# Patient Record
Sex: MALE | Race: WHITE | NOT HISPANIC OR LATINO | Employment: OTHER | URBAN - METROPOLITAN AREA
[De-identification: names, ages, dates, MRNs, and addresses within clinical notes are randomized per-mention and may not be internally consistent; named-entity substitution may affect disease eponyms.]

---

## 2019-09-05 ENCOUNTER — APPOINTMENT (OUTPATIENT)
Dept: GENERAL RADIOLOGY | Facility: HOSPITAL | Age: 84
End: 2019-09-05

## 2019-09-05 ENCOUNTER — APPOINTMENT (OUTPATIENT)
Dept: CT IMAGING | Facility: HOSPITAL | Age: 84
End: 2019-09-05

## 2019-09-05 ENCOUNTER — HOSPITAL ENCOUNTER (INPATIENT)
Facility: HOSPITAL | Age: 84
LOS: 5 days | Discharge: HOME HEALTH | End: 2019-09-10
Attending: EMERGENCY MEDICINE | Admitting: INTERNAL MEDICINE

## 2019-09-05 ENCOUNTER — APPOINTMENT (OUTPATIENT)
Dept: CARDIOLOGY | Facility: HOSPITAL | Age: 84
End: 2019-09-05

## 2019-09-05 DIAGNOSIS — E87.20 LACTIC ACIDOSIS: ICD-10-CM

## 2019-09-05 DIAGNOSIS — A41.9 SEPSIS, DUE TO UNSPECIFIED ORGANISM: ICD-10-CM

## 2019-09-05 DIAGNOSIS — S22.39XA CLOSED FRACTURE OF ONE RIB, UNSPECIFIED LATERALITY, INITIAL ENCOUNTER: ICD-10-CM

## 2019-09-05 DIAGNOSIS — J18.9 PNEUMONIA DUE TO INFECTIOUS ORGANISM, UNSPECIFIED LATERALITY, UNSPECIFIED PART OF LUNG: ICD-10-CM

## 2019-09-05 DIAGNOSIS — S22.050A TRAUMATIC COMPRESSION FRACTURE OF T6 THORACIC VERTEBRA, CLOSED, INITIAL ENCOUNTER (HCC): ICD-10-CM
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
## 2019-09-05 DIAGNOSIS — I71.20 THORACIC AORTIC ANEURYSM WITHOUT RUPTURE (HCC): ICD-10-CM

## 2019-09-05 DIAGNOSIS — M62.82 NON-TRAUMATIC RHABDOMYOLYSIS: Primary | ICD-10-CM

## 2019-09-05 PROBLEM — N28.9 RENAL INSUFFICIENCY: Status: ACTIVE | Noted: 2019-09-05

## 2019-09-05 PROBLEM — E87.6 HYPOKALEMIA: Status: ACTIVE | Noted: 2019-09-05

## 2019-09-05 PROBLEM — R79.89 LACTATE BLOOD INCREASE: Status: ACTIVE | Noted: 2019-09-05

## 2019-09-05 PROBLEM — I71.40 ABDOMINAL AORTIC ANEURYSM (AAA) WITHOUT RUPTURE (HCC): Status: ACTIVE | Noted: 2019-09-05

## 2019-09-05 PROBLEM — E86.0 DEHYDRATION: Status: ACTIVE | Noted: 2019-09-05

## 2019-09-05 LAB
ALBUMIN SERPL-MCNC: 4.1 G/DL (ref 3.5–5.2)
ALBUMIN/GLOB SERPL: 1.1 G/DL
ALP SERPL-CCNC: 68 U/L (ref 39–117)
ALT SERPL W P-5'-P-CCNC: 40 U/L (ref 1–41)
ANION GAP SERPL CALCULATED.3IONS-SCNC: 16 MMOL/L (ref 5–15)
AST SERPL-CCNC: 208 U/L (ref 1–40)
BACTERIA UR QL AUTO: ABNORMAL /HPF
BASOPHILS # BLD MANUAL: 0 10*3/MM3 (ref 0–0.2)
BASOPHILS NFR BLD AUTO: 0 % (ref 0–1.5)
BILIRUB SERPL-MCNC: 0.6 MG/DL (ref 0.2–1.2)
BILIRUB UR QL STRIP: NEGATIVE
BUN BLD-MCNC: 18 MG/DL (ref 8–23)
BUN/CREAT SERPL: 10.5 (ref 7–25)
CALCIUM SPEC-SCNC: 8.9 MG/DL (ref 8.2–9.6)
CHLORIDE SERPL-SCNC: 103 MMOL/L (ref 98–107)
CK SERPL-CCNC: ABNORMAL U/L (ref 20–200)
CLARITY UR: ABNORMAL
CO2 SERPL-SCNC: 21 MMOL/L (ref 22–29)
COARSE GRAN CASTS URNS QL MICRO: ABNORMAL /LPF
COLOR UR: ABNORMAL
CREAT BLD-MCNC: 1.72 MG/DL (ref 0.76–1.27)
D-LACTATE SERPL-SCNC: 1.3 MMOL/L (ref 0.5–2)
D-LACTATE SERPL-SCNC: 2.8 MMOL/L (ref 0.5–2)
DEPRECATED RDW RBC AUTO: 56 FL (ref 37–54)
EOSINOPHIL # BLD MANUAL: 0 10*3/MM3 (ref 0–0.4)
EOSINOPHIL NFR BLD MANUAL: 0 % (ref 0.3–6.2)
ERYTHROCYTE [DISTWIDTH] IN BLOOD BY AUTOMATED COUNT: 17.8 % (ref 12.3–15.4)
GFR SERPL CREATININE-BSD FRML MDRD: 38 ML/MIN/1.73
GLOBULIN UR ELPH-MCNC: 3.8 GM/DL
GLUCOSE BLD-MCNC: 87 MG/DL (ref 65–99)
GLUCOSE UR STRIP-MCNC: NEGATIVE MG/DL
HCT VFR BLD AUTO: 39.6 % (ref 37.5–51)
HGB BLD-MCNC: 12.2 G/DL (ref 13–17.7)
HGB UR QL STRIP.AUTO: ABNORMAL
HOLD SPECIMEN: NORMAL
HYALINE CASTS UR QL AUTO: ABNORMAL /LPF
KETONES UR QL STRIP: ABNORMAL
LEUKOCYTE ESTERASE UR QL STRIP.AUTO: ABNORMAL
LYMPHOCYTES # BLD MANUAL: 1.09 10*3/MM3 (ref 0.7–3.1)
LYMPHOCYTES NFR BLD MANUAL: 4 % (ref 19.6–45.3)
LYMPHOCYTES NFR BLD MANUAL: 5 % (ref 5–12)
MAGNESIUM SERPL-MCNC: 2 MG/DL (ref 1.6–2.4)
MCH RBC QN AUTO: 26.8 PG (ref 26.6–33)
MCHC RBC AUTO-ENTMCNC: 30.8 G/DL (ref 31.5–35.7)
MCV RBC AUTO: 86.8 FL (ref 79–97)
MONOCYTES # BLD AUTO: 1.36 10*3/MM3 (ref 0.1–0.9)
MUCOUS THREADS URNS QL MICRO: ABNORMAL /HPF
NEUTROPHILS # BLD AUTO: 24.82 10*3/MM3 (ref 1.7–7)
NEUTROPHILS NFR BLD MANUAL: 80 % (ref 42.7–76)
NEUTS BAND NFR BLD MANUAL: 11 % (ref 0–5)
NITRITE UR QL STRIP: NEGATIVE
NT-PROBNP SERPL-MCNC: 5517 PG/ML (ref 5–1800)
PH UR STRIP.AUTO: 5.5 [PH] (ref 5–8)
PLAT MORPH BLD: NORMAL
PLATELET # BLD AUTO: 234 10*3/MM3 (ref 140–450)
PMV BLD AUTO: 9.8 FL (ref 6–12)
POTASSIUM BLD-SCNC: 3.1 MMOL/L (ref 3.5–5.2)
PROT SERPL-MCNC: 7.9 G/DL (ref 6–8.5)
PROT UR QL STRIP: ABNORMAL
RBC # BLD AUTO: 4.56 10*6/MM3 (ref 4.14–5.8)
RBC # UR: ABNORMAL /HPF
RBC MORPH BLD: NORMAL
REF LAB TEST METHOD: ABNORMAL
SODIUM BLD-SCNC: 140 MMOL/L (ref 136–145)
SP GR UR STRIP: 1.02 (ref 1–1.03)
SQUAMOUS #/AREA URNS HPF: ABNORMAL /HPF
TROPONIN T SERPL-MCNC: 0.03 NG/ML (ref 0–0.03)
UROBILINOGEN UR QL STRIP: ABNORMAL
WBC MORPH BLD: NORMAL
WBC NRBC COR # BLD: 27.28 10*3/MM3 (ref 3.4–10.8)
WBC UR QL AUTO: ABNORMAL /HPF
WHOLE BLOOD HOLD SPECIMEN: NORMAL
WHOLE BLOOD HOLD SPECIMEN: NORMAL

## 2019-09-05 PROCEDURE — 84484 ASSAY OF TROPONIN QUANT: CPT | Performed by: EMERGENCY MEDICINE

## 2019-09-05 PROCEDURE — 25010000002 CEFTRIAXONE PER 250 MG: Performed by: NURSE PRACTITIONER

## 2019-09-05 PROCEDURE — 93005 ELECTROCARDIOGRAM TRACING: CPT | Performed by: EMERGENCY MEDICINE

## 2019-09-05 PROCEDURE — 81001 URINALYSIS AUTO W/SCOPE: CPT | Performed by: EMERGENCY MEDICINE

## 2019-09-05 PROCEDURE — 25010000002 AZITHROMYCIN PER 500 MG: Performed by: NURSE PRACTITIONER

## 2019-09-05 PROCEDURE — 83880 ASSAY OF NATRIURETIC PEPTIDE: CPT | Performed by: NURSE PRACTITIONER

## 2019-09-05 PROCEDURE — 80053 COMPREHEN METABOLIC PANEL: CPT | Performed by: EMERGENCY MEDICINE

## 2019-09-05 PROCEDURE — A9270 NON-COVERED ITEM OR SERVICE: HCPCS | Performed by: INTERNAL MEDICINE

## 2019-09-05 PROCEDURE — 85025 COMPLETE CBC W/AUTO DIFF WBC: CPT | Performed by: EMERGENCY MEDICINE

## 2019-09-05 PROCEDURE — 71250 CT THORAX DX C-: CPT

## 2019-09-05 PROCEDURE — 85007 BL SMEAR W/DIFF WBC COUNT: CPT | Performed by: EMERGENCY MEDICINE

## 2019-09-05 PROCEDURE — 70450 CT HEAD/BRAIN W/O DYE: CPT

## 2019-09-05 PROCEDURE — 93010 ELECTROCARDIOGRAM REPORT: CPT | Performed by: INTERNAL MEDICINE

## 2019-09-05 PROCEDURE — 99285 EMERGENCY DEPT VISIT HI MDM: CPT

## 2019-09-05 PROCEDURE — 63710000001 POTASSIUM CHLORIDE 10 MEQ CAPSULE CONTROLLED-RELEASE: Performed by: INTERNAL MEDICINE

## 2019-09-05 PROCEDURE — 83605 ASSAY OF LACTIC ACID: CPT | Performed by: EMERGENCY MEDICINE

## 2019-09-05 PROCEDURE — 93971 EXTREMITY STUDY: CPT | Performed by: INTERNAL MEDICINE

## 2019-09-05 PROCEDURE — 93005 ELECTROCARDIOGRAM TRACING: CPT | Performed by: INTERNAL MEDICINE

## 2019-09-05 PROCEDURE — 99223 1ST HOSP IP/OBS HIGH 75: CPT | Performed by: INTERNAL MEDICINE

## 2019-09-05 PROCEDURE — 93971 EXTREMITY STUDY: CPT

## 2019-09-05 PROCEDURE — 71045 X-RAY EXAM CHEST 1 VIEW: CPT

## 2019-09-05 PROCEDURE — 83735 ASSAY OF MAGNESIUM: CPT | Performed by: EMERGENCY MEDICINE

## 2019-09-05 PROCEDURE — 63710000001 ACETAMINOPHEN 325 MG TABLET: Performed by: INTERNAL MEDICINE

## 2019-09-05 PROCEDURE — 63710000001 DOXYCYCLINE 100 MG CAPSULE: Performed by: INTERNAL MEDICINE

## 2019-09-05 PROCEDURE — 87040 BLOOD CULTURE FOR BACTERIA: CPT | Performed by: EMERGENCY MEDICINE

## 2019-09-05 PROCEDURE — 82550 ASSAY OF CK (CPK): CPT | Performed by: NURSE PRACTITIONER

## 2019-09-05 RX ORDER — MAGNESIUM SULFATE HEPTAHYDRATE 40 MG/ML
4 INJECTION, SOLUTION INTRAVENOUS AS NEEDED
Status: DISCONTINUED | OUTPATIENT
Start: 2019-09-05 | End: 2019-09-10 | Stop reason: HOSPADM

## 2019-09-05 RX ORDER — ACETAMINOPHEN 160 MG/5ML
650 SOLUTION ORAL EVERY 4 HOURS PRN
Status: DISCONTINUED | OUTPATIENT
Start: 2019-09-05 | End: 2019-09-10 | Stop reason: HOSPADM

## 2019-09-05 RX ORDER — HYDROCODONE BITARTRATE AND ACETAMINOPHEN 5; 325 MG/1; MG/1
1 TABLET ORAL EVERY 4 HOURS PRN
Status: DISCONTINUED | OUTPATIENT
Start: 2019-09-05 | End: 2019-09-10 | Stop reason: HOSPADM

## 2019-09-05 RX ORDER — SODIUM CHLORIDE 0.9 % (FLUSH) 0.9 %
10 SYRINGE (ML) INJECTION AS NEEDED
Status: DISCONTINUED | OUTPATIENT
Start: 2019-09-05 | End: 2019-09-10 | Stop reason: HOSPADM

## 2019-09-05 RX ORDER — SODIUM CHLORIDE, SODIUM LACTATE, POTASSIUM CHLORIDE, CALCIUM CHLORIDE 600; 310; 30; 20 MG/100ML; MG/100ML; MG/100ML; MG/100ML
100 INJECTION, SOLUTION INTRAVENOUS CONTINUOUS
Status: ACTIVE | OUTPATIENT
Start: 2019-09-05 | End: 2019-09-07

## 2019-09-05 RX ORDER — AMLODIPINE BESYLATE 5 MG/1
5 TABLET ORAL DAILY
Status: ON HOLD | COMMUNITY
End: 2019-09-18 | Stop reason: SDUPTHER

## 2019-09-05 RX ORDER — POTASSIUM CHLORIDE 7.45 MG/ML
10 INJECTION INTRAVENOUS
Status: DISCONTINUED | OUTPATIENT
Start: 2019-09-05 | End: 2019-09-10 | Stop reason: HOSPADM

## 2019-09-05 RX ORDER — POTASSIUM CHLORIDE 750 MG/1
40 CAPSULE, EXTENDED RELEASE ORAL AS NEEDED
Status: DISCONTINUED | OUTPATIENT
Start: 2019-09-05 | End: 2019-09-10 | Stop reason: HOSPADM

## 2019-09-05 RX ORDER — MAGNESIUM SULFATE HEPTAHYDRATE 40 MG/ML
2 INJECTION, SOLUTION INTRAVENOUS AS NEEDED
Status: DISCONTINUED | OUTPATIENT
Start: 2019-09-05 | End: 2019-09-10 | Stop reason: HOSPADM

## 2019-09-05 RX ORDER — SENNA AND DOCUSATE SODIUM 50; 8.6 MG/1; MG/1
2 TABLET, FILM COATED ORAL NIGHTLY
Status: DISCONTINUED | OUTPATIENT
Start: 2019-09-05 | End: 2019-09-10 | Stop reason: HOSPADM

## 2019-09-05 RX ORDER — DOXYCYCLINE 100 MG/1
100 CAPSULE ORAL EVERY 12 HOURS SCHEDULED
Status: DISCONTINUED | OUTPATIENT
Start: 2019-09-05 | End: 2019-09-10 | Stop reason: HOSPADM

## 2019-09-05 RX ORDER — POTASSIUM CHLORIDE 1.5 G/1.77G
40 POWDER, FOR SOLUTION ORAL AS NEEDED
Status: DISCONTINUED | OUTPATIENT
Start: 2019-09-05 | End: 2019-09-10 | Stop reason: HOSPADM

## 2019-09-05 RX ORDER — ONDANSETRON 4 MG/1
4 TABLET, FILM COATED ORAL EVERY 6 HOURS PRN
Status: DISCONTINUED | OUTPATIENT
Start: 2019-09-05 | End: 2019-09-10 | Stop reason: HOSPADM

## 2019-09-05 RX ORDER — SODIUM CHLORIDE 0.9 % (FLUSH) 0.9 %
10 SYRINGE (ML) INJECTION EVERY 12 HOURS SCHEDULED
Status: DISCONTINUED | OUTPATIENT
Start: 2019-09-05 | End: 2019-09-10 | Stop reason: HOSPADM

## 2019-09-05 RX ORDER — HYDRALAZINE HYDROCHLORIDE 50 MG/1
50 TABLET, FILM COATED ORAL 3 TIMES DAILY
COMMUNITY
End: 2019-09-10 | Stop reason: HOSPADM

## 2019-09-05 RX ORDER — ROSUVASTATIN CALCIUM 5 MG/1
5 TABLET, COATED ORAL DAILY
COMMUNITY
End: 2019-09-10 | Stop reason: HOSPADM

## 2019-09-05 RX ORDER — CHOLECALCIFEROL (VITAMIN D3) 125 MCG
5 CAPSULE ORAL NIGHTLY PRN
Status: DISCONTINUED | OUTPATIENT
Start: 2019-09-05 | End: 2019-09-10 | Stop reason: HOSPADM

## 2019-09-05 RX ORDER — ACETAMINOPHEN 650 MG/1
650 SUPPOSITORY RECTAL EVERY 4 HOURS PRN
Status: DISCONTINUED | OUTPATIENT
Start: 2019-09-05 | End: 2019-09-10 | Stop reason: HOSPADM

## 2019-09-05 RX ORDER — ACETAMINOPHEN 325 MG/1
650 TABLET ORAL EVERY 4 HOURS PRN
Status: DISCONTINUED | OUTPATIENT
Start: 2019-09-05 | End: 2019-09-10 | Stop reason: HOSPADM

## 2019-09-05 RX ORDER — ONDANSETRON 2 MG/ML
4 INJECTION INTRAMUSCULAR; INTRAVENOUS EVERY 6 HOURS PRN
Status: DISCONTINUED | OUTPATIENT
Start: 2019-09-05 | End: 2019-09-10 | Stop reason: HOSPADM

## 2019-09-05 RX ADMIN — POTASSIUM CHLORIDE 40 MEQ: 750 CAPSULE, EXTENDED RELEASE ORAL at 16:18

## 2019-09-05 RX ADMIN — ACETAMINOPHEN 650 MG: 325 TABLET ORAL at 14:55

## 2019-09-05 RX ADMIN — SODIUM CHLORIDE, POTASSIUM CHLORIDE, SODIUM LACTATE AND CALCIUM CHLORIDE 150 ML/HR: 600; 310; 30; 20 INJECTION, SOLUTION INTRAVENOUS at 11:55

## 2019-09-05 RX ADMIN — DOXYCYCLINE 100 MG: 100 CAPSULE ORAL at 11:55

## 2019-09-05 RX ADMIN — CEFTRIAXONE 2 G: 2 INJECTION, POWDER, FOR SOLUTION INTRAMUSCULAR; INTRAVENOUS at 10:04

## 2019-09-05 RX ADMIN — ACETAMINOPHEN 650 MG: 325 TABLET ORAL at 20:53

## 2019-09-05 RX ADMIN — SODIUM CHLORIDE, PRESERVATIVE FREE 10 ML: 5 INJECTION INTRAVENOUS at 20:55

## 2019-09-05 RX ADMIN — SODIUM CHLORIDE, POTASSIUM CHLORIDE, SODIUM LACTATE AND CALCIUM CHLORIDE 150 ML/HR: 600; 310; 30; 20 INJECTION, SOLUTION INTRAVENOUS at 19:00

## 2019-09-05 RX ADMIN — POTASSIUM CHLORIDE 40 MEQ: 750 CAPSULE, EXTENDED RELEASE ORAL at 11:57

## 2019-09-05 RX ADMIN — POTASSIUM CHLORIDE 40 MEQ: 750 CAPSULE, EXTENDED RELEASE ORAL at 20:53

## 2019-09-05 RX ADMIN — AZITHROMYCIN MONOHYDRATE 500 MG: 500 INJECTION, POWDER, LYOPHILIZED, FOR SOLUTION INTRAVENOUS at 10:42

## 2019-09-05 RX ADMIN — DOXYCYCLINE 100 MG: 100 CAPSULE ORAL at 20:53

## 2019-09-05 RX ADMIN — SODIUM CHLORIDE 1000 ML: 9 INJECTION, SOLUTION INTRAVENOUS at 10:04

## 2019-09-05 NOTE — ED PROVIDER NOTES
Subjective   Reports she got up to use the bathroom around 2:00 this morning he felt his legs get weak and he fell onto the ground.  He does not think he hit his head and was not knocked out.  He denies any pain from the fall.  He tells me he laid on the floor at about 7:00 periodically dozing off as he did not want to wake anyone up in the house to come get him.  He does have a small skin tear to his left elbow but denies any joint pain.  Family to bedside tells me he lives with them and typically is very mobile gets up and moves around can hold children etc. however now he can barely stand because he reports she is very weak.  He is apparently in A. fib right now but he denies any anticoagulation he also denies any sort of heart disease or previous stroke.  He tells me he did go to  several years ago for some heart problems where he was passing out but was not a good candidate for any sort of procedure.  As of right now he tells me he feels okay his family tells me he would most likely not tell me if he was feeling bad as that is his personality.        Fall   Mechanism of injury: fall    Injury location:  Head/neck  Time since incident:  6 hours  Arrived directly from scene: no    Suspicion of alcohol use: no    Suspicion of drug use: no    Prior to arrival data:     Patient ambulatory at scene: no      Loss of consciousness: no      Amnesic to event: no    Associated symptoms: no abdominal pain, no back pain, no chest pain, no headaches, no loss of consciousness, no nausea, no neck pain and no vomiting        Review of Systems   Constitutional: Negative for chills, diaphoresis and fever.   HENT: Negative for congestion and sore throat.    Respiratory: Negative for cough, choking, chest tightness, shortness of breath and wheezing.    Cardiovascular: Negative for chest pain and leg swelling.   Gastrointestinal: Negative for abdominal distention, abdominal pain, anal bleeding, blood in stool, constipation,  diarrhea, nausea and vomiting.   Genitourinary: Negative for difficulty urinating, dysuria, flank pain, frequency, hematuria and urgency.   Musculoskeletal: Negative for back pain and neck pain.   Neurological: Negative for loss of consciousness and headaches.   All other systems reviewed and are negative.      Past Medical History:   Diagnosis Date   • Cancer (CMS/HCC)     colon   • Hyperlipidemia    • Hypertension        No Known Allergies    Past Surgical History:   Procedure Laterality Date   • COLON SURGERY     • VASCULAR SURGERY         History reviewed. No pertinent family history.    Social History     Socioeconomic History   • Marital status:      Spouse name: Not on file   • Number of children: Not on file   • Years of education: Not on file   • Highest education level: Not on file   Tobacco Use   • Smoking status: Never Smoker   Substance and Sexual Activity   • Alcohol use: Yes     Frequency: Never     Comment: social   • Drug use: No   • Sexual activity: Defer           Objective   Physical Exam   Constitutional: He is oriented to person, place, and time. He appears distressed.   Frail. Ill appearing.   HENT:   Head: Normocephalic and atraumatic.   Right Ear: External ear normal.   Left Ear: External ear normal.   Nose: Nose normal.   Mouth/Throat: Oropharynx is clear and moist.   Small area of bruising on the top of his head.  Denies any pain there is no break in the skin.  There is no cervical spinal tenderness no pain with range of motion.   Eyes: Conjunctivae and EOM are normal. Pupils are equal, round, and reactive to light.   Neck: Normal range of motion. Neck supple.   Cardiovascular: Normal rate, regular rhythm, normal heart sounds and intact distal pulses.   Pulmonary/Chest: Effort normal and breath sounds normal.   Abdominal: Soft. Bowel sounds are normal.   Musculoskeletal: Normal range of motion.   She does have a small superficial less than 1 cm skin tear to his left elbow which is  not actively bleeding.  He does have good range of motion of his left elbow with no pain.    Has no spinal tenderness.  He has no pelvic pain there is no facial grimace with deep palpation and squeezing of his pelvis he does have limited range of motion in his legs rated to him telling me that his legs feel pretty weak and they are mild to moderately swollen which the family tells me is normal for him he usually wears compression garments.   Neurological: He is alert and oriented to person, place, and time.   Skin: Skin is warm and dry.   Psychiatric: He has a normal mood and affect. His behavior is normal. Judgment normal.       Critical Care  Performed by: Carlito Stone APRN  Authorized by: Natanael Rodriguez DO     Critical care provider statement:     Critical care time (minutes):  35    Critical care was necessary to treat or prevent imminent or life-threatening deterioration of the following conditions:  Metabolic crisis and dehydration    Critical care was time spent personally by me on the following activities:  Ordering and performing treatments and interventions, ordering and review of laboratory studies, ordering and review of radiographic studies, pulse oximetry, re-evaluation of patient's condition, review of old charts, obtaining history from patient or surrogate, examination of patient, evaluation of patient's response to treatment, discussions with consultants and development of treatment plan with patient or surrogate               ED Course  ED Course as of Sep 05 1637   Thu Sep 05, 2019   0954 Hospitalist paged.  Patient will receive critical care related to serial evaluations rhabdo pneumonia Case discussed with Dr. Rodriguez.  [MEHNAZ]   1011 Ua pending  [MEHNAZ]      ED Course User Index  [JM] Carlito Stone APRN        CT Chest Without Contrast   Preliminary Result   1. Mild emphysema/COPD with superimposed nonspecific diffuse groundglass   changes most pronounced involving the lingula and bilateral lower lobes    with mild interlobular septal thickening. This is favored to relate to   an infectious/inflammatory etiology superimposed on chronic interstitial   lung disease. Clinical correlation is required.   2. Fusiform aortic aneurysm measuring up to 6.1 cm involving the   ascending aorta with additional enlarged aortic arch and descending   thoracic aorta as described above.   3. Heavy calcified and noncalcified atherosclerotic disease of the   coronary arteries.   4. Age-indeterminate T6 compression deformity. Correlate with point   tenderness for further evaluation.   5. Remote appearing right lower lateral rib fracture is identified.       D:  09/05/2019   E:  09/05/2019          CT Head Without Contrast   Preliminary Result   1. No acute intracranial process appreciated.   2. Chronic senescent changes with diffuse cerebral atrophy most   pronounced involving the bilateral frontal lobes with associated chronic   microvascular ischemic change identified.   3. Favored remote left basal ganglia lacunar infarct.       D:  09/05/2019   E:  09/05/2019          XR Chest 1 View   Preliminary Result   1.  Tortuous and fusiform prominence of the thoracic aorta is suggested   measuring up to 6.4 cm which may be accentuated by the portable   technique of the film. Consider cross-sectional imaging for further   evaluation if underlying acute aortic pathology is of clinical concern.       2.  Coarsened interstitial lung changes noted diffusely without   superimposed evidence of pneumonia.       D:  09/05/2019   E:  09/05/2019                Final diagnoses:   Non-traumatic rhabdomyolysis   Pneumonia due to infectious organism, unspecified laterality, unspecified part of lung   Thoracic aortic aneurysm without rupture (CMS/HCC)   Fall, initial encounter   Traumatic compression fracture of T6 thoracic vertebra, closed, initial encounter (CMS/HCC)   Closed fracture of one rib, unspecified laterality, initial encounter   Lactic acidosis                MDM             Carlito Stone APRN  09/05/19 1011       Carlito Stone APRN  09/05/19 7128

## 2019-09-05 NOTE — PLAN OF CARE
Problem: Patient Care Overview  Goal: Plan of Care Review  Outcome: Ongoing (interventions implemented as appropriate)   09/05/19 9230   Coping/Psychosocial   Plan of Care Reviewed With patient   Plan of Care Review   Progress no change   OTHER   Outcome Summary Consulted to assess patient left elbow for a skin tear sustained from a fall prior to admission. Assessment performed. At this time skin tear is less then 1.0cm, with pink/white exposed dermal layer. Xeroform and foam dressing is already in place. Nothing is needed from C nurse. See order for care needs. Wound dressing and care daily. Will sign off. Thanks

## 2019-09-05 NOTE — H&P
"    [facility] Medicine Services  HISTORY AND PHYSICAL    Patient Name: Ash Diez  : 10/4/1928  MRN: [id number]  Primary Care Physician: Maria Isabel Espinosa MD  Date of admission: 2019      Subjective   Subjective     Chief Complaint: fall    HPI:  Ash Diez is a 89 y.o. male brought in after a fall at home. Per his daughter, the patient \"didn't look well yesterday.\" When asked about this the patient denies feeling poorly though says he didn't eat well. He awoke at 0200 this am to use restroom and was \"so weak in legs and arms that he fell.\" He was unable to get up and rather than call for help he laid in the floor until 0700 this morning. His only other complaint is that he has had congestion/drainage for the past couple of days. Denies f/c, cough.    Review of Systems   Gen- No fevers, chills  CV- No chest pain, palpitations  GI- No N/V/D, abd pain    All other systems reviewed and negative except any additional pertinent positives and negatives discussed in HPI.     All other systems reviewed and are negative.     Personal History     Past Medical History:   Diagnosis Date   • Cancer (CMS/HCC)     colon   • Hyperlipidemia    • Hypertension        Past Surgical History:   Procedure Laterality Date   • COLON SURGERY     • VASCULAR SURGERY         Family History: Otherwise pertinent FHx was reviewed and unremarkable.     Social History:  reports that he has never smoked. He does not have any smokeless tobacco history on file. He reports that he drinks alcohol. He reports that he does not use drugs.  Social History     Social History Narrative   • Not on file       Medications:    Available home medication information reviewed.    (Not in a hospital admission)    No Known Allergies    Objective   Objective     Vital Signs:   Temp:  [100.1 °F (37.8 °C)] 100.1 °F (37.8 °C)  Heart Rate:  [85] 85  Resp:  [18] 18  BP: (106-125)/(70-72) 106/72        Physical Exam   Constitutional: Awake, " alert  Eyes: PERRLA, sclerae anicteric, no conjunctival injection  HENT: NCAT, dry MM, contusion on posterior head  Neck: Supple, no thyromegaly, no lymphadenopathy, trachea midline  Respiratory: Clear to auscultation bilaterally, nonlabored respirations   Cardiovascular: RRR, no murmurs, rubs, or gallops, palpable pedal pulses bilaterally  Gastrointestinal: Positive bowel sounds, soft, nontender, nondistended  Musculoskeletal: Right and left leg edematous, but right leg with erythema and warmth. LUE as below, without TTP.  Psychiatric: Appropriate affect, cooperative  Neurologic: Oriented x 3, strength symmetric in all extremities, Cranial Nerves grossly intact to confrontation, speech clear  Skin: LUE with large ecchymosis    Results Reviewed:  I have personally reviewed current lab and radiology data.    Results from last 7 days   Lab Units 09/05/19  0751   WBC 10*3/mm3 27.28*   HEMOGLOBIN g/dL 12.2*   HEMATOCRIT % 39.6   PLATELETS 10*3/mm3 234     Results from last 7 days   Lab Units 09/05/19  0755 09/05/19  0751   SODIUM mmol/L  --  140   POTASSIUM mmol/L  --  3.1*   CHLORIDE mmol/L  --  103   CO2 mmol/L  --  21.0*   BUN mg/dL  --  18   CREATININE mg/dL  --  1.72*   GLUCOSE mg/dL  --  87   CALCIUM mg/dL  --  8.9   ALT (SGPT) U/L  --  40   AST (SGOT) U/L  --  208*   TROPONIN T ng/mL  --  0.030   PROBNP pg/mL  --  5,517.0*   LACTATE mmol/L 2.8*  --      Estimated Creatinine Clearance: 34.1 mL/min (A) (by C-G formula based on SCr of 1.72 mg/dL (H)).  Brief Urine Lab Results     None        CT chest personally reviewed with mild GGO bilaterally. Agree with interpretation.  Imaging Results (last 24 hours)     Procedure Component Value Units Date/Time    CT Chest Without Contrast [[id number]] Collected:  09/05/19 0910     Updated:  09/05/19 0944    Narrative:       EXAMINATION: CT CHEST WO CONTRAST-      INDICATION: Fall, WBC 27.      TECHNIQUE: Unenhanced CT imaging of the chest was performed. Additional  coronal  sagittal reformatted imaging was obtained for review.     The radiation dose reduction device was turned on for each scan per the  ALARA (As Low as Reasonably Achievable) protocol.     COMPARISON: None.     FINDINGS: Dedicated CT imaging of the lungs demonstrate nonspecific  subpleural reticulation with bibasilar groundglass airspace opacities  and micronodularity noted. Mild COPD/emphysema identified. Mild  bronchial wall thickening noted.     The ascending thoracic aorta demonstrates fusiform enlargement measuring  up to 6.1 cm. The sinus of Valsalva measures approximately 4.4 cm. The  sinotubular junction measures 4.1 cm. The fusiform enlargement of the  aorta extends throughout the aortic arch measuring 4.1 cm. The  descending thoracic aorta is tortuous and measures up to 4.3 cm. After  sclerotic calcifications of the thoracic aorta are identified. Lack of  intravenous contrast limits evaluation of underlying acute aortic  pathology and endoluminal evaluation. Although no inflammation  surrounding the aorta or displaced aortic wall calcification are  identified on the exam. The heart is not particularly enlarged. Aortic  annular calcifications are identified. Heavy coronary arterial  calcification are identified within noncalcified lack noted within the  coronary artery distribution. Trace pericardial fluid is identified. The  thoracic esophagus demonstrates mild small volume of air and fluid noted  throughout its lumen. No mediastinal mass or adenopathy identified. The  visualized upper abdomen does not demonstrate acute abnormality.  Granulomatous disease of the spleen, liver, and mediastinum are  identified. Thoracolumbar degenerative changes are identified. An  age-indeterminate T6 compression fracture is identified. No retropulsion  appreciated. The ribs appear intact.       Impression:       1. Mild emphysema/COPD with superimposed nonspecific diffuse groundglass  changes most pronounced involving the  lingula and bilateral lower lobes  with mild interlobular septal thickening. This is favored to relate to  an infectious/inflammatory etiology superimposed on chronic interstitial  lung disease. Clinical correlation is required.  2. Fusiform aortic aneurysm measuring up to 6.1 cm involving the  ascending aorta with additional enlarged aortic arch and descending  thoracic aorta as described above.  3. Heavy calcified and noncalcified atherosclerotic disease of the  coronary arteries.  4. Age-indeterminate T6 compression deformity. Correlate with point  tenderness for further evaluation.  5. Remote appearing right lower lateral rib fracture is identified.     D:  09/05/2019  E:  09/05/2019       CT Head Without Contrast [[id number]] Collected:  09/05/19 0906     Updated:  09/05/19 0923    Narrative:       EXAMINATION: CT HEAD WO CONTRAST-      INDICATION: Fall.     TECHNIQUE: Unenhanced CT imaging of the brain was performed.     The radiation dose reduction device was turned on for each scan per the  ALARA (As Low as Reasonably Achievable) protocol.     COMPARISON: None.     FINDINGS: Unenhanced CT imaging of the brain was performed without  intravenous contrast which demonstrates no evidence of midline shift. No  hydrocephalus. Diffuse cerebral atrophy most pronounced involving the  frontal lobes are identified with slight asymmetry involving the right  frontal lobe. No evidence for extraaxial fluid collection or  intracranial hemorrhage identified. Bilateral basal ganglia  calcifications are noted. The gray-white matter junction is largely  maintained without CT evidence of transcortical infarct, however CT is  insensitive for the presence of underlying ischemia. MRI would be more  sensitive if clinically relevant. Confluent hypoattenuating white matter  changes are identified likely relating to chronic microvascular ischemic  change.  Hypoattenuated region involving the left caudate head  identified. Favored to be  representative of a remote lacunar infarct.       Impression:       1. No acute intracranial process appreciated.  2. Chronic senescent changes with diffuse cerebral atrophy most  pronounced involving the bilateral frontal lobes with associated chronic  microvascular ischemic change identified.  3. Favored remote left basal ganglia lacunar infarct.     D:  09/05/2019  E:  09/05/2019       XR Chest 1 View [[id number]] Collected:  09/05/19 0822     Updated:  09/05/19 0837    Narrative:       EXAMINATION: XR CHEST 1 VW-09/05/2019:      INDICATION: Weak/Dizzy/AMS, triage protocol.      COMPARISON: NONE.     FINDINGS: Single portable chest radiograph was submitted for review. The  heart is not enlarged. The thoracic aorta is tortuous and appears  enlarged measuring up to 6.4 cm, although is slightly accentuated by the  portable technique of the film. Coarsened interstitial lung changes are  identified. Remote appearing right lateral lower rib fractures noted. No  convincing evidence of superimposed airspace disease. No pleural  effusion or pneumothorax. The visualized upper abdomen is unrevealing.  No acute osseous abnormality appreciated.           Impression:       1.  Tortuous and fusiform prominence of the thoracic aorta is suggested  measuring up to 6.4 cm which may be accentuated by the portable  technique of the film. Consider cross-sectional imaging for further  evaluation if underlying acute aortic pathology is of clinical concern.     2.  Coarsened interstitial lung changes noted diffusely without  superimposed evidence of pneumonia.     D:  09/05/2019  E:  09/05/2019                   Assessment/Plan   Assessment / Plan     Active Hospital Problems    Diagnosis POA   • **Sepsis (CMS/HCC) [A41.9] Yes   • Non-traumatic rhabdomyolysis [M62.82] Yes   • Pneumonia [J18.9] Yes   • Renal insufficiency [N28.9] Yes   • Dehydration [E86.0] Yes   • Hypokalemia [E87.6] Yes   • Lactate blood increase [R79.89] Yes   •  "Abdominal aortic aneurysm (AAA) without rupture (CMS/[facility]) [I71.4] Yes   • Metabolic acidosis [E87.2] Yes     89 y/o male presenting after a fall at home with sepsis due to pna and RLE cellulitis also with rhabdomyolysis.    A/P:  --Cultures pending including sputum culture. Continue IV rocephin and doxy for his PNA and RLE cellulitis.  --Continue IVF and repeat CK in am. Hold statin, antihypertensives.   --I suspect he has some underlying mild CKD though I am unclear as to what his baseline renal function is. IV fluids as above and recheck bmp in am.  --Reflex lactate.  --RLE duplex to r/o DVT.  --Had admission at [facility] for valvular heart disease and syncope related to that. During that visit he was seen by cardiology and CT surgery (for his AAA.) At that time intervention was recommended against. He can follow up with them as needed upon d/c.  --Has \"remote\" stroke mentioned on CT head. Patient denies any associated symptoms with this. Prior to d/c can discuss addition of asa to his statin for ppx.  --Replace K+.  --PT/OT. CM.    DVT prophylaxis: Mechanical on LLE    CODE STATUS:    Code Status and Medical Interventions:   Ordered at: 09/05/19 1045     Limited Support to NOT Include:    Intubation     Code Status:    No CPR     Medical Interventions (Level of Support Prior to Arrest):    Limited       Admission Status:  I believe this patient meets INPATIENT status due to need for IV fluids, repeat labs, IV abx.  I feel patient’s risk for adverse outcomes and need for care warrant INPATIENT evaluation and I predict the patient’s care encounter to likely last beyond 2 midnights.    Electronically signed by Tammy Cobb II, DO, 09/05/19, 10:51 AM.      "

## 2019-09-05 NOTE — PROGRESS NOTES
Discharge Planning Assessment  [facility]     Patient Name: Ash Diez  MRN: [id number]  Today's Date: 9/5/2019    Admit Date: 9/5/2019    Discharge Needs Assessment     Row Name 09/05/19 1608       Living Environment    Lives With  grandchild(arthur);other relative(s)    Name(s) of Who Lives With Patient  Gregoria Lerma's  and 2 kids.     Current Living Arrangements  home/apartment/condo [place] [facility] [place] [facility]     Primary Care Provided by  self    Provides Primary Care For  no one, unable/limited ability to care for self    Family Caregiver if Needed  grandchild(arthur), adult    Quality of Family Relationships  helpful;involved;supportive    Able to Return to Prior Arrangements  yes       Resource/Environmental Concerns    Resource/Environmental Concerns  none    Transportation Concerns  car, none       Transition Planning    Patient/Family Anticipates Transition to  home with family    Patient/Family Anticipated Services at Transition      Transportation Anticipated  family or friend will provide       Discharge Needs Assessment    Concerns to be Addressed  other (see comments) Will need a new PCP     Equipment Currently Used at Home  other (see comments) [facility]    Anticipated Changes Related to Illness  none    Equipment Needed After Discharge  none        Discharge Plan     Row Name 09/05/19 1618       Plan    Plan  Home with [facility]    Patient/Family in Agreement with Plan  yes    Plan Comments  Met with pt and his [facility] at [facility]. He is independent of ADL's. He just moved to live with his [facility] 2 weeks ago from [place]. He doesn't have medication coverage but meds at his [facility] have been affordable. He is agreeable to a new PCP and Meds to Beds. He plans to return home with his [facility] at d/c with no needs. CM will call for a new PCP closer to d/c.     Final Discharge Disposition Code  01 - home or self-care         Destination      No service coordination in this encounter.      Durable Medical Equipment      No service coordination in this encounter.      Dialysis/Infusion      No service coordination in this encounter.      Home Medical Care      No service coordination in this encounter.      Therapy      No service coordination in this encounter.      Community Resources      No service coordination in this encounter.          Demographic Summary     Row Name 09/05/19 1555       General Information    Referral Source  admission list;nursing    Reason for Consult  other (see comments) Assessment     Used During This Interaction  no    General Information Comments  PCP is Maria Isabel Espinosa in [place]. Agreeable to new PCP here.        Contact Information    Permission Granted to Share Info With  ;family/designee Jax    Contact Information Comments  Gregoria [phone]        Functional Status     Row Name 09/05/19 1606       Functional Status    Usual Activity Tolerance  good    Current Activity Tolerance  good       Functional Status, IADL    Medications  assistive person    Meal Preparation  assistive person    Housekeeping  assistive person    Laundry  assistive person    Shopping  assistive person    IADL Comments  Pt is active. Pt is able to cook and clean but his jaclyn does it.        Employment/    Employment/ Comments  Has Medicare [employer]B and [employer]. States he has no medication coverage but copays have been affordable. Used [facility] in the past.         Psychosocial    No documentation.       Abuse/Neglect    No documentation.       Legal    No documentation.       Substance Abuse    No documentation.       Patient Forms    No documentation.           Rupa Tian RN

## 2019-09-05 NOTE — PLAN OF CARE
Problem: Patient Care Overview  Goal: Plan of Care Review  Outcome: Ongoing (interventions implemented as appropriate)   09/05/19 1701   Coping/Psychosocial   Plan of Care Reviewed With patient;family   Plan of Care Review   Progress no change   OTHER   Outcome Summary VSS, Max Temp 99, Afib on monitor, remains on RA. Left skin tear noted to left elbow s/p fall. LR infusing at 150 ml/hr per order. Tylenol given for headache. Awaiting duplex RLE. RLE reddened/hot. K replaced per protocol. LBM today. Denies any other needs/complaints. Will continue to monitor.

## 2019-09-06 LAB
ANION GAP SERPL CALCULATED.3IONS-SCNC: 11 MMOL/L (ref 5–15)
BH CV ECHO MEAS - BSA(HAYCOCK): 2.2 M^2
BH CV ECHO MEAS - BSA: 2.2 M^2
BH CV ECHO MEAS - BZI_BMI: 28.3 KILOGRAMS/M^2
BH CV ECHO MEAS - BZI_METRIC_HEIGHT: 182.9 CM
BH CV ECHO MEAS - BZI_METRIC_WEIGHT: 94.8 KG
BH CV LOWER VASCULAR LEFT COMMON FEMORAL AUGMENT: NORMAL
BH CV LOWER VASCULAR LEFT COMMON FEMORAL COMPRESS: NORMAL
BH CV LOWER VASCULAR LEFT COMMON FEMORAL PHASIC: NORMAL
BH CV LOWER VASCULAR LEFT COMMON FEMORAL SPONT: NORMAL
BH CV LOWER VASCULAR RIGHT COMMON FEMORAL AUGMENT: NORMAL
BH CV LOWER VASCULAR RIGHT COMMON FEMORAL COMPRESS: NORMAL
BH CV LOWER VASCULAR RIGHT COMMON FEMORAL PHASIC: NORMAL
BH CV LOWER VASCULAR RIGHT COMMON FEMORAL SPONT: NORMAL
BH CV LOWER VASCULAR RIGHT DISTAL FEMORAL COMPRESS: NORMAL
BH CV LOWER VASCULAR RIGHT GASTRONEMIUS COMPRESS: NORMAL
BH CV LOWER VASCULAR RIGHT GREATER SAPH AK COMPRESS: NORMAL
BH CV LOWER VASCULAR RIGHT GREATER SAPH BK COMPRESS: NORMAL
BH CV LOWER VASCULAR RIGHT LESSER SAPH COMPRESS: NORMAL
BH CV LOWER VASCULAR RIGHT MID FEMORAL AUGMENT: NORMAL
BH CV LOWER VASCULAR RIGHT MID FEMORAL COMPRESS: NORMAL
BH CV LOWER VASCULAR RIGHT MID FEMORAL PHASIC: NORMAL
BH CV LOWER VASCULAR RIGHT MID FEMORAL SPONT: NORMAL
BH CV LOWER VASCULAR RIGHT PERONEAL COMPRESS: NORMAL
BH CV LOWER VASCULAR RIGHT POPLITEAL AUGMENT: NORMAL
BH CV LOWER VASCULAR RIGHT POPLITEAL COMPRESS: NORMAL
BH CV LOWER VASCULAR RIGHT POPLITEAL PHASIC: NORMAL
BH CV LOWER VASCULAR RIGHT POPLITEAL SPONT: NORMAL
BH CV LOWER VASCULAR RIGHT POSTERIOR TIBIAL COMPRESS: NORMAL
BH CV LOWER VASCULAR RIGHT PROFUNDA FEMORAL COMPRESS: NORMAL
BH CV LOWER VASCULAR RIGHT PROXIMAL FEMORAL COMPRESS: NORMAL
BH CV LOWER VASCULAR RIGHT SAPHENOFEMORAL JUNCTION AUGMENT: NORMAL
BH CV LOWER VASCULAR RIGHT SAPHENOFEMORAL JUNCTION COMPRESS: NORMAL
BH CV LOWER VASCULAR RIGHT SAPHENOFEMORAL JUNCTION PHASIC: NORMAL
BH CV LOWER VASCULAR RIGHT SAPHENOFEMORAL JUNCTION SPONT: NORMAL
BUN BLD-MCNC: 24 MG/DL (ref 8–23)
BUN/CREAT SERPL: 15.6 (ref 7–25)
CALCIUM SPEC-SCNC: 8.5 MG/DL (ref 8.2–9.6)
CHLORIDE SERPL-SCNC: 106 MMOL/L (ref 98–107)
CK SERPL-CCNC: ABNORMAL U/L (ref 20–200)
CO2 SERPL-SCNC: 21 MMOL/L (ref 22–29)
CREAT BLD-MCNC: 1.54 MG/DL (ref 0.76–1.27)
DEPRECATED RDW RBC AUTO: 58.2 FL (ref 37–54)
ERYTHROCYTE [DISTWIDTH] IN BLOOD BY AUTOMATED COUNT: 18.3 % (ref 12.3–15.4)
GFR SERPL CREATININE-BSD FRML MDRD: 43 ML/MIN/1.73
GLUCOSE BLD-MCNC: 103 MG/DL (ref 65–99)
HCT VFR BLD AUTO: 34.8 % (ref 37.5–51)
HGB BLD-MCNC: 10.7 G/DL (ref 13–17.7)
MCH RBC QN AUTO: 26.9 PG (ref 26.6–33)
MCHC RBC AUTO-ENTMCNC: 30.7 G/DL (ref 31.5–35.7)
MCV RBC AUTO: 87.4 FL (ref 79–97)
PLATELET # BLD AUTO: 179 10*3/MM3 (ref 140–450)
PMV BLD AUTO: 10.2 FL (ref 6–12)
POTASSIUM BLD-SCNC: 4.3 MMOL/L (ref 3.5–5.2)
POTASSIUM BLD-SCNC: 4.4 MMOL/L (ref 3.5–5.2)
RBC # BLD AUTO: 3.98 10*6/MM3 (ref 4.14–5.8)
SODIUM BLD-SCNC: 138 MMOL/L (ref 136–145)
WBC NRBC COR # BLD: 18.97 10*3/MM3 (ref 3.4–10.8)

## 2019-09-06 PROCEDURE — 99232 SBSQ HOSP IP/OBS MODERATE 35: CPT | Performed by: INTERNAL MEDICINE

## 2019-09-06 PROCEDURE — 80048 BASIC METABOLIC PNL TOTAL CA: CPT | Performed by: INTERNAL MEDICINE

## 2019-09-06 PROCEDURE — 97165 OT EVAL LOW COMPLEX 30 MIN: CPT

## 2019-09-06 PROCEDURE — 85027 COMPLETE CBC AUTOMATED: CPT | Performed by: INTERNAL MEDICINE

## 2019-09-06 PROCEDURE — 82550 ASSAY OF CK (CPK): CPT | Performed by: INTERNAL MEDICINE

## 2019-09-06 PROCEDURE — 97535 SELF CARE MNGMENT TRAINING: CPT

## 2019-09-06 PROCEDURE — 84132 ASSAY OF SERUM POTASSIUM: CPT | Performed by: INTERNAL MEDICINE

## 2019-09-06 PROCEDURE — 97161 PT EVAL LOW COMPLEX 20 MIN: CPT

## 2019-09-06 PROCEDURE — 25010000002 CEFTRIAXONE PER 250 MG: Performed by: INTERNAL MEDICINE

## 2019-09-06 PROCEDURE — 63710000001 DOXYCYCLINE 100 MG CAPSULE: Performed by: INTERNAL MEDICINE

## 2019-09-06 PROCEDURE — A9270 NON-COVERED ITEM OR SERVICE: HCPCS | Performed by: INTERNAL MEDICINE

## 2019-09-06 RX ADMIN — SODIUM CHLORIDE, POTASSIUM CHLORIDE, SODIUM LACTATE AND CALCIUM CHLORIDE 100 ML/HR: 600; 310; 30; 20 INJECTION, SOLUTION INTRAVENOUS at 21:54

## 2019-09-06 RX ADMIN — SODIUM CHLORIDE, PRESERVATIVE FREE 10 ML: 5 INJECTION INTRAVENOUS at 20:09

## 2019-09-06 RX ADMIN — ACETAMINOPHEN 650 MG: 325 TABLET ORAL at 20:22

## 2019-09-06 RX ADMIN — DOXYCYCLINE 100 MG: 100 CAPSULE ORAL at 20:09

## 2019-09-06 RX ADMIN — CEFTRIAXONE 1 G: 1 INJECTION, POWDER, FOR SOLUTION INTRAMUSCULAR; INTRAVENOUS at 10:55

## 2019-09-06 RX ADMIN — SODIUM CHLORIDE, POTASSIUM CHLORIDE, SODIUM LACTATE AND CALCIUM CHLORIDE 150 ML/HR: 600; 310; 30; 20 INJECTION, SOLUTION INTRAVENOUS at 02:24

## 2019-09-06 RX ADMIN — SODIUM CHLORIDE, POTASSIUM CHLORIDE, SODIUM LACTATE AND CALCIUM CHLORIDE 150 ML/HR: 600; 310; 30; 20 INJECTION, SOLUTION INTRAVENOUS at 08:09

## 2019-09-06 RX ADMIN — DOXYCYCLINE 100 MG: 100 CAPSULE ORAL at 08:09

## 2019-09-06 NOTE — PLAN OF CARE
Problem: Patient Care Overview  Goal: Plan of Care Review  Outcome: Ongoing (interventions implemented as appropriate)   09/06/19 6838   Coping/Psychosocial   Plan of Care Reviewed With patient   OTHER   Outcome Summary PT initial evaluation completed for pt presenting with generalized weakness, impaired balance, and decreased functional mobility. Pt ambulated 250ft with CGA. Recommend use of RW for future ambulation. Pt's decreased independence warrants PT skilled care. Recommend D/C home with assistance and  PT services.

## 2019-09-06 NOTE — PROGRESS NOTES
"Transitional Care Follow Up Visit  Subjective     Ash Diez is a 89 y.o. male who presents for a transitional care management visit.    Within 48 business hours after discharge our office contacted him via telephone to coordinate his care and needs.      I reviewed and discussed the details of that call along with the discharge summary, hospital problems, inpatient lab results, inpatient diagnostic studies, and consultation reports with Ash.     Current outpatient and discharge medications have been reconciled for the patient.    Date of TCM Phone Call 9/12/2019   Hospital [facility]   Date of Admission 9/5/2019   Date of Discharge 9/10/2019   Discharge Disposition [facility] [place]     Risk for Readmission (LACE) Score: 8 (9/10/2019  6:01 AM)    Chief Complaint   Patient presents with   • Transitional Care Management     Here with daughter    History of Present Illness   Course During Hospital Stay:      89 year-old male admitted to the [facility] 9/5-9/10 after presenting with weakness following fall at home.  He was diagnosed with sepsis due to pneumonia, right lower extremity cellulitis and rhabdomyolysis.  CT chest showed bilateral lower lobe pneumonia.  Blood cultures were no growth to date.  He was treated with ceftriaxone and doxycycline for pneumonia and additionally the RLE cellulitis.  Rhabdomyolysis was resolving, CK trending down to normal (was 1759 on day of discharge).  Statin has been on hold and is deferred to PCP about 1 to restart.  RLE duplex negative for DVT.  CT head showed possible remote CVA.  To discuss potential ASA with statin at PCP visit.  Declined rehab and was walking well prior to discharge.  Was discharged with home health for skilled nursing and PT. he completed a total of 7 days of antibiotics (discharged with cefuroxime and doxy).    Since his discharge home he states that his breathing \"is fair.\"  He is not having much cough or sputum production.  " Still feels a little short of breath at times.  His right lower extremity still swollen and warm but not worse than before.  He is not having any fevers or chills.    Breathing fair  Leg still warm  166/90  140-160's/50's  No diuretics due to AAA  Compression stockings    Chronic conditions:  1.  Hypertension:  On amlodipine 5 mg daily only currently. Per home health nurse, patient's blood pressure has been running 160s over 90s.  Prior to hospitalization blood pressure was running 140s-160s over 50s.  He was on hydralazine 50 mg 3 times daily and amlodipine 5 mg daily prior to hospitalization, patient is unsure why he was taken off hydralazine.    2.  Hyperlipidemia:  Had previously been on Crestor 5 mg daily prior to hospitalization as above.  He believes Lipitor caused leg weakness and was stopped.  Daughter is wondering if Crestor was the reason for his leg weakness in the setting of presenting to the hospital as above.    Lab Results   Component Value Date    WBC 9.27 09/09/2019    HGB 10.5 (L) 09/09/2019    HCT 34.2 (L) 09/09/2019    MCV 86.4 09/09/2019     09/09/2019     Lab Results   Component Value Date    GLUCOSE 106 (H) 09/09/2019    BUN 15 09/09/2019    CREATININE 1.04 09/09/2019    EGFRIFNONA 67 09/09/2019    BCR 14.4 09/09/2019    K 3.7 09/09/2019    CO2 24.0 09/09/2019    CALCIUM 8.3 09/09/2019    ALBUMIN 4.10 09/05/2019     (H) 09/05/2019    ALT 40 09/05/2019         Past Medical History:   Diagnosis Date   • Cancer (CMS/HCC)     colon   • Hyperlipidemia    • Hypertension      Past Surgical History:   Procedure Laterality Date   • COLON SURGERY     • VASCULAR SURGERY      Vein removal       Current Outpatient Medications:   •  amLODIPine (NORVASC) 5 MG tablet, Take 5 mg by mouth Daily., Disp: , Rfl:   •  cefuroxime (CEFTIN) 250 MG tablet, Take 1 tablet by mouth 2 (Two) Times a Day for 3 days., Disp: 6 tablet, Rfl: 0  •  doxycycline (VIBRAMYICN) 100 MG tablet, Take 1 tablet by mouth 2  (Two) Times a Day for 3 days., Disp: 6 tablet, Rfl: 0  •  hydrALAZINE (APRESOLINE) 50 MG tablet, Take 50 mg by mouth 3 (Three) Times a Day., Disp: , Rfl:   •  rosuvastatin (CRESTOR) 5 MG tablet, Take 5 mg by mouth Daily., Disp: , Rfl:      No Known Allergies     Family History   Problem Relation Age of Onset   • Arthritis Mother    • Heart attack Mother    • Stroke Father    • Stroke Sister    • Stroke Brother      Social History     Socioeconomic History   • Marital status:      Spouse name: Not on file   • Number of children: Not on file   • Years of education: Not on file   • Highest education level: Not on file   Tobacco Use   • Smoking status: Never Smoker   • Smokeless tobacco: Never Used   Substance and Sexual Activity   • Alcohol use: Yes     Frequency: Never     Comment: social   • Drug use: No   • Sexual activity: Defer       Review of Systems   Constitutional: Negative for activity change, appetite change and fever.   HENT: Negative for congestion, sneezing and sore throat.    Eyes: Negative for discharge and visual disturbance.   Respiratory: Positive for cough and shortness of breath.    Cardiovascular: Positive for leg swelling. Negative for chest pain and palpitations.   Gastrointestinal: Negative for abdominal distention, abdominal pain, blood in stool, constipation, nausea and vomiting.   Endocrine: Negative for cold intolerance and heat intolerance.   Genitourinary: Negative for dysuria.   Musculoskeletal: Negative for neck stiffness.   Skin: Negative for rash.   Allergic/Immunologic: Negative for environmental allergies and food allergies.   Neurological: Negative for headache.   Hematological: Negative for adenopathy.   Psychiatric/Behavioral: Negative for depressed mood.       Objective   Vitals:    09/13/19 1429   BP: 162/92   Pulse: 77   Resp: 16   Temp: 97.8 °F (36.6 °C)   SpO2: 99%     Physical Exam   Constitutional: He is oriented to person, place, and time. He appears  well-developed and well-nourished. No distress.   HENT:   Head: Normocephalic and atraumatic.   Right Ear: External ear normal.   Left Ear: External ear normal.   Mouth/Throat: Oropharynx is clear and moist. No oropharyngeal exudate.   Eyes: Conjunctivae are normal. Right eye exhibits no discharge. Left eye exhibits no discharge. No scleral icterus.   Neck: Normal range of motion. Neck supple. No thyromegaly present.   Cardiovascular: Normal rate and regular rhythm. Exam reveals no gallop and no friction rub.   Murmur (systolic 3/6) heard.  Pulmonary/Chest: Effort normal and breath sounds normal. No stridor. No respiratory distress. He has no wheezes. He has no rales.   Abdominal: Soft. Bowel sounds are normal. He exhibits no distension and no mass. There is no tenderness. There is no rebound and no guarding.   Musculoskeletal:   Distal RLE has 2+ pitting edema with slight warmth and erythema, predominantly anteriorly.  LLE appears normal with only trace pitting edema.   Lymphadenopathy:     He has no cervical adenopathy.   Neurological: He is alert and oriented to person, place, and time. He exhibits normal muscle tone.   Skin: Skin is warm and dry. Capillary refill takes less than 2 seconds. He is not diaphoretic.   Psychiatric: He has a normal mood and affect.   Vitals reviewed.      Assessment and Plan: 89 y.o. male here for:  Essential hypertension  Stable.  Not quite at goal of less than 140/90, however with recent hospitalization, age, weakness and history of falls will avoid uptitrating medications at current visit.  Continue to monitor BP at home and at 2-week follow-up if still elevated above 160/90 may consider increasing amlodipine cautiously.    Hyperlipidemia  Currently off of Crestor 5 mg daily in the setting of recent rhabdo.  Has been intolerant to Lipitor secondary to myalgias/muscle weakness.  No recent lipid panel.  Will plan to check fasting lipid panel at next visit and provided LFTs/CK have  normalized, discussed risk/benefits of restarting.  Due to age, may no longer need statin.    Heart murmur  Likely has some aortic valve disease.  No recent echo.  Will order.  Pending this, may benefit from cardiology evaluation to help manage fluid status.    Pneumonia  Lungs are clear and room air O2 sat is stable.  Continue to monitor.  Reports of shortness of breath may be related to cardiac status as above or deconditioning.    Non-traumatic rhabdomyolysis  Repeat CPK today.    Anemia  H/H 10.5/34.2 on 9/9.  May be related to IV fluids given during recent hospital stay.  Will repeat CBC today.  If anemia persists, discussed risk/benefits of further work-up (i.e. endoscopy) due to age.    Cellulitis  RLE still remains warm/erythematous, asymmetrical versus left.  No worsening swelling since discharge per patient.  Concern for ongoing cellulitis.  Will extend 3-day course of cefuroxime and doxycycline (total approximately 10 days of antibiotics).  May also have some venous insufficiency.  RLE duplex was negative for DVT during recent hospitalization which is reassuring.  If he has any further spreading erythema, chills, recurrent fevers, worsening swelling over the weekend he would need to see the ER as he would have failed outpatient management.  Have also recommended compression stockings in the meantime along with leg elevation and watching sodium intake.    Healthcare maintenance:  1.  Patient states to me that he wishes to be DNR/DNI.  He does not have a living will or advanced directive.  I have provided him with information today to have this completed so we can document this for the record.  2.  Work on obtaining records from [facility] hospitalization regarding AAA.  Certainly due to age and otherwise stable condition, not likely a surgical candidate and patient indicates today that he would likely refuse any intervention.    Return in about 2 weeks (around 9/27/2019) for Follow-up EXTENDED 30 minutes fasting.      Shirin To MD  9/13/2019

## 2019-09-06 NOTE — THERAPY EVALUATION
Patient Name: Ash Diez  : 10/4/1928    MRN: [id number]                              Today's Date: 2019       Admit Date: 2019    Visit Dx:     ICD-10-CM ICD-9-CM   1. Non-traumatic rhabdomyolysis M62.82 728.88   2. Pneumonia due to infectious organism, unspecified laterality, unspecified part of lung J18.9 136.9     484.8   3. Thoracic aortic aneurysm without rupture (CMS/[facility]) I71.2 441.2   4. Fall, initial encounter W19.XXXA E888.9   5. Traumatic compression fracture of T6 thoracic vertebra, closed, initial encounter (CMS/[facility]) S22.050A 805.2   6. Closed fracture of one rib, unspecified laterality, initial encounter S22.39XA 807.01   7. Lactic acidosis E87.2 276.2     Patient Active Problem List   Diagnosis   • Non-traumatic rhabdomyolysis   • Sepsis (CMS/[facility])   • Pneumonia   • Renal insufficiency   • Dehydration   • Hypokalemia   • Lactate blood increase   • Abdominal aortic aneurysm (AAA) without rupture (CMS/[facility])   • Metabolic acidosis     Past Medical History:   Diagnosis Date   • Cancer (CMS/[facility])     colon   • Hyperlipidemia    • Hypertension      Past Surgical History:   Procedure Laterality Date   • COLON SURGERY     • VASCULAR SURGERY       General Information     Row Name 19 1129          PT Evaluation Time/Intention    Document Type  evaluation  -KR     Mode of Treatment  physical therapy  -KR     Row Name 19 1129          General Information    Patient Profile Reviewed?  yes  -KR     Prior Level of Function  independent:;all household mobility;gait;transfer;ADL's;dressing;bathing  -KR     Existing Precautions/Restrictions  fall  -KR     Barriers to Rehab  none identified  -KR     Row Name 19 1129          Relationship/Environment    Lives With  grandchild(arthur)  -KR     Row Name 19 1129          Resource/Environmental Concerns    Current Living Arrangements  home/apartment/condo  -KR     Row Name 19 1129          Home Main Entrance    Number of Stairs, Main  Entrance  three  -KR     Stair Railings, Main Entrance  none  -KR     Row Name 09/06/19 1129          Stairs Within Home, Primary    Number of Stairs, Within Home, Primary  none  -KR     Row Name 09/06/19 1129          Cognitive Assessment/Intervention- PT/OT    Orientation Status (Cognition)  oriented to;person;place;situation  -KR     Row Name 09/06/19 1129          Safety Issues, Functional Mobility    Safety Issues Affecting Function (Mobility)  insight into deficits/self awareness;safety precaution awareness;safety precautions follow-through/compliance  -KR     Impairments Affecting Function (Mobility)  balance;coordination;endurance/activity tolerance;strength  -KR       User Key  (r) = Recorded By, (t) = Taken By, (c) = Cosigned By    Initials Name Provider Type    Tatyana Lorenzo, PT Physical Therapist        Mobility     Row Name 09/06/19 1131          Bed Mobility Assessment/Treatment    Bed Mobility Assessment/Treatment  scooting/bridging;supine-sit;sit-supine  -KR     Scooting/Bridging [place] (Bed Mobility)  minimum assist (75% patient effort);2 person assist;verbal cues  -KR     Supine-Sit [place] (Bed Mobility)  minimum assist (75% patient effort);verbal cues  -KR     Sit-Supine [place] (Bed Mobility)  supervision;verbal cues  -KR     Assistive Device (Bed Mobility)  bed rails;draw sheet;head of bed elevated  -KR     Comment (Bed Mobility)  VC's for sequencing. Pt required minimal assistance at trunk when moving to upright position.   -KR     Row Name 09/06/19 1131          Transfer Assessment/Treatment    Comment (Transfers)  VC's for sequencing; pt declined use of RW for transfers and ambulation.   -KR     Row Name 09/06/19 1131          Sit-Stand Transfer    Sit-Stand [place] (Transfers)  stand by assist;verbal cues  -KR     Row Name 09/06/19 1131          Gait/Stairs Assessment/Training    Gait/Stairs Assessment/Training  gait/ambulation independence  -KR     [place]  Level (Gait)  contact guard;verbal cues  -KR     Distance in Feet (Gait)  250  -KR     Pattern (Gait)  step-through  -KR     Deviations/Abnormal Patterns (Gait)  base of support, narrow;noel decreased;stride length decreased  -KR     Bilateral Gait Deviations  heel strike decreased  -KR     Comment (Gait/Stairs)  Pt demonstrated step through gait pattern with slow noel and short, small steps. Pt with periods of decreased balance and coordination; able to self correct. Recommend use of RW for increased stability.   -KR       User Key  (r) = Recorded By, (t) = Taken By, (c) = Cosigned By    Initials Name Provider Type    Tatyana Lorenzo, PT Physical Therapist        Obj/Interventions     Row Name 09/06/19 1133          General ROM    GENERAL ROM COMMENTS  BLE WFL   -KR     Row Name 09/06/19 1133          MMT (Manual Muscle Testing)    General MMT Comments  BLE grossly 4/5  -KR     Row Name 09/06/19 1133          Static Sitting Balance    Level of [place] (Unsupported Sitting, Static Balance)  supervision  -KR     Sitting Position (Unsupported Sitting, Static Balance)  sitting on edge of bed  -KR     Row Name 09/06/19 1133          Static Standing Balance    Level of [place] (Supported Standing, Static Balance)  standby assist  -KR     Row Name 09/06/19 1133          Dynamic Standing Balance    Level of [place], Reaches Outside Midline (Standing, Dynamic Balance)  contact guard assist  -KR     Row Name 09/06/19 1133          Sensory Assessment/Intervention    Sensory General Assessment  no sensation deficits identified  -KR       User Key  (r) = Recorded By, (t) = Taken By, (c) = Cosigned By    Initials Name Provider Type    Tatyana Lorenzo, PT Physical Therapist        Goals/Plan     Row Name 09/06/19 1135          Bed Mobility Goal 1 (PT)    Activity/Assistive Device (Bed Mobility Goal 1, PT)  bed mobility activities, all  -KR     [place] Level/Cues Needed (Bed Mobility Goal 1, PT)   independent  -KR     Time Frame (Bed Mobility Goal 1, PT)  2 weeks  -KR     Progress/Outcomes (Bed Mobility Goal 1, PT)  goal ongoing  -KR     Row Name 09/06/19 1135          Transfer Goal 1 (PT)    Activity/Assistive Device (Transfer Goal 1, PT)  sit-to-stand/stand-to-sit;bed-to-chair/chair-to-bed;walker, rolling  -KR     [place] Level/Cues Needed (Transfer Goal 1, PT)  conditional independence  -KR     Time Frame (Transfer Goal 1, PT)  2 weeks  -KR     Row Name 09/06/19 1135          Gait Training Goal 1 (PT)    Activity/Assistive Device (Gait Training Goal 1, PT)  gait (walking locomotion);assistive device use;walker, rolling  -KR     [place] Level (Gait Training Goal 1, PT)  conditional independence  -KR     Distance (Gait Goal 1, PT)  400 feet  -KR     Time Frame (Gait Training Goal 1, PT)  2 weeks  -KR     Progress/Outcome (Gait Training Goal 1, PT)  goal ongoing  -KR     Row Name 09/06/19 1135          Stairs Goal 1 (PT)    Activity/Assistive Device (Stairs Goal 1, PT)  ascending stairs;descending stairs;step-to-step  -KR     [place] Level/Cues Needed (Stairs Goal 1, PT)  contact guard assist  -KR     Number of Stairs (Stairs Goal 1, PT)  3  -KR     Time Frame (Stairs Goal 1, PT)  2 weeks  -KR     Progress/Outcome (Stairs Goal 1, PT)  goal ongoing  -KR       User Key  (r) = Recorded By, (t) = Taken By, (c) = Cosigned By    Initials Name Provider Type    Tatyana Lorenzo, PT Physical Therapist        Clinical Impression     Row Name 09/06/19 1134          Pain Assessment    Additional Documentation  Pain Scale: Numbers Pre/Post-Treatment (Group)  -KR     Row Name 09/06/19 1134          Pain Scale: Numbers Pre/Post-Treatment    Pain Scale: Numbers, Pretreatment  0/10 - no pain  -KR     Pain Scale: Numbers, Post-Treatment  0/10 - no pain  -KR     Row Name 09/06/19 1134          Plan of Care Review    Plan of Care Reviewed With  patient  -KR     Row Name 09/06/19 113          Physical Therapy  Clinical Impression    Patient/Family Goals Statement (PT Clinical Impression)  return to PLOF  -KR     Criteria for Skilled Interventions Met (PT Clinical Impression)  yes;treatment indicated  -KR     Rehab Potential (PT Clinical Summary)  good, to achieve stated therapy goals  -KR     Row Name 09/06/19 1134          Vital Signs    Pre Systolic BP Rehab  127  -KR     Pre Treatment Diastolic BP  75  -KR     Pretreatment Heart Rate (beats/min)  68  -KR     Posttreatment Heart Rate (beats/min)  72  -KR     Pre SpO2 (%)  96  -KR     O2 Delivery Pre Treatment  room air  -KR     Post SpO2 (%)  97  -KR     O2 Delivery Post Treatment  room air  -KR     Pre Patient Position  Supine  -KR     Intra Patient Position  Standing  -KR     Post Patient Position  Supine  -KR     Row Name 09/06/19 1134          Positioning and Restraints    Pre-Treatment Position  in bed  -KR     Post Treatment Position  bed  -KR     In Bed  notified nsg;supine;call light within reach;encouraged to call for assist;exit alarm on;side rails up x2;SCD pump applied  -KR       User Key  (r) = Recorded By, (t) = Taken By, (c) = Cosigned By    Initials Name Provider Type    Tatyana Lorenzo, PT Physical Therapist        Outcome Measures     Row Name 09/06/19 1139          How much help from another person do you currently need...    Turning from your back to your side while in flat bed without using bedrails?  3  -KR     Moving from lying on back to sitting on the side of a flat bed without bedrails?  3  -KR     Moving to and from a bed to a chair (including a wheelchair)?  3  -KR     Standing up from a chair using your arms (e.g., wheelchair, bedside chair)?  3  -KR     Climbing 3-5 steps with a railing?  2  -KR     To walk in hospital room?  3  -KR     AM-PAC 6 Clicks Score (PT)  17  -KR     Row Name 09/06/19 1139          Functional Assessment    Outcome Measure Options  AM-PAC 6 Clicks Basic Mobility (PT)  -KR       User Key  (r) = Recorded By, (t) =  Taken By, (c) = Cosigned By    Initials Name Provider Type    Tatyana Lorenzo PT Physical Therapist        Physical Therapy Education     Title: PT OT SLP Therapies (In Progress)     Topic: Physical Therapy (In Progress)     Point: Mobility training (In Progress)     Learning Progress Summary           Patient Acceptance, E, NR by KR at 9/6/2019  9:34 AM                   Point: Body mechanics (In Progress)     Learning Progress Summary           Patient Acceptance, E, NR by KR at 9/6/2019  9:34 AM                   Point: Precautions (In Progress)     Learning Progress Summary           Patient Acceptance, E, NR by KR at 9/6/2019  9:34 AM                               User Key     Initials Effective Dates Name Provider Type Discipline     04/03/18 -  Tatyana Garcia PT Physical Therapist PT              PT Recommendation and Plan  Planned Therapy Interventions (PT Eval): balance training, bed mobility training, gait training, stair training, strengthening, transfer training  Outcome Summary/Treatment Plan (PT)  Anticipated Discharge Disposition (PT): home with assist, home with home health  Plan of Care Reviewed With: patient  Outcome Summary: PT initial evaluation completed for pt presenting with generalized weakness, impaired balance, and decreased functional mobility. Pt ambulated 250ft with CGA. Recommend use of RW for future ambulation. Pt's decreased independence warrants PT skilled care. Recommend D/C home with assistance and  PT services.      Time Calculation:   PT Charges     Row Name 09/06/19 0934             Time Calculation    Start Time  0934  -KR      PT Received On  09/06/19  -KTAHIA      PT Goal Re-Cert Due Date  09/16/19  -KATHIA        User Key  (r) = Recorded By, (t) = Taken By, (c) = Cosigned By    Initials Name Provider Type    Tatyana Lorenzo PT Physical Therapist        Therapy Charges for Today     Code Description Service Date Service Provider Modifiers Qty    [id number] HC PT EVAL  LOW COMPLEXITY 4 9/6/2019 Tatyana Garcia, PT GP 1          PT G-Codes  Outcome Measure Options: AM-PAC 6 Clicks Basic Mobility (PT)  AM-PAC 6 Clicks Score (PT): 17  AM-PAC 6 Clicks Score (OT): 20    Kami Garcia PT  9/6/2019

## 2019-09-06 NOTE — PROGRESS NOTES
[facility] Medicine Services  PROGRESS NOTE    Patient Name: Ash Diez  : 10/4/1928  MRN: [id number]    Date of Admission: 2019  Length of Stay: 1  Primary Care Physician: Maria Isabel Espinosa MD    Subjective   Subjective     CC:  Felt weak at home, fell in BR, lay on floor for 6 hrs    HPI:  Better this morning.  Was up walking in halls with CGA, no cane or walker.   Recent cough and diarrhea at home.     Review of Systems   Gen- No fevers, chills  Some swelling around eyes, though impact was back of head.  CV- No chest pain, palpitations  Resp- No cough, dyspnea  GI- No N/V/D, abd pain    All other systems reviewed and negative except any additional pertinent positives and negatives discussed in HPI.       Objective   Objective     Vital Signs:   Temp:  [98.2 °F (36.8 °C)-99.7 °F (37.6 °C)] 99.7 °F (37.6 °C)  Heart Rate:  [59-85] 75  Resp:  [18-20] 18  BP: ()/(51-93) 127/75        Physical Exam:  Gen:  WD/WN man in NAD, seems much younger than his years.  Dtr at bedside.    Neuro: alert and oriented, clear speech, follows commands, grossly nonfocal  HEENT:  NC/AT PERRL, OP benign.  Mild edema around both eyes, R worse than L.   Neck:  Supple, no LAD  Heart RRR no murmur, rub, or gallop  Lungs CTA nonlabored  Abd:  Soft, nontender, no rebound or guarding, pos BS  Extrem:  No c/c/e    Results Reviewed:    Results from last 7 days   Lab Units 19  0342 19  0751   WBC 10*3/mm3 18.97* 27.28*   HEMOGLOBIN g/dL 10.7* 12.2*   HEMATOCRIT % 34.8* 39.6   PLATELETS 10*3/mm3 179 234     Results from last 7 days   Lab Units 19  0342 19  0125 19  0751   SODIUM mmol/L 138  --  140   POTASSIUM mmol/L 4.4 4.3 3.1*   CHLORIDE mmol/L 106  --  103   CO2 mmol/L 21.0*  --  21.0*   BUN mg/dL 24*  --  18   CREATININE mg/dL 1.54*  --  1.72*   GLUCOSE mg/dL 103*  --  87   CALCIUM mg/dL 8.5  --  8.9   ALT (SGPT) U/L  --   --  40   AST (SGOT) U/L  --   --  208*   TROPONIN T  ng/mL  --   --  0.030   PROBNP pg/mL  --   --  5,517.0*     Estimated Creatinine Clearance: 38.1 mL/min (A) (by C-G formula based on SCr of 1.54 mg/dL (H)).    Microbiology Results Abnormal     Procedure Component Value - Date/Time    Blood Culture - Blood, Blood, Venous Line [[id number]] Collected:  09/05/19 0730    Lab Status:  Preliminary result Specimen:  Blood, Venous Line Updated:  09/06/19 0845     Blood Culture No growth at 24 hours    Blood Culture - Blood, Blood, Venous Line [[id number]] Collected:  09/05/19 0745    Lab Status:  Preliminary result Specimen:  Blood, Venous Line Updated:  09/06/19 0845     Blood Culture No growth at 24 hours          Imaging Results (last 24 hours)     ** No results found for the last 24 hours. **               I have reviewed the medications:  Scheduled Meds:  ceftriaxone 1 g Intravenous Q24H   doxycycline 100 mg Oral Q12H   sennosides-docusate sodium 2 tablet Oral Nightly   sodium chloride 10 mL Intravenous Q12H     Continuous Infusions:  lactated ringers 150 mL/hr Last Rate: 150 mL/hr (09/06/19 0809)     PRN Meds:.•  acetaminophen **OR** acetaminophen **OR** acetaminophen  •  HYDROcodone-acetaminophen  •  magnesium sulfate **OR** magnesium sulfate **OR** magnesium sulfate  •  melatonin  •  ondansetron **OR** ondansetron  •  potassium chloride **OR** potassium chloride **OR** potassium chloride  •  sodium chloride  •  sodium chloride      Assessment/Plan   Assessment / Plan     Active Hospital Problems    Diagnosis  POA   • **Sepsis (CMS/HCC) [A41.9]  Yes   • Non-traumatic rhabdomyolysis [M62.82]  Yes   • Pneumonia [J18.9]  Yes   • Renal insufficiency [N28.9]  Yes   • Dehydration [E86.0]  Yes   • Hypokalemia [E87.6]  Yes   • Lactate blood increase [R79.89]  Yes   • Abdominal aortic aneurysm (AAA) without rupture (CMS/HCC) [I71.4]  Yes   • Metabolic acidosis [E87.2]  Yes      Resolved Hospital Problems   No resolved problems to display.        Brief Hospital Course to  "date:  Ash Diez is a 89 y.o. male presenting after feeling weak and falling at home.  Sepsis due to pna and RLE cellulitis also with rhabdomyolysis.      A/P:    Sepsis, pna by CT (bilat lower lobes)  --Cultures pending including sputum culture. Continue IV rocephin and doxy for his PNA and RLE cellulitis.  - lowgrade temps    rhabdo  --Continue IVF   - repeat CK is improved  - Hold statin, antihypertensives.     Renal insuff, improving.  --baseline unknown    RLE duplex to r/o DVT.  Normal    Had admission at [facility] for valvular heart disease and syncope related to that. During that visit he was seen by cardiology and CT surgery (for his AAA.) At that time intervention was recommended against. He can follow up with them as needed upon d/c.    --Has \"remote\" stroke mentioned on CT head. Patient denies any associated symptoms with this. Prior to d/c can discuss addition of asa to his statin for ppx.  --Replace K+.  --PT/OT. CM.    DVT Prophylaxis:      Disposition: I expect the patient to be discharged tbd.  He lives with a granddtr who is not availabe in the daytime.  Dtr wants to make sure he doesn't get DCd too soon while still weak.     CODE STATUS:   Code Status and Medical Interventions:   Ordered at: 09/05/19 1045     Limited Support to NOT Include:    Intubation     Code Status:    No CPR     Medical Interventions (Level of Support Prior to Arrest):    Limited         Electronically signed by Linda Bates MD, 09/06/19, 9:48 AM.      "

## 2019-09-06 NOTE — PLAN OF CARE
Problem: Patient Care Overview  Goal: Plan of Care Review  Outcome: Ongoing (interventions implemented as appropriate)   09/06/19 7174   OTHER   Outcome Summary ADLs: Ranjeet. Functional Mobility and Transfer: SBA w/RW. Limiting Factors: endurance. Recommended D/C: home w/assist and HH. Will cont to observe and address ADL/IADL deficits as needed.

## 2019-09-06 NOTE — PLAN OF CARE
Problem: Patient Care Overview  Goal: Individualization and Mutuality  Outcome: Ongoing (interventions implemented as appropriate)    Goal: Discharge Needs Assessment  Outcome: Ongoing (interventions implemented as appropriate)    Goal: Interprofessional Rounds/Family Conf  Outcome: Ongoing (interventions implemented as appropriate)      Problem: Fall Risk (Adult)  Goal: Identify Related Risk Factors and Signs and Symptoms  Outcome: Ongoing (interventions implemented as appropriate)    Goal: Absence of Fall  Outcome: Ongoing (interventions implemented as appropriate)      Problem: Skin Injury Risk (Adult)  Goal: Identify Related Risk Factors and Signs and Symptoms  Outcome: Ongoing (interventions implemented as appropriate)    Goal: Skin Health and Integrity  Outcome: Ongoing (interventions implemented as appropriate)      Problem: Sepsis/Septic Shock (Adult)  Goal: Signs and Symptoms of Listed Potential Problems Will be Absent, Minimized or Managed (Sepsis/Septic Shock)  Outcome: Ongoing (interventions implemented as appropriate)

## 2019-09-06 NOTE — PLAN OF CARE
Problem: Patient Care Overview  Goal: Plan of Care Review   09/06/19 0533   Coping/Psychosocial   Plan of Care Reviewed With patient   Plan of Care Review   Progress improving   OTHER   Outcome Summary VSS, A-Fib on the monitor, Room air. LR maintained at150 ml/hr. PRN tylenol given once this shift. Will continue to monitor.

## 2019-09-07 LAB
ANION GAP SERPL CALCULATED.3IONS-SCNC: 10 MMOL/L (ref 5–15)
BUN BLD-MCNC: 21 MG/DL (ref 8–23)
BUN/CREAT SERPL: 18.3 (ref 7–25)
CALCIUM SPEC-SCNC: 8.7 MG/DL (ref 8.2–9.6)
CHLORIDE SERPL-SCNC: 107 MMOL/L (ref 98–107)
CK SERPL-CCNC: 7482 U/L (ref 20–200)
CO2 SERPL-SCNC: 21 MMOL/L (ref 22–29)
CREAT BLD-MCNC: 1.15 MG/DL (ref 0.76–1.27)
DEPRECATED RDW RBC AUTO: 58.1 FL (ref 37–54)
ERYTHROCYTE [DISTWIDTH] IN BLOOD BY AUTOMATED COUNT: 18.3 % (ref 12.3–15.4)
GFR SERPL CREATININE-BSD FRML MDRD: 60 ML/MIN/1.73
GLUCOSE BLD-MCNC: 100 MG/DL (ref 65–99)
HCT VFR BLD AUTO: 34.4 % (ref 37.5–51)
HGB BLD-MCNC: 10.6 G/DL (ref 13–17.7)
MCH RBC QN AUTO: 26.8 PG (ref 26.6–33)
MCHC RBC AUTO-ENTMCNC: 30.8 G/DL (ref 31.5–35.7)
MCV RBC AUTO: 86.9 FL (ref 79–97)
PLATELET # BLD AUTO: 175 10*3/MM3 (ref 140–450)
PMV BLD AUTO: 10.6 FL (ref 6–12)
POTASSIUM BLD-SCNC: 3.9 MMOL/L (ref 3.5–5.2)
RBC # BLD AUTO: 3.96 10*6/MM3 (ref 4.14–5.8)
SODIUM BLD-SCNC: 138 MMOL/L (ref 136–145)
WBC NRBC COR # BLD: 13.98 10*3/MM3 (ref 3.4–10.8)

## 2019-09-07 PROCEDURE — 99233 SBSQ HOSP IP/OBS HIGH 50: CPT | Performed by: INTERNAL MEDICINE

## 2019-09-07 PROCEDURE — 25010000002 CEFTRIAXONE PER 250 MG: Performed by: INTERNAL MEDICINE

## 2019-09-07 PROCEDURE — 80048 BASIC METABOLIC PNL TOTAL CA: CPT | Performed by: INTERNAL MEDICINE

## 2019-09-07 PROCEDURE — 85027 COMPLETE CBC AUTOMATED: CPT | Performed by: INTERNAL MEDICINE

## 2019-09-07 PROCEDURE — 82550 ASSAY OF CK (CPK): CPT | Performed by: INTERNAL MEDICINE

## 2019-09-07 RX ADMIN — CEFTRIAXONE 1 G: 1 INJECTION, POWDER, FOR SOLUTION INTRAMUSCULAR; INTRAVENOUS at 09:55

## 2019-09-07 RX ADMIN — SODIUM CHLORIDE, PRESERVATIVE FREE 10 ML: 5 INJECTION INTRAVENOUS at 22:04

## 2019-09-07 RX ADMIN — DOXYCYCLINE 100 MG: 100 CAPSULE ORAL at 08:04

## 2019-09-07 RX ADMIN — DOXYCYCLINE 100 MG: 100 CAPSULE ORAL at 22:03

## 2019-09-07 RX ADMIN — SODIUM CHLORIDE, PRESERVATIVE FREE 10 ML: 5 INJECTION INTRAVENOUS at 08:04

## 2019-09-07 RX ADMIN — SODIUM CHLORIDE, POTASSIUM CHLORIDE, SODIUM LACTATE AND CALCIUM CHLORIDE 100 ML/HR: 600; 310; 30; 20 INJECTION, SOLUTION INTRAVENOUS at 07:59

## 2019-09-07 NOTE — PROGRESS NOTES
[facility] Medicine Services  PROGRESS NOTE    Patient Name: Ash Diez  : 10/4/1928  MRN: [id number]    Date of Admission: 2019  Length of Stay: 2  Primary Care Physician: Maria Isabel Espinosa MD    Subjective   Subjective     CC:  Felt weak at home, fell in BR, lay on floor for 6 hrs    HPI:  Doing well this am, denies any issues overnight.  Nervous about eventually going home.     Review of Systems   Gen- No fevers, chills  CV- No chest pain, palpitations  Resp- No cough, dyspnea  GI- No N/V/D, abd pain    All other systems reviewed and negative except any additional pertinent positives and negatives discussed in HPI.       Objective   Objective     Vital Signs:   Temp:  [98.6 °F (37 °C)-99.9 °F (37.7 °C)] 98.7 °F (37.1 °C)  Heart Rate:  [63-78] 78  Resp:  [18] 18  BP: (129-154)/(64-82) 154/82        Physical Exam:  Gen:  WD/WN man in NAD, seems much younger than stated age. Dtr at bedside.    Neuro: alert and oriented, clear speech, follows commands, grossly nonfocal  HEENT:  NC/AT PERRL, OP benign.  Mild edema around both eyes, R worse than L.   Neck:  Supple, no LAD  Heart RRR no murmur, rub, or gallop  Lungs CTA nonlabored  Abd:  Soft, nontender, no rebound or guarding, pos BS  Extrem:  No c/c/e    Results Reviewed:    Results from last 7 days   Lab Units 19  0751   WBC 10*3/mm3 13.98* 18.97* 27.28*   HEMOGLOBIN g/dL 10.6* 10.7* 12.2*   HEMATOCRIT % 34.4* 34.8* 39.6   PLATELETS 10*3/mm3 175 179 234     Results from last 7 days   Lab Units 1930 19  0125 19  0751   SODIUM mmol/L 138 138  --  140   POTASSIUM mmol/L 3.9 4.4 4.3 3.1*   CHLORIDE mmol/L 107 106  --  103   CO2 mmol/L 21.0* 21.0*  --  21.0*   BUN mg/dL 21 24*  --  18   CREATININE mg/dL 1.15 1.54*  --  1.72*   GLUCOSE mg/dL 100* 103*  --  87   CALCIUM mg/dL 8.7 8.5  --  8.9   ALT (SGPT) U/L  --   --   --  40   AST (SGOT) U/L  --   --   --  208*    TROPONIN T ng/mL  --   --   --  0.030   PROBNP pg/mL  --   --   --  5,517.0*     Estimated Creatinine Clearance: 51 mL/min (by C-G formula based on SCr of 1.15 mg/dL).    Microbiology Results Abnormal     Procedure Component Value - Date/Time    Blood Culture - Blood, Blood, Venous Line [[id number]] Collected:  09/05/19 0730    Lab Status:  Preliminary result Specimen:  Blood, Venous Line Updated:  09/07/19 0845     Blood Culture No growth at 2 days    Blood Culture - Blood, Blood, Venous Line [[id number]] Collected:  09/05/19 0745    Lab Status:  Preliminary result Specimen:  Blood, Venous Line Updated:  09/07/19 0845     Blood Culture No growth at 2 days          Imaging Results (last 24 hours)     Procedure Component Value Units Date/Time    CT Head Without Contrast [[id number]] Collected:  09/05/19 0906     Updated:  09/06/19 1637    Narrative:       EXAMINATION: CT HEAD WO CONTRAST-      INDICATION: Fall.     TECHNIQUE: Unenhanced CT imaging of the brain was performed.     The radiation dose reduction device was turned on for each scan per the  ALARA (As Low as Reasonably Achievable) protocol.     COMPARISON: None.     FINDINGS: Unenhanced CT imaging of the brain was performed without  intravenous contrast which demonstrates no evidence of midline shift. No  hydrocephalus. Diffuse cerebral atrophy most pronounced involving the  frontal lobes are identified with slight asymmetry involving the right  frontal lobe. No evidence for extraaxial fluid collection or  intracranial hemorrhage identified. Bilateral basal ganglia  calcifications are noted. The gray-white matter junction is largely  maintained without CT evidence of transcortical infarct, however CT is  insensitive for the presence of underlying ischemia. MRI would be more  sensitive if clinically relevant. Confluent hypoattenuating white matter  changes are identified likely relating to chronic microvascular ischemic  change.  Hypoattenuated region  involving the left caudate head  identified. Favored to be representative of a remote lacunar infarct.       Impression:       1. No acute intracranial process appreciated.  2. Chronic senescent changes with diffuse cerebral atrophy most  pronounced involving the bilateral frontal lobes with associated chronic  microvascular ischemic change identified.  3. Favored remote left basal ganglia lacunar infarct.     D:  09/05/2019  E:  09/05/2019     This report was finalized on 9/6/2019 4:34 PM by Dr. Jameson Ware MD.       XR Chest 1 View [[id number]] Collected:  09/05/19 0822     Updated:  09/06/19 1209    Narrative:       EXAMINATION: XR CHEST 1 VW-09/05/2019:      INDICATION: Weak/Dizzy/AMS, triage protocol.      COMPARISON: NONE.     FINDINGS: Single portable chest radiograph was submitted for review. The  heart is not enlarged. The thoracic aorta is tortuous and appears  enlarged measuring up to 6.4 cm, although is slightly accentuated by the  portable technique of the film. Coarsened interstitial lung changes are  identified. Remote appearing right lateral lower rib fractures noted. No  convincing evidence of superimposed airspace disease. No pleural  effusion or pneumothorax. The visualized upper abdomen is unrevealing.  No acute osseous abnormality appreciated.           Impression:       1.  Tortuous and fusiform prominence of the thoracic aorta is suggested  measuring up to 6.4 cm which may be accentuated by the portable  technique of the film. Consider cross-sectional imaging for further  evaluation if underlying acute aortic pathology is of clinical concern.     2.  Coarsened interstitial lung changes noted diffusely without  superimposed evidence of pneumonia.     D:  09/05/2019  E:  09/05/2019     This report was finalized on 9/6/2019 12:06 PM by Dr. Jameson Ware MD.                  I have reviewed the medications:  Scheduled Meds:    ceftriaxone 1 g Intravenous Q24H   doxycycline 100 mg Oral  "Q12H   sennosides-docusate sodium 2 tablet Oral Nightly   sodium chloride 10 mL Intravenous Q12H     Continuous Infusions:    lactated ringers 100 mL/hr Last Rate: 100 mL/hr (09/07/19 0759)     PRN Meds:.•  acetaminophen **OR** acetaminophen **OR** acetaminophen  •  HYDROcodone-acetaminophen  •  magnesium sulfate **OR** magnesium sulfate **OR** magnesium sulfate  •  melatonin  •  ondansetron **OR** ondansetron  •  potassium chloride **OR** potassium chloride **OR** potassium chloride  •  sodium chloride  •  sodium chloride      Assessment/Plan   Assessment / Plan     Active Hospital Problems    Diagnosis  POA   • **Sepsis (CMS/HCC) [A41.9]  Yes   • Non-traumatic rhabdomyolysis [M62.82]  Yes   • Pneumonia [J18.9]  Yes   • Renal insufficiency [N28.9]  Yes   • Dehydration [E86.0]  Yes   • Hypokalemia [E87.6]  Yes   • Lactate blood increase [R79.89]  Yes   • Abdominal aortic aneurysm (AAA) without rupture (CMS/HCC) [I71.4]  Yes   • Metabolic acidosis [E87.2]  Yes      Resolved Hospital Problems   No resolved problems to display.        Brief Hospital Course to date:  Ash Diez is a 89 y.o. male presenting after feeling weak and falling at home.  Sepsis due to pna and RLE cellulitis also with rhabdomyolysis.      A/P:    Sepsis, pna by CT (bilat lower lobes)  --Cultures pending including sputum culture. Continue IV rocephin and doxy for his PNA and RLE cellulitis.  - lowgrade temps    rhabdo  --Continue IVF   - repeat CK is improved  - Hold statin, antihypertensives.     Renal insuff, improving.  --baseline unknown    RLE cellulitis  --continue ABX as above  --Duplex NEGATIVE for DVT    VHD  --Had admission at [facility] for valvular heart disease and syncope related to that. During that visit he was seen by cardiology and CT surgery (for his AAA.) At that time intervention was recommended against. He can follow up with them as needed upon d/c.    --Has \"remote\" stroke mentioned on CT head. Patient denies any associated " symptoms with this. Prior to d/c can discuss addition of asa to his statin for ppx.    --PT/OT. CM.    DVT Prophylaxis:      Disposition: I expect the patient to be discharged tbd.  He lives with a granddtr who is not availabe in the daytime.  Dtr wants to make sure he doesn't get DCd too soon while still weak.     CODE STATUS:   Code Status and Medical Interventions:   Ordered at: 09/05/19 1045     Limited Support to NOT Include:    Intubation     Code Status:    No CPR     Medical Interventions (Level of Support Prior to Arrest):    Limited         Electronically signed by Joan Argueta MD, 09/07/19, 10:24 AM.

## 2019-09-07 NOTE — PLAN OF CARE
Problem: Patient Care Overview  Goal: Plan of Care Review   09/07/19 0530   Coping/Psychosocial   Plan of Care Reviewed With patient   Plan of Care Review   Progress improving   OTHER   Outcome Summary VSS, A-fib on the monitor, Room air. LR maintained at 100 ml/hr. No complaints this shift. Will continue to monitor.

## 2019-09-07 NOTE — PLAN OF CARE
Problem: Patient Care Overview  Goal: Plan of Care Review  Outcome: Ongoing (interventions implemented as appropriate)   09/07/19 0767   Coping/Psychosocial   Plan of Care Reviewed With patient   Plan of Care Review   Progress improving   OTHER   Outcome Summary PT with no new complaints. Up to chair and bathroom x 1 as tolerated. VSS. IV saline locked.      Goal: Individualization and Mutuality  Outcome: Ongoing (interventions implemented as appropriate)    Goal: Discharge Needs Assessment  Outcome: Ongoing (interventions implemented as appropriate)    Goal: Interprofessional Rounds/Family Conf  Outcome: Ongoing (interventions implemented as appropriate)      Problem: Fall Risk (Adult)  Goal: Identify Related Risk Factors and Signs and Symptoms  Outcome: Ongoing (interventions implemented as appropriate)    Goal: Absence of Fall  Outcome: Ongoing (interventions implemented as appropriate)      Problem: Skin Injury Risk (Adult)  Goal: Identify Related Risk Factors and Signs and Symptoms  Outcome: Ongoing (interventions implemented as appropriate)    Goal: Skin Health and Integrity  Outcome: Ongoing (interventions implemented as appropriate)      Problem: Sepsis/Septic Shock (Adult)  Goal: Signs and Symptoms of Listed Potential Problems Will be Absent, Minimized or Managed (Sepsis/Septic Shock)  Outcome: Ongoing (interventions implemented as appropriate)

## 2019-09-08 ENCOUNTER — APPOINTMENT (OUTPATIENT)
Dept: GENERAL RADIOLOGY | Facility: HOSPITAL | Age: 84
End: 2019-09-08

## 2019-09-08 PROCEDURE — 71045 X-RAY EXAM CHEST 1 VIEW: CPT

## 2019-09-08 PROCEDURE — 99232 SBSQ HOSP IP/OBS MODERATE 35: CPT | Performed by: NURSE PRACTITIONER

## 2019-09-08 PROCEDURE — 94799 UNLISTED PULMONARY SVC/PX: CPT

## 2019-09-08 PROCEDURE — 25010000002 CEFTRIAXONE PER 250 MG: Performed by: INTERNAL MEDICINE

## 2019-09-08 RX ORDER — IPRATROPIUM BROMIDE AND ALBUTEROL SULFATE 2.5; .5 MG/3ML; MG/3ML
3 SOLUTION RESPIRATORY (INHALATION)
Status: DISCONTINUED | OUTPATIENT
Start: 2019-09-08 | End: 2019-09-10 | Stop reason: HOSPADM

## 2019-09-08 RX ORDER — ALBUTEROL SULFATE 2.5 MG/3ML
2.5 SOLUTION RESPIRATORY (INHALATION) EVERY 6 HOURS PRN
Status: DISCONTINUED | OUTPATIENT
Start: 2019-09-08 | End: 2019-09-10 | Stop reason: HOSPADM

## 2019-09-08 RX ADMIN — SODIUM CHLORIDE, PRESERVATIVE FREE 10 ML: 5 INJECTION INTRAVENOUS at 08:09

## 2019-09-08 RX ADMIN — SODIUM CHLORIDE, PRESERVATIVE FREE 10 ML: 5 INJECTION INTRAVENOUS at 20:43

## 2019-09-08 RX ADMIN — ALBUTEROL SULFATE 2.5 MG: 2.5 SOLUTION RESPIRATORY (INHALATION) at 16:52

## 2019-09-08 RX ADMIN — CEFTRIAXONE 1 G: 1 INJECTION, POWDER, FOR SOLUTION INTRAMUSCULAR; INTRAVENOUS at 09:54

## 2019-09-08 RX ADMIN — DOXYCYCLINE 100 MG: 100 CAPSULE ORAL at 08:09

## 2019-09-08 RX ADMIN — IPRATROPIUM BROMIDE AND ALBUTEROL SULFATE 3 ML: 2.5; .5 SOLUTION RESPIRATORY (INHALATION) at 20:11

## 2019-09-08 RX ADMIN — DOXYCYCLINE 100 MG: 100 CAPSULE ORAL at 20:42

## 2019-09-08 NOTE — PLAN OF CARE
Problem: Patient Care Overview  Goal: Plan of Care Review   09/08/19 0532   Coping/Psychosocial   Plan of Care Reviewed With patient   Plan of Care Review   Progress improving   OTHER   Outcome Summary VSS, A-Fib on the monitor, Pt. required 2LNC overnight. Will continue to monitor.

## 2019-09-08 NOTE — PROGRESS NOTES
[facility] Medicine Services  PROGRESS NOTE    Patient Name: Ash Diez  : 10/4/1928  MRN: [id number]    Date of Admission: 2019  Length of Stay: 3  Primary Care Physician: Maria Isabel Espinosa MD    Subjective   Subjective     CC:  Follow up weakness at home, s/p fall and down x 6 hrs.    HPI:  Patient sitting up to chair.  Reports he feels well and denies any issues overnight.  Reports he is looking forward to visitors today.      Review of Systems  Gen- No fevers, chills  CV- No chest pain, palpitations  Resp- No cough, dyspnea  GI- No N/V/D, abd pain    All other systems reviewed and negative except any additional pertinent positives and negatives discussed in HPI.     Objective   Objective     Vital Signs:   Temp:  [97.6 °F (36.4 °C)-98.7 °F (37.1 °C)] 97.6 °F (36.4 °C)  Heart Rate:  [57-79] 64  Resp:  [18-20] 18  BP: (125-158)/(60-83) 158/83        Physical Exam:  Constitutional: No acute distress, awake, alert  HENT: NCAT, mucous membranes moist, mild bilat periorbital swelling  Respiratory: Clear to auscultation bilaterally, respiratory effort normal   Cardiovascular: RRR,+ murmur grade II/VI  Gastrointestinal: Positive bowel sounds, soft, nontender, nondistended  Musculoskeletal: Right lower extremity with 2+ edema, left with trace edema.  Psychiatric: Appropriate affect, cooperative  Neurologic: Oriented x 3, follows commands, speech clear  Skin: ecchymosis noted to left forearm, mepilex dressing intact to left elbow.       Results Reviewed:    Results from last 7 days   Lab Units 19  0519  0342 19  0751   WBC 10*3/mm3 13.98* 18.97* 27.28*   HEMOGLOBIN g/dL 10.6* 10.7* 12.2*   HEMATOCRIT % 34.4* 34.8* 39.6   PLATELETS 10*3/mm3 175 179 234     Results from last 7 days   Lab Units 19  0530 19  0342 19  0125 19  0751   SODIUM mmol/L 138 138  --  140   POTASSIUM mmol/L 3.9 4.4 4.3 3.1*   CHLORIDE mmol/L 107 106  --  103   CO2 mmol/L  21.0* 21.0*  --  21.0*   BUN mg/dL 21 24*  --  18   CREATININE mg/dL 1.15 1.54*  --  1.72*   GLUCOSE mg/dL 100* 103*  --  87   CALCIUM mg/dL 8.7 8.5  --  8.9   ALT (SGPT) U/L  --   --   --  40   AST (SGOT) U/L  --   --   --  208*   TROPONIN T ng/mL  --   --   --  0.030   PROBNP pg/mL  --   --   --  5,517.0*     Estimated Creatinine Clearance: 51 mL/min (by C-G formula based on SCr of 1.15 mg/dL).    Microbiology Results Abnormal     Procedure Component Value - Date/Time    Blood Culture - Blood, Blood, Venous Line [[id number]] Collected:  09/05/19 0730    Lab Status:  Preliminary result Specimen:  Blood, Venous Line Updated:  09/08/19 0845     Blood Culture No growth at 3 days    Blood Culture - Blood, Blood, Venous Line [[id number]] Collected:  09/05/19 0745    Lab Status:  Preliminary result Specimen:  Blood, Venous Line Updated:  09/08/19 0845     Blood Culture No growth at 3 days          Imaging Results (last 24 hours)     ** No results found for the last 24 hours. **               I have reviewed the medications:  Scheduled Meds:  ceftriaxone 1 g Intravenous Q24H   doxycycline 100 mg Oral Q12H   sennosides-docusate sodium 2 tablet Oral Nightly   sodium chloride 10 mL Intravenous Q12H     Continuous Infusions:   PRN Meds:.•  acetaminophen **OR** acetaminophen **OR** acetaminophen  •  HYDROcodone-acetaminophen  •  magnesium sulfate **OR** magnesium sulfate **OR** magnesium sulfate  •  melatonin  •  ondansetron **OR** ondansetron  •  potassium chloride **OR** potassium chloride **OR** potassium chloride  •  sodium chloride  •  sodium chloride      Assessment/Plan   Assessment / Plan     Active Hospital Problems    Diagnosis  POA   • **Sepsis (CMS/HCC) [A41.9]  Yes   • Non-traumatic rhabdomyolysis [M62.82]  Yes   • Pneumonia [J18.9]  Yes   • Renal insufficiency [N28.9]  Yes   • Dehydration [E86.0]  Yes   • Hypokalemia [E87.6]  Yes   • Lactate blood increase [R79.89]  Yes   • Abdominal aortic aneurysm (AAA) without  "rupture (CMS/HCC) [I71.4]  Yes   • Metabolic acidosis [E87.2]  Yes      Resolved Hospital Problems   No resolved problems to display.        Brief Hospital Course to date:  Ash Diez is a 89 y.o. male who presented after weakness with fall at home.  Sepsis due to pna, RLE cellulitis and rhabdomyolysis.    Sepsis, pna by CT (bilat lower lobes)  --Blood cultures pending, NGTD, sputum culture pending.   --Continue IV rocephin and doxy for PNA and RLE cellulitis.  - lowgrade temps have mostly resolved, none x 2 days     rhabdo  --Continue IVFs   - repeat CK is improved ( down to 7482 today from 04928 9/6)  - Continuing to hold statin & antihypertensives.      Renal insuff  -- improving, however, baseline unknown     RLE cellulitis  --continue ABX as above  --Duplex NEGATIVE for DVT  -- patient reports RLE looks about baseline to him     VHD  --Had admission at [facility] for valvular heart disease and syncope related to that. During that visit he was seen by cardiology and CT surgery (for his AAA.) At that time intervention was recommended against. He can follow up with them as needed upon d/c.     --Has \"remote\" stroke mentioned on CT head. Patient denies any associated symptoms with this. Prior to d/c can discuss addition of asa to his statin for ppx.     --PT/OT  --CM following          DVT Prophylaxis:  Mechanical     Disposition: I expect the patient to be discharged tbd.  Patient lives with granddtr who is unavailable in daytime.  Dtr wants to be sure patient isn't discharged too soon while he is still weak.  CM following.      CODE STATUS:   Code Status and Medical Interventions:   Ordered at: 09/05/19 1045     Limited Support to NOT Include:    Intubation     Code Status:    No CPR     Medical Interventions (Level of Support Prior to Arrest):    Limited         Electronically signed by VALENTINO Ba, 09/08/19, 10:25 AM.    "

## 2019-09-08 NOTE — PLAN OF CARE
Problem: Patient Care Overview  Goal: Plan of Care Review  Outcome: Ongoing (interventions implemented as appropriate)   09/08/19 0428   Coping/Psychosocial   Plan of Care Reviewed With patient   Plan of Care Review   Progress no change   OTHER   Outcome Summary No new complaints. Pt noted to have some wheezes and SOB, chest xray and nebs ordered.      Goal: Individualization and Mutuality  Outcome: Ongoing (interventions implemented as appropriate)    Goal: Discharge Needs Assessment  Outcome: Ongoing (interventions implemented as appropriate)    Goal: Interprofessional Rounds/Family Conf  Outcome: Ongoing (interventions implemented as appropriate)      Problem: Fall Risk (Adult)  Goal: Identify Related Risk Factors and Signs and Symptoms  Outcome: Ongoing (interventions implemented as appropriate)    Goal: Absence of Fall  Outcome: Ongoing (interventions implemented as appropriate)      Problem: Skin Injury Risk (Adult)  Goal: Identify Related Risk Factors and Signs and Symptoms  Outcome: Ongoing (interventions implemented as appropriate)    Goal: Skin Health and Integrity  Outcome: Ongoing (interventions implemented as appropriate)      Problem: Sepsis/Septic Shock (Adult)  Goal: Signs and Symptoms of Listed Potential Problems Will be Absent, Minimized or Managed (Sepsis/Septic Shock)  Outcome: Ongoing (interventions implemented as appropriate)

## 2019-09-09 LAB
ANION GAP SERPL CALCULATED.3IONS-SCNC: 11 MMOL/L (ref 5–15)
BUN BLD-MCNC: 15 MG/DL (ref 8–23)
BUN/CREAT SERPL: 14.4 (ref 7–25)
CALCIUM SPEC-SCNC: 8.3 MG/DL (ref 8.2–9.6)
CHLORIDE SERPL-SCNC: 106 MMOL/L (ref 98–107)
CK SERPL-CCNC: 1759 U/L (ref 20–200)
CO2 SERPL-SCNC: 24 MMOL/L (ref 22–29)
CREAT BLD-MCNC: 1.04 MG/DL (ref 0.76–1.27)
DEPRECATED RDW RBC AUTO: 56.3 FL (ref 37–54)
ERYTHROCYTE [DISTWIDTH] IN BLOOD BY AUTOMATED COUNT: 17.8 % (ref 12.3–15.4)
GFR SERPL CREATININE-BSD FRML MDRD: 67 ML/MIN/1.73
GLUCOSE BLD-MCNC: 106 MG/DL (ref 65–99)
HCT VFR BLD AUTO: 34.2 % (ref 37.5–51)
HGB BLD-MCNC: 10.5 G/DL (ref 13–17.7)
MCH RBC QN AUTO: 26.5 PG (ref 26.6–33)
MCHC RBC AUTO-ENTMCNC: 30.7 G/DL (ref 31.5–35.7)
MCV RBC AUTO: 86.4 FL (ref 79–97)
PLATELET # BLD AUTO: 188 10*3/MM3 (ref 140–450)
PMV BLD AUTO: 10.3 FL (ref 6–12)
POTASSIUM BLD-SCNC: 3.7 MMOL/L (ref 3.5–5.2)
RBC # BLD AUTO: 3.96 10*6/MM3 (ref 4.14–5.8)
SODIUM BLD-SCNC: 141 MMOL/L (ref 136–145)
WBC NRBC COR # BLD: 9.27 10*3/MM3 (ref 3.4–10.8)

## 2019-09-09 PROCEDURE — 82550 ASSAY OF CK (CPK): CPT | Performed by: NURSE PRACTITIONER

## 2019-09-09 PROCEDURE — 80048 BASIC METABOLIC PNL TOTAL CA: CPT | Performed by: NURSE PRACTITIONER

## 2019-09-09 PROCEDURE — 94799 UNLISTED PULMONARY SVC/PX: CPT

## 2019-09-09 PROCEDURE — 97535 SELF CARE MNGMENT TRAINING: CPT

## 2019-09-09 PROCEDURE — 85027 COMPLETE CBC AUTOMATED: CPT | Performed by: NURSE PRACTITIONER

## 2019-09-09 PROCEDURE — 25010000002 CEFTRIAXONE PER 250 MG: Performed by: INTERNAL MEDICINE

## 2019-09-09 PROCEDURE — 97530 THERAPEUTIC ACTIVITIES: CPT

## 2019-09-09 PROCEDURE — 99232 SBSQ HOSP IP/OBS MODERATE 35: CPT | Performed by: NURSE PRACTITIONER

## 2019-09-09 PROCEDURE — 97116 GAIT TRAINING THERAPY: CPT

## 2019-09-09 PROCEDURE — 97110 THERAPEUTIC EXERCISES: CPT

## 2019-09-09 RX ADMIN — DOXYCYCLINE 100 MG: 100 CAPSULE ORAL at 08:40

## 2019-09-09 RX ADMIN — SODIUM CHLORIDE, PRESERVATIVE FREE 10 ML: 5 INJECTION INTRAVENOUS at 09:41

## 2019-09-09 RX ADMIN — IPRATROPIUM BROMIDE AND ALBUTEROL SULFATE 3 ML: 2.5; .5 SOLUTION RESPIRATORY (INHALATION) at 12:13

## 2019-09-09 RX ADMIN — IPRATROPIUM BROMIDE AND ALBUTEROL SULFATE 3 ML: 2.5; .5 SOLUTION RESPIRATORY (INHALATION) at 15:59

## 2019-09-09 RX ADMIN — CEFTRIAXONE 1 G: 1 INJECTION, POWDER, FOR SOLUTION INTRAMUSCULAR; INTRAVENOUS at 09:41

## 2019-09-09 RX ADMIN — SODIUM CHLORIDE, PRESERVATIVE FREE 10 ML: 5 INJECTION INTRAVENOUS at 20:00

## 2019-09-09 RX ADMIN — IPRATROPIUM BROMIDE AND ALBUTEROL SULFATE 3 ML: 2.5; .5 SOLUTION RESPIRATORY (INHALATION) at 08:17

## 2019-09-09 RX ADMIN — IPRATROPIUM BROMIDE AND ALBUTEROL SULFATE 3 ML: 2.5; .5 SOLUTION RESPIRATORY (INHALATION) at 19:50

## 2019-09-09 RX ADMIN — DOXYCYCLINE 100 MG: 100 CAPSULE ORAL at 20:00

## 2019-09-09 NOTE — PLAN OF CARE
Problem: Patient Care Overview  Goal: Plan of Care Review  Outcome: Ongoing (interventions implemented as appropriate)   09/09/19 1137   Coping/Psychosocial   Plan of Care Reviewed With patient   Plan of Care Review   Progress improving   OTHER   Outcome Summary Pt ambulating 250 ft w/ Min A d/t two episodes of slight LOB, encouraged to utilize AD to improve safety. Pt educated on AE to improve LBD performance, Mod A to don/doff socks. Recommend cont skilled IPOT POC.

## 2019-09-09 NOTE — PLAN OF CARE
"Problem: Patient Care Overview  Goal: Plan of Care Review  Outcome: Ongoing (interventions implemented as appropriate)   09/09/19 1440   Coping/Psychosocial   Plan of Care Reviewed With patient;daughter   Plan of Care Review   Progress improving   OTHER   Outcome Summary Able to amb 400 ft w/ SPC R hand & CGA of PT, w/ 1 stand.rest, + performed bal. activ & LE ther ex w/ mult rests(+nsg assess,3 calls), but limited by wheezing, signif incr HR, elev BP to 172/88, & fatigue; approx gt pattern w/ SPC (needs to practice 3 steps), but dtr stating pt \"will definitely need R wx ordered\"          "

## 2019-09-09 NOTE — PROGRESS NOTES
Continued Stay Note  [facility]     Patient Name: Ash Diez  MRN: [id number]  Today's Date: 9/9/2019    Admit Date: 9/5/2019    Discharge Plan     Row Name 09/09/19 1433       Plan    Plan  Home with [facility] and     Patient/Family in Agreement with Plan  yes    Plan Comments  Met with pt and daughter who lives in [place] at . Pt has decided he could benefit from HH and agreeable to use [facility]. He also requests a walker with no DME preference. Called referral for HH to Te at [facility] and referral to Thierry at [facility]. [facility] can bring walker to . CM will notify [facility] upon d/c. Provided pt's daughter with sitter list. Pt states he doesn't want a sitter at this time. Also discussed life alert necklace and she will talk to her daughter and discuss if HH can set that up if he is agreeable. CM will cont to follow.     Final Discharge Disposition Code  06 - home with home health care        Discharge Codes    No documentation.       Expected Discharge Date and Time     Expected Discharge Date Expected Discharge Time    Sep 9, 2019             Rupa Tian, RN

## 2019-09-09 NOTE — DISCHARGE PLACEMENT REQUEST
"Rupa Tain RN  [phone]  Ash Diez (89 y.o. Male)     Date of Birth Social Security Number Address Home Phone MRN    10/04/1928  [address] [place] [address] [place] [address] [phone] [id number]    Amish Marital Status          [facility]        Admission Date Admission Type Admitting Provider Attending Provider Department, Room/Bed    9/5/19 Emergency Joan Argueta MD Howard, Gabriela Kirk, MD [facility] 6A, N610/1    Discharge Date Discharge Disposition Discharge Destination                       Attending Provider:  Joan Argueta MD    Allergies:  No Known Allergies    Isolation:  None   Infection:  None   Code Status:  No CPR    Ht:  182.9 cm (72\")   Wt:  94.8 kg (209 lb)    Admission Cmt:  None   Principal Problem:  Sepsis (CMS/[facility]) [A41.9]                 Active Insurance as of 9/5/2019     Primary Coverage     Payor Plan Insurance Group Employer/Plan Group    MEDICARE MEDICARE A & B      Payor Plan Address Payor Plan Phone Number Payor Plan Fax Number Effective Dates    [address] [phone]  10/1/1993 - None Entered    [facility] [id number]       Subscriber Name Subscriber Birth Date Member ID       ASH DIEZ 10/4/1928 [id number]           Secondary Coverage     Payor Plan Insurance Group Employer/Plan Group    AARP MED SUPP AARP HEALTH CARE OPTIONS      Payor Plan Address Payor Plan Phone Number Payor Plan Fax Number Effective Dates    [facility] [phone]  1/1/2019 - None Entered    [address]       [facility] [id number]       Subscriber Name Subscriber Birth Date Member ID       ASH DIEZ 10/4/1928 [id number]                 Emergency Contacts      (Rel.) Home Phone Work Phone Mobile Phone    Shanae Galeano (Grandchild) [phone] -- --               History & Physical      Tammy Cobb II, DO at 9/5/2019 10:50 AM              [facility]  HISTORY AND PHYSICAL    Patient Name: " "Ash Diez  : 10/4/1928  MRN: [id number]  Primary Care Physician: Maria Isabel Espinosa MD  Date of admission: 2019      Subjective   Subjective     Chief Complaint: fall    HPI:  Ash Diez is a 89 y.o. male brought in after a fall at home. Per his daughter, the patient \"didn't look well yesterday.\" When asked about this the patient denies feeling poorly though says he didn't eat well. He awoke at 0200 this am to use restroom and was \"so weak in legs and arms that he fell.\" He was unable to get up and rather than call for help he laid in the floor until 0700 this morning. His only other complaint is that he has had congestion/drainage for the past couple of days. Denies f/c, cough.    Review of Systems   Gen- No fevers, chills  CV- No chest pain, palpitations  GI- No N/V/D, abd pain    All other systems reviewed and negative except any additional pertinent positives and negatives discussed in HPI.     All other systems reviewed and are negative.     Personal History     Past Medical History:   Diagnosis Date   • Cancer (CMS/HCC)     colon   • Hyperlipidemia    • Hypertension        Past Surgical History:   Procedure Laterality Date   • COLON SURGERY     • VASCULAR SURGERY         Family History: Otherwise pertinent FHx was reviewed and unremarkable.     Social History:  reports that he has never smoked. He does not have any smokeless tobacco history on file. He reports that he drinks alcohol. He reports that he does not use drugs.  Social History     Social History Narrative   • Not on file       Medications:    Available home medication information reviewed.    (Not in a hospital admission)    No Known Allergies    Objective   Objective     Vital Signs:   Temp:  [100.1 °F (37.8 °C)] 100.1 °F (37.8 °C)  Heart Rate:  [85] 85  Resp:  [18] 18  BP: (106-125)/(70-72) 106/72        Physical Exam   Constitutional: Awake, alert  Eyes: PERRLA, sclerae anicteric, no conjunctival injection  HENT: NCAT, dry MM, contusion " on posterior head  Neck: Supple, no thyromegaly, no lymphadenopathy, trachea midline  Respiratory: Clear to auscultation bilaterally, nonlabored respirations   Cardiovascular: RRR, no murmurs, rubs, or gallops, palpable pedal pulses bilaterally  Gastrointestinal: Positive bowel sounds, soft, nontender, nondistended  Musculoskeletal: Right and left leg edematous, but right leg with erythema and warmth. LUE as below, without TTP.  Psychiatric: Appropriate affect, cooperative  Neurologic: Oriented x 3, strength symmetric in all extremities, Cranial Nerves grossly intact to confrontation, speech clear  Skin: LUE with large ecchymosis    Results Reviewed:  I have personally reviewed current lab and radiology data.    Results from last 7 days   Lab Units 09/05/19  0751   WBC 10*3/mm3 27.28*   HEMOGLOBIN g/dL 12.2*   HEMATOCRIT % 39.6   PLATELETS 10*3/mm3 234     Results from last 7 days   Lab Units 09/05/19  0755 09/05/19  0751   SODIUM mmol/L  --  140   POTASSIUM mmol/L  --  3.1*   CHLORIDE mmol/L  --  103   CO2 mmol/L  --  21.0*   BUN mg/dL  --  18   CREATININE mg/dL  --  1.72*   GLUCOSE mg/dL  --  87   CALCIUM mg/dL  --  8.9   ALT (SGPT) U/L  --  40   AST (SGOT) U/L  --  208*   TROPONIN T ng/mL  --  0.030   PROBNP pg/mL  --  5,517.0*   LACTATE mmol/L 2.8*  --      Estimated Creatinine Clearance: 34.1 mL/min (A) (by C-G formula based on SCr of 1.72 mg/dL (H)).  Brief Urine Lab Results     None        CT chest personally reviewed with mild GGO bilaterally. Agree with interpretation.  Imaging Results (last 24 hours)     Procedure Component Value Units Date/Time    CT Chest Without Contrast [[id number]] Collected:  09/05/19 0910     Updated:  09/05/19 0944    Narrative:       EXAMINATION: CT CHEST WO CONTRAST-      INDICATION: Fall, WBC 27.      TECHNIQUE: Unenhanced CT imaging of the chest was performed. Additional  coronal sagittal reformatted imaging was obtained for review.     The radiation dose reduction device was  turned on for each scan per the  ALARA (As Low as Reasonably Achievable) protocol.     COMPARISON: None.     FINDINGS: Dedicated CT imaging of the lungs demonstrate nonspecific  subpleural reticulation with bibasilar groundglass airspace opacities  and micronodularity noted. Mild COPD/emphysema identified. Mild  bronchial wall thickening noted.     The ascending thoracic aorta demonstrates fusiform enlargement measuring  up to 6.1 cm. The sinus of Valsalva measures approximately 4.4 cm. The  sinotubular junction measures 4.1 cm. The fusiform enlargement of the  aorta extends throughout the aortic arch measuring 4.1 cm. The  descending thoracic aorta is tortuous and measures up to 4.3 cm. After  sclerotic calcifications of the thoracic aorta are identified. Lack of  intravenous contrast limits evaluation of underlying acute aortic  pathology and endoluminal evaluation. Although no inflammation  surrounding the aorta or displaced aortic wall calcification are  identified on the exam. The heart is not particularly enlarged. Aortic  annular calcifications are identified. Heavy coronary arterial  calcification are identified within noncalcified lack noted within the  coronary artery distribution. Trace pericardial fluid is identified. The  thoracic esophagus demonstrates mild small volume of air and fluid noted  throughout its lumen. No mediastinal mass or adenopathy identified. The  visualized upper abdomen does not demonstrate acute abnormality.  Granulomatous disease of the spleen, liver, and mediastinum are  identified. Thoracolumbar degenerative changes are identified. An  age-indeterminate T6 compression fracture is identified. No retropulsion  appreciated. The ribs appear intact.       Impression:       1. Mild emphysema/COPD with superimposed nonspecific diffuse groundglass  changes most pronounced involving the lingula and bilateral lower lobes  with mild interlobular septal thickening. This is favored to relate  to  an infectious/inflammatory etiology superimposed on chronic interstitial  lung disease. Clinical correlation is required.  2. Fusiform aortic aneurysm measuring up to 6.1 cm involving the  ascending aorta with additional enlarged aortic arch and descending  thoracic aorta as described above.  3. Heavy calcified and noncalcified atherosclerotic disease of the  coronary arteries.  4. Age-indeterminate T6 compression deformity. Correlate with point  tenderness for further evaluation.  5. Remote appearing right lower lateral rib fracture is identified.     D:  09/05/2019  E:  09/05/2019       CT Head Without Contrast [[id number]] Collected:  09/05/19 0906     Updated:  09/05/19 0923    Narrative:       EXAMINATION: CT HEAD WO CONTRAST-      INDICATION: Fall.     TECHNIQUE: Unenhanced CT imaging of the brain was performed.     The radiation dose reduction device was turned on for each scan per the  ALARA (As Low as Reasonably Achievable) protocol.     COMPARISON: None.     FINDINGS: Unenhanced CT imaging of the brain was performed without  intravenous contrast which demonstrates no evidence of midline shift. No  hydrocephalus. Diffuse cerebral atrophy most pronounced involving the  frontal lobes are identified with slight asymmetry involving the right  frontal lobe. No evidence for extraaxial fluid collection or  intracranial hemorrhage identified. Bilateral basal ganglia  calcifications are noted. The gray-white matter junction is largely  maintained without CT evidence of transcortical infarct, however CT is  insensitive for the presence of underlying ischemia. MRI would be more  sensitive if clinically relevant. Confluent hypoattenuating white matter  changes are identified likely relating to chronic microvascular ischemic  change.  Hypoattenuated region involving the left caudate head  identified. Favored to be representative of a remote lacunar infarct.       Impression:       1. No acute intracranial process  appreciated.  2. Chronic senescent changes with diffuse cerebral atrophy most  pronounced involving the bilateral frontal lobes with associated chronic  microvascular ischemic change identified.  3. Favored remote left basal ganglia lacunar infarct.     D:  09/05/2019  E:  09/05/2019       XR Chest 1 View [[id number]] Collected:  09/05/19 0822     Updated:  09/05/19 0837    Narrative:       EXAMINATION: XR CHEST 1 VW-09/05/2019:      INDICATION: Weak/Dizzy/AMS, triage protocol.      COMPARISON: NONE.     FINDINGS: Single portable chest radiograph was submitted for review. The  heart is not enlarged. The thoracic aorta is tortuous and appears  enlarged measuring up to 6.4 cm, although is slightly accentuated by the  portable technique of the film. Coarsened interstitial lung changes are  identified. Remote appearing right lateral lower rib fractures noted. No  convincing evidence of superimposed airspace disease. No pleural  effusion or pneumothorax. The visualized upper abdomen is unrevealing.  No acute osseous abnormality appreciated.           Impression:       1.  Tortuous and fusiform prominence of the thoracic aorta is suggested  measuring up to 6.4 cm which may be accentuated by the portable  technique of the film. Consider cross-sectional imaging for further  evaluation if underlying acute aortic pathology is of clinical concern.     2.  Coarsened interstitial lung changes noted diffusely without  superimposed evidence of pneumonia.     D:  09/05/2019  E:  09/05/2019                   Assessment/Plan   Assessment / Plan     Active Hospital Problems    Diagnosis POA   • **Sepsis (CMS/HCC) [A41.9] Yes   • Non-traumatic rhabdomyolysis [M62.82] Yes   • Pneumonia [J18.9] Yes   • Renal insufficiency [N28.9] Yes   • Dehydration [E86.0] Yes   • Hypokalemia [E87.6] Yes   • Lactate blood increase [R79.89] Yes   • Abdominal aortic aneurysm (AAA) without rupture (CMS/HCC) [I71.4] Yes   • Metabolic acidosis [E87.2] Yes  "    89 y/o male presenting after a fall at home with sepsis due to pna and RLE cellulitis also with rhabdomyolysis.    A/P:  --Cultures pending including sputum culture. Continue IV rocephin and doxy for his PNA and RLE cellulitis.  --Continue IVF and repeat CK in am. Hold statin, antihypertensives.   --I suspect he has some underlying mild CKD though I am unclear as to what his baseline renal function is. IV fluids as above and recheck bmp in am.  --Reflex lactate.  --RLE duplex to r/o DVT.  --Had admission at [facility] for valvular heart disease and syncope related to that. During that visit he was seen by cardiology and CT surgery (for his AAA.) At that time intervention was recommended against. He can follow up with them as needed upon d/c.  --Has \"remote\" stroke mentioned on CT head. Patient denies any associated symptoms with this. Prior to d/c can discuss addition of asa to his statin for ppx.  --Replace K+.  --PT/OT. CM.    DVT prophylaxis: Mechanical on LLE    CODE STATUS:    Code Status and Medical Interventions:   Ordered at: 19 1045     Limited Support to NOT Include:    Intubation     Code Status:    No CPR     Medical Interventions (Level of Support Prior to Arrest):    Limited       Admission Status:  I believe this patient meets INPATIENT status due to need for IV fluids, repeat labs, IV abx.  I feel patient’s risk for adverse outcomes and need for care warrant INPATIENT evaluation and I predict the patient’s care encounter to likely last beyond 2 midnights.    Electronically signed by Tammy Cobb II, DO, 19, 10:51 AM.        Electronically signed by Tammy Cobb II, DO at 2019  1:19 PM       [address] [address]  Dept. Phone:  [phone]  Dept. Fax:   Date Ordered: Sep 9, 2019         Patient:  Ash Diez MRN:  [id number]   [address] [place] [address] [place] [address] :  10/4/1928  SSN:    Phone: [phone] " "Sex:  M     Weight: 94.8 kg (209 lb)         Ht Readings from Last 1 Encounters:   09/05/19 182.9 cm (72\")         Walker               (Order ID: [id number])    Diagnosis:  Non-traumatic rhabdomyolysis (M62.82 [ICD-10-CM] 728.88 [ICD-9-CM])  Fall, initial encounter (W19.XXXA [ICD-10-CM] E888.9 [ICD-9-CM])  Sepsis, due to unspecified organism (CMS/HCC) (A41.9 [ICD-10-CM] 038.9,995.91 [ICD-9-CM])   Quantity:  1     Equipment:  Walker Folding with Wheels  Length of Need (99 Months = Lifetime): 99 Months = Lifetime        Authorizing Provider's Phone: [phone]   Verbal Order Mode: Telephone with readback   Authorizing Provider: Joan Argueta MD  Authorizing Provider's NPI: [id number]     Order Entered By: Rupa Tian RN 9/9/2019  2:29 PM            "

## 2019-09-09 NOTE — THERAPY TREATMENT NOTE
Acute Care - Occupational Therapy Treatment Note  [facility]     Patient Name: Ash Diez  : 10/4/1928  MRN: [id number]  Today's Date: 2019  Onset of Illness/Injury or Date of Surgery: 19  Date of Referral to OT: 19       Admit Date: 2019       ICD-10-CM ICD-9-CM   1. Non-traumatic rhabdomyolysis M62.82 728.88   2. Pneumonia due to infectious organism, unspecified laterality, unspecified part of lung J18.9 136.9     484.8   3. Thoracic aortic aneurysm without rupture (CMS/[facility]) I71.2 441.2   4. Fall, initial encounter W19.XXXA E888.9   5. Traumatic compression fracture of T6 thoracic vertebra, closed, initial encounter (CMS/[facility]) S22.050A 805.2   6. Closed fracture of one rib, unspecified laterality, initial encounter S22.39XA 807.01   7. Lactic acidosis E87.2 276.2   8. Sepsis, due to unspecified organism (CMS/[facility]) A41.9 038.9     995.91     Patient Active Problem List   Diagnosis   • Non-traumatic rhabdomyolysis   • Sepsis (CMS/HCC)   • Pneumonia   • Renal insufficiency   • Dehydration   • Hypokalemia   • Lactate blood increase   • Abdominal aortic aneurysm (AAA) without rupture (CMS/HCC)   • Metabolic acidosis     Past Medical History:   Diagnosis Date   • Cancer (CMS/HCC)     colon   • Hyperlipidemia    • Hypertension      Past Surgical History:   Procedure Laterality Date   • COLON SURGERY     • VASCULAR SURGERY         Therapy Treatment    Rehabilitation Treatment Summary     Row Name 19 1137             Treatment Time/Intention    Discipline  occupational therapist  -CL      Document Type  therapy note (daily note)  -CL      Subjective Information  no complaints  -CL      Patient Effort  good  -CL      Existing Precautions/Restrictions  fall  -CL      Treatment Considerations/Comments  Pt would benefit from use of cane vs RW during mobility.   -CL      Recorded by [CL] Erika Horton, MAMADOU 19 1440      Row Name 19 1137             Vital Signs    Pre Systolic BP Rehab  --  MAGUI, RN cleared for tx.   -CL      Recorded by [CL] Erika Horton, OT 09/09/19 1443      Row Name 09/09/19 1137             Cognitive Assessment/Intervention    Additional Documentation  Cognitive Assessment/Intervention (Group)  -CL      Recorded by [CL] Erika Horton, OT 09/09/19 1443      Row Name 09/09/19 1137             Cognitive Assessment/Intervention- PT/OT    Affect/Mental Status (Cognitive)  WFL  -CL      Orientation Status (Cognition)  oriented x 3  -CL      Follows Commands (Cognition)  WFL  -CL      Cognitive Function (Cognitive)  WFL  -CL      Recorded by [CL] Erika Horton, OT 09/09/19 1443      Row Name 09/09/19 1137             Bed Mobility Assessment/Treatment    Comment (Bed Mobility)  UIC.   -CL      Recorded by [CL] Erika Horton, OT 09/09/19 1443      Row Name 09/09/19 1137             Functional Mobility    Functional Mobility- Ind. Level  minimum assist (75% patient effort)  -CL      Functional Mobility-Distance (Feet)  250  -CL      Functional Mobility- Comment  Pt w/ two episodes of minor LOB, progressed to CGA. Pt initially refusing to use RW, however agreeable upon noted poor balance.   -CL      Recorded by [CL] Erika Horton, OT 09/09/19 1443      Row Name 09/09/19 1137             Transfer Assessment/Treatment    Transfer Assessment/Treatment  sit-stand transfer;stand-sit transfer  -CL      Recorded by [CL] Erika Horton, OT 09/09/19 1447      Row Name 09/09/19 1137             Sit-Stand Transfer    Sit-Stand [place] (Transfers)  stand by assist  -CL      Recorded by [CL] Erika Horton, OT 09/09/19 1447      Row Name 09/09/19 1137             Stand-Sit Transfer    Stand-Sit [place] (Transfers)  stand by assist  -CL      Recorded by [CL] Erika Horton, OT 09/09/19 1447      Row Name 09/09/19 1137             ADL Assessment/Intervention    47090 - OT Self Care/Mgmt Minutes  15  -CL      BADL Assessment/Intervention  toileting;lower body dressing  -CL      Recorded by [CL] Erika Horton,  OT 09/09/19 1447      Row Name 09/09/19 1137             Lower Body Dressing Assessment/Training    Lower Body Dressing [place] Level  don;doff;socks;moderate assist (50% patient effort);verbal cues  -CL      Assistive Devices (Lower Body Dressing)  reacher;sock-aid;long-handled shoe horn  -CL      Lower Body Dressing Position  supported sitting  -CL      Recorded by [CL] Erika Horton OT 09/09/19 1447      Row Name 09/09/19 1137             Toileting Assessment/Training    [place] Level (Toileting)  adjust/manage clothing;perform perineal hygiene;supervision  -CL      Toileting Position  supported standing  -CL      Recorded by [CL] Erika Horton OT 09/09/19 1447      Row Name 09/09/19 1137             Motor Skills Assessment/Interventions    Additional Documentation  Therapeutic Exercise (Group)  -CL      Recorded by [CL] Erika Horton OT 09/09/19 1449      Row Name 09/09/19 1137             Therapeutic Exercise    63274 - OT Therapeutic Activity Minutes  11  -CL      Recorded by [CL] Erika Horton OT 09/09/19 1449      Row Name 09/09/19 1137             Static Sitting Balance    Level of [place] (Unsupported Sitting, Static Balance)  independent  -CL      Sitting Position (Unsupported Sitting, Static Balance)  sitting in chair  -CL      Recorded by [CL] Erika Horton OT 09/09/19 1447      Row Name 09/09/19 1137             Static Standing Balance    Level of [place] (Supported Standing, Static Balance)  standby assist  -CL      Recorded by [CL] Erika Horton OT 09/09/19 1447      Row Name 09/09/19 1137             Positioning and Restraints    Pre-Treatment Position  sitting in chair/recliner  -CL      Post Treatment Position  chair  -CL      In Chair  notified nsg;sitting;call light within reach;encouraged to call for assist;exit alarm on;with family/caregiver  -CL      Recorded by [CL] Erika Horton OT 09/09/19 1447      Row Name 09/09/19 1137             Pain Scale: Numbers  Pre/Post-Treatment    Pain Scale: Numbers, Pretreatment  0/10 - no pain  -CL      Pain Scale: Numbers, Post-Treatment  0/10 - no pain  -CL      Recorded by [CL] Erika Horton OT 09/09/19 1447      Row Name                Wound 09/05/19 0756 Left lateral elbow Skin Tear    Wound - Properties Group Date first assessed: 09/05/19 [LL] Time first assessed: 0756 [LL] Present on Hospital Admission: Y [LL] Side: Left [LL] Orientation: lateral [LL] Location: elbow [LL] Primary Wound Type: Skin tear [LL] Recorded by:  [LL] Juani Olivo RN 09/05/19 0756      User Key  (r) = Recorded By, (t) = Taken By, (c) = Cosigned By    Initials Name Effective Dates Discipline    LL Juani Olivo RN 06/16/16 -  Nurse    CL Erika Horton OT 04/03/18 -  OT        Wound 09/05/19 0756 Left lateral elbow Skin Tear (Active)   Dressing Appearance no drainage;dry;intact 9/9/2019 10:00 AM   Closure None 9/9/2019 10:00 AM   Base pink 9/9/2019 10:00 AM   Drainage Amount none 9/9/2019 10:00 AM   Care, Wound cleansed with;sterile normal saline;other (see comments) 9/9/2019 10:00 AM   Dressing Care, Wound dressing changed 9/9/2019 10:00 AM       Occupational Therapy Education     Title: PT OT SLP Therapies (In Progress)     Topic: Occupational Therapy (In Progress)     Point: ADL training (In Progress)     Description: Instruct learner(s) on proper safety adaptation and remediation techniques during self care or transfers.   Instruct in proper use of assistive devices.    Learning Progress Summary           Patient Acceptance, E, NR by CL at 9/9/2019 11:37 AM    Acceptance, E,D, VU,DU,NR by KA at 9/6/2019  8:15 AM    Comment:  Pt educated on role of OT, ADLs, safety, fall prevention and POC.   Family Acceptance, E, NR by CL at 9/9/2019 11:37 AM                   Point: Precautions (In Progress)     Description: Instruct learner(s) on prescribed precautions during self-care and functional transfers.    Learning Progress Summary           Patient  Acceptance, E, NR by CL at 9/9/2019 11:37 AM    Acceptance, E,D, VU,DU,NR by KA at 9/6/2019  8:15 AM    Comment:  Pt educated on role of OT, ADLs, safety, fall prevention and POC.   Family Acceptance, E, NR by CL at 9/9/2019 11:37 AM                   Point: Body mechanics (In Progress)     Description: Instruct learner(s) on proper positioning and spine alignment during self-care, functional mobility activities and/or exercises.    Learning Progress Summary           Patient Acceptance, E, NR by CL at 9/9/2019 11:37 AM    Acceptance, E,TB, VU by CL1 at 9/9/2019  4:00 AM    Acceptance, E,D, VU,DU,NR by KA at 9/6/2019  8:15 AM    Comment:  Pt educated on role of OT, ADLs, safety, fall prevention and POC.   Family Acceptance, E, NR by CL at 9/9/2019 11:37 AM                               User Key     Initials Effective Dates Name Provider Type Discipline     04/03/18 -  Erika Horton, OT Occupational Therapist OT    CL1 06/28/19 -  William Post, RN Registered Nurse Nurse    KA 07/17/19 -  Hanane Clifton OT Occupational Therapist OT                OT Recommendation and Plan     Plan of Care Review  Plan of Care Reviewed With: patient  Plan of Care Reviewed With: patient  Outcome Summary: Pt ambulating 250 ft w/ Min A d/t two episodes of slight LOB, encouraged to utilize AD to improve safety. Pt educated on AE to improve LBD performance, Mod A to don/doff socks. Recommend cont skilled IPOT POC.   Outcome Measures     Row Name 09/09/19 1137             How much help from another is currently needed...    Putting on and taking off regular lower body clothing?  2  -CL      Bathing (including washing, rinsing, and drying)  2  -CL      Toileting (which includes using toilet bed pan or urinal)  3  -CL      Putting on and taking off regular upper body clothing  3  -CL      Taking care of personal grooming (such as brushing teeth)  4  -CL      Eating meals  4  -CL      AM-PAC 6 Clicks Score (OT)  18  -CL         Functional  Assessment    Outcome Measure Options  AM-PAC 6 Clicks Daily Activity (OT)  -CL        User Key  (r) = Recorded By, (t) = Taken By, (c) = Cosigned By    Initials Name Provider Type    Erika Kc OT Occupational Therapist           Time Calculation:   Time Calculation- OT     Row Name 09/09/19 1420 09/09/19 1137          Time Calculation- OT    OT Start Time  --  1137  -CL     OT Received On  --  09/09/19  -CL     OT Goal Re-Cert Due Date  --  09/16/19  -CL        Timed Charges    48564 - Gait Training Minutes   19 -DM  --     95519 - OT Therapeutic Activity Minutes  --  11  -CL     15753 - OT Self Care/Mgmt Minutes  --  15  -CL       User Key  (r) = Recorded By, (t) = Taken By, (c) = Cosigned By    Initials Name Provider Type    Valorie Hanna, PT Physical Therapist    CL Erika Horton OT Occupational Therapist        Therapy Charges for Today     Code Description Service Date Service Provider Modifiers Qty    18351969992 HC OT THERAPEUTIC ACT EA 15 MIN 9/9/2019 Erika Horton OT GO 1    66117482928 HC OT SELF CARE/MGMT/TRAIN EA 15 MIN 9/9/2019 Erika Horton OT GO 1               Erika Horton OT  9/9/2019

## 2019-09-09 NOTE — PLAN OF CARE
Problem: Patient Care Overview  Goal: Plan of Care Review  Outcome: Ongoing (interventions implemented as appropriate)   09/09/19 6887   Coping/Psychosocial   Plan of Care Reviewed With patient   Plan of Care Review   Progress no change   OTHER   Outcome Summary VSS, Afib on monitor, remains on room air. Patient still c/o weakness in extremities. States he feels his legs seem back to baseline. Denies pain. Ambulated in [place] today. Left elbow skin tear dressing changed. No complaints. Will monitor.

## 2019-09-09 NOTE — PROGRESS NOTES
[facility] Medicine Services  PROGRESS NOTE    Patient Name: Ash Diez  : 10/4/1928  MRN: [id number]    Date of Admission: 2019  Length of Stay: 4  Primary Care Physician: Maria Isabel Espinosa MD    Subjective   Subjective   CC:  Follow up weakness at home, s/p fall and down x 6 hrs.    HPI:  Patient sitting up in his chair in NAD.  Patient states he is not ready to go home yet, because his lower extremities are still weak.  Currently working with PT/OT.  States he does not want inpatient rehab, but wants to go home with  PT/OT.  No adverse events overnight.  Positive BM.  No family in the room.    Review of Systems  Gen- No fevers, chills  CV- No chest pain, palpitations  Resp- No cough, dyspnea  GI- No N/V/D, abd pain  MS- +LE weakness  All other systems reviewed and negative except any additional pertinent positives and negatives discussed in HPI.     Objective   Objective   Vital Signs:   Temp:  [97.7 °F (36.5 °C)-98.4 °F (36.9 °C)] 98.4 °F (36.9 °C)  Heart Rate:  [50-65] 56  Resp:  [16-20] 20  BP: (124-152)/(47-72) 139/62  Physical Exam:  Constitutional: Alert, WD, obese male in NAD  Eyes: EOMI, sclerae anicteric, no conjunctival injection  Head: NCAT  ENT: [place], moist mucous membranes   Respiratory: Non-labored, symmetrical chest expansion, CTAB  Cardiovascular: RRR, no R/G, + II/VI murmur, +DP pulses bilaterally  Gastrointestinal: Soft, NT, ND +BS  Musculoskeletal: CALL;  RLE +2 pitting edema with chronic skin changes anteriorly; LLE is no edema or chronic skin changes;  Neurologic: Oriented x4, strength symmetric in all extremities, follows all commands, speech clear  Skin: No rashes on exposed skin; Mepilex dressing to left elbow-CDI  Psychiatric: Pleasant and cooperative; normal affect    Results Reviewed:  Results from last 7 days   Lab Units 19  0510 19  0530 19  0342   WBC 10*3/mm3 9.27 13.98* 18.97*   HEMOGLOBIN g/dL 10.5* 10.6* 10.7*   HEMATOCRIT %  34.2* 34.4* 34.8*   PLATELETS 10*3/mm3 188 175 179     Results from last 7 days   Lab Units 09/09/19  0510 09/07/19  0530 09/06/19  0342  09/05/19  0751   SODIUM mmol/L 141 138 138  --  140   POTASSIUM mmol/L 3.7 3.9 4.4   < > 3.1*   CHLORIDE mmol/L 106 107 106  --  103   CO2 mmol/L 24.0 21.0* 21.0*  --  21.0*   BUN mg/dL 15 21 24*  --  18   CREATININE mg/dL 1.04 1.15 1.54*  --  1.72*   GLUCOSE mg/dL 106* 100* 103*  --  87   CALCIUM mg/dL 8.3 8.7 8.5  --  8.9   ALT (SGPT) U/L  --   --   --   --  40   AST (SGOT) U/L  --   --   --   --  208*   TROPONIN T ng/mL  --   --   --   --  0.030   PROBNP pg/mL  --   --   --   --  5,517.0*    < > = values in this interval not displayed.     Estimated Creatinine Clearance: 56.4 mL/min (by C-G formula based on SCr of 1.04 mg/dL).    Microbiology Results Abnormal     Procedure Component Value - Date/Time    Blood Culture - Blood, Blood, Venous Line [[id number]] Collected:  09/05/19 0730    Lab Status:  Preliminary result Specimen:  Blood, Venous Line Updated:  09/09/19 0845     Blood Culture No growth at 4 days    Blood Culture - Blood, Blood, Venous Line [[id number]] Collected:  09/05/19 0745    Lab Status:  Preliminary result Specimen:  Blood, Venous Line Updated:  09/09/19 0845     Blood Culture No growth at 4 days        Imaging Results (last 24 hours)     Procedure Component Value Units Date/Time    XR Chest 1 View [[id number]] Collected:  09/08/19 1749     Updated:  09/09/19 1101    Narrative:       EXAMINATION: XR CHEST 1 VW-09/08/2019:      INDICATION: SOA/wheezing; M62.82-Rhabdomyolysis; J18.9-Pneumonia,  unspecified organism; I71.2-Thoracic aortic aneurysm, without rupture;  W19.XXXA-Unspecified fall, initial encounter; S22.050A-Wedge compression  fracture of t5-T6 vertebra, initial encounter for closed fracture;  S22.39XA-Fracture of one rib, unspecified side, initial encounter for  closed fracture; E87.2-Acidosis.      COMPARISON: Chest radiograph 09/05/2019.     FINDINGS:  Single portable chest radiograph was submitted for review. The  heart is prominent in size, similar to prior. Enlargement of the aorta  is identified as previously documented. Coarsened interstitial lung  changes are identified without evidence of superimposed airspace  disease. No sizable pleural effusion identified. No pneumothorax  appreciated. The visualized upper abdomen is unrevealing for acute  abnormality. No acute osseous injury identified.           Impression:       1.  Redemonstration of cardiomegaly and prominence of the aorta, similar  to prior   2.  Chronic lung changes without evidence for superimposed pneumonia.     D:  09/08/2019  E:  09/09/2019                 I have reviewed the medications:  Scheduled Meds:    ceftriaxone 1 g Intravenous Q24H   doxycycline 100 mg Oral Q12H   ipratropium-albuterol 3 mL Nebulization 4x Daily - RT   sennosides-docusate sodium 2 tablet Oral Nightly   sodium chloride 10 mL Intravenous Q12H     Continuous Infusions:   PRN Meds:.•  acetaminophen **OR** acetaminophen **OR** acetaminophen  •  albuterol  •  HYDROcodone-acetaminophen  •  magnesium sulfate **OR** magnesium sulfate **OR** magnesium sulfate  •  melatonin  •  ondansetron **OR** ondansetron  •  potassium chloride **OR** potassium chloride **OR** potassium chloride  •  sodium chloride  •  sodium chloride    Assessment/Plan   Assessment / Plan     Active Hospital Problems    Diagnosis  POA   • **Sepsis (CMS/HCC) [A41.9]  Yes   • Non-traumatic rhabdomyolysis [M62.82]  Yes   • Pneumonia [J18.9]  Yes   • Renal insufficiency [N28.9]  Yes   • Dehydration [E86.0]  Yes   • Hypokalemia [E87.6]  Yes   • Lactate blood increase [R79.89]  Yes   • Abdominal aortic aneurysm (AAA) without rupture (CMS/HCC) [I71.4]  Yes   • Metabolic acidosis [E87.2]  Yes      Resolved Hospital Problems   No resolved problems to display.     Brief Hospital Course to date:  Ash Diez is a 89 y.o. male who presented after weakness with fall at  "home.  Sepsis due to pna, RLE cellulitis and rhabdomyolysis.    Sepsis, pna by CT (bilat lower lobes)  --Blood cultures pending, NGTD, sputum culture pending.   --Continue IV rocephin and doxy for PNA and RLE cellulitis.  - lowgrade temps have mostly resolved, none x 2 days     rhabdo  --Continue IVFs   - repeat CK is improved ( down to 7482 today from 38968 9/6)  - Continuing to hold statin & antihypertensives.      Renal insuff  -- improving, however, baseline unknown     RLE cellulitis  --continue ABX as above  --Duplex NEGATIVE for DVT  -- patient reports RLE looks about baseline to him     D  --Had admission at [facility] for valvular heart disease and syncope related to that. During that visit he was seen by cardiology and CT surgery (for his AAA.) At that time intervention was recommended against. He can follow up with them as needed upon d/c.     --Has \"remote\" stroke mentioned on CT head. Patient denies any associated symptoms with this. Prior to d/c can discuss addition of asa to his statin for ppx.     --PT/OT  --CM following      DVT Prophylaxis:  Mechanical     Disposition: I expect the patient to be discharged tbd.  Patient lives with granddtr who is unavailable in daytime.  Dtr wants to be sure patient isn't discharged too soon while he is still weak.  CM following.  Patient wants HH PT/OT, but is refusing inpatient rehab.    CODE STATUS:   Code Status and Medical Interventions:   Ordered at: 09/05/19 1045     Limited Support to NOT Include:    Intubation     Code Status:    No CPR     Medical Interventions (Level of Support Prior to Arrest):    Limited     Electronically signed by VALENTINO Hernandez, 09/09/19, 11:41 AM.    "

## 2019-09-09 NOTE — PLAN OF CARE
Problem: Patient Care Overview  Goal: Plan of Care Review   09/09/19 0401   Coping/Psychosocial   Plan of Care Reviewed With patient   Plan of Care Review   Progress no change   OTHER   Outcome Summary VSS. No complaints of pain. Pt slept well through night. Will continue to monitor.     Goal: Individualization and Mutuality  Outcome: Ongoing (interventions implemented as appropriate)    Goal: Interprofessional Rounds/Family Conf  Outcome: Ongoing (interventions implemented as appropriate)      Problem: Fall Risk (Adult)  Goal: Identify Related Risk Factors and Signs and Symptoms  Outcome: Ongoing (interventions implemented as appropriate)    Goal: Absence of Fall  Outcome: Ongoing (interventions implemented as appropriate)      Problem: Skin Injury Risk (Adult)  Goal: Identify Related Risk Factors and Signs and Symptoms  Outcome: Ongoing (interventions implemented as appropriate)    Goal: Skin Health and Integrity  Outcome: Ongoing (interventions implemented as appropriate)

## 2019-09-10 VITALS
DIASTOLIC BLOOD PRESSURE: 66 MMHG | SYSTOLIC BLOOD PRESSURE: 166 MMHG | WEIGHT: 209 LBS | HEIGHT: 72 IN | BODY MASS INDEX: 28.31 KG/M2 | HEART RATE: 60 BPM | TEMPERATURE: 98.3 F | RESPIRATION RATE: 18 BRPM | OXYGEN SATURATION: 94 %

## 2019-09-10 LAB
BACTERIA SPEC AEROBE CULT: NORMAL
BACTERIA SPEC AEROBE CULT: NORMAL

## 2019-09-10 PROCEDURE — 25010000002 CEFTRIAXONE PER 250 MG: Performed by: INTERNAL MEDICINE

## 2019-09-10 PROCEDURE — 94799 UNLISTED PULMONARY SVC/PX: CPT

## 2019-09-10 PROCEDURE — 99239 HOSP IP/OBS DSCHRG MGMT >30: CPT | Performed by: NURSE PRACTITIONER

## 2019-09-10 RX ORDER — DOXYCYCLINE 100 MG/1
100 CAPSULE ORAL EVERY 12 HOURS SCHEDULED
Qty: 3 CAPSULE | Refills: 0 | Status: SHIPPED | OUTPATIENT
Start: 2019-09-10 | End: 2019-09-13

## 2019-09-10 RX ORDER — CEFUROXIME AXETIL 250 MG/1
250 TABLET ORAL 2 TIMES DAILY
Qty: 4 TABLET | Refills: 0 | Status: SHIPPED | OUTPATIENT
Start: 2019-09-10 | End: 2019-09-13

## 2019-09-10 RX ADMIN — SODIUM CHLORIDE, PRESERVATIVE FREE 10 ML: 5 INJECTION INTRAVENOUS at 09:34

## 2019-09-10 RX ADMIN — CEFTRIAXONE 1 G: 1 INJECTION, POWDER, FOR SOLUTION INTRAMUSCULAR; INTRAVENOUS at 09:34

## 2019-09-10 RX ADMIN — DOXYCYCLINE 100 MG: 100 CAPSULE ORAL at 09:31

## 2019-09-10 RX ADMIN — IPRATROPIUM BROMIDE AND ALBUTEROL SULFATE 3 ML: 2.5; .5 SOLUTION RESPIRATORY (INHALATION) at 12:55

## 2019-09-10 RX ADMIN — IPRATROPIUM BROMIDE AND ALBUTEROL SULFATE 3 ML: 2.5; .5 SOLUTION RESPIRATORY (INHALATION) at 08:22

## 2019-09-10 NOTE — DISCHARGE SUMMARY
"    [facility] Medicine Services  DISCHARGE SUMMARY    Patient Name: Ash Diez  : 10/4/1928  MRN: [id number]    Date of Admission: 2019  Date of Discharge:  9/10/19  Primary Care Physician: Maria Isabel Espinosa MD    Consults     No orders found from 2019 to 2019.          Hospital Course     Presenting Problem:   Non-traumatic rhabdomyolysis [M62.82]    Active Hospital Problems    Diagnosis  POA   • **Sepsis (CMS/HCC) [A41.9]  Yes   • Non-traumatic rhabdomyolysis [M62.82]  Yes   • Pneumonia [J18.9]  Yes   • Renal insufficiency [N28.9]  Yes   • Dehydration [E86.0]  Yes   • Hypokalemia [E87.6]  Yes   • Lactate blood increase [R79.89]  Yes   • Abdominal aortic aneurysm (AAA) without rupture (CMS/HCC) [I71.4]  Yes   • Metabolic acidosis [E87.2]  Yes      Resolved Hospital Problems   No resolved problems to display.          Hospital Course:  Ash Diez is a 89 y.o. male  presented after weakness with fall at home.  Sepsis due to pna, RLE cellulitis and rhabdomyolysis. CT chest showed bilateral lower lobe pneumonia, BC with NGTD. Treated with IV Rocephin and Doxy for PNA additionally for RLE cellulitis. Rhabdomyolysis has been resolving, CK trending back down towards normal, 1,759 on day of discharge, cont to hold Statin at DC and defer to PCP this week with repeat labs. RLE duplex negative for DVT. Has \"remote\" stroke mentioned on CT head. Patient denies any associated symptoms with this, can discuss addition of ASA to his statin with PCP this week. Has declined rehab and is now walking well and strength is continuing to improve daily. Ready for discharge at this time.      Discharge Follow Up Recommendations for labs/diagnostics:  PCP in 1 week with CK      Day of Discharge     HPI:   Resting in chair, NAD. Feels well, walked > 400ft in [place] yesterday with PT. Feels strength is improving. Occasionally feels SOA, small productive cough this morning. No new concerns. No pain. Denies " f/c,n/v/d, palpitations, dizziness, or cp.     Review of Systems  Otherwise ROS is negative except as mentioned in the HPI.    Vital Signs:   Temp:  [97.1 °F (36.2 °C)-98.4 °F (36.9 °C)] 97.9 °F (36.6 °C)  Heart Rate:  [56-75] 57  Resp:  [15-20] 18  BP: (127-174)/(66-79) 140/77     Physical Exam:  Constitutional: Awake, alert, up in chair   Eyes: PERRLA, sclerae anicteric, no conjunctival injection  HENT: NCAT, mucous membranes moist  Neck: Supple, no thyromegaly, no lymphadenopathy, trachea midline  Respiratory: Clear to auscultation bilaterally, nonlabored respirations on RA  Cardiovascular: RRR, + murmurs, no rubs or gallops, palpable pedal pulses bilaterally  Gastrointestinal: Positive bowel sounds, soft, nontender, nondistended  Musculoskeletal: Right lower extremity and ankle edema 2+ with mild erythema on anterior shin/ no edema in LLE, no clubbing or cyanosis to extremities  Psychiatric: Appropriate affect, cooperative  Neurologic: Oriented x 3, strength symmetric in all extremities, Cranial Nerves grossly intact to confrontation, speech clear  Skin: No rashes    Pertinent  and/or Most Recent Results     Results from last 7 days   Lab Units 09/09/19  0510 09/07/19  0530 09/06/19  0342 09/06/19  0125 09/05/19  0751   WBC 10*3/mm3 9.27 13.98* 18.97*  --  27.28*   HEMOGLOBIN g/dL 10.5* 10.6* 10.7*  --  12.2*   HEMATOCRIT % 34.2* 34.4* 34.8*  --  39.6   PLATELETS 10*3/mm3 188 175 179  --  234   SODIUM mmol/L 141 138 138  --  140   POTASSIUM mmol/L 3.7 3.9 4.4 4.3 3.1*   CHLORIDE mmol/L 106 107 106  --  103   CO2 mmol/L 24.0 21.0* 21.0*  --  21.0*   BUN mg/dL 15 21 24*  --  18   CREATININE mg/dL 1.04 1.15 1.54*  --  1.72*   GLUCOSE mg/dL 106* 100* 103*  --  87   CALCIUM mg/dL 8.3 8.7 8.5  --  8.9     Results from last 7 days   Lab Units 09/05/19  0751   BILIRUBIN mg/dL 0.6   ALK PHOS U/L 68   ALT (SGPT) U/L 40   AST (SGOT) U/L 208*           Invalid input(s): TG, LDLCALC, LDLREALC  Results from last 7 days   Lab  Units 09/05/19  1209 09/05/19  0755 09/05/19  0751   PROBNP pg/mL  --   --  5,517.0*   TROPONIN T ng/mL  --   --  0.030   LACTATE mmol/L 1.3 2.8*  --        Brief Urine Lab Results  (Last result in the past 365 days)      Color   Clarity   Blood   Leuk Est   Nitrite   Protein   CREAT   Urine HCG        09/05/19 1023 Dark Yellow Cloudy Large (3+) Trace Negative 100 mg/dL (2+)               Microbiology Results Abnormal     Procedure Component Value - Date/Time    Blood Culture - Blood, Blood, Venous Line [[id number]] Collected:  09/05/19 0730    Lab Status:  Final result Specimen:  Blood, Venous Line Updated:  09/10/19 0846     Blood Culture No growth at 5 days    Blood Culture - Blood, Blood, Venous Line [[id number]] Collected:  09/05/19 0745    Lab Status:  Final result Specimen:  Blood, Venous Line Updated:  09/10/19 0846     Blood Culture No growth at 5 days          Imaging Results (all)     Procedure Component Value Units Date/Time    XR Chest 1 View [[id number]] Collected:  09/08/19 1749     Updated:  09/09/19 1101    Narrative:       EXAMINATION: XR CHEST 1 VW-09/08/2019:      INDICATION: SOA/wheezing; M62.82-Rhabdomyolysis; J18.9-Pneumonia,  unspecified organism; I71.2-Thoracic aortic aneurysm, without rupture;  W19.XXXA-Unspecified fall, initial encounter; S22.050A-Wedge compression  fracture of t5-T6 vertebra, initial encounter for closed fracture;  S22.39XA-Fracture of one rib, unspecified side, initial encounter for  closed fracture; E87.2-Acidosis.      COMPARISON: Chest radiograph 09/05/2019.     FINDINGS: Single portable chest radiograph was submitted for review. The  heart is prominent in size, similar to prior. Enlargement of the aorta  is identified as previously documented. Coarsened interstitial lung  changes are identified without evidence of superimposed airspace  disease. No sizable pleural effusion identified. No pneumothorax  appreciated. The visualized upper abdomen is unrevealing for  acute  abnormality. No acute osseous injury identified.           Impression:       1.  Redemonstration of cardiomegaly and prominence of the aorta, similar  to prior   2.  Chronic lung changes without evidence for superimposed pneumonia.     D:  09/08/2019  E:  09/09/2019             CT Head Without Contrast [[id number]] Collected:  09/05/19 0906     Updated:  09/06/19 1637    Narrative:       EXAMINATION: CT HEAD WO CONTRAST-      INDICATION: Fall.     TECHNIQUE: Unenhanced CT imaging of the brain was performed.     The radiation dose reduction device was turned on for each scan per the  ALARA (As Low as Reasonably Achievable) protocol.     COMPARISON: None.     FINDINGS: Unenhanced CT imaging of the brain was performed without  intravenous contrast which demonstrates no evidence of midline shift. No  hydrocephalus. Diffuse cerebral atrophy most pronounced involving the  frontal lobes are identified with slight asymmetry involving the right  frontal lobe. No evidence for extraaxial fluid collection or  intracranial hemorrhage identified. Bilateral basal ganglia  calcifications are noted. The gray-white matter junction is largely  maintained without CT evidence of transcortical infarct, however CT is  insensitive for the presence of underlying ischemia. MRI would be more  sensitive if clinically relevant. Confluent hypoattenuating white matter  changes are identified likely relating to chronic microvascular ischemic  change.  Hypoattenuated region involving the left caudate head  identified. Favored to be representative of a remote lacunar infarct.       Impression:       1. No acute intracranial process appreciated.  2. Chronic senescent changes with diffuse cerebral atrophy most  pronounced involving the bilateral frontal lobes with associated chronic  microvascular ischemic change identified.  3. Favored remote left basal ganglia lacunar infarct.     D:  09/05/2019  E:  09/05/2019     This report was finalized on  9/6/2019 4:34 PM by Dr. Jameson Ware MD.       XR Chest 1 View [[id number]] Collected:  09/05/19 0822     Updated:  09/06/19 1209    Narrative:       EXAMINATION: XR CHEST 1 VW-09/05/2019:      INDICATION: Weak/Dizzy/AMS, triage protocol.      COMPARISON: NONE.     FINDINGS: Single portable chest radiograph was submitted for review. The  heart is not enlarged. The thoracic aorta is tortuous and appears  enlarged measuring up to 6.4 cm, although is slightly accentuated by the  portable technique of the film. Coarsened interstitial lung changes are  identified. Remote appearing right lateral lower rib fractures noted. No  convincing evidence of superimposed airspace disease. No pleural  effusion or pneumothorax. The visualized upper abdomen is unrevealing.  No acute osseous abnormality appreciated.           Impression:       1.  Tortuous and fusiform prominence of the thoracic aorta is suggested  measuring up to 6.4 cm which may be accentuated by the portable  technique of the film. Consider cross-sectional imaging for further  evaluation if underlying acute aortic pathology is of clinical concern.     2.  Coarsened interstitial lung changes noted diffusely without  superimposed evidence of pneumonia.     D:  09/05/2019  E:  09/05/2019     This report was finalized on 9/6/2019 12:06 PM by Dr. Jameson Ware MD.       CT Chest Without Contrast [[id number]] Collected:  09/05/19 0910     Updated:  09/05/19 0944    Narrative:       EXAMINATION: CT CHEST WO CONTRAST-      INDICATION: Fall, WBC 27.      TECHNIQUE: Unenhanced CT imaging of the chest was performed. Additional  coronal sagittal reformatted imaging was obtained for review.     The radiation dose reduction device was turned on for each scan per the  ALARA (As Low as Reasonably Achievable) protocol.     COMPARISON: None.     FINDINGS: Dedicated CT imaging of the lungs demonstrate nonspecific  subpleural reticulation with bibasilar groundglass airspace  opacities  and micronodularity noted. Mild COPD/emphysema identified. Mild  bronchial wall thickening noted.     The ascending thoracic aorta demonstrates fusiform enlargement measuring  up to 6.1 cm. The sinus of Valsalva measures approximately 4.4 cm. The  sinotubular junction measures 4.1 cm. The fusiform enlargement of the  aorta extends throughout the aortic arch measuring 4.1 cm. The  descending thoracic aorta is tortuous and measures up to 4.3 cm. After  sclerotic calcifications of the thoracic aorta are identified. Lack of  intravenous contrast limits evaluation of underlying acute aortic  pathology and endoluminal evaluation. Although no inflammation  surrounding the aorta or displaced aortic wall calcification are  identified on the exam. The heart is not particularly enlarged. Aortic  annular calcifications are identified. Heavy coronary arterial  calcification are identified within noncalcified lack noted within the  coronary artery distribution. Trace pericardial fluid is identified. The  thoracic esophagus demonstrates mild small volume of air and fluid noted  throughout its lumen. No mediastinal mass or adenopathy identified. The  visualized upper abdomen does not demonstrate acute abnormality.  Granulomatous disease of the spleen, liver, and mediastinum are  identified. Thoracolumbar degenerative changes are identified. An  age-indeterminate T6 compression fracture is identified. No retropulsion  appreciated. The ribs appear intact.       Impression:       1. Mild emphysema/COPD with superimposed nonspecific diffuse groundglass  changes most pronounced involving the lingula and bilateral lower lobes  with mild interlobular septal thickening. This is favored to relate to  an infectious/inflammatory etiology superimposed on chronic interstitial  lung disease. Clinical correlation is required.  2. Fusiform aortic aneurysm measuring up to 6.1 cm involving the  ascending aorta with additional enlarged  aortic arch and descending  thoracic aorta as described above.  3. Heavy calcified and noncalcified atherosclerotic disease of the  coronary arteries.  4. Age-indeterminate T6 compression deformity. Correlate with point  tenderness for further evaluation.  5. Remote appearing right lower lateral rib fracture is identified.     D:  09/05/2019  E:  09/05/2019             Results for orders placed during the hospital encounter of 09/05/19   Duplex Venous Lower Extremity - Right CAR    Narrative · Normal right lower extremity venous duplex scan.          Results for orders placed during the hospital encounter of 09/05/19   Duplex Venous Lower Extremity - Right CAR    Narrative · Normal right lower extremity venous duplex scan.                 Discharge Details        Discharge Medications      New Medications      Instructions Start Date   cefuroxime 250 MG tablet  Commonly known as:  CEFTIN   250 mg, Oral, 2 Times Daily      doxycycline 100 MG capsule  Commonly known as:  MONODOX   100 mg, Oral, Every 12 Hours Scheduled         Continue These Medications      Instructions Start Date   amLODIPine 5 MG tablet  Commonly known as:  NORVASC   5 mg, Oral, Daily         Stop These Medications    hydrALAZINE 50 MG tablet  Commonly known as:  APRESOLINE     rosuvastatin 5 MG tablet  Commonly known as:  CRESTOR            No Known Allergies      Discharge Disposition:  [place] [facility]    Diet:  Hospital:  Diet Order   Procedures   • Diet Regular     Discharge:     Discharge Activity:         CODE STATUS:    Code Status and Medical Interventions:   Ordered at: 09/05/19 1045     Limited Support to NOT Include:    Intubation     Code Status:    No CPR     Medical Interventions (Level of Support Prior to Arrest):    Limited         Future Appointments   Date Time Provider Department Center   9/13/2019  2:00 PM Shirin To MD [facility] None       Additional Instructions for the Follow-ups that You Need to Schedule      Ambulatory Referral to Home Health   As directed      Face to Face Visit Date:  9/9/2019    Follow-up provider for Plan of Care?:  I treated the patient in an acute care facility and will not continue treatment after discharge.    Follow-up provider:  JEREMIAH HUERTA [[id number]]    Reason/Clinical Findings:  Generalized weakness, s/p fall with Rhabdo, impaired mobility, sepsis    Describe mobility limitations that make leaving home difficult:  Generalized weakness. Walked 250 ft with PT.    Nursing/Therapeutic Services Requested:  Skilled Nursing Physical Therapy    Skilled nursing orders:  Other (Eval and Treat)    PT orders:  Other (Eval and Treat)               Time Spent on Discharge:  40 minutes    Electronically signed by VALENTINO Ellis, 09/10/19, 11:22 AM.

## 2019-09-10 NOTE — PLAN OF CARE
Problem: Patient Care Overview  Goal: Plan of Care Review  Outcome: Ongoing (interventions implemented as appropriate)   09/10/19 0302   Coping/Psychosocial   Plan of Care Reviewed With patient   Plan of Care Review   Progress no change   OTHER   Outcome Summary VSS. Pt a fib on monitor. No complaints of pain. Pt rested well throughout night. Will continue to monitor.     Goal: Individualization and Mutuality  Outcome: Ongoing (interventions implemented as appropriate)    Goal: Discharge Needs Assessment  Outcome: Ongoing (interventions implemented as appropriate)    Goal: Interprofessional Rounds/Family Conf  Outcome: Ongoing (interventions implemented as appropriate)      Problem: Fall Risk (Adult)  Goal: Identify Related Risk Factors and Signs and Symptoms  Outcome: Ongoing (interventions implemented as appropriate)    Goal: Absence of Fall  Outcome: Ongoing (interventions implemented as appropriate)      Problem: Skin Injury Risk (Adult)  Goal: Identify Related Risk Factors and Signs and Symptoms  Outcome: Ongoing (interventions implemented as appropriate)    Goal: Skin Health and Integrity  Outcome: Ongoing (interventions implemented as appropriate)      Problem: Sepsis/Septic Shock (Adult)  Goal: Signs and Symptoms of Listed Potential Problems Will be Absent, Minimized or Managed (Sepsis/Septic Shock)  Outcome: Ongoing (interventions implemented as appropriate)

## 2019-09-10 NOTE — PROGRESS NOTES
Case Management Discharge Note    Final Note: Pt d/c'ing today. Walker has been brought to bedside. Called d/c to Te at [facility]. They will f/u after d/c. Pt's daughter was given info on life alert and a private sitter list if needed. CM will cont to follow.     Destination      No service has been selected for the patient.      Durable Medical Equipment - Selection Complete      Service Provider Request Status Selected Services Address Phone Number Fax Number    [facility] Selected Durable Medical Equipment [address] [address] [phone]      Dialysis/Infusion      No service has been selected for the patient.      Home Medical Care - Selection Complete      Service Provider Request Status Selected Services Address Phone Number Fax Number    [facility] Selected Home Health Services [address] [address] [phone]      Therapy      No service has been selected for the patient.      Community Resources      No service has been selected for the patient.             Final Discharge Disposition Code: 06 - home with home health care

## 2019-09-11 ENCOUNTER — READMISSION MANAGEMENT (OUTPATIENT)
Dept: CALL CENTER | Facility: HOSPITAL | Age: 84
End: 2019-09-11

## 2019-09-11 ENCOUNTER — TRANSITIONAL CARE MANAGEMENT TELEPHONE ENCOUNTER (OUTPATIENT)
Dept: INTERNAL MEDICINE | Facility: CLINIC | Age: 84
End: 2019-09-11

## 2019-09-11 NOTE — OUTREACH NOTE
Prep Survey      Responses   Facility patient discharged from?  [facility]   Is patient eligible?  Yes   Discharge diagnosis  **Sepsis    Does the patient have one of the following disease processes/diagnoses(primary or secondary)?  Sepsis   Does the patient have Home health ordered?  Yes   What is the Home health agency?   [facility]    Is there a DME ordered?  Yes   What DME was ordered?  Walker per [facility] care    Prep survey completed?  Yes          Yaa Potter RN

## 2019-09-12 ENCOUNTER — READMISSION MANAGEMENT (OUTPATIENT)
Dept: CALL CENTER | Facility: HOSPITAL | Age: 84
End: 2019-09-12

## 2019-09-12 NOTE — OUTREACH NOTE
Sepsis Week 1 Survey      Responses   Facility patient discharged from?  [facility]   Does the patient have one of the following disease processes/diagnoses(primary or secondary)?  Sepsis   Is there a successful TCM telephone encounter documented?  No   Week 1 attempt successful?  No   Revoke  Phone number issues          Luz Kramer RN

## 2019-09-12 NOTE — OUTREACH NOTE
Multiple attempts have been made to contact pt to complete KOREY call. Phone # listed ([phone], also same # listed under emergency contact) is not in service. No further attempts will be made. Pt has a new pcp appt w/ Dr To 9/13/19 that was [facility] request and TCM is applicable.

## 2019-09-13 ENCOUNTER — OFFICE VISIT (OUTPATIENT)
Dept: INTERNAL MEDICINE | Facility: CLINIC | Age: 84
End: 2019-09-13

## 2019-09-13 VITALS
DIASTOLIC BLOOD PRESSURE: 92 MMHG | BODY MASS INDEX: 28.58 KG/M2 | TEMPERATURE: 97.8 F | OXYGEN SATURATION: 99 % | RESPIRATION RATE: 16 BRPM | WEIGHT: 211 LBS | HEIGHT: 72 IN | HEART RATE: 77 BPM | SYSTOLIC BLOOD PRESSURE: 162 MMHG

## 2019-09-13 DIAGNOSIS — D64.9 ANEMIA, UNSPECIFIED TYPE: ICD-10-CM

## 2019-09-13 DIAGNOSIS — I10 ESSENTIAL HYPERTENSION: ICD-10-CM

## 2019-09-13 DIAGNOSIS — E78.5 HYPERLIPIDEMIA, UNSPECIFIED HYPERLIPIDEMIA TYPE: ICD-10-CM

## 2019-09-13 DIAGNOSIS — J18.9 PNEUMONIA OF BOTH LOWER LOBES DUE TO INFECTIOUS ORGANISM: ICD-10-CM

## 2019-09-13 DIAGNOSIS — L03.818 CELLULITIS OF OTHER SPECIFIED SITE: ICD-10-CM

## 2019-09-13 DIAGNOSIS — M62.82 NON-TRAUMATIC RHABDOMYOLYSIS: Primary | ICD-10-CM

## 2019-09-13 DIAGNOSIS — R01.1 HEART MURMUR: ICD-10-CM

## 2019-09-13 PROBLEM — L03.90 CELLULITIS: Status: ACTIVE | Noted: 2019-09-13

## 2019-09-13 PROBLEM — E87.6 HYPOKALEMIA: Status: RESOLVED | Noted: 2019-09-05 | Resolved: 2019-09-13

## 2019-09-13 PROBLEM — E87.20 METABOLIC ACIDOSIS: Status: RESOLVED | Noted: 2019-09-05 | Resolved: 2019-09-13

## 2019-09-13 PROBLEM — A41.9 SEPSIS (HCC): Status: RESOLVED | Noted: 2019-09-05 | Resolved: 2019-09-13

## 2019-09-13 PROBLEM — E86.0 DEHYDRATION: Status: RESOLVED | Noted: 2019-09-05 | Resolved: 2019-09-13

## 2019-09-13 PROBLEM — R79.89 LACTATE BLOOD INCREASE: Status: RESOLVED | Noted: 2019-09-05 | Resolved: 2019-09-13

## 2019-09-13 PROCEDURE — 36415 COLL VENOUS BLD VENIPUNCTURE: CPT | Performed by: INTERNAL MEDICINE

## 2019-09-13 PROCEDURE — 80053 COMPREHEN METABOLIC PANEL: CPT | Performed by: INTERNAL MEDICINE

## 2019-09-13 PROCEDURE — 99204 OFFICE O/P NEW MOD 45 MIN: CPT | Performed by: INTERNAL MEDICINE

## 2019-09-13 PROCEDURE — 82550 ASSAY OF CK (CPK): CPT | Performed by: INTERNAL MEDICINE

## 2019-09-13 PROCEDURE — 85027 COMPLETE CBC AUTOMATED: CPT | Performed by: INTERNAL MEDICINE

## 2019-09-13 RX ORDER — CEFUROXIME AXETIL 250 MG/1
250 TABLET ORAL 2 TIMES DAILY
Qty: 6 TABLET | Refills: 0 | Status: SHIPPED | OUTPATIENT
Start: 2019-09-13 | End: 2019-09-16

## 2019-09-13 RX ORDER — HYDRALAZINE HYDROCHLORIDE 50 MG/1
50 TABLET, FILM COATED ORAL 3 TIMES DAILY
COMMUNITY
End: 2019-09-18 | Stop reason: HOSPADM

## 2019-09-13 RX ORDER — DOXYCYCLINE HYCLATE 100 MG
100 TABLET ORAL 2 TIMES DAILY
Qty: 6 TABLET | Refills: 0 | Status: SHIPPED | OUTPATIENT
Start: 2019-09-13 | End: 2019-09-16

## 2019-09-13 RX ORDER — ROSUVASTATIN CALCIUM 5 MG/1
5 TABLET, COATED ORAL DAILY
COMMUNITY
End: 2019-09-18 | Stop reason: HOSPADM

## 2019-09-13 NOTE — ASSESSMENT & PLAN NOTE
Lungs are clear and room air O2 sat is stable.  Continue to monitor.  Reports of shortness of breath may be related to cardiac status as above or deconditioning.

## 2019-09-13 NOTE — ASSESSMENT & PLAN NOTE
Currently off of Crestor 5 mg daily in the setting of recent rhabdo.  Has been intolerant to Lipitor secondary to myalgias/muscle weakness.  No recent lipid panel.  Will plan to check fasting lipid panel at next visit and provided LFTs/CK have normalized, discussed risk/benefits of restarting.  Due to age, may no longer need statin.

## 2019-09-13 NOTE — ASSESSMENT & PLAN NOTE
RLE still remains warm/erythematous, asymmetrical versus left.  No worsening swelling since discharge per patient.  Concern for ongoing cellulitis.  Will extend 3-day course of cefuroxime and doxycycline (total approximately 10 days of antibiotics).  May also have some venous insufficiency.  RLE duplex was negative for DVT during recent hospitalization which is reassuring.  If he has any further spreading erythema, chills, recurrent fevers, worsening swelling over the weekend he would need to see the ER as he would have failed outpatient management.  Have also recommended compression stockings in the meantime along with leg elevation and watching sodium intake.

## 2019-09-13 NOTE — ASSESSMENT & PLAN NOTE
Stable.  Not quite at goal of less than 140/90, however with recent hospitalization, age, weakness and history of falls will avoid uptitrating medications at current visit.  Continue to monitor BP at home and at 2-week follow-up if still elevated above 160/90 may consider increasing amlodipine cautiously.

## 2019-09-13 NOTE — ASSESSMENT & PLAN NOTE
Likely has some aortic valve disease.  No recent echo.  Will order.  Pending this, may benefit from cardiology evaluation to help manage fluid status.

## 2019-09-13 NOTE — ASSESSMENT & PLAN NOTE
H/H 10.5/34.2 on 9/9.  May be related to IV fluids given during recent hospital stay.  Will repeat CBC today.  If anemia persists, discussed risk/benefits of further work-up (i.e. endoscopy) due to age.

## 2019-09-14 LAB
ALBUMIN SERPL-MCNC: 3.6 G/DL (ref 3.5–5.2)
ALBUMIN/GLOB SERPL: 0.9 G/DL
ALP SERPL-CCNC: 73 U/L (ref 39–117)
ALT SERPL W P-5'-P-CCNC: 48 U/L (ref 1–41)
ANION GAP SERPL CALCULATED.3IONS-SCNC: 12.8 MMOL/L (ref 5–15)
AST SERPL-CCNC: 39 U/L (ref 1–40)
BILIRUB SERPL-MCNC: 0.5 MG/DL (ref 0.2–1.2)
BUN BLD-MCNC: 15 MG/DL (ref 8–23)
BUN/CREAT SERPL: 14.4 (ref 7–25)
CALCIUM SPEC-SCNC: 9.1 MG/DL (ref 8.2–9.6)
CHLORIDE SERPL-SCNC: 101 MMOL/L (ref 98–107)
CK SERPL-CCNC: 203 U/L (ref 20–200)
CO2 SERPL-SCNC: 25.2 MMOL/L (ref 22–29)
CREAT BLD-MCNC: 1.04 MG/DL (ref 0.76–1.27)
DEPRECATED RDW RBC AUTO: 60.5 FL (ref 37–54)
ERYTHROCYTE [DISTWIDTH] IN BLOOD BY AUTOMATED COUNT: 19 % (ref 12.3–15.4)
GFR SERPL CREATININE-BSD FRML MDRD: 67 ML/MIN/1.73
GLOBULIN UR ELPH-MCNC: 3.9 GM/DL
GLUCOSE BLD-MCNC: 103 MG/DL (ref 65–99)
HCT VFR BLD AUTO: 40.7 % (ref 37.5–51)
HGB BLD-MCNC: 12.1 G/DL (ref 13–17.7)
MCH RBC QN AUTO: 26.8 PG (ref 26.6–33)
MCHC RBC AUTO-ENTMCNC: 29.7 G/DL (ref 31.5–35.7)
MCV RBC AUTO: 90.2 FL (ref 79–97)
PLATELET # BLD AUTO: 403 10*3/MM3 (ref 140–450)
PMV BLD AUTO: 10.6 FL (ref 6–12)
POTASSIUM BLD-SCNC: 4.3 MMOL/L (ref 3.5–5.2)
PROT SERPL-MCNC: 7.5 G/DL (ref 6–8.5)
RBC # BLD AUTO: 4.51 10*6/MM3 (ref 4.14–5.8)
SODIUM BLD-SCNC: 139 MMOL/L (ref 136–145)
WBC NRBC COR # BLD: 11.6 10*3/MM3 (ref 3.4–10.8)

## 2019-09-16 ENCOUNTER — APPOINTMENT (OUTPATIENT)
Dept: CARDIOLOGY | Facility: HOSPITAL | Age: 84
End: 2019-09-16

## 2019-09-16 ENCOUNTER — HOSPITAL ENCOUNTER (INPATIENT)
Facility: HOSPITAL | Age: 84
LOS: 1 days | Discharge: HOME | End: 2019-09-18
Attending: EMERGENCY MEDICINE | Admitting: FAMILY MEDICINE

## 2019-09-16 DIAGNOSIS — D72.829 LEUKOCYTOSIS, UNSPECIFIED TYPE: ICD-10-CM

## 2019-09-16 DIAGNOSIS — Z74.09 IMPAIRED MOBILITY AND ADLS: ICD-10-CM

## 2019-09-16 DIAGNOSIS — L03.115 CELLULITIS OF RIGHT LOWER LEG: Primary | ICD-10-CM

## 2019-09-16 DIAGNOSIS — Z78.9 IMPAIRED MOBILITY AND ADLS: ICD-10-CM

## 2019-09-16 LAB
ALBUMIN SERPL-MCNC: 4.2 G/DL (ref 3.5–5.2)
ALBUMIN/GLOB SERPL: 1 G/DL
ALP SERPL-CCNC: 77 U/L (ref 39–117)
ALT SERPL W P-5'-P-CCNC: 28 U/L (ref 1–41)
ANION GAP SERPL CALCULATED.3IONS-SCNC: 9 MMOL/L (ref 5–15)
AST SERPL-CCNC: 30 U/L (ref 1–40)
BASOPHILS # BLD AUTO: 0.04 10*3/MM3 (ref 0–0.2)
BASOPHILS NFR BLD AUTO: 0.3 % (ref 0–1.5)
BH CV LOWER VASCULAR LEFT COMMON FEMORAL AUGMENT: NORMAL
BH CV LOWER VASCULAR LEFT COMMON FEMORAL COMPRESS: NORMAL
BH CV LOWER VASCULAR LEFT COMMON FEMORAL PHASIC: NORMAL
BH CV LOWER VASCULAR LEFT COMMON FEMORAL SPONT: NORMAL
BH CV LOWER VASCULAR RIGHT COMMON FEMORAL AUGMENT: NORMAL
BH CV LOWER VASCULAR RIGHT COMMON FEMORAL COMPRESS: NORMAL
BH CV LOWER VASCULAR RIGHT COMMON FEMORAL PHASIC: NORMAL
BH CV LOWER VASCULAR RIGHT COMMON FEMORAL SPONT: NORMAL
BH CV LOWER VASCULAR RIGHT DISTAL FEMORAL AUGMENT: NORMAL
BH CV LOWER VASCULAR RIGHT DISTAL FEMORAL COMPRESS: NORMAL
BH CV LOWER VASCULAR RIGHT GASTRONEMIUS COMPRESS: NORMAL
BH CV LOWER VASCULAR RIGHT GREATER SAPH AK COMPRESS: NORMAL
BH CV LOWER VASCULAR RIGHT GREATER SAPH BK COMPRESS: NORMAL
BH CV LOWER VASCULAR RIGHT LESSER SAPH COMPRESS: NORMAL
BH CV LOWER VASCULAR RIGHT MID FEMORAL AUGMENT: NORMAL
BH CV LOWER VASCULAR RIGHT MID FEMORAL COMPRESS: NORMAL
BH CV LOWER VASCULAR RIGHT MID FEMORAL PHASIC: NORMAL
BH CV LOWER VASCULAR RIGHT MID FEMORAL SPONT: NORMAL
BH CV LOWER VASCULAR RIGHT PERONEAL AUGMENT: NORMAL
BH CV LOWER VASCULAR RIGHT PERONEAL COMPRESS: NORMAL
BH CV LOWER VASCULAR RIGHT POPLITEAL AUGMENT: NORMAL
BH CV LOWER VASCULAR RIGHT POPLITEAL COMPRESS: NORMAL
BH CV LOWER VASCULAR RIGHT POPLITEAL PHASIC: NORMAL
BH CV LOWER VASCULAR RIGHT POPLITEAL SPONT: NORMAL
BH CV LOWER VASCULAR RIGHT POSTERIOR TIBIAL AUGMENT: NORMAL
BH CV LOWER VASCULAR RIGHT POSTERIOR TIBIAL COMPRESS: NORMAL
BH CV LOWER VASCULAR RIGHT PROFUNDA FEMORAL AUGMENT: NORMAL
BH CV LOWER VASCULAR RIGHT PROFUNDA FEMORAL COMPRESS: NORMAL
BH CV LOWER VASCULAR RIGHT PROXIMAL FEMORAL AUGMENT: NORMAL
BH CV LOWER VASCULAR RIGHT PROXIMAL FEMORAL COMPRESS: NORMAL
BH CV LOWER VASCULAR RIGHT SAPHENOFEMORAL JUNCTION AUGMENT: NORMAL
BH CV LOWER VASCULAR RIGHT SAPHENOFEMORAL JUNCTION COMPRESS: NORMAL
BH CV LOWER VASCULAR RIGHT SAPHENOFEMORAL JUNCTION PHASIC: NORMAL
BH CV LOWER VASCULAR RIGHT SAPHENOFEMORAL JUNCTION SPONT: NORMAL
BILIRUB SERPL-MCNC: 0.5 MG/DL (ref 0.2–1.2)
BILIRUB UR QL STRIP: NEGATIVE
BUN BLD-MCNC: 14 MG/DL (ref 8–23)
BUN/CREAT SERPL: 13.3 (ref 7–25)
CALCIUM SPEC-SCNC: 9.3 MG/DL (ref 8.2–9.6)
CHLORIDE SERPL-SCNC: 102 MMOL/L (ref 98–107)
CLARITY UR: CLEAR
CO2 SERPL-SCNC: 27 MMOL/L (ref 22–29)
COLOR UR: YELLOW
CREAT BLD-MCNC: 1.05 MG/DL (ref 0.76–1.27)
CRP SERPL-MCNC: 1.02 MG/DL (ref 0–0.5)
DEPRECATED RDW RBC AUTO: 57.4 FL (ref 37–54)
EOSINOPHIL # BLD AUTO: 0.16 10*3/MM3 (ref 0–0.4)
EOSINOPHIL NFR BLD AUTO: 1.3 % (ref 0.3–6.2)
ERYTHROCYTE [DISTWIDTH] IN BLOOD BY AUTOMATED COUNT: 18.3 % (ref 12.3–15.4)
ERYTHROCYTE [SEDIMENTATION RATE] IN BLOOD: 73 MM/HR (ref 0–20)
GFR SERPL CREATININE-BSD FRML MDRD: 66 ML/MIN/1.73
GLOBULIN UR ELPH-MCNC: 4.1 GM/DL
GLUCOSE BLD-MCNC: 92 MG/DL (ref 65–99)
GLUCOSE UR STRIP-MCNC: NEGATIVE MG/DL
HCT VFR BLD AUTO: 40.6 % (ref 37.5–51)
HGB BLD-MCNC: 12.3 G/DL (ref 13–17.7)
HGB UR QL STRIP.AUTO: NEGATIVE
HOLD SPECIMEN: NORMAL
HOLD SPECIMEN: NORMAL
IMM GRANULOCYTES # BLD AUTO: 0.19 10*3/MM3 (ref 0–0.05)
IMM GRANULOCYTES NFR BLD AUTO: 1.5 % (ref 0–0.5)
KETONES UR QL STRIP: NEGATIVE
LEUKOCYTE ESTERASE UR QL STRIP.AUTO: NEGATIVE
LYMPHOCYTES # BLD AUTO: 3.84 10*3/MM3 (ref 0.7–3.1)
LYMPHOCYTES NFR BLD AUTO: 30 % (ref 19.6–45.3)
MCH RBC QN AUTO: 26.6 PG (ref 26.6–33)
MCHC RBC AUTO-ENTMCNC: 30.3 G/DL (ref 31.5–35.7)
MCV RBC AUTO: 87.9 FL (ref 79–97)
MONOCYTES # BLD AUTO: 1.14 10*3/MM3 (ref 0.1–0.9)
MONOCYTES NFR BLD AUTO: 8.9 % (ref 5–12)
NEUTROPHILS # BLD AUTO: 7.41 10*3/MM3 (ref 1.7–7)
NEUTROPHILS NFR BLD AUTO: 58 % (ref 42.7–76)
NITRITE UR QL STRIP: NEGATIVE
NRBC BLD AUTO-RTO: 0 /100 WBC (ref 0–0.2)
PH UR STRIP.AUTO: 7.5 [PH] (ref 5–8)
PLATELET # BLD AUTO: 389 10*3/MM3 (ref 140–450)
PMV BLD AUTO: 9.2 FL (ref 6–12)
POTASSIUM BLD-SCNC: 4.5 MMOL/L (ref 3.5–5.2)
PROT SERPL-MCNC: 8.3 G/DL (ref 6–8.5)
PROT UR QL STRIP: NEGATIVE
RBC # BLD AUTO: 4.62 10*6/MM3 (ref 4.14–5.8)
SODIUM BLD-SCNC: 138 MMOL/L (ref 136–145)
SP GR UR STRIP: 1.01 (ref 1–1.03)
UROBILINOGEN UR QL STRIP: NORMAL
WBC NRBC COR # BLD: 12.78 10*3/MM3 (ref 3.4–10.8)
WHOLE BLOOD HOLD SPECIMEN: NORMAL
WHOLE BLOOD HOLD SPECIMEN: NORMAL

## 2019-09-16 PROCEDURE — 99223 1ST HOSP IP/OBS HIGH 75: CPT | Performed by: HOSPITALIST

## 2019-09-16 PROCEDURE — 25010000002 CEFTRIAXONE PER 250 MG: Performed by: EMERGENCY MEDICINE

## 2019-09-16 PROCEDURE — 85652 RBC SED RATE AUTOMATED: CPT | Performed by: NURSE PRACTITIONER

## 2019-09-16 PROCEDURE — 85025 COMPLETE CBC W/AUTO DIFF WBC: CPT

## 2019-09-16 PROCEDURE — 93971 EXTREMITY STUDY: CPT

## 2019-09-16 PROCEDURE — 93010 ELECTROCARDIOGRAM REPORT: CPT | Performed by: INTERNAL MEDICINE

## 2019-09-16 PROCEDURE — 80053 COMPREHEN METABOLIC PANEL: CPT | Performed by: EMERGENCY MEDICINE

## 2019-09-16 PROCEDURE — 25010000002 ENOXAPARIN PER 10 MG: Performed by: NURSE PRACTITIONER

## 2019-09-16 PROCEDURE — 81003 URINALYSIS AUTO W/O SCOPE: CPT | Performed by: HOSPITALIST

## 2019-09-16 PROCEDURE — 93005 ELECTROCARDIOGRAM TRACING: CPT | Performed by: NURSE PRACTITIONER

## 2019-09-16 PROCEDURE — 25010000002 HYDRALAZINE PER 20 MG: Performed by: NURSE PRACTITIONER

## 2019-09-16 PROCEDURE — 25010000002 VANCOMYCIN 10 G RECONSTITUTED SOLUTION: Performed by: EMERGENCY MEDICINE

## 2019-09-16 PROCEDURE — 86140 C-REACTIVE PROTEIN: CPT | Performed by: NURSE PRACTITIONER

## 2019-09-16 PROCEDURE — 99284 EMERGENCY DEPT VISIT MOD MDM: CPT

## 2019-09-16 PROCEDURE — 93971 EXTREMITY STUDY: CPT | Performed by: INTERNAL MEDICINE

## 2019-09-16 RX ORDER — BISACODYL 10 MG
10 SUPPOSITORY, RECTAL RECTAL DAILY PRN
Status: DISCONTINUED | OUTPATIENT
Start: 2019-09-16 | End: 2019-09-18 | Stop reason: HOSPADM

## 2019-09-16 RX ORDER — SODIUM CHLORIDE 0.9 % (FLUSH) 0.9 %
10 SYRINGE (ML) INJECTION EVERY 12 HOURS SCHEDULED
Status: DISCONTINUED | OUTPATIENT
Start: 2019-09-16 | End: 2019-09-18 | Stop reason: HOSPADM

## 2019-09-16 RX ORDER — HYDRALAZINE HYDROCHLORIDE 20 MG/ML
10 INJECTION INTRAMUSCULAR; INTRAVENOUS EVERY 6 HOURS PRN
Status: DISCONTINUED | OUTPATIENT
Start: 2019-09-16 | End: 2019-09-18 | Stop reason: HOSPADM

## 2019-09-16 RX ORDER — AMLODIPINE BESYLATE 5 MG/1
5 TABLET ORAL DAILY
Status: DISCONTINUED | OUTPATIENT
Start: 2019-09-17 | End: 2019-09-18

## 2019-09-16 RX ORDER — SODIUM CHLORIDE 0.9 % (FLUSH) 0.9 %
10 SYRINGE (ML) INJECTION AS NEEDED
Status: DISCONTINUED | OUTPATIENT
Start: 2019-09-16 | End: 2019-09-18 | Stop reason: HOSPADM

## 2019-09-16 RX ORDER — SENNA AND DOCUSATE SODIUM 50; 8.6 MG/1; MG/1
2 TABLET, FILM COATED ORAL 2 TIMES DAILY PRN
Status: DISCONTINUED | OUTPATIENT
Start: 2019-09-16 | End: 2019-09-18 | Stop reason: HOSPADM

## 2019-09-16 RX ORDER — BISACODYL 5 MG/1
5 TABLET, DELAYED RELEASE ORAL DAILY PRN
Status: DISCONTINUED | OUTPATIENT
Start: 2019-09-16 | End: 2019-09-18 | Stop reason: HOSPADM

## 2019-09-16 RX ADMIN — CEFTRIAXONE 1 G: 1 INJECTION, POWDER, FOR SOLUTION INTRAMUSCULAR; INTRAVENOUS at 16:07

## 2019-09-16 RX ADMIN — VANCOMYCIN HYDROCHLORIDE 2000 MG: 10 INJECTION, POWDER, LYOPHILIZED, FOR SOLUTION INTRAVENOUS at 16:38

## 2019-09-16 RX ADMIN — ENOXAPARIN SODIUM 40 MG: 40 INJECTION SUBCUTANEOUS at 20:48

## 2019-09-16 RX ADMIN — SODIUM CHLORIDE, PRESERVATIVE FREE 10 ML: 5 INJECTION INTRAVENOUS at 20:48

## 2019-09-16 RX ADMIN — HYDRALAZINE HYDROCHLORIDE 10 MG: 20 INJECTION INTRAMUSCULAR; INTRAVENOUS at 20:48

## 2019-09-17 PROBLEM — L03.115 CELLULITIS OF RIGHT LOWER LEG: Status: ACTIVE | Noted: 2019-09-17

## 2019-09-17 LAB
ANION GAP SERPL CALCULATED.3IONS-SCNC: 9 MMOL/L (ref 5–15)
BASOPHILS # BLD AUTO: 0.05 10*3/MM3 (ref 0–0.2)
BASOPHILS NFR BLD AUTO: 0.6 % (ref 0–1.5)
BUN BLD-MCNC: 15 MG/DL (ref 8–23)
BUN/CREAT SERPL: 14.7 (ref 7–25)
CALCIUM SPEC-SCNC: 8.7 MG/DL (ref 8.2–9.6)
CHLORIDE SERPL-SCNC: 105 MMOL/L (ref 98–107)
CO2 SERPL-SCNC: 25 MMOL/L (ref 22–29)
CREAT BLD-MCNC: 1.02 MG/DL (ref 0.76–1.27)
DEPRECATED RDW RBC AUTO: 56.8 FL (ref 37–54)
EOSINOPHIL # BLD AUTO: 0.1 10*3/MM3 (ref 0–0.4)
EOSINOPHIL NFR BLD AUTO: 1.3 % (ref 0.3–6.2)
ERYTHROCYTE [DISTWIDTH] IN BLOOD BY AUTOMATED COUNT: 18.1 % (ref 12.3–15.4)
GFR SERPL CREATININE-BSD FRML MDRD: 69 ML/MIN/1.73
GLUCOSE BLD-MCNC: 90 MG/DL (ref 65–99)
HCT VFR BLD AUTO: 34.7 % (ref 37.5–51)
HGB BLD-MCNC: 10.7 G/DL (ref 13–17.7)
IMM GRANULOCYTES # BLD AUTO: 0.07 10*3/MM3 (ref 0–0.05)
IMM GRANULOCYTES NFR BLD AUTO: 0.9 % (ref 0–0.5)
LYMPHOCYTES # BLD AUTO: 2 10*3/MM3 (ref 0.7–3.1)
LYMPHOCYTES NFR BLD AUTO: 25.1 % (ref 19.6–45.3)
MCH RBC QN AUTO: 26.6 PG (ref 26.6–33)
MCHC RBC AUTO-ENTMCNC: 30.8 G/DL (ref 31.5–35.7)
MCV RBC AUTO: 86.3 FL (ref 79–97)
MONOCYTES # BLD AUTO: 0.95 10*3/MM3 (ref 0.1–0.9)
MONOCYTES NFR BLD AUTO: 11.9 % (ref 5–12)
NEUTROPHILS # BLD AUTO: 4.81 10*3/MM3 (ref 1.7–7)
NEUTROPHILS NFR BLD AUTO: 60.2 % (ref 42.7–76)
NRBC BLD AUTO-RTO: 0 /100 WBC (ref 0–0.2)
PLATELET # BLD AUTO: 333 10*3/MM3 (ref 140–450)
PMV BLD AUTO: 9.9 FL (ref 6–12)
POTASSIUM BLD-SCNC: 4.5 MMOL/L (ref 3.5–5.2)
RBC # BLD AUTO: 4.02 10*6/MM3 (ref 4.14–5.8)
SODIUM BLD-SCNC: 139 MMOL/L (ref 136–145)
VANCOMYCIN SERPL-MCNC: 11.3 MCG/ML (ref 5–40)
WBC NRBC COR # BLD: 7.98 10*3/MM3 (ref 3.4–10.8)

## 2019-09-17 PROCEDURE — 80048 BASIC METABOLIC PNL TOTAL CA: CPT | Performed by: NURSE PRACTITIONER

## 2019-09-17 PROCEDURE — 25010000002 CEFTRIAXONE PER 250 MG: Performed by: NURSE PRACTITIONER

## 2019-09-17 PROCEDURE — 97165 OT EVAL LOW COMPLEX 30 MIN: CPT

## 2019-09-17 PROCEDURE — 85025 COMPLETE CBC W/AUTO DIFF WBC: CPT | Performed by: NURSE PRACTITIONER

## 2019-09-17 PROCEDURE — 97161 PT EVAL LOW COMPLEX 20 MIN: CPT

## 2019-09-17 PROCEDURE — 25010000002 ENOXAPARIN PER 10 MG: Performed by: NURSE PRACTITIONER

## 2019-09-17 PROCEDURE — 80202 ASSAY OF VANCOMYCIN: CPT

## 2019-09-17 PROCEDURE — 63710000001 AMLODIPINE 5 MG TABLET: Performed by: NURSE PRACTITIONER

## 2019-09-17 PROCEDURE — A9270 NON-COVERED ITEM OR SERVICE: HCPCS | Performed by: NURSE PRACTITIONER

## 2019-09-17 PROCEDURE — 99232 SBSQ HOSP IP/OBS MODERATE 35: CPT | Performed by: FAMILY MEDICINE

## 2019-09-17 RX ORDER — LINEZOLID 600 MG/1
600 TABLET, FILM COATED ORAL EVERY 12 HOURS SCHEDULED
Status: DISCONTINUED | OUTPATIENT
Start: 2019-09-17 | End: 2019-09-18 | Stop reason: HOSPADM

## 2019-09-17 RX ORDER — VANCOMYCIN HYDROCHLORIDE 1 G/200ML
1000 INJECTION, SOLUTION INTRAVENOUS EVERY 24 HOURS
Status: DISCONTINUED | OUTPATIENT
Start: 2019-09-17 | End: 2019-09-17

## 2019-09-17 RX ORDER — LEVOFLOXACIN 500 MG/1
500 TABLET, FILM COATED ORAL EVERY 24 HOURS
Status: DISCONTINUED | OUTPATIENT
Start: 2019-09-18 | End: 2019-09-18 | Stop reason: HOSPADM

## 2019-09-17 RX ADMIN — CEFTRIAXONE 1 G: 1 INJECTION, POWDER, FOR SOLUTION INTRAMUSCULAR; INTRAVENOUS at 14:21

## 2019-09-17 RX ADMIN — LINEZOLID 600 MG: 600 TABLET, FILM COATED ORAL at 20:19

## 2019-09-17 RX ADMIN — SODIUM CHLORIDE, PRESERVATIVE FREE 10 ML: 5 INJECTION INTRAVENOUS at 20:20

## 2019-09-17 RX ADMIN — AMLODIPINE BESYLATE 5 MG: 5 TABLET ORAL at 08:38

## 2019-09-17 RX ADMIN — SODIUM CHLORIDE, PRESERVATIVE FREE 10 ML: 5 INJECTION INTRAVENOUS at 08:39

## 2019-09-17 RX ADMIN — ENOXAPARIN SODIUM 40 MG: 40 INJECTION SUBCUTANEOUS at 20:19

## 2019-09-18 ENCOUNTER — TELEPHONE (OUTPATIENT)
Dept: INTERNAL MEDICINE | Facility: CLINIC | Age: 84
End: 2019-09-18

## 2019-09-18 ENCOUNTER — APPOINTMENT (OUTPATIENT)
Dept: CARDIOLOGY | Facility: HOSPITAL | Age: 84
End: 2019-09-18

## 2019-09-18 VITALS
HEIGHT: 72 IN | DIASTOLIC BLOOD PRESSURE: 68 MMHG | SYSTOLIC BLOOD PRESSURE: 154 MMHG | HEART RATE: 61 BPM | TEMPERATURE: 98.7 F | BODY MASS INDEX: 27.36 KG/M2 | OXYGEN SATURATION: 96 % | WEIGHT: 202 LBS | RESPIRATION RATE: 18 BRPM

## 2019-09-18 PROBLEM — N28.9 RENAL INSUFFICIENCY: Status: RESOLVED | Noted: 2019-09-05 | Resolved: 2019-09-18

## 2019-09-18 PROBLEM — J18.9 PNEUMONIA: Status: RESOLVED | Noted: 2019-09-05 | Resolved: 2019-09-18

## 2019-09-18 PROBLEM — M62.82 NON-TRAUMATIC RHABDOMYOLYSIS: Status: RESOLVED | Noted: 2019-09-05 | Resolved: 2019-09-18

## 2019-09-18 LAB
ASCENDING AORTA: 5.8 CM
BH CV ECHO MEAS - AI DEC SLOPE: 279.9 CM/SEC^2
BH CV ECHO MEAS - AI MAX PG: 71.7 MMHG
BH CV ECHO MEAS - AI MAX VEL: 423.5 CM/SEC
BH CV ECHO MEAS - AI P1/2T: 443.1 MSEC
BH CV ECHO MEAS - AO MAX PG (FULL): 51 MMHG
BH CV ECHO MEAS - AO MAX PG: 53.5 MMHG
BH CV ECHO MEAS - AO MEAN PG (FULL): 31 MMHG
BH CV ECHO MEAS - AO MEAN PG: 32.4 MMHG
BH CV ECHO MEAS - AO V2 MAX: 365.8 CM/SEC
BH CV ECHO MEAS - AO V2 MEAN: 270.2 CM/SEC
BH CV ECHO MEAS - AO V2 VTI: 95.9 CM
BH CV ECHO MEAS - AVA(I,A): 0.67 CM^2
BH CV ECHO MEAS - AVA(I,D): 0.67 CM^2
BH CV ECHO MEAS - AVA(V,A): 0.66 CM^2
BH CV ECHO MEAS - AVA(V,D): 0.66 CM^2
BH CV ECHO MEAS - BSA(HAYCOCK): 2.2 M^2
BH CV ECHO MEAS - BSA: 2.1 M^2
BH CV ECHO MEAS - BZI_BMI: 27.4 KILOGRAMS/M^2
BH CV ECHO MEAS - BZI_METRIC_HEIGHT: 182.9 CM
BH CV ECHO MEAS - BZI_METRIC_WEIGHT: 91.6 KG
BH CV ECHO MEAS - EDV(CUBED): 116.5 ML
BH CV ECHO MEAS - EDV(TEICH): 112 ML
BH CV ECHO MEAS - EF(CUBED): 61.3 %
BH CV ECHO MEAS - EF(TEICH): 52.7 %
BH CV ECHO MEAS - ESV(CUBED): 45.1 ML
BH CV ECHO MEAS - ESV(TEICH): 52.9 ML
BH CV ECHO MEAS - FS: 27.1 %
BH CV ECHO MEAS - IVS/LVPW: 0.98
BH CV ECHO MEAS - IVSD: 1.2 CM
BH CV ECHO MEAS - LA DIMENSION: 4.3 CM
BH CV ECHO MEAS - LAD MAJOR: 6.3 CM
BH CV ECHO MEAS - LAT PEAK E' VEL: 8 CM/SEC
BH CV ECHO MEAS - LATERAL E/E' RATIO: 9.2
BH CV ECHO MEAS - LV MASS(C)D: 226.3 GRAMS
BH CV ECHO MEAS - LV MASS(C)DI: 105.8 GRAMS/M^2
BH CV ECHO MEAS - LV MAX PG: 2.5 MMHG
BH CV ECHO MEAS - LV MEAN PG: 1.4 MMHG
BH CV ECHO MEAS - LV V1 MAX: 79 CM/SEC
BH CV ECHO MEAS - LV V1 MEAN: 55.6 CM/SEC
BH CV ECHO MEAS - LV V1 VTI: 20.9 CM
BH CV ECHO MEAS - LVIDD: 4.9 CM
BH CV ECHO MEAS - LVIDS: 3.6 CM
BH CV ECHO MEAS - LVOT AREA (M): 3.1 CM^2
BH CV ECHO MEAS - LVOT AREA: 3.1 CM^2
BH CV ECHO MEAS - LVOT DIAM: 2 CM
BH CV ECHO MEAS - LVPWD: 1.2 CM
BH CV ECHO MEAS - MED PEAK E' VEL: 5.9 CM/SEC
BH CV ECHO MEAS - MEDIAL E/E' RATIO: 12.4
BH CV ECHO MEAS - MV A MAX VEL: 86.9 CM/SEC
BH CV ECHO MEAS - MV DEC TIME: 0.3 SEC
BH CV ECHO MEAS - MV E MAX VEL: 75.5 CM/SEC
BH CV ECHO MEAS - MV E/A: 0.87
BH CV ECHO MEAS - PA ACC SLOPE: 662.4 CM/SEC^2
BH CV ECHO MEAS - PA ACC TIME: 0.14 SEC
BH CV ECHO MEAS - PA MAX PG: 5 MMHG
BH CV ECHO MEAS - PA PR(ACCEL): 15.6 MMHG
BH CV ECHO MEAS - PA V2 MAX: 111.3 CM/SEC
BH CV ECHO MEAS - RAP SYSTOLE: 3 MMHG
BH CV ECHO MEAS - RVSP: 27 MMHG
BH CV ECHO MEAS - SI(CUBED): 33.4 ML/M^2
BH CV ECHO MEAS - SI(LVOT): 30 ML/M^2
BH CV ECHO MEAS - SI(TEICH): 27.6 ML/M^2
BH CV ECHO MEAS - SV(CUBED): 71.5 ML
BH CV ECHO MEAS - SV(LVOT): 64.1 ML
BH CV ECHO MEAS - SV(TEICH): 59 ML
BH CV ECHO MEAS - TAPSE (>1.6): 2.5 CM2
BH CV ECHO MEAS - TR MAX PG: 24 MMHG
BH CV ECHO MEAS - TR MAX VEL: 245 CM/SEC
BH CV ECHO MEASUREMENTS AVERAGE E/E' RATIO: 10.86
BH CV VAS BP RIGHT ARM: NORMAL MMHG
BH CV XLRA - RV BASE: 3.6 CM
BH CV XLRA - RV LENGTH: 7 CM
BH CV XLRA - RV MID: 3.5 CM
BH CV XLRA - TDI S': 13.6 CM/SEC
LEFT ATRIUM VOLUME INDEX: 46.3 ML/M^2
LEFT ATRIUM VOLUME: 99 ML
MAGNESIUM SERPL-MCNC: 2.2 MG/DL (ref 1.6–2.4)
MAXIMAL PREDICTED HEART RATE: 130 BPM
STRESS TARGET HR: 111 BPM

## 2019-09-18 PROCEDURE — 93306 TTE W/DOPPLER COMPLETE: CPT | Performed by: INTERNAL MEDICINE

## 2019-09-18 PROCEDURE — 93306 TTE W/DOPPLER COMPLETE: CPT

## 2019-09-18 PROCEDURE — 83735 ASSAY OF MAGNESIUM: CPT

## 2019-09-18 PROCEDURE — 99239 HOSP IP/OBS DSCHRG MGMT >30: CPT | Performed by: PHYSICIAN ASSISTANT

## 2019-09-18 RX ORDER — LINEZOLID 600 MG/1
600 TABLET, FILM COATED ORAL EVERY 12 HOURS SCHEDULED
Qty: 12 TABLET | Refills: 0 | Status: SHIPPED | OUTPATIENT
Start: 2019-09-18 | End: 2019-09-25

## 2019-09-18 RX ORDER — AMLODIPINE BESYLATE 5 MG/1
7.5 TABLET ORAL DAILY
Qty: 45 TABLET | Refills: 0 | Status: SHIPPED | OUTPATIENT
Start: 2019-09-18 | End: 2019-11-04 | Stop reason: SDUPTHER

## 2019-09-18 RX ORDER — LEVOFLOXACIN 500 MG/1
500 TABLET, FILM COATED ORAL EVERY 24 HOURS
Qty: 6 TABLET | Refills: 0 | Status: SHIPPED | OUTPATIENT
Start: 2019-09-19 | End: 2019-09-25

## 2019-09-18 RX ORDER — AMLODIPINE BESYLATE 2.5 MG/1
2.5 TABLET ORAL ONCE
Status: COMPLETED | OUTPATIENT
Start: 2019-09-18 | End: 2019-09-18

## 2019-09-18 RX ADMIN — LINEZOLID 600 MG: 600 TABLET, FILM COATED ORAL at 08:53

## 2019-09-18 RX ADMIN — AMLODIPINE BESYLATE 5 MG: 5 TABLET ORAL at 08:53

## 2019-09-18 RX ADMIN — LEVOFLOXACIN 500 MG: 500 TABLET, FILM COATED ORAL at 08:53

## 2019-09-18 RX ADMIN — AMLODIPINE BESYLATE 2.5 MG: 2.5 TABLET ORAL at 12:06

## 2019-09-18 RX ADMIN — SODIUM CHLORIDE, PRESERVATIVE FREE 10 ML: 5 INJECTION INTRAVENOUS at 08:53

## 2019-09-19 ENCOUNTER — TRANSITIONAL CARE MANAGEMENT TELEPHONE ENCOUNTER (OUTPATIENT)
Dept: INTERNAL MEDICINE | Facility: CLINIC | Age: 84
End: 2019-09-19

## 2019-09-19 ENCOUNTER — READMISSION MANAGEMENT (OUTPATIENT)
Dept: CALL CENTER | Facility: HOSPITAL | Age: 84
End: 2019-09-19

## 2019-09-19 DIAGNOSIS — I51.89 GRADE I DIASTOLIC DYSFUNCTION: ICD-10-CM

## 2019-09-19 DIAGNOSIS — I35.0 AORTIC STENOSIS, MODERATE: Primary | ICD-10-CM

## 2019-09-19 DIAGNOSIS — I77.810 AORTIC ROOT DILATATION (HCC): ICD-10-CM

## 2019-09-19 NOTE — OUTREACH NOTE
Pt feeling very well since hospital d/c. States cellulitis on RLE much improved, redness is almost gone. Pt states he is continuing to elevate leg when at rest. Confirmes receipt and understanding of D/C orders and medications. HH is starting this afternoon. Confirmed TCM [facility] appt with pt for 09/25/19.

## 2019-09-19 NOTE — OUTREACH NOTE
Prep Survey      Responses   Facility patient discharged from?  [facility]   Is patient eligible?  Yes   Discharge diagnosis  Cellulitis of right lower leg   Does the patient have one of the following disease processes/diagnoses(primary or secondary)?  Other   Does the patient have Home health ordered?  Yes   What is the Home health agency?   [facility]    Is there a DME ordered?  No   Prep survey completed?  Yes          Elen Braswell RN

## 2019-09-22 ENCOUNTER — READMISSION MANAGEMENT (OUTPATIENT)
Dept: CALL CENTER | Facility: HOSPITAL | Age: 84
End: 2019-09-22

## 2019-09-22 NOTE — PROGRESS NOTES
OFFICE PROGRESS NOTE    Chief Complaint   Patient presents with   • Follow-up     hospital f/u 9/16-9/18     Here with granddaughter Gregoria.    HPI: 89 y.o. male with hx HTN, HLD who was seen 9/13 as a new pt to establish care for TCM after hospitalization at [facility] 9/5-9/10 for sepsis 2/2 PNA, RLE cellulitis and rhabdo. Discharged on doxy and cefuroxime. At 9/13 appt RLE cellulitis still present, so gave an additional 3d of doxy/cefuroxime. Labs showed recurrent WBC elevated to 11.6 so referred to ER where CRP 1.02, WBC 12.78 with 58% neutrophils. Repeat RLE duplex showed no evidence DVT.    Was hospitalized at [facility] 9/16-9/18. Treated with IV vanc/CTX, then levaquin/linezolid. Wound care assisted with compression wraps which will c/w HH PT at d/c.  He feels his right lower extremity edema is much improved although still present.  The previous erythema has resolved.    Due to sub-optimally controlled BP, amlodipine increased to 7.5mg daily during hospital stay.  Home BP per home health RN has been running 130s over 60s-70s per patient.  He denies any headache, chest pain, shortness of breath, palpitations, PND.    Crestor continued to be held at d/c.    Due to significant cardiac murmur at new pt visit 9/13, had ordered ECHO which was done while pt was inpatient. Study showed EF 56-60%, grade 1 diastolic dysfunction, LVF, LA dilatation, moderate AS, mild-moderate AVR, mild TR, severe dilation of aortic root/prox aorta.  Head referred to cardiology and per referral notes, patient initially declined referral.    He also has stable anemia 10-11/34.  He reports a prior history of colon cancer status post resection without chemotherapy or radiation treated at approximately 2013 [place].  He denies any problems since.  He denies any blood in the stools.  No abdominal pain.  No nausea/vomiting.  Slight weight loss in the setting of poor appetite with recent hospitalization/sepsis etc. but otherwise weight has been  "stable.    Review of Systems   Constitutional: Negative for activity change, appetite change and fever.   HENT: Negative for congestion, sneezing and sore throat.    Eyes: Negative for discharge and visual disturbance.   Respiratory: Negative for cough and shortness of breath.    Cardiovascular: Positive for leg swelling (Much improved). Negative for chest pain and palpitations.   Gastrointestinal: Negative for abdominal distention, abdominal pain, blood in stool, constipation, nausea and vomiting.   Endocrine: Negative for cold intolerance and heat intolerance.   Genitourinary: Negative for dysuria.   Musculoskeletal: Negative for neck stiffness.   Skin: Negative for rash.   Allergic/Immunologic: Negative for environmental allergies and food allergies.   Neurological: Negative for headache.   Hematological: Negative for adenopathy.   Psychiatric/Behavioral: Negative for depressed mood.       The following portions of the patient's history were reviewed and updated as appropriate: allergies, current medications, past family history, past medical history, past social history, past surgical history and problem list.      Physical Exam:  Vitals:    09/25/19 0933   BP: 150/78   BP Location: Right arm   Patient Position: Sitting   Cuff Size: Adult   Pulse: 69   Resp: 18   Temp: 97.2 °F (36.2 °C)   TempSrc: Temporal   SpO2: 98%   Weight: 89.2 kg (196 lb 9.6 oz)   Height: 182.9 cm (72\")       Physical Exam   Constitutional: He is oriented to person, place, and time. He appears well-developed and well-nourished. No distress.   HENT:   Head: Normocephalic and atraumatic.   Right Ear: External ear normal.   Left Ear: External ear normal.   Eyes: Conjunctivae are normal. Right eye exhibits no discharge. Left eye exhibits no discharge. No scleral icterus.   Cardiovascular: Normal rate and regular rhythm. Exam reveals no gallop and no friction rub.   Murmur (systolic 3/6) heard.  Pulmonary/Chest: Effort normal and breath sounds " normal. No stridor. No respiratory distress. He has no wheezes. He has no rales.   Abdominal: Soft. Bowel sounds are normal. He exhibits no distension and no mass. There is no tenderness. There is no rebound and no guarding.   Musculoskeletal:   Distal RLE has trace-1+ pitting edema.  Previous RLE erythema and warmth has resolved.  LLE appears normal with only trace pitting edema.   Neurological: He is alert and oriented to person, place, and time. He exhibits normal muscle tone.   Skin: Skin is warm and dry. Capillary refill takes less than 2 seconds. He is not diaphoretic.   Psychiatric: He has a normal mood and affect.   Vitals reviewed.    Assesment and Plan: 89 y.o. male here for:  Cellulitis of right lower leg  Resolved.  Continue leg wraps to treat residual edema per home health.  Call for return of erythema, pain, fever/chills etc.    Anemia  H/H stable around 10-11/34.  Possibly anemia of chronic disease, however does have history of colon cancer in the past treated with surgery.  No recent endoscopies.  Reviewed that without endoscopy, would be unable to evaluate for any small bleeding, polyps, cancerous lesions.  Certainly with age and his current cardiac status as above, would need to weigh risk/benefits as patient denies any symptoms of abdominal pain, weight loss, blood in stools etc.  He would like to discuss this with cardiology (see above regarding referrals) before discussing whether or not to proceed.  At his next visit, consider labs to further work-up anemia (i.e. iron, B12 etc.).    Aortic stenosis, moderate  Moderate aortic stenosis along with grade 1 diastolic dysfunction, left atrium dilatation, severe dilation of aortic root/proximal aorta.  Certainly in a otherwise stable 89-year-old gentleman, would not proceed with surgical intervention.  However advised patient that I believe he would benefit from cardiology evaluation to help manage his fluid status given ongoing issues with lower  extremity edema.  He is agreeable to cardiology referral.    Hyperlipidemia  Currently off of Crestor 5 mg daily in the setting of recent rhabdo, elevated transaminases.  LFTs have normalized and CK has normalized.  Discussed cautiously resuming although in 89 year-old patient without known CAD/MI/CVA history, would again want to weigh risks and benefits.  If we did resume, discussed wanting to check LFTs and CK a few weeks after starting given his prior history.  Patient declines for now and prefers to discuss this with cardiology.    Essential hypertension  Stable.  By report, home BP log largely under control with systolic in the 130s and diastolic in the 60s-70s.  Continue amlodipine 7.5 mg daily for now.  Continue home BP checks.  If greater than 140s over 90s, to call and we would increase amlodipine to 10 mg daily.      Return in about 8 weeks (around 11/20/2019) for Medicare Wellness Visit fasting.    Shirin To MD  9/25/2019

## 2019-09-22 NOTE — OUTREACH NOTE
Medical Week 1 Survey      Responses   Facility patient discharged from?  [facility]   Does the patient have one of the following disease processes/diagnoses(primary or secondary)?  Other   Is there a successful TCM telephone encounter documented?  Yes          Kay Mcrae RN

## 2019-09-25 ENCOUNTER — OFFICE VISIT (OUTPATIENT)
Dept: INTERNAL MEDICINE | Facility: CLINIC | Age: 84
End: 2019-09-25

## 2019-09-25 VITALS
HEART RATE: 69 BPM | RESPIRATION RATE: 18 BRPM | TEMPERATURE: 97.2 F | WEIGHT: 196.6 LBS | OXYGEN SATURATION: 98 % | BODY MASS INDEX: 26.63 KG/M2 | DIASTOLIC BLOOD PRESSURE: 78 MMHG | SYSTOLIC BLOOD PRESSURE: 150 MMHG | HEIGHT: 72 IN

## 2019-09-25 DIAGNOSIS — I71.40 ABDOMINAL AORTIC ANEURYSM (AAA) WITHOUT RUPTURE (HCC): ICD-10-CM

## 2019-09-25 DIAGNOSIS — D64.9 ANEMIA, UNSPECIFIED TYPE: ICD-10-CM

## 2019-09-25 DIAGNOSIS — L03.115 CELLULITIS OF RIGHT LOWER LEG: ICD-10-CM

## 2019-09-25 DIAGNOSIS — E78.5 HYPERLIPIDEMIA, UNSPECIFIED HYPERLIPIDEMIA TYPE: ICD-10-CM

## 2019-09-25 DIAGNOSIS — I10 ESSENTIAL HYPERTENSION: Primary | ICD-10-CM

## 2019-09-25 DIAGNOSIS — I35.0 AORTIC STENOSIS, MODERATE: ICD-10-CM

## 2019-09-25 PROCEDURE — 99214 OFFICE O/P EST MOD 30 MIN: CPT | Performed by: INTERNAL MEDICINE

## 2019-09-25 NOTE — ASSESSMENT & PLAN NOTE
Moderate aortic stenosis along with grade 1 diastolic dysfunction, left atrium dilatation, severe dilation of aortic root/proximal aorta.  Certainly in a otherwise stable 89-year-old gentleman, would not proceed with surgical intervention.  However advised patient that I believe he would benefit from cardiology evaluation to help manage his fluid status given ongoing issues with lower extremity edema.  He is agreeable to cardiology referral.

## 2019-09-25 NOTE — ASSESSMENT & PLAN NOTE
Stable.  By report, home BP log largely under control with systolic in the 130s and diastolic in the 60s-70s.  Continue amlodipine 7.5 mg daily for now.  Continue home BP checks.  If greater than 140s over 90s, to call and we would increase amlodipine to 10 mg daily.

## 2019-09-25 NOTE — ASSESSMENT & PLAN NOTE
Currently off of Crestor 5 mg daily in the setting of recent rhabdo, elevated transaminases.  LFTs have normalized and CK has normalized.  Discussed cautiously resuming although in 89 year-old patient without known CAD/MI/CVA history, would again want to weigh risks and benefits.  If we did resume, discussed wanting to check LFTs and CK a few weeks after starting given his prior history.  Patient declines for now and prefers to discuss this with cardiology.

## 2019-09-25 NOTE — ASSESSMENT & PLAN NOTE
Resolved.  Continue leg wraps to treat residual edema per home health.  Call for return of erythema, pain, fever/chills etc.

## 2019-09-25 NOTE — ASSESSMENT & PLAN NOTE
H/H stable around 10-11/34.  Possibly anemia of chronic disease, however does have history of colon cancer in the past treated with surgery.  No recent endoscopies.  Reviewed that without endoscopy, would be unable to evaluate for any small bleeding, polyps, cancerous lesions.  Certainly with age and his current cardiac status as above, would need to weigh risk/benefits as patient denies any symptoms of abdominal pain, weight loss, blood in stools etc.  He would like to discuss this with cardiology (see above regarding referrals) before discussing whether or not to proceed.  At his next visit, consider labs to further work-up anemia (i.e. iron, B12 etc.).

## 2019-09-27 ENCOUNTER — READMISSION MANAGEMENT (OUTPATIENT)
Dept: CALL CENTER | Facility: HOSPITAL | Age: 84
End: 2019-09-27

## 2019-09-27 NOTE — OUTREACH NOTE
Medical Week 2 Survey      Responses   Facility patient discharged from?  [facility]   Does the patient have one of the following disease processes/diagnoses(primary or secondary)?  Other   Week 2 attempt successful?  Yes   Call start time  1411   Discharge diagnosis  Cellulitis of right lower leg   Call end time  1414   Meds reviewed with patient/caregiver?  Yes   Is the patient having any side effects they believe may be caused by any medication additions or changes?  No   Does the patient have all medications ordered at discharge?  Yes   Is the patient taking all medications as directed (includes completed medication regime)?  Yes   Does the patient have a primary care provider?   Yes   Does the patient have an appointment with their PCP within 7 days of discharge?  Yes   Has the patient kept scheduled appointments due by today?  Yes   What is the Home health agency?   [facility]    Has home health visited the patient within 72 hours of discharge?  Yes   What DME was ordered?  [facility] per [facility] care    Has all DME been delivered?  Yes   Psychosocial issues?  No   Did the patient receive a copy of their discharge instructions?  Yes   Nursing interventions  Reviewed instructions with patient   What is the patient's perception of their health status since discharge?  Improving   Is the patient/caregiver able to teach back signs and symptoms related to disease process for when to call PCP?  Yes   Is the patient/caregiver able to teach back signs and symptoms related to disease process for when to call 911?  Yes   Is the patient/caregiver able to teach back the hierarchy of who to call/visit for symptoms/problems? PCP, Specialist, Home health nurse, Urgent Care, ED, 911  Yes   Week 2 Call Completed?  Yes          Carlo Boudreaux RN

## 2019-09-30 PROBLEM — R73.03 PREDIABETES: Status: ACTIVE | Noted: 2019-09-30

## 2019-10-14 ENCOUNTER — CONSULT (OUTPATIENT)
Dept: CARDIOLOGY | Facility: CLINIC | Age: 84
End: 2019-10-14

## 2019-10-14 VITALS
WEIGHT: 197 LBS | BODY MASS INDEX: 26.68 KG/M2 | DIASTOLIC BLOOD PRESSURE: 60 MMHG | SYSTOLIC BLOOD PRESSURE: 128 MMHG | HEIGHT: 72 IN | HEART RATE: 78 BPM

## 2019-10-14 DIAGNOSIS — I71.40 ABDOMINAL AORTIC ANEURYSM (AAA) WITHOUT RUPTURE (HCC): Primary | ICD-10-CM

## 2019-10-14 DIAGNOSIS — I35.0 AORTIC STENOSIS, MODERATE: ICD-10-CM

## 2019-10-14 DIAGNOSIS — I10 ESSENTIAL HYPERTENSION: ICD-10-CM

## 2019-10-14 PROCEDURE — 99204 OFFICE O/P NEW MOD 45 MIN: CPT | Performed by: INTERNAL MEDICINE

## 2019-10-14 NOTE — PROGRESS NOTES
[facility]  Consultation H&P  [facility]  10/4/1928    There is no work phone number on file..    VISIT DATE:  10/14/2019    PCP: Shirin To MD  [address] [place], [place] [id number]    CC:  Chief Complaint   Patient presents with   • Edema   • diastolic dysfunction     Previous cardiac studies and procedures:  September 2019  echo  · Estimated EF appears to be in the range of 56 - 60%.  · Left ventricular diastolic dysfunction (grade I) consistent with impaired relaxation.  · Left ventricular wall thickness is consistent with mild concentric hypertrophy.  · Left atrial cavity size is mildly dilated.  · Moderate aortic valve stenosis is present.  · Mild to moderate aortic valve regurgitation is present.  · Mild tricuspid valve regurgitation is present.  · Calculated right ventricular systolic pressure from tricuspid regurgitation is 27 mmHg.  · Severe dilation of the aortic root is present. Severe dilation of the proximal aorta is present.    CT chest  1. Mild emphysema/COPD with superimposed nonspecific diffuse groundglass  changes most pronounced involving the lingula and bilateral lower lobes  with mild interlobular septal thickening. This is favored to relate to  an infectious/inflammatory etiology superimposed on chronic interstitial  lung disease. Clinical correlation is required.  2. Fusiform aortic aneurysm measuring up to 6.1 cm involving the  ascending aorta with additional enlarged aortic arch and descending  thoracic aorta as described above.  3. Heavy calcified and noncalcified atherosclerotic disease of the  coronary arteries.  4. Age-indeterminate T6 compression deformity. Correlate with point  tenderness for further evaluation.  5. Remote appearing right lower lateral rib fracture is identified.    ASSESSMENT:   Diagnosis Plan   1. Abdominal aortic aneurysm (AAA) without rupture (CMS/HCC)     2. Aortic stenosis, moderate     3. 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  Chronic.  Afterload well-controlled.  Understands limitations regarding to avoid activities which cause straining.  Intolerant to beta blockade due to resting bradycardia with profound first-degree AV block.  Not a surgical candidate for repair.      Hypertension: Goal less than 130/80 mmHg.  Currently well controlled.  Continue current dose of amlodipine.  If lower extremity edema worsens we may need to down titrate calcium channel blockade and consider alternative pharmacologic agents.    Edema: I do not think this is predominantly cardiogenic in etiology.  More likely related to venous insufficiency.  Continue compression stockings, limiting sodium intake, and elevating feet when possible.  Discussed as needed low-dose of diuretics if conservative measures do not work during flareups in the future.    Aortic valve disease: Combined moderate aortic stenosis and aortic insufficiency.  Currently asymptomatic.  Would be a potential candidate for TAVR if stenosis progresses to be symptomatic at some point.    History of Present Illness   89-year-old gentleman with a known history of aortic stenosis, aortic insufficiency, and aortic aneurysm.  Recently had worsening bilateral lower extremity edema after hospitalization.  Has returned to baseline with use of compression stockings, limiting sodium intake and elevating his legs.  Currently takes amlodipine 7.5 mg p.o. daily.  Blood pressures run less than 130/80 mmHg.  He has had a known diagnosis of a significant thoracic ascending aortic aneurysm which was diagnosed at the [facility] approximately 3 to 4 years ago.  No intervention was recommended at that time.  Reviewed most recent transthoracic echo.  He denies limiting dyspnea on exertion, chest pain, presyncope or syncope.    PHYSICAL EXAMINATION:  Vitals:     "10/14/19 1445   BP: 128/60   BP Location: Left arm   Patient Position: Sitting   Pulse: 78   Weight: 89.4 kg (197 lb)   Height: 182.9 cm (72\")     General Appearance:    Alert, cooperative, no distress, appears stated age   Head:    Normocephalic, without obvious abnormality, atraumatic   Eyes:    conjunctiva/corneas clear, EOM's intact, fundi     benign, both eyes   Ears:    Normal TM's and external ear canals, both ears   Nose:   Nares normal, septum midline, mucosa normal, no drainage    or sinus tenderness   Throat:   Lips, mucosa, and tongue normal; teeth and gums normal   Neck:   Supple, symmetrical, trachea midline, no adenopathy;     thyroid:  no enlargement/tenderness/nodules; no carotid    bruit or JVD   Back:     Symmetric, no curvature, ROM normal, no CVA tenderness   Lungs:     Clear to auscultation bilaterally, respirations unlabored   Chest Wall:    No tenderness or deformity    Heart:    Regular rate and rhythm, S1 and S2 normal, 3-4 over 6 early peaking systolic murmur right upper sternal border, rating to the apex, rub   or gallop, normal carotid impulse bilaterally without bruit.   Abdomen:     Soft, non-tender, bowel sounds active all four quadrants,     no masses, no organomegaly   Extremities:   Extremities normal, atraumatic, no cyanosis, trivial edema, compression wraps in place.   Pulses:   2+ and symmetric all extremities   Skin:   Skin color, texture, turgor normal, no rashes or lesions   Lymph nodes:   Cervical, supraclavicular, and axillary nodes normal   Neurologic:   normal strength, sensation intact     throughout       Diagnostic Data:  Procedures  No results found for: CHLPL, TRIG, HDL, LDLDIRECT  Lab Results   Component Value Date    GLUCOSE 90 09/17/2019    BUN 15 09/17/2019    CREATININE 1.02 09/17/2019     09/17/2019    K 4.5 09/17/2019     09/17/2019    CO2 25.0 09/17/2019     No results found for: HGBA1C  Lab Results   Component Value Date    WBC 7.98 09/17/2019    " HGB 10.7 (L) 09/17/2019    HCT 34.7 (L) 09/17/2019     09/17/2019       PROBLEM LIST:  Patient Active Problem List   Diagnosis   • Abdominal aortic aneurysm (AAA) without rupture (CMS/HCC)   • Essential hypertension   • Hyperlipidemia   • Anemia   • Aortic stenosis, moderate   • Cellulitis of right lower leg   • Prediabetes       PAST MEDICAL HX  Past Medical History:   Diagnosis Date   • Aneurysm (CMS/HCC)     on aortic valve. has been evaluted at . pt decided agaisnt surgery    • Cancer (CMS/HCC)     colon   • Cellulitis     RLE   • CHF (congestive heart failure) (CMS/HCC)    • Hyperlipidemia    • Hypertension    • Pneumonia        Allergies  No Known Allergies    Current Medications    Current Outpatient Medications:   •  amLODIPine (NORVASC) 5 MG tablet, Take 1.5 tablets by mouth Daily., Disp: 45 tablet, Rfl: 0         ROS  Review of Systems   Constitution: Positive for malaise/fatigue.   Cardiovascular: Positive for leg swelling.   Respiratory: Positive for cough.        All other body systems reviewed and are negative    SOCIAL HX  Social History     Socioeconomic History   • Marital status:      Spouse name: Not on file   • Number of children: Not on file   • Years of education: Not on file   • Highest education level: Not on file   Tobacco Use   • Smoking status: Never Smoker   • Smokeless tobacco: Never Used   Substance and Sexual Activity   • Alcohol use: Yes     Frequency: Never     Comment: social   • Drug use: No   • Sexual activity: Defer       FAMILY HX  Family History   Problem Relation Age of Onset   • Arthritis Mother    • Heart attack Mother    • Stroke Father    • Stroke Sister    • Stroke Brother              Carlito Mckeon III, MD, [facility]

## 2019-10-18 ENCOUNTER — OUTSIDE FACILITY SERVICE (OUTPATIENT)
Dept: HOSPITALIST | Facility: HOSPITAL | Age: 84
End: 2019-10-18

## 2019-10-18 PROCEDURE — G0180 MD CERTIFICATION HHA PATIENT: HCPCS | Performed by: INTERNAL MEDICINE

## 2019-11-04 ENCOUNTER — OFFICE VISIT (OUTPATIENT)
Dept: INTERNAL MEDICINE | Facility: CLINIC | Age: 84
End: 2019-11-04

## 2019-11-04 VITALS
WEIGHT: 204.2 LBS | DIASTOLIC BLOOD PRESSURE: 64 MMHG | TEMPERATURE: 97.8 F | SYSTOLIC BLOOD PRESSURE: 145 MMHG | HEART RATE: 73 BPM | BODY MASS INDEX: 27.66 KG/M2 | RESPIRATION RATE: 18 BRPM | OXYGEN SATURATION: 97 % | HEIGHT: 72 IN

## 2019-11-04 DIAGNOSIS — L03.115 CELLULITIS OF RIGHT LOWER LEG: Primary | ICD-10-CM

## 2019-11-04 PROCEDURE — 99213 OFFICE O/P EST LOW 20 MIN: CPT | Performed by: INTERNAL MEDICINE

## 2019-11-04 RX ORDER — AMLODIPINE BESYLATE 5 MG/1
7.5 TABLET ORAL DAILY
Qty: 45 TABLET | Refills: 3 | Status: SHIPPED | OUTPATIENT
Start: 2019-11-04 | End: 2019-11-11 | Stop reason: SDUPTHER

## 2019-11-04 RX ORDER — CEPHALEXIN 500 MG/1
500 CAPSULE ORAL 3 TIMES DAILY
Qty: 30 CAPSULE | Refills: 0 | Status: SHIPPED | OUTPATIENT
Start: 2019-11-04 | End: 2019-11-14

## 2019-11-04 RX ORDER — SULFAMETHOXAZOLE AND TRIMETHOPRIM 800; 160 MG/1; MG/1
1 TABLET ORAL 2 TIMES DAILY
Qty: 20 TABLET | Refills: 0 | Status: SHIPPED | OUTPATIENT
Start: 2019-11-04 | End: 2019-11-14

## 2019-11-04 NOTE — PROGRESS NOTES
OFFICE PROGRESS NOTE    Chief Complaint   Patient presents with   • Foot Injury     x3 days ago open wound with swellling      Here with granddaughter Gregoria who was present for the entire visit with patient's permission    HPI: 89 y.o. male with hx HTN, HLD, moderate AS, AAA here for:    Of note, hospitalized 9/5-9/10 at [facility] for sepsis secondary to pneumonia, RLE cellulitis and rhabdo (discharged on doxy,cefuroxime).  He had ongoing symptoms at his 9/13 follow-up along with rising WBC count which failed outpatient PO abx (extended course of doxy and cefuroxime), so was rehospitalized 9/16-9/18 for an additional 3 days with CTX and vanc.    Here today because 3d ago he started developing recurrent warmth, erythema and swelling in his right foot.  Symptoms started to spread into his right calf.  There is no pain.  There is also some edema in his left foot/ankle.  He does have chronic venous stasis dermatitis and a scabbed wound to his right shin.  He thinks this is chronic and does not recall a clear reinjury.  There is some scant bleeding but no purulent drainage.  He has been doing his Ace wrap compression dressings although his granddaughter left those off last night because she thought it would make the right shin wound worse.  He states the swelling is less in the mornings but progresses throughout the course of the day.  He does not have any fevers or chills.  He has no chest pain or shortness of breath.  He has not had excessive sodium intake above his baseline by his report.  He is not currently on any diuretics.    Review of Systems   Constitutional: Negative for activity change, appetite change and fever.   HENT: Negative for congestion, sneezing and sore throat.    Eyes: Negative for discharge and visual disturbance.   Respiratory: Negative for cough and shortness of breath.    Cardiovascular: Positive for leg swelling (And erythema). Negative for chest pain and palpitations.  "  Gastrointestinal: Negative for abdominal distention, abdominal pain, blood in stool, constipation, nausea and vomiting.   Endocrine: Negative for cold intolerance and heat intolerance.   Genitourinary: Negative for dysuria.   Musculoskeletal: Negative for neck stiffness.   Skin: Negative for rash.        Wound in RLE   Allergic/Immunologic: Negative for environmental allergies and food allergies.   Neurological: Negative for headache.   Hematological: Negative for adenopathy.   Psychiatric/Behavioral: Negative for depressed mood.       The following portions of the patient's history were reviewed and updated as appropriate: allergies, current medications, past family history, past medical history, past social history, past surgical history and problem list.      Physical Exam:  Vitals:    11/04/19 1508   BP: 145/64   BP Location: Right arm   Patient Position: Sitting   Cuff Size: Adult   Pulse: 73   Resp: 18   Temp: 97.8 °F (36.6 °C)   TempSrc: Temporal   SpO2: 97%   Weight: 92.6 kg (204 lb 3.2 oz)   Height: 182.9 cm (72\")       Physical Exam   Constitutional: He is oriented to person, place, and time. He appears well-developed and well-nourished. No distress.   HENT:   Head: Normocephalic and atraumatic.   Eyes: Conjunctivae are normal. Right eye exhibits no discharge. Left eye exhibits no discharge.   Neck: Normal range of motion. Neck supple. No thyromegaly present.   Cardiovascular: Normal rate, regular rhythm and normal heart sounds. Exam reveals no gallop and no friction rub.   No murmur heard.  Pulmonary/Chest: Effort normal and breath sounds normal. No stridor. No respiratory distress. He has no wheezes. He has no rales.   Abdominal: Soft. Bowel sounds are normal. He exhibits no distension and no mass. There is no tenderness. There is no rebound and no guarding.   Musculoskeletal:   He has 1-2+ pitting edema in his bilateral lower extremities, right greater than left.  There is an approximately 2 cm " scabbed area to his distal anterior shin on the right with a scant amount of blood but no active discharge.  The right lower extremity is erythematous and slightly warmer to touch than the left.   Lymphadenopathy:     He has no cervical adenopathy.   Neurological: He is alert and oriented to person, place, and time. He exhibits normal muscle tone.   Skin: Skin is warm and dry. Capillary refill takes less than 2 seconds. He is not diaphoretic. No pallor.   Psychiatric: He has a normal mood and affect.   Vitals reviewed.       Assesment and Plan: 89 y.o. male here for:  Ash was seen today for foot injury.    Diagnoses and all orders for this visit:    Cellulitis of right lower leg    Other orders  -     amLODIPine (NORVASC) 5 MG tablet; Take 1.5 tablets by mouth Daily.  -     sulfamethoxazole-trimethoprim (BACTRIM DS) 800-160 MG per tablet; Take 1 tablet by mouth 2 (Two) Times a Day for 10 days.  -     cephalexin (KEFLEX) 500 MG capsule; Take 1 capsule by mouth 3 (Three) Times a Day for 10 days.      Given asymmetrical warmth and erythema with slightly open wound as above, concern for recurrent right lower extremity cellulitis.  Will treat with Bactrim DS 1 tab p.o. twice daily x10 days and Keflex 500 mg p.o. 3 times daily x10 days.  He will continue with leg elevation and compression dressings.  For the open wound area he may keep this covered with triple antibiotic cream and a Band-Aid.  He should limit dietary sodium.  Discussed doing a brief course of oral diuretics to help with the edema but patient would like to avoid this as he has a prior history of acute kidney injury when hospitalized in September.  Discussed that if he has any worsening erythema, new pain, worsening edema, difficulty ambulating, systemic symptoms like fevers or chills, new symptoms like chest pain or shortness of breath etc. he needs to be immediately evaluated in the local ER to rule out other process like DVT (he is had 2 normal right  lower extremity duplex scans in September) and/or to consider IV antibiotics (as he has a history of failed outpatient treatment for right lower extremity cellulitis).  We will have a 1 week follow-up in office to reassess his leg but he will call me sooner with any questions or concerns.    Return in about 1 week (around 11/11/2019) for Follow-up cellulitis.    Shirin To MD  11/4/2019

## 2019-11-08 ENCOUNTER — TELEPHONE (OUTPATIENT)
Dept: INTERNAL MEDICINE | Facility: CLINIC | Age: 84
End: 2019-11-08

## 2019-11-08 ENCOUNTER — HOSPITAL ENCOUNTER (EMERGENCY)
Facility: HOSPITAL | Age: 84
Discharge: HOME | End: 2019-11-08
Attending: EMERGENCY MEDICINE | Admitting: EMERGENCY MEDICINE

## 2019-11-08 VITALS
HEART RATE: 59 BPM | SYSTOLIC BLOOD PRESSURE: 161 MMHG | DIASTOLIC BLOOD PRESSURE: 75 MMHG | WEIGHT: 204 LBS | RESPIRATION RATE: 16 BRPM | TEMPERATURE: 98.2 F | OXYGEN SATURATION: 99 % | HEIGHT: 72 IN | BODY MASS INDEX: 27.63 KG/M2

## 2019-11-08 DIAGNOSIS — R60.0 LEG EDEMA, RIGHT: Primary | ICD-10-CM

## 2019-11-08 DIAGNOSIS — L03.115 CELLULITIS OF RIGHT LOWER LEG: ICD-10-CM

## 2019-11-08 LAB
ALBUMIN SERPL-MCNC: 4.1 G/DL (ref 3.5–5.2)
ALBUMIN/GLOB SERPL: 1 G/DL
ALP SERPL-CCNC: 85 U/L (ref 39–117)
ALT SERPL W P-5'-P-CCNC: 12 U/L (ref 1–41)
ANION GAP SERPL CALCULATED.3IONS-SCNC: 13 MMOL/L (ref 5–15)
AST SERPL-CCNC: 20 U/L (ref 1–40)
BASOPHILS # BLD AUTO: 0.05 10*3/MM3 (ref 0–0.2)
BASOPHILS NFR BLD AUTO: 0.5 % (ref 0–1.5)
BILIRUB SERPL-MCNC: 0.2 MG/DL (ref 0.2–1.2)
BUN BLD-MCNC: 14 MG/DL (ref 8–23)
BUN/CREAT SERPL: 11 (ref 7–25)
CALCIUM SPEC-SCNC: 9.1 MG/DL (ref 8.2–9.6)
CHLORIDE SERPL-SCNC: 103 MMOL/L (ref 98–107)
CO2 SERPL-SCNC: 20 MMOL/L (ref 22–29)
CREAT BLD-MCNC: 1.27 MG/DL (ref 0.76–1.27)
DEPRECATED RDW RBC AUTO: 57.5 FL (ref 37–54)
EOSINOPHIL # BLD AUTO: 0.49 10*3/MM3 (ref 0–0.4)
EOSINOPHIL NFR BLD AUTO: 5.3 % (ref 0.3–6.2)
ERYTHROCYTE [DISTWIDTH] IN BLOOD BY AUTOMATED COUNT: 17.6 % (ref 12.3–15.4)
GFR SERPL CREATININE-BSD FRML MDRD: 53 ML/MIN/1.73
GLOBULIN UR ELPH-MCNC: 4.1 GM/DL
GLUCOSE BLD-MCNC: 99 MG/DL (ref 65–99)
HCT VFR BLD AUTO: 42.9 % (ref 37.5–51)
HGB BLD-MCNC: 12.9 G/DL (ref 13–17.7)
HOLD SPECIMEN: NORMAL
HOLD SPECIMEN: NORMAL
IMM GRANULOCYTES # BLD AUTO: 0.04 10*3/MM3 (ref 0–0.05)
IMM GRANULOCYTES NFR BLD AUTO: 0.4 % (ref 0–0.5)
LYMPHOCYTES # BLD AUTO: 3.74 10*3/MM3 (ref 0.7–3.1)
LYMPHOCYTES NFR BLD AUTO: 40.6 % (ref 19.6–45.3)
MCH RBC QN AUTO: 26.9 PG (ref 26.6–33)
MCHC RBC AUTO-ENTMCNC: 30.1 G/DL (ref 31.5–35.7)
MCV RBC AUTO: 89.6 FL (ref 79–97)
MONOCYTES # BLD AUTO: 1.08 10*3/MM3 (ref 0.1–0.9)
MONOCYTES NFR BLD AUTO: 11.7 % (ref 5–12)
NEUTROPHILS # BLD AUTO: 3.82 10*3/MM3 (ref 1.7–7)
NEUTROPHILS NFR BLD AUTO: 41.5 % (ref 42.7–76)
NRBC BLD AUTO-RTO: 0 /100 WBC (ref 0–0.2)
PLATELET # BLD AUTO: 262 10*3/MM3 (ref 140–450)
PMV BLD AUTO: 10.4 FL (ref 6–12)
POTASSIUM BLD-SCNC: 4.5 MMOL/L (ref 3.5–5.2)
PROT SERPL-MCNC: 8.2 G/DL (ref 6–8.5)
RBC # BLD AUTO: 4.79 10*6/MM3 (ref 4.14–5.8)
SODIUM BLD-SCNC: 136 MMOL/L (ref 136–145)
WBC NRBC COR # BLD: 9.22 10*3/MM3 (ref 3.4–10.8)
WHOLE BLOOD HOLD SPECIMEN: NORMAL
WHOLE BLOOD HOLD SPECIMEN: NORMAL

## 2019-11-08 PROCEDURE — 96366 THER/PROPH/DIAG IV INF ADDON: CPT

## 2019-11-08 PROCEDURE — 25010000002 CEFTRIAXONE PER 250 MG: Performed by: PHYSICIAN ASSISTANT

## 2019-11-08 PROCEDURE — 80053 COMPREHEN METABOLIC PANEL: CPT | Performed by: PHYSICIAN ASSISTANT

## 2019-11-08 PROCEDURE — 25010000002 VANCOMYCIN 10 G RECONSTITUTED SOLUTION: Performed by: PHYSICIAN ASSISTANT

## 2019-11-08 PROCEDURE — 96365 THER/PROPH/DIAG IV INF INIT: CPT

## 2019-11-08 PROCEDURE — 96367 TX/PROPH/DG ADDL SEQ IV INF: CPT

## 2019-11-08 PROCEDURE — 99283 EMERGENCY DEPT VISIT LOW MDM: CPT

## 2019-11-08 PROCEDURE — 85025 COMPLETE CBC W/AUTO DIFF WBC: CPT | Performed by: PHYSICIAN ASSISTANT

## 2019-11-08 RX ORDER — SODIUM CHLORIDE 0.9 % (FLUSH) 0.9 %
10 SYRINGE (ML) INJECTION AS NEEDED
Status: DISCONTINUED | OUTPATIENT
Start: 2019-11-08 | End: 2019-11-09 | Stop reason: HOSPADM

## 2019-11-08 RX ADMIN — VANCOMYCIN HYDROCHLORIDE 1750 MG: 10 INJECTION, POWDER, LYOPHILIZED, FOR SOLUTION INTRAVENOUS at 21:04

## 2019-11-08 RX ADMIN — SODIUM CHLORIDE 1 G: 900 INJECTION INTRAVENOUS at 20:19

## 2019-11-08 NOTE — TELEPHONE ENCOUNTER
Daughter, Gregoria Man, called regarding patient leg. States it is not a whole lot better. In the morning there is a knot/blister on it and by the end of the day it will be very swollen. He is not sleeping at night and this is causing him to be very agitated during the day. Per Dr. To I informed Gregoria this is a progression of the infection and there are no other oral antibiotics to give him. He needs to go to ER for IV antibiotics. Daughter verbalized understanding and agreement and states she will take him to [facility] ER for this but it will be between 4-5pm before she can get him there. Gregoria can be reached at [phone] if needed.

## 2019-11-09 NOTE — ED PROVIDER NOTES
Subjective   Ash Diez is a 89 y.o.male who presents to the ED with complaints of right leg swelling and cellulitis.  The patient reports his swelling has been gradually worsening over the past five days. He was seen by his primary care provider four days ago, when his symptoms onset. After evaluation, he was given a prescription for Keflex and Bactrim. He has taken his medications as prescribed, but his swelling has continued to worsen. Although, the redness to his leg does not appear to have gotten better or worse. Additionally, he has a history of recurrent cellultis in this leg.  He also has a history of a DVT, AAA, and atrial fibrillation. He does not take a blood thinner. There are no other complaints at this time.         History provided by:  Patient  Leg Swelling   Location:  Right leg  Quality:  Swelling  Severity:  Moderate  Onset quality:  Gradual  Duration:  5 days  Timing:  Constant  Progression:  Worsening  Chronicity:  New  Context:  Worsening leg swelling over the past five days  Relieved by:  Nothing  Worsened by:  Nothing  Ineffective treatments:  Keflex and Bactrim  Associated symptoms: no abdominal pain, no chest pain, no cough, no fever, no headaches, no nausea, no shortness of breath and no vomiting        Review of Systems   Constitutional: Negative for chills and fever.   HENT: Negative for nosebleeds.    Respiratory: Negative for cough and shortness of breath.    Cardiovascular: Positive for leg swelling (right). Negative for chest pain.   Gastrointestinal: Negative for abdominal pain, nausea and vomiting.   Endocrine: Negative for polydipsia, polyphagia and polyuria.   Genitourinary: Negative for dysuria.   Musculoskeletal: Negative for back pain.   Skin:        Redness to right leg.   Allergic/Immunologic: Negative for immunocompromised state.   Neurological: Negative for headaches.   Hematological: Negative.    Psychiatric/Behavioral: Negative.    All other systems reviewed and are  negative.      Past Medical History:   Diagnosis Date   • Aneurysm (CMS/HCC)     on aortic valve. has been evaluted at . pt decided agaisnt surgery    • Cancer (CMS/HCC)     colon   • Cellulitis     RLE   • CHF (congestive heart failure) (CMS/HCC)    • Hyperlipidemia    • Hypertension    • Pneumonia        No Known Allergies    Past Surgical History:   Procedure Laterality Date   • COLON SURGERY     • VASCULAR SURGERY      Vein removal       Family History   Problem Relation Age of Onset   • Arthritis Mother    • Heart attack Mother    • Stroke Father    • Stroke Sister    • Stroke Brother        Social History     Socioeconomic History   • Marital status:      Spouse name: Not on file   • Number of children: Not on file   • Years of education: Not on file   • Highest education level: Not on file   Tobacco Use   • Smoking status: Never Smoker   • Smokeless tobacco: Never Used   Substance and Sexual Activity   • Alcohol use: Yes     Frequency: Never     Comment: social   • Drug use: No   • Sexual activity: Defer         Objective   Physical Exam   Constitutional: He is oriented to person, place, and time. He appears well-developed and well-nourished.   HENT:   Head: Normocephalic and atraumatic.   Eyes: Conjunctivae are normal. Pupils are equal, round, and reactive to light. No scleral icterus.   Neck: Normal range of motion. Neck supple.   Cardiovascular: Normal rate, regular rhythm and intact distal pulses.   Murmur heard.   Systolic murmur is present with a grade of 2/6.  Pulmonary/Chest: Effort normal and breath sounds normal. No respiratory distress.   Abdominal: Soft. Bowel sounds are normal. There is no tenderness.   Musculoskeletal: Normal range of motion. He exhibits edema.   Mild right lower extremity edema with some thickening, scabbing and induration without any significant erythema or heat. One small blistered area to the anterior shin with no drainage.    Neurological: He is alert and oriented  to person, place, and time.   Skin: Skin is warm and dry. No erythema.   Psychiatric: He has a normal mood and affect. His behavior is normal.   Nursing note and vitals reviewed.      Procedures         ED Course  The pt appears to have mild cellultis to right lower shin.  He had a negative vascular u/s in September.  He was given a dose of IV Rocephin and Vanc here.  No concerning labs.  I think he can go home to continue his oral abx and elevate and use mild compression.  Will order outpatient doppler ultrasound for tomorrow but I don't think I will start on blood thinners tonight given that he just had a negative doppler 2 months ago.    ED Course as of Nov 08 2047 Fri Nov 08, 2019 1954 Patient had normal vascular ultra sound for the same symptoms in September.   [TB]      ED Course User Index  [TB] Russel Arceo     Recent Results (from the past 24 hour(s))   Light Blue Top    Collection Time: 11/08/19  5:31 PM   Result Value Ref Range    Extra Tube hold for add-on    Green Top (Gel)    Collection Time: 11/08/19  5:31 PM   Result Value Ref Range    Extra Tube Hold for add-ons.    Lavender Top    Collection Time: 11/08/19  5:31 PM   Result Value Ref Range    Extra Tube hold for add-on    Gold Top - SST    Collection Time: 11/08/19  5:31 PM   Result Value Ref Range    Extra Tube Hold for add-ons.    Comprehensive Metabolic Panel    Collection Time: 11/08/19  5:31 PM   Result Value Ref Range    Glucose 99 65 - 99 mg/dL    BUN 14 8 - 23 mg/dL    Creatinine 1.27 0.76 - 1.27 mg/dL    Sodium 136 136 - 145 mmol/L    Potassium 4.5 3.5 - 5.2 mmol/L    Chloride 103 98 - 107 mmol/L    CO2 20.0 (L) 22.0 - 29.0 mmol/L    Calcium 9.1 8.2 - 9.6 mg/dL    Total Protein 8.2 6.0 - 8.5 g/dL    Albumin 4.10 3.50 - 5.20 g/dL    ALT (SGPT) 12 1 - 41 U/L    AST (SGOT) 20 1 - 40 U/L    Alkaline Phosphatase 85 39 - 117 U/L    Total Bilirubin 0.2 0.2 - 1.2 mg/dL    eGFR Non African Amer 53 (L) >60 mL/min/1.73    Globulin 4.1 gm/dL  "   A/G Ratio 1.0 g/dL    BUN/Creatinine Ratio 11.0 7.0 - 25.0    Anion Gap 13.0 5.0 - 15.0 mmol/L   CBC Auto Differential    Collection Time: 11/08/19  5:31 PM   Result Value Ref Range    WBC 9.22 3.40 - 10.80 10*3/mm3    RBC 4.79 4.14 - 5.80 10*6/mm3    Hemoglobin 12.9 (L) 13.0 - 17.7 g/dL    Hematocrit 42.9 37.5 - 51.0 %    MCV 89.6 79.0 - 97.0 fL    MCH 26.9 26.6 - 33.0 pg    MCHC 30.1 (L) 31.5 - 35.7 g/dL    RDW 17.6 (H) 12.3 - 15.4 %    RDW-SD 57.5 (H) 37.0 - 54.0 fl    MPV 10.4 6.0 - 12.0 fL    Platelets 262 140 - 450 10*3/mm3    Neutrophil % 41.5 (L) 42.7 - 76.0 %    Lymphocyte % 40.6 19.6 - 45.3 %    Monocyte % 11.7 5.0 - 12.0 %    Eosinophil % 5.3 0.3 - 6.2 %    Basophil % 0.5 0.0 - 1.5 %    Immature Grans % 0.4 0.0 - 0.5 %    Neutrophils, Absolute 3.82 1.70 - 7.00 10*3/mm3    Lymphocytes, Absolute 3.74 (H) 0.70 - 3.10 10*3/mm3    Monocytes, Absolute 1.08 (H) 0.10 - 0.90 10*3/mm3    Eosinophils, Absolute 0.49 (H) 0.00 - 0.40 10*3/mm3    Basophils, Absolute 0.05 0.00 - 0.20 10*3/mm3    Immature Grans, Absolute 0.04 0.00 - 0.05 10*3/mm3    nRBC 0.0 0.0 - 0.2 /100 WBC     Note: In addition to lab results from this visit, the labs listed above may include labs taken at another facility or during a different encounter within the last 24 hours. Please correlate lab times with ED admission and discharge times for further clarification of the services performed during this visit.    No orders to display     Vitals:    11/08/19 1722 11/08/19 1941 11/08/19 1942   BP: 174/79 (!) 181/67    BP Location: Left arm     Patient Position: Sitting     Pulse: 59     Resp: 16     Temp: 98.2 °F (36.8 °C)     TempSrc: Oral     SpO2: 97%  99%   Weight: 92.5 kg (204 lb)     Height: 182.9 cm (72\")       Medications   sodium chloride 0.9 % flush 10 mL (not administered)   cefTRIAXone (ROCEPHIN) 1 g/100 mL 0.9% NS (MBP) (1 g Intravenous New Bag 11/8/19 2019)   vancomycin 1750 mg/500 mL 0.9% NS IVPB ([facility]) (not administered)     ECG/EMG " Results (last 24 hours)     ** No results found for the last 24 hours. **        No orders to display                       MDM    Final diagnoses:   Cellulitis of right lower leg   Leg edema, right       Documentation assistance provided by renzo Arceo.  Information recorded by the scribbessie was done at my direction and has been verified and validated by me.     Russel Arceo  11/08/19 1943       Kendall Kelley, PA  11/08/19 2047

## 2019-11-09 NOTE — DISCHARGE INSTRUCTIONS
Elevate and continue with mild compression.  Continue your Keflex and Bactrim.  Return tomorrow for doppler ultrasound of the right leg.  Follow up with your PCP on Monday.  Return to ER if worse.

## 2019-11-11 ENCOUNTER — OFFICE VISIT (OUTPATIENT)
Dept: INTERNAL MEDICINE | Facility: CLINIC | Age: 84
End: 2019-11-11

## 2019-11-11 VITALS
HEART RATE: 73 BPM | TEMPERATURE: 98.6 F | SYSTOLIC BLOOD PRESSURE: 124 MMHG | RESPIRATION RATE: 18 BRPM | BODY MASS INDEX: 27.56 KG/M2 | OXYGEN SATURATION: 96 % | DIASTOLIC BLOOD PRESSURE: 70 MMHG | WEIGHT: 203.2 LBS

## 2019-11-11 DIAGNOSIS — L03.115 CELLULITIS OF RIGHT LOWER LEG: Primary | ICD-10-CM

## 2019-11-11 PROCEDURE — 99213 OFFICE O/P EST LOW 20 MIN: CPT | Performed by: INTERNAL MEDICINE

## 2019-11-11 RX ORDER — AMLODIPINE BESYLATE 5 MG/1
7.5 TABLET ORAL DAILY
Qty: 45 TABLET | Refills: 3 | Status: SHIPPED | OUTPATIENT
Start: 2019-11-11 | End: 2020-04-08 | Stop reason: SDUPTHER

## 2019-11-11 NOTE — PROGRESS NOTES
"OFFICE PROGRESS NOTE    Chief Complaint   Patient presents with   • Follow-up     1 week f/u      Here with daughter    HPI: 89 y.o. male with hx HTN, HLD, moderate AS, AAA here for:     Of note, hospitalized 9/5-9/10 at [facility] for sepsis secondary to pneumonia, RLE cellulitis and rhabdo (discharged on doxy,cefuroxime).  He had ongoing symptoms at his 9/13 follow-up along with rising WBC count which failed outpatient PO abx (extended course of doxy and cefuroxime), so was rehospitalized 9/16-9/18 for an additional 3 days with CTX and vanc.    He was seen 11/4 for recurrent b/l LE edema, RLE warmth/erythema and weeping RLE wound. Started on Bactrim/Keflex x 10 days. Called 11/8 stated sx were worsening/not improved. Referred to [facility] ER where he was given a dose of Vanc, CTX and discharged home to c/w PO abx. CBC and CMP were unremarkable.    States right lower extremity swelling, erythema have improved.  The open area on his anterior right lower extremity has started to \"dry up.\"  He still has a few days of antibiotics left.  He does not have any fevers or chills.  He has started using compression stockings (previously doing Ace wrap) with improvement.    Review of Systems  Constitutional: Negative for activity change, appetite change and fever.   HENT: Negative for congestion, sneezing and sore throat.    Eyes: Negative for discharge and visual disturbance.   Respiratory: Negative for cough and shortness of breath.    Cardiovascular: Positive for leg swelling (And erythema, much improved). Negative for chest pain and palpitations.   Gastrointestinal: Negative for abdominal distention, abdominal pain, blood in stool, constipation, nausea and vomiting.   Endocrine: Negative for cold intolerance and heat intolerance.   Genitourinary: Negative for dysuria.   Musculoskeletal: Negative for neck stiffness.   Skin: Negative for rash.        Wound in RLE, improving  Allergic/Immunologic: Negative for " environmental allergies and food allergies.   Neurological: Negative for headache.   Hematological: Negative for adenopathy.   Psychiatric/Behavioral: Negative for depressed mood.     The following portions of the patient's history were reviewed and updated as appropriate: allergies, current medications, past family history, past medical history, past social history, past surgical history and problem list.      Physical Exam:  Vitals:    11/11/19 1030   BP: 124/70   BP Location: Right arm   Patient Position: Sitting   Cuff Size: Adult   Pulse: 73   Resp: 18   Temp: 98.6 °F (37 °C)   TempSrc: Temporal   SpO2: 96%   Weight: 92.2 kg (203 lb 3.2 oz)       Physical Exam   Constitutional: He is oriented to person, place, and time. He appears well-developed and well-nourished. No distress.   HENT:   Head: Normocephalic and atraumatic.   Eyes: Conjunctivae are normal. Right eye exhibits no discharge. Left eye exhibits no discharge.   Neck: Normal range of motion. Neck supple. No thyromegaly present.   Cardiovascular: Normal rate, regular rhythm and normal heart sounds. Exam reveals no gallop and no friction rub.   Systolic murmur.  Pulmonary/Chest: Effort normal and breath sounds normal. No stridor. No respiratory distress. He has no wheezes. He has no rales.   Abdominal: Soft. Bowel sounds are normal. He exhibits no distension and no mass. There is no tenderness. There is no rebound and no guarding.   Musculoskeletal:   He has 1+ pitting edema in his bilateral lower extremities, right greater than left, improved from prior.  There is an approximately 2 cm scabbed area to his distal anterior shin on the right without active drainage, improved from prior.    The right lower extremities no longer erythematous or warm.  Lymphadenopathy:     He has no cervical adenopathy.   Neurological: He is alert and oriented to person, place, and time. He exhibits normal muscle tone.   Skin: Skin is warm and dry. Capillary refill takes less  than 2 seconds. He is not diaphoretic. No pallor.   Psychiatric: He has a normal mood and affect.   Vitals reviewed.    Assesment and Plan: 89 y.o. male here for:  Cellulitis of right lower leg  Resolving.  Complete Bactrim and Keflex as prescribed.  Continue compression stockings, leg elevation, limiting dietary sodium.  Call for any recurrent erythema, pain, fever/chills etc.      Return for As needed if no improvement or new symptoms, Next scheduled follow up.    Shirin To MD  11/11/2019

## 2019-11-11 NOTE — ASSESSMENT & PLAN NOTE
Resolving.  Complete Bactrim and Keflex as prescribed.  Continue compression stockings, leg elevation, limiting dietary sodium.  Call for any recurrent erythema, pain, fever/chills etc.

## 2020-04-08 RX ORDER — AMLODIPINE BESYLATE 5 MG/1
7.5 TABLET ORAL DAILY
Qty: 45 TABLET | Refills: 3 | Status: SHIPPED | OUTPATIENT
Start: 2020-04-08 | End: 2020-08-13 | Stop reason: SDUPTHER

## 2020-04-08 NOTE — TELEPHONE ENCOUNTER
Pt called to request refill for amLODIPine (NORVASC) 5 MG tablet.    Please advise.  Call back:[phone]       [facility] - [address]

## 2020-08-13 RX ORDER — AMLODIPINE BESYLATE 5 MG/1
7.5 TABLET ORAL DAILY
Qty: 45 TABLET | Refills: 3 | Status: SHIPPED | OUTPATIENT
Start: 2020-08-13 | End: 2020-12-11 | Stop reason: SDUPTHER

## 2020-08-13 NOTE — TELEPHONE ENCOUNTER
NEEDS REFILL ON THE FOLLOWING: HE IS OUT    amLODIPine (NORVASC) 5 MG tablet 45 tablet 3      Sig - Route: Take 1.5 tablets by mouth Daily. - Oral    Sent to pharmacy as: amLODIPine Besylate 5 MG Oral Tablet (NORVASC)    E-Prescribing Status: Receipt confirmed by pharmacy ([id number]  3:05 PM EDT)    Pharmacy     [facility] [place] - [address] - [phone]  - [phone] FX

## 2020-12-08 ENCOUNTER — TELEPHONE (OUTPATIENT)
Dept: INTERNAL MEDICINE | Facility: CLINIC | Age: 85
End: 2020-12-08

## 2020-12-08 RX ORDER — AMLODIPINE BESYLATE 5 MG/1
7.5 TABLET ORAL DAILY
Qty: 45 TABLET | Refills: 3 | Status: CANCELLED | OUTPATIENT
Start: 2020-12-08

## 2020-12-08 NOTE — TELEPHONE ENCOUNTER
Patient has not been seen since 11/11/2019.  He is overdue for follow-up and labs.    Please call and schedule Medicare wellness visit 30-minute appointment and will then refill medication until then.    If patient refuses an office appointment which should be advised due to need for labs, at minimum schedule video visit.  Telephone visit should be last resort.    It is a safety issue if I continue to prescribe medications without an exam and labs to make sure your kidney function, electrolytes etc. are doing okay.

## 2020-12-08 NOTE — TELEPHONE ENCOUNTER
Caller: [facility] - [facility] [address] [facility] [phone]  - [phone] FX    Relationship: Pharmacy    Medication needed:   Requested Prescriptions     Pending Prescriptions Disp Refills   • amLODIPine (NORVASC) 5 MG tablet 45 tablet 3     Sig: Take 1.5 tablets by mouth Daily.       When do you need the refill by: TODAY     What details did the patient provide when requesting the medication: IF PATIENT ISN'T OUT TODAY HE WILL BE TOMORROW    Does the patient have less than a 3 day supply:  [x] Yes  [] No    What is the patient's preferred pharmacy: [facility] - [facility] [address] - [phone]  - [phone] FX

## 2020-12-11 ENCOUNTER — TELEPHONE (OUTPATIENT)
Dept: INTERNAL MEDICINE | Facility: CLINIC | Age: 85
End: 2020-12-11

## 2020-12-11 RX ORDER — AMLODIPINE BESYLATE 5 MG/1
7.5 TABLET ORAL DAILY
Qty: 45 TABLET | Refills: 0 | Status: SHIPPED | OUTPATIENT
Start: 2020-12-11 | End: 2020-12-23 | Stop reason: SDUPTHER

## 2020-12-11 NOTE — TELEPHONE ENCOUNTER
[facility] pts daughter will take him to get bloodwork drawn at a local hospital.   She provided the office number and fax number.   fax: [phone]  Phone; [phone]

## 2020-12-11 NOTE — TELEPHONE ENCOUNTER
Sent 30-day supply, no refills.  We will need to keep appointment on 12/23/2020 and also obtain lab work before I will give any other refills.  We will determine what labs are appropriate when he has that visit and fax below as requested.  Please inform.

## 2020-12-16 ENCOUNTER — TELEPHONE (OUTPATIENT)
Dept: INTERNAL MEDICINE | Facility: CLINIC | Age: 85
End: 2020-12-16

## 2020-12-16 NOTE — TELEPHONE ENCOUNTER
Please see previous message from 12/11.  He needs to keep his appointment 12/23/2020 and after discussion I will order any and all lab work to be faxed as below.  If I order labs now I may not encompass all of the labs he will need based on any symptoms or concerns he reports that the visit and therefore will have multiple lab visits and lab charges.

## 2020-12-16 NOTE — TELEPHONE ENCOUNTER
Caller: PEPE HERNÁNDEZ    Relationship: Emergency Contact    Best call back number: [phone]    What orders are you requesting (i.e. lab or imaging): BLOOD WORK ORDERS FAXED    In what timeframe would the patient need to come in: AS SOON AS POSSIBLE    Where will you receive your lab/imaging services: [facility] [place] [phone]    Additional notes: PATIENTS DAUGHTER IS ASKING THAT THE ORDERS GET FAXED TO THIS PARTICULAR CLINIC. PEPE WOULD LIKE A CALL LETTING HER KNOW THE ORDERS HAVE BEEN SENT.

## 2020-12-18 ENCOUNTER — TELEPHONE (OUTPATIENT)
Dept: INTERNAL MEDICINE | Facility: CLINIC | Age: 85
End: 2020-12-18

## 2020-12-18 NOTE — TELEPHONE ENCOUNTER
"PATIENTS DAUGHTER PEPE CALLED AND SAID THAT SHE DOESN'T REALLY WANT TO BRING THE PATIENT  TO GET LABS AS HE IS 89 AND WITH COVD BEING RAMPANT; SHE WANTED TO KNOW IF THERE WAS ANY WAY LABS COULD BE FAXED TO THE [facility]    FAX\" [phone] LAB AT [facility]    PEPE: [phone]    "

## 2020-12-18 NOTE — TELEPHONE ENCOUNTER
Spoke with Leslie informed her that labs will be ordered after tele health visit on 23rd and faxed to location .

## 2020-12-23 ENCOUNTER — TELEPHONE (OUTPATIENT)
Dept: INTERNAL MEDICINE | Facility: CLINIC | Age: 85
End: 2020-12-23

## 2020-12-23 ENCOUNTER — OFFICE VISIT (OUTPATIENT)
Dept: INTERNAL MEDICINE | Facility: CLINIC | Age: 85
End: 2020-12-23

## 2020-12-23 VITALS
SYSTOLIC BLOOD PRESSURE: 145 MMHG | HEIGHT: 72 IN | BODY MASS INDEX: 28.17 KG/M2 | DIASTOLIC BLOOD PRESSURE: 71 MMHG | WEIGHT: 208 LBS

## 2020-12-23 DIAGNOSIS — I35.0 AORTIC STENOSIS, MODERATE: ICD-10-CM

## 2020-12-23 DIAGNOSIS — R73.03 PREDIABETES: ICD-10-CM

## 2020-12-23 DIAGNOSIS — E78.5 HYPERLIPIDEMIA, UNSPECIFIED HYPERLIPIDEMIA TYPE: ICD-10-CM

## 2020-12-23 DIAGNOSIS — I71.40 ABDOMINAL AORTIC ANEURYSM (AAA) WITHOUT RUPTURE (HCC): ICD-10-CM

## 2020-12-23 DIAGNOSIS — Z00.00 MEDICARE ANNUAL WELLNESS VISIT, SUBSEQUENT: Primary | ICD-10-CM

## 2020-12-23 DIAGNOSIS — I10 ESSENTIAL HYPERTENSION: ICD-10-CM

## 2020-12-23 PROBLEM — D64.9 ANEMIA: Status: RESOLVED | Noted: 2019-09-13 | Resolved: 2020-12-23

## 2020-12-23 PROBLEM — L03.115 CELLULITIS OF RIGHT LOWER LEG: Status: RESOLVED | Noted: 2019-09-17 | Resolved: 2020-12-23

## 2020-12-23 PROCEDURE — G0439 PPPS, SUBSEQ VISIT: HCPCS | Performed by: INTERNAL MEDICINE

## 2020-12-23 RX ORDER — AMLODIPINE BESYLATE 5 MG/1
7.5 TABLET ORAL DAILY
Qty: 45 TABLET | Refills: 6 | Status: SHIPPED | OUTPATIENT
Start: 2020-12-23

## 2020-12-23 NOTE — ASSESSMENT & PLAN NOTE
Stable.  Continue amlodipine.  6-month refill supply given.  Inform patient as below that he will need to find a new primary care provider as I am leaving the practice.

## 2020-12-23 NOTE — ASSESSMENT & PLAN NOTE
Moderate AS.  Stable.  Discussed importance of ongoing cardiology evaluation at minimum once a year to help manage fluid status and hopefully detect any changes that may indicate need for valve replacement versus palliative approach early in order to prevent any hospitalizations or life-threatening events.  Patient declines my medical recommendation to call cardiology for follow-up.

## 2020-12-23 NOTE — ASSESSMENT & PLAN NOTE
Stable.  Discussed importance of follow-up ultrasound to monitor for any enlargement.  If severely enlarged may increase the risk for rupture and life-threatening/fatal bleeding.  Patient declines follow-up order.

## 2020-12-23 NOTE — PATIENT INSTRUCTIONS
Preventive Care 65 Years and Older, Male  Preventive care refers to lifestyle choices and visits with your health care provider that can promote health and wellness. This includes:  · A yearly physical exam. This is also called an annual well check.  · Regular dental and eye exams.  · Immunizations.  · Screening for certain conditions.  · Healthy lifestyle choices, such as diet and exercise.  What can I expect for my preventive care visit?  Physical exam  Your health care provider will check:  · Height and weight. These may be used to calculate body mass index (BMI), which is a measurement that tells if you are at a healthy weight.  · Heart rate and blood pressure.  · Your skin for abnormal spots.  Counseling  Your health care provider may ask you questions about:  · Alcohol, tobacco, and drug use.  · Emotional well-being.  · Home and relationship well-being.  · Sexual activity.  · Eating habits.  · History of falls.  · Memory and ability to understand (cognition).  · Work and work environment.  What immunizations do I need?    Influenza (flu) vaccine  · This is recommended every year.  Tetanus, diphtheria, and pertussis (Tdap) vaccine  · You may need a Td booster every 10 years.  Varicella (chickenpox) vaccine  · You may need this vaccine if you have not already been vaccinated.  Zoster (shingles) vaccine  · You may need this after age 60.  Pneumococcal conjugate (PCV13) vaccine  · One dose is recommended after age 65.  Pneumococcal polysaccharide (PPSV23) vaccine  · One dose is recommended after age 65.  Measles, mumps, and rubella (MMR) vaccine  · You may need at least one dose of MMR if you were born in 1957 or later. You may also need a second dose.  Meningococcal conjugate (MenACWY) vaccine  · You may need this if you have certain conditions.  Hepatitis A vaccine  · You may need this if you have certain conditions or if you travel or work in places where you may be exposed to hepatitis A.  Hepatitis B  vaccine  · You may need this if you have certain conditions or if you travel or work in places where you may be exposed to hepatitis B.  Haemophilus influenzae type b (Hib) vaccine  · You may need this if you have certain conditions.  You may receive vaccines as individual doses or as more than one vaccine together in one shot (combination vaccines). Talk with your health care provider about the risks and benefits of combination vaccines.  What tests do I need?  Blood tests  · Lipid and cholesterol levels. These may be checked every 5 years, or more frequently depending on your overall health.  · Hepatitis C test.  · Hepatitis B test.  Screening  · Lung cancer screening. You may have this screening every year starting at age 55 if you have a 30-pack-year history of smoking and currently smoke or have quit within the past 15 years.  · Colorectal cancer screening. All adults should have this screening starting at age 50 and continuing until age 75. Your health care provider may recommend screening at age 45 if you are at increased risk. You will have tests every 1-10 years, depending on your results and the type of screening test.  · Prostate cancer screening. Recommendations will vary depending on your family history and other risks.  · Diabetes screening. This is done by checking your blood sugar (glucose) after you have not eaten for a while (fasting). You may have this done every 1-3 years.  · Abdominal aortic aneurysm (AAA) screening. You may need this if you are a current or former smoker.  · Sexually transmitted disease (STD) testing.  Follow these instructions at home:  Eating and drinking  · Eat a diet that includes fresh fruits and vegetables, whole grains, lean protein, and low-fat dairy products. Limit your intake of foods with high amounts of sugar, saturated fats, and salt.  · Take vitamin and mineral supplements as recommended by your health care provider.  · Do not drink alcohol if your health care  provider tells you not to drink.  · If you drink alcohol:  ? Limit how much you have to 0-2 drinks a day.  ? Be aware of how much alcohol is in your drink. In the [place] one drink equals one 12 oz bottle of beer (355 mL), one 5 oz glass of wine (148 mL), or one 1½ oz glass of hard liquor (44 mL).  Lifestyle  · Take daily care of your teeth and gums.  · Stay active. Exercise for at least 30 minutes on 5 or more days each week.  · Do not use any products that contain nicotine or tobacco, such as cigarettes, e-cigarettes, and chewing tobacco. If you need help quitting, ask your health care provider.  · If you are sexually active, practice safe sex. Use a condom or other form of protection to prevent STIs (sexually transmitted infections).  · Talk with your health care provider about taking a low-dose aspirin or statin.  What's next?  · Visit your health care provider once a year for a well check visit.  · Ask your health care provider how often you should have your eyes and teeth checked.  · Stay up to date on all vaccines.  This information is not intended to replace advice given to you by your health care provider. Make sure you discuss any questions you have with your health care provider.  Document Revised: 2019 Document Reviewed: 2019  [employer] Patient Education 2020 [employer]    Medicare Wellness  Personal Prevention Plan of Service     Date of Office Visit:  2020  Encounter Provider:  Shirin To MD  Place of Service:  [facility] MED[facility]INE [facility]RICS  Patient Name: Ash Diez  :  10/4/1928    As part of the Medicare Wellness portion of your visit today, we are providing you with this personalized preventive plan of services (PPPS). This plan is based upon recommendations of the [place] Preventive Services Task Force (USPSTF) and the Advisory Committee on Immunization Practices (ACIP).    This lists the preventive care services that should be  considered, and provides dates of when you are due. Items listed as completed are up-to-date and do not require any further intervention.    Health Maintenance   Topic Date Due   • LIPID PANEL  09/06/2019   • ANNUAL WELLNESS VISIT  05/28/2020   • TDAP/TD VACCINES (1 - Tdap) 12/23/2020 (Originally 10/4/1947)   • INFLUENZA VACCINE  12/23/2021 (Originally 8/1/2020)   • Pneumococcal Vaccine 65+ (1 of 1 - PPSV23) 12/23/2021 (Originally 10/4/1993)   • ZOSTER VACCINE (2 of 3) 01/01/2022 (Originally 9/11/2012)       Orders Placed This Encounter   Procedures   • Lipid Panel     Standing Status:   Future     Standing Expiration Date:   12/23/2021   • Comprehensive Metabolic Panel     Standing Status:   Future     Standing Expiration Date:   12/23/2021   • CBC (No Diff)     Standing Status:   Future     Standing Expiration Date:   12/23/2021   • Hemoglobin A1c     Standing Status:   Future     Standing Expiration Date:   12/23/2021       No follow-ups on file.

## 2021-01-01 ENCOUNTER — HOSPITAL ENCOUNTER (OUTPATIENT)
Dept: GENERAL RADIOLOGY | Facility: HOSPITAL | Age: 86
Discharge: HOME | End: 2021-07-08

## 2021-01-01 ENCOUNTER — TELEPHONE (OUTPATIENT)
Dept: INTERNAL MEDICINE | Facility: CLINIC | Age: 86
End: 2021-01-01

## 2021-01-01 ENCOUNTER — OFFICE VISIT (OUTPATIENT)
Dept: ORTHOPEDIC SURGERY | Facility: CLINIC | Age: 86
End: 2021-01-01

## 2021-01-01 ENCOUNTER — TELEPHONE (OUTPATIENT)
Dept: ORTHOPEDIC SURGERY | Facility: CLINIC | Age: 86
End: 2021-01-01

## 2021-01-01 ENCOUNTER — LAB (OUTPATIENT)
Dept: LAB | Facility: HOSPITAL | Age: 86
End: 2021-01-01

## 2021-01-01 ENCOUNTER — OFFICE VISIT (OUTPATIENT)
Dept: INTERNAL MEDICINE | Facility: CLINIC | Age: 86
End: 2021-01-01

## 2021-01-01 VITALS
HEIGHT: 65 IN | DIASTOLIC BLOOD PRESSURE: 82 MMHG | SYSTOLIC BLOOD PRESSURE: 120 MMHG | BODY MASS INDEX: 32.76 KG/M2 | WEIGHT: 196.65 LBS

## 2021-01-01 VITALS
OXYGEN SATURATION: 98 % | HEIGHT: 65 IN | TEMPERATURE: 97.3 F | DIASTOLIC BLOOD PRESSURE: 70 MMHG | SYSTOLIC BLOOD PRESSURE: 118 MMHG | BODY MASS INDEX: 32.76 KG/M2 | WEIGHT: 196.6 LBS | HEART RATE: 51 BPM

## 2021-01-01 DIAGNOSIS — S42.292A OTHER CLOSED DISPLACED FRACTURE OF PROXIMAL END OF LEFT HUMERUS, INITIAL ENCOUNTER: Primary | ICD-10-CM

## 2021-01-01 DIAGNOSIS — S42.92XK: Primary | ICD-10-CM

## 2021-01-01 DIAGNOSIS — R73.03 PREDIABETES: ICD-10-CM

## 2021-01-01 DIAGNOSIS — R63.4 WEIGHT LOSS: ICD-10-CM

## 2021-01-01 DIAGNOSIS — I71.40 ABDOMINAL AORTIC ANEURYSM (AAA) WITHOUT RUPTURE (HCC): ICD-10-CM

## 2021-01-01 DIAGNOSIS — R26.89 DECREASED MOBILITY: ICD-10-CM

## 2021-01-01 DIAGNOSIS — S42.92XK: ICD-10-CM

## 2021-01-01 DIAGNOSIS — Z00.00 MEDICARE ANNUAL WELLNESS VISIT, SUBSEQUENT: ICD-10-CM

## 2021-01-01 DIAGNOSIS — E78.5 HYPERLIPIDEMIA, UNSPECIFIED HYPERLIPIDEMIA TYPE: ICD-10-CM

## 2021-01-01 DIAGNOSIS — R53.1 GENERALIZED WEAKNESS: ICD-10-CM

## 2021-01-01 DIAGNOSIS — E66.9 OBESITY (BMI 30.0-34.9): ICD-10-CM

## 2021-01-01 DIAGNOSIS — I10 ESSENTIAL HYPERTENSION: ICD-10-CM

## 2021-01-01 DIAGNOSIS — I35.0 AORTIC STENOSIS, MODERATE: Primary | ICD-10-CM

## 2021-01-01 LAB
ALBUMIN SERPL-MCNC: 4.2 G/DL (ref 3.5–5.2)
ALBUMIN/GLOB SERPL: 1.1 G/DL
ALP SERPL-CCNC: 94 U/L (ref 39–117)
ALT SERPL W P-5'-P-CCNC: 8 U/L (ref 1–41)
ANION GAP SERPL CALCULATED.3IONS-SCNC: 9.7 MMOL/L (ref 5–15)
AST SERPL-CCNC: 15 U/L (ref 1–40)
BILIRUB SERPL-MCNC: 0.8 MG/DL (ref 0–1.2)
BUN SERPL-MCNC: 15 MG/DL (ref 8–23)
BUN/CREAT SERPL: 9.4 (ref 7–25)
CALCIUM SPEC-SCNC: 9.6 MG/DL (ref 8.2–9.6)
CHLORIDE SERPL-SCNC: 102 MMOL/L (ref 98–107)
CHOLEST SERPL-MCNC: 188 MG/DL (ref 0–200)
CO2 SERPL-SCNC: 25.3 MMOL/L (ref 22–29)
CREAT SERPL-MCNC: 1.6 MG/DL (ref 0.76–1.27)
DEPRECATED RDW RBC AUTO: 51.2 FL (ref 37–54)
ERYTHROCYTE [DISTWIDTH] IN BLOOD BY AUTOMATED COUNT: 14.3 % (ref 12.3–15.4)
GFR SERPL CREATININE-BSD FRML MDRD: 41 ML/MIN/1.73
GLOBULIN UR ELPH-MCNC: 3.9 GM/DL
GLUCOSE SERPL-MCNC: 81 MG/DL (ref 65–99)
HBA1C MFR BLD: 5.3 % (ref 4.8–5.6)
HCT VFR BLD AUTO: 50.6 % (ref 37.5–51)
HDLC SERPL-MCNC: 45 MG/DL (ref 40–60)
HGB BLD-MCNC: 16.4 G/DL (ref 13–17.7)
LDLC SERPL CALC-MCNC: 113 MG/DL (ref 0–100)
LDLC/HDLC SERPL: 2.42 {RATIO}
MCH RBC QN AUTO: 31.2 PG (ref 26.6–33)
MCHC RBC AUTO-ENTMCNC: 32.4 G/DL (ref 31.5–35.7)
MCV RBC AUTO: 96.4 FL (ref 79–97)
PLATELET # BLD AUTO: 260 10*3/MM3 (ref 140–450)
PMV BLD AUTO: 10.6 FL (ref 6–12)
POTASSIUM SERPL-SCNC: 4.5 MMOL/L (ref 3.5–5.2)
PROT SERPL-MCNC: 8.1 G/DL (ref 6–8.5)
RBC # BLD AUTO: 5.25 10*6/MM3 (ref 4.14–5.8)
SODIUM SERPL-SCNC: 137 MMOL/L (ref 136–145)
TRIGL SERPL-MCNC: 170 MG/DL (ref 0–150)
TSH SERPL DL<=0.05 MIU/L-ACNC: 3.81 UIU/ML (ref 0.27–4.2)
VIT B12 BLD-MCNC: 239 PG/ML (ref 211–946)
VLDLC SERPL-MCNC: 30 MG/DL (ref 5–40)
WBC # BLD AUTO: 11.98 10*3/MM3 (ref 3.4–10.8)

## 2021-01-01 PROCEDURE — 82607 VITAMIN B-12: CPT

## 2021-01-01 PROCEDURE — 84443 ASSAY THYROID STIM HORMONE: CPT

## 2021-01-01 PROCEDURE — 80061 LIPID PANEL: CPT

## 2021-01-01 PROCEDURE — 80053 COMPREHEN METABOLIC PANEL: CPT

## 2021-01-01 PROCEDURE — 99215 OFFICE O/P EST HI 40 MIN: CPT | Performed by: STUDENT IN AN ORGANIZED HEALTH CARE EDUCATION/TRAINING PROGRAM

## 2021-01-01 PROCEDURE — 83036 HEMOGLOBIN GLYCOSYLATED A1C: CPT

## 2021-01-01 PROCEDURE — 73030 X-RAY EXAM OF SHOULDER: CPT

## 2021-01-01 PROCEDURE — 99203 OFFICE O/P NEW LOW 30 MIN: CPT | Performed by: ORTHOPAEDIC SURGERY

## 2021-01-01 PROCEDURE — 85027 COMPLETE CBC AUTOMATED: CPT

## 2021-02-17 ENCOUNTER — TELEPHONE (OUTPATIENT)
Dept: INTERNAL MEDICINE | Facility: CLINIC | Age: 86
End: 2021-02-17

## 2021-02-17 DIAGNOSIS — Z23 IMMUNIZATION DUE: ICD-10-CM

## 2021-02-17 NOTE — TELEPHONE ENCOUNTER
Please call and see if pt had these done and request them.  If not, give reminder to do so.  Patient has not had labs in over a year and medications will not continue to be prescribed without annual labs as it is a safety issue.  Also remind him that I am leaving the practice in just a few weeks and ultimately he needs a new PCP.

## 2021-02-17 NOTE — TELEPHONE ENCOUNTER
----- Message from Yoanna Sommer sent at 2/17/2021 10:13 AM EST -----  Did you ever see patients labs come through from [facility] ??  ----- Message -----  From: SYSTEM  Sent: 2/17/2021  12:27 AM EST  To: Leatha Barnes [facility]

## 2021-02-17 NOTE — TELEPHONE ENCOUNTER
Called patients daughter to see if she has taken patient to [facility] for lab work , I had called the Clinic and they have no record of patient coming in for labs.  The fax number for the Clinic in chart was wrong.  The fax number for the Clinic is [phone].  LMOM for daughter to call office back office number given.

## 2021-02-18 NOTE — TELEPHONE ENCOUNTER
Per. Dr. To the results were faxed to her and she has sign the results and were placed up front to be scanned into patients chart

## 2021-03-11 ENCOUNTER — TELEPHONE (OUTPATIENT)
Dept: INTERNAL MEDICINE | Facility: CLINIC | Age: 86
End: 2021-03-11

## 2021-03-11 NOTE — TELEPHONE ENCOUNTER
lmom for patients daughter Leslie to call office back office number was given.( Regarding blood work needing to know if it has been completed or not from Dr. To that was ordered back in December. )

## 2021-03-11 NOTE — TELEPHONE ENCOUNTER
Patients daughter Leslie called back stating patient did have labs completed and she will call and have them fax the results to our office .  At this time patient has fallen and broke his shoulder and they are in the process of trying to find a new PCP.  She has requested his records but the place he is at as not gotten them yet .  I did inform her that there was no message regarding this information.  She stated she would call that office again and have them call and request his records.  I did tell Leslie that I would send this to my manger to make sure this gets taken care of .  She was very appreciative .  Call ended.

## 2021-06-15 NOTE — TELEPHONE ENCOUNTER
Can you please reach out to the patient's daughter and find out where the patient's records need to be sent?  If they have not been sent because an PEG has not been completed, please advise her of our process so this can be completed.    Thanks

## 2021-07-08 PROBLEM — E66.9 OBESITY (BMI 30.0-34.9): Status: ACTIVE | Noted: 2021-01-01

## 2021-07-08 NOTE — PROGRESS NOTES
"Chief Complaint  Ash Diez is a 89 y.o. male presenting for Fall (in January Lt. shoulder  Establish Care).     From [place], [place]. , has been living with his elderly daughter and son-in-law, but now living with his granddaughter in [place] to get access to care.  Overall patient has been more or less bedbound since fall in January 2021 with left shoulder fracture.  Has barely been able to get around with cane, does not have a wheelchair.    Patient has a past medical history of abdominal aortic aneurysm without rupture (evaluated at , declined surgery), CHF aortic stenosis,, colon cancer, hypertension, hyperlipidemia, prediabetes and left shoulder fracture (1/2021)    History of Present Illness  Patient is here with his granddaughter to establish care.    The most pressing is his left shoulder, but he broke in January 2021.  Patient states he fell on slippery floor.  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  Surgery was not recommended.  He would like to be evaluated by Ortho here in [place].  If cleared it would be interested in starting physical therapy.    Patient has been seen by cardiology at , last in 2016.  He has had episodes of syncope, but it appears not over the last few months.  They did not recommend surgery for him.    Additionally he has lost about 12 pounds over last 6 months.    The following portions of the patient's history were reviewed and updated as appropriate: allergies, current medications, past family history, past medical history, past social history, past surgical history and problem list.    Objective  /70 (BP Location: Left arm, Patient Position: Sitting, Cuff Size: Adult)   Pulse 51   Temp 97.3 °F (36.3 °C) (Temporal)   Ht 166 cm (65.35\")   Wt 89.2 kg (196 lb 9.6 oz)   SpO2 98%   BMI 32.36 kg/m²     Physical Exam  Vitals reviewed.   Constitutional:       General: He is not in acute distress.     Appearance: " Normal appearance. He is ill-appearing. He is not toxic-appearing or diaphoretic.   HENT:      Head: Normocephalic and atraumatic.      Nose: No congestion.   Eyes:      Extraocular Movements: Extraocular movements intact.      Conjunctiva/sclera: Conjunctivae normal.   Neck:      Vascular: No carotid bruit.   Cardiovascular:      Rate and Rhythm: Normal rate and regular rhythm.      Heart sounds: Murmur heard.        Comments: Systolic murmur grade 3/6 right 2nd intercostal, not heard on auscultation of the neck.  Pulmonary:      Effort: Pulmonary effort is normal.      Breath sounds: Normal breath sounds.   Abdominal:      General: There is no distension.      Palpations: Abdomen is soft. There is no mass.      Tenderness: There is no abdominal tenderness.   Musculoskeletal:         General: Swelling and tenderness present.      Cervical back: Neck supple.      Right lower leg: Edema present.      Left lower leg: Edema present.      Comments: Left shoulder in sling: Appears nontender over clavicle and acromion, also nontender scapula.  Proximal humerus appears mildly swollen and tender.  Patient has minimal external and internal rotation.  This causes pain.  There is good function for extension in MCP joints of the hand, good flexion and DIP joints of 2nd-5th fingers.  Normal sensation all over the hand.  Good pulse in radial artery.   Skin:     General: Skin is warm and dry.   Neurological:      Mental Status: He is alert and oriented to person, place, and time. Mental status is at baseline.      Motor: Weakness present.      Gait: Gait abnormal.      Comments: Needs assistance on both sides to get up on the exam table.  Able to ambulate with support on one side and cane on the other side slowly down the hallway.   Psychiatric:         Behavior: Behavior normal.         Thought Content: Thought content normal.         Assessment/Plan   1. Aortic stenosis, moderate  Patient does have bilateral lower extremity  edema, for which he uses compression stockings.  He also had syncope in the past, and had a fall in January.  He has not been seen by cardiology since 2016, and they did not recommend surgery.  However I would like him evaluated again to see if there is changes and if we need to adjust his medications.  Currently his blood pressure is little bit on the low side so at this time on a low reluctant to make any changes unless necessary.  - Ambulatory Referral to Cardiology  - Standard Wheelchair    2. 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  If he had normal healing I would refer him for physical therapy, but at this time I would like him evaluated 1st by Ortho.  - Ambulatory Referral to Orthopedic Surgery  - Standard Wheelchair  - XR Shoulder 2+ View Left; Future    3. Decreased mobility  Patient is almost bedbound, uses bedpan at bedside.  Recommend wheelchair.  - Ambulatory Referral to Orthopedic Surgery  - Standard Wheelchair    4. Essential hypertension  BP Readings from Last 3 Encounters:   07/08/21 118/70   12/23/20 145/71   11/11/19 124/70   Blood pressure currently well controlled on amlodipine 5 mg.  Will check labs.  - Comprehensive Metabolic Panel; Future    5. Prediabetes  Listed in chart.  We will check A1c.  - Hemoglobin A1c; Future    6. Weight loss  Ambulate related to his fall in January, will check blood work.  - CBC (No Diff); Future  - TSH Rfx On Abnormal To Free T4; Future  - Vitamin B12; Future    7. Obesity (BMI 30.0-34.9)  Patient's Body mass index is 32.36 kg/m². indicating that he is obese (BMI >30). Obesity-related health conditions include the following: hypertension and dyslipidemias. Obesity is worsening. BMI is is above average; BMI management plan is completed. We discussed No changes at this time due to ongoing weight loss..    Total time spent on chart review, charting and  face-to-face with patient 47 minutes.    Return in about 1 month (around 8/8/2021).    Future Appointments       Provider Department Center    7/8/2021 12:00 PM LAB [facility]ST[facility] AT [facility]    8/11/2021 10:30 AM Karthikeyan Moreno MD [facility] INTERNAL MEDICINE [place]          Karthikeyan Moreno MD  Family Medicine  07/08/2021    Note: Speech recognition transcription software may have been used to create portions of this document.  An attempt at proofreading has been made but errors in transcription could still be present.

## 2021-07-12 NOTE — TELEPHONE ENCOUNTER
Patient's grandauter called stating that patient has had extreme uncontrollable diarrhea. Patient has had part of his colon taken out due to colon cancer. When patient saw Dr. Moreno on 7/8/21 he was dehydrated and she's afaid he is more dehydrated now. States patient doesn't usually let her know things but he mentioned it to her today so she nows he thinks something is wrong. Wants to know if she should take him to ER or what to do. He has  nausea no appetite and is weak . Shes worried since he is 89 yrs. old

## 2021-07-16 NOTE — PROGRESS NOTES
[facility] Orthopaedic Surgery Clinic Note    Subjective     CC: Pain of the Left Shoulder (DOI 01/26/2021)      HPI    Ash Diez is a 89 y.o. male who presents with new problem of: left shoulder pain.  Onset: mechanical fall. The issue has been ongoing for 6 month(s). Pain is described as dull and aching. Associated symptoms include pain. The pain is worse with any movement of the joint; resting improve the pain. Previous treatments have included: bracing, cane/walker, NSAIDS and physical therapy.    I have reviewed the following portions of the patient's history:History of Present Illness and review of systems.      He fell fracturing his left shoulder in January.  Initially treated with a sling.  He is here because he has become less active since then.  His granddaughter would like to see him to get some therapy and get to moving.       Review of Systems   Constitutional: Negative.  Negative for chills, fatigue and fever.   HENT: Negative.  Negative for congestion and dental problem.    Eyes: Negative.  Negative for blurred vision.   Respiratory: Negative.  Negative for shortness of breath.    Cardiovascular: Negative.  Negative for leg swelling.   Gastrointestinal: Negative.  Negative for abdominal pain.   Endocrine: Negative.  Negative for polyuria.   Genitourinary: Negative.  Negative for difficulty urinating.   Musculoskeletal: Positive for arthralgias.   Skin: Negative.    Allergic/Immunologic: Negative.    Neurological: Negative.    Hematological: Negative.  Negative for adenopathy.   Psychiatric/Behavioral: Negative.  Negative for behavioral problems.       ROS:    Constiutional:Pt denies fever, chills, nausea, or vomiting.  MSK:as above      Objective      Past Medical History  Past Medical History:   Diagnosis Date   • Aneurysm (CMS/HCC)     on aortic valve. has been evaluted at . pt decided agaisnt surgery    • Cancer (CMS/HCC)     colon   • Cellulitis     RLE   • CHF (congestive heart failure)  "(CMS/[facility])    • Hyperlipidemia    • Hypertension    • Pneumonia          Physical Exam  /82   Ht 166 cm (65.35\")   Wt 89.2 kg (196 lb 10.4 oz)   BMI 32.37 kg/m²     Body mass index is 32.37 kg/m².    Patient is well nourished and well developed.        Ortho Exam  Left shoulder with minimal to no tenderness.  Forward flexion abduction less than 90 degrees.  Elbow flexion extension intact.  Limited internal and external rotation    Imaging/Labs/EMG Reviewed:  Imaging Results (Last 24 Hours)     ** No results found for the last 24 hours. **      I viewed his x-rays from last week which show a proximal humerus fracture nonunion with subluxation and partial dislocation    Assessment:  1. Other closed displaced fracture of proximal end of left humerus, initial encounter        Plan:  1. Recommend over the counter anti-inflammatories for pain and/or swelling  2. At this time is a nonoperative candidate.  He is not having pain.  3. I have ordered home health physical therapy.  I encouraged patient and his family to get him moving.  He may need to go to the facility for rehab and to stay active.  He does not have to wear the sling.    Follow Up:   Return if symptoms worsen or fail to improve.      Medical Decision Making  Management Options : Low - OT or PT Therapy         Zoran Cadena M.D., FAAOS  Orthopedic Surgeon  Fellowship Trained Sports Medicine  [facility]  Orthopedics and Sports Medicine  [address] [place] [address], [address]  [place] [address]  "

## 2021-07-16 NOTE — TELEPHONE ENCOUNTER
Caller: Gregoria Hughes    Relationship: Emergency Contact    Best call back number: [phone]    What is the medical concern/diagnosis:     What specialty or service is being requested: SKILLED REHAB FACILITY    What is the provider, practice or medical service name: NA    What is the office location: NA    What is the office phone number:     Any additional details: PATIENTS GRANDDAUGHTER STATES THE PATIENT SAW THE ORTHOPEDIC IN WHICH HE HAS ORDERED HOME HEALTH. GREGORIA STATES THE ORTHOPEDIC THINKS HE NEEDS TO BE IN A SKILLED REHAB FACILITY AND [facility] IS WANTING TO SEE WHAT NEEDS TO BE DONE TO GET HIM IN TO ONE. PLEASE CONTACT CHAN

## 2021-07-19 NOTE — TELEPHONE ENCOUNTER
I would suggest to try on PT to start with, often the physical therapist will have a plan that will allow for treatment in the home.  However if this proves difficult or if the physical therapist feels that he should be in a skilled rehab facility, then the next step would be to apply for that.  Please let them know.  Thanks

## 2021-07-20 NOTE — TELEPHONE ENCOUNTER
Gregoria called back I advised her per note she informed me that patient lives in [facility] and has been here for last two weeks and has no home for home health both houses have steps and he can't go up them without help and it takes her  and herself to do it  she stated patient is weak and has no strength and has been bed bound for 6 mos. He messes on himself  doesn't want to feed himself or drink water because he is afraid he will use the bathroom on himself. She wants to know what to do to get him in a facility that will help him get his strength back to be able to get a home and then have home health come in. She says she doesn't know how much longer she can take off work and he can't stay by himself

## 2021-07-20 NOTE — TELEPHONE ENCOUNTER
Talked to Gregoria cruz per note and gave her 2 nursing homes to call [facility] and [facility] she said ok to doing the PT for an evaluation and wants to know if she can't get him into a nursing home will PT continue to come to her house

## 2021-07-20 NOTE — TELEPHONE ENCOUNTER
Because he has been bedbound for 6 months and now is getting worse I would consider applying for nursing home, potentially discussing the option for a limited stay.  If it is possible to get a physical therapist to come into the home and evaluate him I think that would also make sense.  It can be challenging to get him into a rehab facility if home physical therapy has not been attempted.  However given the longstanding loss of function and being bedbound I would recommend applying for nursing home, where they also can offer physical therapy for their residents.  Family would have to apply for nursing home, and the nursing home would usually request information from the primary care physician during this process.

## 2021-07-20 NOTE — TELEPHONE ENCOUNTER
Thank you.  I think the ideal plan is for PT to evaluate him at home and if possible offer some treatment.  If they feel that he should be referred for inpatient rehab we can do that, and that would be easier if we already have attempted outpatient/in-home PT.  However given his high age and decreased function over his last few months it might still be appropriate to consider nursing home.  Thanks

## 2021-07-23 NOTE — TELEPHONE ENCOUNTER
Gregoria called stating she hasn't heard anything from PT yet and was wondering about it. I informed her and apologized because I had just sent it to you today.  She also informed me that she had talked to someone at [facility] and they told her that they can' t tell her if they have a bed for him until they get a referral form provider and they can do PT there if he can get in

## 2021-07-23 NOTE — TELEPHONE ENCOUNTER
Called Gregoria informed her the PT has been sent but it may take up to 2 weeks to hear from them she that is to long. she stated patient is 89 and hasn't walked in 6 mos. and doesn't know why you want help  get her dad some help like the nursing home that can do the PT there .she states she thinks she may find another provider she has never had this much trouble getting help before. She also said she is going to call  on Monday to get a  and explain to them what is going on.

## 2021-07-23 NOTE — TELEPHONE ENCOUNTER
I tried to call Gregoria twice late Friday afternoon, left message on voicemail that I would send a [employer] message.  See my [employer] message for information.

## 2021-07-23 NOTE — TELEPHONE ENCOUNTER
Sorry, I was not aware.  I thought the orthopedic surgeon had placed a.  I just placed one now.  Thanks

## 2021-07-27 NOTE — TELEPHONE ENCOUNTER
Noted.  No need to continue trying after multiple attempts, I have also sent a MyChart message.  We will just have to see if they make contact again.  Thanks

## 2022-01-01 ENCOUNTER — APPOINTMENT (OUTPATIENT)
Dept: CARDIOLOGY | Facility: HOSPITAL | Age: 87
End: 2022-01-01

## 2022-01-01 ENCOUNTER — APPOINTMENT (OUTPATIENT)
Dept: NEUROLOGY | Facility: HOSPITAL | Age: 87
End: 2022-01-01

## 2022-01-01 ENCOUNTER — TELEPHONE (OUTPATIENT)
Dept: INTERNAL MEDICINE | Facility: CLINIC | Age: 87
End: 2022-01-01

## 2022-01-01 ENCOUNTER — APPOINTMENT (OUTPATIENT)
Dept: CT IMAGING | Facility: HOSPITAL | Age: 87
End: 2022-01-01

## 2022-01-01 ENCOUNTER — HOSPITAL ENCOUNTER (INPATIENT)
Facility: HOSPITAL | Age: 87
LOS: 4 days | End: 2022-03-18
Attending: INTERNAL MEDICINE | Admitting: INTERNAL MEDICINE

## 2022-01-01 ENCOUNTER — APPOINTMENT (OUTPATIENT)
Dept: MRI IMAGING | Facility: HOSPITAL | Age: 87
End: 2022-01-01

## 2022-01-01 ENCOUNTER — APPOINTMENT (OUTPATIENT)
Dept: GENERAL RADIOLOGY | Facility: HOSPITAL | Age: 87
End: 2022-01-01

## 2022-01-01 ENCOUNTER — ANCILLARY PROCEDURE (OUTPATIENT)
Dept: PERIOP | Facility: HOSPITAL | Age: 87
End: 2022-01-01

## 2022-01-01 ENCOUNTER — HOSPITAL ENCOUNTER (INPATIENT)
Facility: HOSPITAL | Age: 87
LOS: 12 days | Discharge: HOSPICE | End: 2022-03-14
Attending: STUDENT IN AN ORGANIZED HEALTH CARE EDUCATION/TRAINING PROGRAM | Admitting: FAMILY MEDICINE

## 2022-01-01 VITALS
HEIGHT: 71 IN | BODY MASS INDEX: 29.88 KG/M2 | HEART RATE: 60 BPM | RESPIRATION RATE: 18 BRPM | SYSTOLIC BLOOD PRESSURE: 143 MMHG | OXYGEN SATURATION: 96 % | DIASTOLIC BLOOD PRESSURE: 59 MMHG | WEIGHT: 213.41 LBS | TEMPERATURE: 97.5 F

## 2022-01-01 VITALS — RESPIRATION RATE: 14 BRPM

## 2022-01-01 DIAGNOSIS — J96.01 ACUTE RESPIRATORY FAILURE WITH HYPOXIA: ICD-10-CM

## 2022-01-01 DIAGNOSIS — Z20.822 COVID-19 RULED OUT BY LABORATORY TESTING: ICD-10-CM

## 2022-01-01 DIAGNOSIS — I35.0 SEVERE AORTIC STENOSIS: Chronic | ICD-10-CM

## 2022-01-01 DIAGNOSIS — R13.10 DYSPHAGIA, UNSPECIFIED TYPE: Primary | ICD-10-CM

## 2022-01-01 DIAGNOSIS — I50.9 ACUTE ON CHRONIC CONGESTIVE HEART FAILURE, UNSPECIFIED HEART FAILURE TYPE: ICD-10-CM

## 2022-01-01 LAB
ALBUMIN SERPL-MCNC: 4 G/DL (ref 3.5–5.2)
ALBUMIN/GLOB SERPL: 1 G/DL
ALP SERPL-CCNC: 79 U/L (ref 39–117)
ALT SERPL W P-5'-P-CCNC: 12 U/L (ref 1–41)
ANION GAP SERPL CALCULATED.3IONS-SCNC: 10 MMOL/L (ref 5–15)
ANION GAP SERPL CALCULATED.3IONS-SCNC: 10 MMOL/L (ref 5–15)
ANION GAP SERPL CALCULATED.3IONS-SCNC: 12 MMOL/L (ref 5–15)
ANION GAP SERPL CALCULATED.3IONS-SCNC: 13 MMOL/L (ref 5–15)
ANION GAP SERPL CALCULATED.3IONS-SCNC: 8 MMOL/L (ref 5–15)
ANION GAP SERPL CALCULATED.3IONS-SCNC: 8 MMOL/L (ref 5–15)
ANION GAP SERPL CALCULATED.3IONS-SCNC: 9 MMOL/L (ref 5–15)
ANION GAP SERPL CALCULATED.3IONS-SCNC: 9 MMOL/L (ref 5–15)
ARTERIAL PATENCY WRIST A: ABNORMAL
ARTERIAL PATENCY WRIST A: ABNORMAL
ASCENDING AORTA: 4.6 CM
ASCENDING AORTA: 5.8 CM
AST SERPL-CCNC: 17 U/L (ref 1–40)
ATMOSPHERIC PRESS: ABNORMAL MM[HG]
ATMOSPHERIC PRESS: ABNORMAL MM[HG]
BACTERIA UR QL AUTO: NORMAL /HPF
BASE EXCESS BLDA CALC-SCNC: 3.6 MMOL/L (ref 0–2)
BASE EXCESS BLDA CALC-SCNC: 5.6 MMOL/L (ref 0–2)
BASOPHILS # BLD AUTO: 0.04 10*3/MM3 (ref 0–0.2)
BASOPHILS # BLD AUTO: 0.04 10*3/MM3 (ref 0–0.2)
BASOPHILS # BLD AUTO: 0.05 10*3/MM3 (ref 0–0.2)
BASOPHILS # BLD AUTO: 0.05 10*3/MM3 (ref 0–0.2)
BASOPHILS NFR BLD AUTO: 0.3 % (ref 0–1.5)
BASOPHILS NFR BLD AUTO: 0.4 % (ref 0–1.5)
BASOPHILS NFR BLD AUTO: 0.5 % (ref 0–1.5)
BASOPHILS NFR BLD AUTO: 0.5 % (ref 0–1.5)
BDY SITE: ABNORMAL
BDY SITE: ABNORMAL
BH CV ECHO MEAS - AI DEC SLOPE: 396 CM/SEC^2
BH CV ECHO MEAS - AI MAX PG: 66 MMHG
BH CV ECHO MEAS - AI MAX VEL: 406.1 CM/SEC
BH CV ECHO MEAS - AI P1/2T: 300.4 MSEC
BH CV ECHO MEAS - AO MAX PG (FULL): 71.3 MMHG
BH CV ECHO MEAS - AO MAX PG: 67 MMHG
BH CV ECHO MEAS - AO MAX PG: 72.8 MMHG
BH CV ECHO MEAS - AO MEAN PG (FULL): 38.2 MMHG
BH CV ECHO MEAS - AO MEAN PG: 38.8 MMHG
BH CV ECHO MEAS - AO MEAN PG: 42 MMHG
BH CV ECHO MEAS - AO ROOT AREA (BSA CORRECTED): 2
BH CV ECHO MEAS - AO ROOT AREA: 13.9 CM^2
BH CV ECHO MEAS - AO ROOT DIAM: 3.6 CM
BH CV ECHO MEAS - AO ROOT DIAM: 4.2 CM
BH CV ECHO MEAS - AO V2 MAX: 410 CM/SEC
BH CV ECHO MEAS - AO V2 MAX: 426.7 CM/SEC
BH CV ECHO MEAS - AO V2 MEAN: 295 CM/SEC
BH CV ECHO MEAS - AO V2 VTI: 92.4 CM
BH CV ECHO MEAS - ASC AORTA: 5.9 CM
BH CV ECHO MEAS - AVA(I,A): 0.64 CM^2
BH CV ECHO MEAS - AVA(I,D): 0.64 CM^2
BH CV ECHO MEAS - AVA(V,A): 0.63 CM^2
BH CV ECHO MEAS - AVA(V,D): 0.63 CM^2
BH CV ECHO MEAS - BSA(HAYCOCK): 2.1 M^2
BH CV ECHO MEAS - BSA(HAYCOCK): 2.2 M^2
BH CV ECHO MEAS - BSA: 2.1 M^2
BH CV ECHO MEAS - BSA: 2.2 M^2
BH CV ECHO MEAS - BZI_BMI: 27.9 KILOGRAMS/M^2
BH CV ECHO MEAS - BZI_BMI: 29.7 KILOGRAMS/M^2
BH CV ECHO MEAS - BZI_METRIC_HEIGHT: 180.3 CM
BH CV ECHO MEAS - BZI_METRIC_HEIGHT: 180.3 CM
BH CV ECHO MEAS - BZI_METRIC_WEIGHT: 90.7 KG
BH CV ECHO MEAS - BZI_METRIC_WEIGHT: 96.6 KG
BH CV ECHO MEAS - EDV(CUBED): 168.8 ML
BH CV ECHO MEAS - EDV(MOD-SP2): 132 ML
BH CV ECHO MEAS - EDV(MOD-SP4): 139 ML
BH CV ECHO MEAS - EDV(TEICH): 149.1 ML
BH CV ECHO MEAS - EF(CUBED): 42.2 %
BH CV ECHO MEAS - EF(MOD-BP): 27 %
BH CV ECHO MEAS - EF(MOD-SP2): 27.3 %
BH CV ECHO MEAS - EF(MOD-SP4): 36.7 %
BH CV ECHO MEAS - EF(TEICH): 34.6 %
BH CV ECHO MEAS - ESV(CUBED): 97.5 ML
BH CV ECHO MEAS - ESV(MOD-SP2): 96 ML
BH CV ECHO MEAS - ESV(MOD-SP4): 88 ML
BH CV ECHO MEAS - ESV(TEICH): 97.5 ML
BH CV ECHO MEAS - FS: 16.7 %
BH CV ECHO MEAS - IVS/LVPW: 1
BH CV ECHO MEAS - IVSD: 1.1 CM
BH CV ECHO MEAS - LA DIMENSION: 4.8 CM
BH CV ECHO MEAS - LA/AO: 1.1
BH CV ECHO MEAS - LAD MAJOR: 6 CM
BH CV ECHO MEAS - LAT PEAK E' VEL: 9.9 CM/SEC
BH CV ECHO MEAS - LATERAL E/E' RATIO: 8.5
BH CV ECHO MEAS - LV DIASTOLIC VOL/BSA (35-75): 65.9 ML/M^2
BH CV ECHO MEAS - LV MASS(C)D: 250.9 GRAMS
BH CV ECHO MEAS - LV MASS(C)DI: 119 GRAMS/M^2
BH CV ECHO MEAS - LV MAX PG: 1.5 MMHG
BH CV ECHO MEAS - LV MEAN PG: 0.64 MMHG
BH CV ECHO MEAS - LV MEAN PG: 2 MMHG
BH CV ECHO MEAS - LV SYSTOLIC VOL/BSA (12-30): 41.7 ML/M^2
BH CV ECHO MEAS - LV V1 MAX: 61.7 CM/SEC
BH CV ECHO MEAS - LV V1 MAX: 86.3 CM/SEC
BH CV ECHO MEAS - LV V1 MEAN: 36.2 CM/SEC
BH CV ECHO MEAS - LV V1 VTI: 13.5 CM
BH CV ECHO MEAS - LV V1 VTI: 18.2 CM
BH CV ECHO MEAS - LVIDD: 5.5 CM
BH CV ECHO MEAS - LVIDS: 4.6 CM
BH CV ECHO MEAS - LVLD AP2: 8.6 CM
BH CV ECHO MEAS - LVLD AP4: 7.7 CM
BH CV ECHO MEAS - LVLS AP2: 8.1 CM
BH CV ECHO MEAS - LVLS AP4: 6.8 CM
BH CV ECHO MEAS - LVOT AREA (M): 4.5 CM^2
BH CV ECHO MEAS - LVOT AREA: 4.4 CM^2
BH CV ECHO MEAS - LVOT DIAM: 2.4 CM
BH CV ECHO MEAS - LVOT DIAM: 2.5 CM
BH CV ECHO MEAS - LVPWD: 1.1 CM
BH CV ECHO MEAS - MED PEAK E' VEL: 8 CM/SEC
BH CV ECHO MEAS - MEDIAL E/E' RATIO: 10.5
BH CV ECHO MEAS - MV A MAX VEL: 69.6 CM/SEC
BH CV ECHO MEAS - MV DEC SLOPE: 467.5 CM/SEC^2
BH CV ECHO MEAS - MV DEC TIME: 0.19 SEC
BH CV ECHO MEAS - MV E MAX VEL: 85.4 CM/SEC
BH CV ECHO MEAS - MV E/A: 1.2
BH CV ECHO MEAS - MV P1/2T MAX VEL: 156.4 CM/SEC
BH CV ECHO MEAS - MV P1/2T: 98 MSEC
BH CV ECHO MEAS - MVA P1/2T LCG: 1.4 CM^2
BH CV ECHO MEAS - MVA(P1/2T): 2.2 CM^2
BH CV ECHO MEAS - PA ACC SLOPE: 871.1 CM/SEC^2
BH CV ECHO MEAS - PA ACC TIME: 0.1 SEC
BH CV ECHO MEAS - PA PR(ACCEL): 34.6 MMHG
BH CV ECHO MEAS - RAP SYSTOLE: 3 MMHG
BH CV ECHO MEAS - RVSP: 23.1 MMHG
BH CV ECHO MEAS - SI(AO): 610.9 ML/M^2
BH CV ECHO MEAS - SI(CUBED): 33.8 ML/M^2
BH CV ECHO MEAS - SI(LVOT): 28.1 ML/M^2
BH CV ECHO MEAS - SI(MOD-SP2): 17.1 ML/M^2
BH CV ECHO MEAS - SI(MOD-SP4): 24.2 ML/M^2
BH CV ECHO MEAS - SI(TEICH): 24.5 ML/M^2
BH CV ECHO MEAS - SV(AO): 1288 ML
BH CV ECHO MEAS - SV(CUBED): 71.3 ML
BH CV ECHO MEAS - SV(LVOT): 59.2 ML
BH CV ECHO MEAS - SV(MOD-SP2): 36 ML
BH CV ECHO MEAS - SV(MOD-SP4): 51 ML
BH CV ECHO MEAS - SV(TEICH): 51.6 ML
BH CV ECHO MEAS - TAPSE (>1.6): 2.1 CM
BH CV ECHO MEAS - TR MAX PG: 20.1 MMHG
BH CV ECHO MEAS - TR MAX VEL: 224 CM/SEC
BH CV ECHO MEASUREMENTS AVERAGE E/E' RATIO: 9.54
BH CV XLRA - RV BASE: 3 CM
BH CV XLRA - RV LENGTH: 5.4 CM
BH CV XLRA - RV MID: 3.1 CM
BH CV XLRA MEAS LEFT CAROTID BULB EDV: 4.91 CM/SEC
BH CV XLRA MEAS LEFT CAROTID BULB PSV: 55.5 CM/SEC
BH CV XLRA MEAS LEFT CCA RATIO VEL: 59.9 CM/SEC
BH CV XLRA MEAS LEFT DIST CCA EDV: 9.8 CM/SEC
BH CV XLRA MEAS LEFT DIST CCA PSV: 48.6 CM/SEC
BH CV XLRA MEAS LEFT DIST ICA EDV: 7.4 CM/SEC
BH CV XLRA MEAS LEFT DIST ICA PSV: 47.1 CM/SEC
BH CV XLRA MEAS LEFT ICA RATIO VEL: 45.7 CM/SEC
BH CV XLRA MEAS LEFT ICA/CCA RATIO: 0.76
BH CV XLRA MEAS LEFT MID CCA PSV: 60.4 CM/SEC
BH CV XLRA MEAS LEFT MID ICA EDV: 10.3 CM/SEC
BH CV XLRA MEAS LEFT MID ICA PSV: 45.7 CM/SEC
BH CV XLRA MEAS LEFT PROX CCA EDV: 8.4 CM/SEC
BH CV XLRA MEAS LEFT PROX CCA PSV: 86.6 CM/SEC
BH CV XLRA MEAS LEFT PROX ECA PSV: 66.4 CM/SEC
BH CV XLRA MEAS LEFT PROX ICA EDV: 8.3 CM/SEC
BH CV XLRA MEAS LEFT PROX ICA PSV: 46.2 CM/SEC
BH CV XLRA MEAS LEFT PROX SCLA PSV: 109.6 CM/SEC
BH CV XLRA MEAS LEFT VERTEBRAL A PSV: 38.3 CM/SEC
BH CV XLRA MEAS RIGHT CAROTID BULB EDV: 5.24 CM/SEC
BH CV XLRA MEAS RIGHT CAROTID BULB PSV: 61.1 CM/SEC
BH CV XLRA MEAS RIGHT CCA RATIO VEL: 42.3 CM/SEC
BH CV XLRA MEAS RIGHT DIST CCA EDV: 7.4 CM/SEC
BH CV XLRA MEAS RIGHT DIST CCA PSV: 49.3 CM/SEC
BH CV XLRA MEAS RIGHT DIST ICA EDV: 8.3 CM/SEC
BH CV XLRA MEAS RIGHT DIST ICA PSV: 56.8 CM/SEC
BH CV XLRA MEAS RIGHT ICA RATIO VEL: 61.1 CM/SEC
BH CV XLRA MEAS RIGHT ICA/CCA RATIO: 1.4
BH CV XLRA MEAS RIGHT MID CCA EDV: 7.9 CM/SEC
BH CV XLRA MEAS RIGHT MID CCA PSV: 42.8 CM/SEC
BH CV XLRA MEAS RIGHT MID ICA EDV: 9.6 CM/SEC
BH CV XLRA MEAS RIGHT MID ICA PSV: 61.6 CM/SEC
BH CV XLRA MEAS RIGHT PROX CCA EDV: 7 CM/SEC
BH CV XLRA MEAS RIGHT PROX CCA PSV: 65 CM/SEC
BH CV XLRA MEAS RIGHT PROX ECA PSV: 83.8 CM/SEC
BH CV XLRA MEAS RIGHT PROX ICA EDV: 7.4 CM/SEC
BH CV XLRA MEAS RIGHT PROX ICA PSV: 60.2 CM/SEC
BH CV XLRA MEAS RIGHT PROX SCLA PSV: 69.1 CM/SEC
BH CV XLRA MEAS RIGHT VERTEBRAL A PSV: 44.7 CM/SEC
BILIRUB SERPL-MCNC: 0.7 MG/DL (ref 0–1.2)
BILIRUB UR QL STRIP: NEGATIVE
BODY TEMPERATURE: 37 C
BODY TEMPERATURE: 37 C
BUN SERPL-MCNC: 21 MG/DL (ref 8–23)
BUN SERPL-MCNC: 23 MG/DL (ref 8–23)
BUN SERPL-MCNC: 26 MG/DL (ref 8–23)
BUN SERPL-MCNC: 32 MG/DL (ref 8–23)
BUN SERPL-MCNC: 35 MG/DL (ref 8–23)
BUN SERPL-MCNC: 36 MG/DL (ref 8–23)
BUN SERPL-MCNC: 39 MG/DL (ref 8–23)
BUN SERPL-MCNC: 40 MG/DL (ref 8–23)
BUN/CREAT SERPL: 15.8 (ref 7–25)
BUN/CREAT SERPL: 18 (ref 7–25)
BUN/CREAT SERPL: 21.1 (ref 7–25)
BUN/CREAT SERPL: 28.3 (ref 7–25)
BUN/CREAT SERPL: 28.6 (ref 7–25)
BUN/CREAT SERPL: 30.2 (ref 7–25)
BUN/CREAT SERPL: 30.3 (ref 7–25)
BUN/CREAT SERPL: 34.8 (ref 7–25)
CALCIUM SPEC-SCNC: 8.9 MG/DL (ref 8.2–9.6)
CALCIUM SPEC-SCNC: 9.1 MG/DL (ref 8.2–9.6)
CALCIUM SPEC-SCNC: 9.1 MG/DL (ref 8.2–9.6)
CALCIUM SPEC-SCNC: 9.3 MG/DL (ref 8.2–9.6)
CALCIUM SPEC-SCNC: 9.6 MG/DL (ref 8.2–9.6)
CALCIUM SPEC-SCNC: 9.6 MG/DL (ref 8.2–9.6)
CHLORIDE SERPL-SCNC: 100 MMOL/L (ref 98–107)
CHLORIDE SERPL-SCNC: 102 MMOL/L (ref 98–107)
CHLORIDE SERPL-SCNC: 102 MMOL/L (ref 98–107)
CHLORIDE SERPL-SCNC: 103 MMOL/L (ref 98–107)
CHLORIDE SERPL-SCNC: 106 MMOL/L (ref 98–107)
CHLORIDE SERPL-SCNC: 99 MMOL/L (ref 98–107)
CHOLEST SERPL-MCNC: 160 MG/DL (ref 0–200)
CLARITY UR: CLEAR
CO2 BLDA-SCNC: 29.2 MMOL/L (ref 22–33)
CO2 BLDA-SCNC: 31.7 MMOL/L (ref 22–33)
CO2 SERPL-SCNC: 25 MMOL/L (ref 22–29)
CO2 SERPL-SCNC: 27 MMOL/L (ref 22–29)
CO2 SERPL-SCNC: 28 MMOL/L (ref 22–29)
CO2 SERPL-SCNC: 31 MMOL/L (ref 22–29)
COHGB MFR BLD: 1 % (ref 0–2)
COHGB MFR BLD: 1 % (ref 0–2)
COLOR UR: YELLOW
CREAT SERPL-MCNC: 1.12 MG/DL (ref 0.76–1.27)
CREAT SERPL-MCNC: 1.13 MG/DL (ref 0.76–1.27)
CREAT SERPL-MCNC: 1.16 MG/DL (ref 0.76–1.27)
CREAT SERPL-MCNC: 1.19 MG/DL (ref 0.76–1.27)
CREAT SERPL-MCNC: 1.23 MG/DL (ref 0.76–1.27)
CREAT SERPL-MCNC: 1.28 MG/DL (ref 0.76–1.27)
CREAT SERPL-MCNC: 1.33 MG/DL (ref 0.76–1.27)
CREAT SERPL-MCNC: 1.4 MG/DL (ref 0.76–1.27)
DEPRECATED RDW RBC AUTO: 54.2 FL (ref 37–54)
DEPRECATED RDW RBC AUTO: 54.4 FL (ref 37–54)
DEPRECATED RDW RBC AUTO: 54.9 FL (ref 37–54)
DEPRECATED RDW RBC AUTO: 55.3 FL (ref 37–54)
DEPRECATED RDW RBC AUTO: 55.8 FL (ref 37–54)
DEPRECATED RDW RBC AUTO: 56.2 FL (ref 37–54)
DEPRECATED RDW RBC AUTO: 56.4 FL (ref 37–54)
DEPRECATED RDW RBC AUTO: 57 FL (ref 37–54)
EGFRCR SERPLBLD CKD-EPI 2021: 46.9 ML/MIN/1.73
EGFRCR SERPLBLD CKD-EPI 2021: 49.8 ML/MIN/1.73
EGFRCR SERPLBLD CKD-EPI 2021: 52.2 ML/MIN/1.73
EGFRCR SERPLBLD CKD-EPI 2021: 54.7 ML/MIN/1.73
EGFRCR SERPLBLD CKD-EPI 2021: 57 ML/MIN/1.73
EGFRCR SERPLBLD CKD-EPI 2021: 58.7 ML/MIN/1.73
EGFRCR SERPLBLD CKD-EPI 2021: 60.6 ML/MIN/1.73
EGFRCR SERPLBLD CKD-EPI 2021: 61.3 ML/MIN/1.73
EOSINOPHIL # BLD AUTO: 0.06 10*3/MM3 (ref 0–0.4)
EOSINOPHIL # BLD AUTO: 0.12 10*3/MM3 (ref 0–0.4)
EOSINOPHIL # BLD AUTO: 0.14 10*3/MM3 (ref 0–0.4)
EOSINOPHIL # BLD AUTO: 0.15 10*3/MM3 (ref 0–0.4)
EOSINOPHIL NFR BLD AUTO: 0.5 % (ref 0.3–6.2)
EOSINOPHIL NFR BLD AUTO: 1.2 % (ref 0.3–6.2)
EOSINOPHIL NFR BLD AUTO: 1.4 % (ref 0.3–6.2)
EOSINOPHIL NFR BLD AUTO: 1.5 % (ref 0.3–6.2)
EPAP: 0
EPAP: 0
ERYTHROCYTE [DISTWIDTH] IN BLOOD BY AUTOMATED COUNT: 14.9 % (ref 12.3–15.4)
ERYTHROCYTE [DISTWIDTH] IN BLOOD BY AUTOMATED COUNT: 14.9 % (ref 12.3–15.4)
ERYTHROCYTE [DISTWIDTH] IN BLOOD BY AUTOMATED COUNT: 15 % (ref 12.3–15.4)
ERYTHROCYTE [DISTWIDTH] IN BLOOD BY AUTOMATED COUNT: 15.1 % (ref 12.3–15.4)
ERYTHROCYTE [DISTWIDTH] IN BLOOD BY AUTOMATED COUNT: 15.3 % (ref 12.3–15.4)
ERYTHROCYTE [DISTWIDTH] IN BLOOD BY AUTOMATED COUNT: 15.4 % (ref 12.3–15.4)
ERYTHROCYTE [DISTWIDTH] IN BLOOD BY AUTOMATED COUNT: 15.6 % (ref 12.3–15.4)
ERYTHROCYTE [DISTWIDTH] IN BLOOD BY AUTOMATED COUNT: 15.6 % (ref 12.3–15.4)
FLUAV RNA RESP QL NAA+PROBE: NOT DETECTED
FLUBV RNA RESP QL NAA+PROBE: NOT DETECTED
GLOBULIN UR ELPH-MCNC: 3.9 GM/DL
GLUCOSE BLDC GLUCOMTR-MCNC: 109 MG/DL (ref 70–130)
GLUCOSE BLDC GLUCOMTR-MCNC: 91 MG/DL (ref 70–130)
GLUCOSE BLDC GLUCOMTR-MCNC: 97 MG/DL (ref 70–130)
GLUCOSE BLDC GLUCOMTR-MCNC: 99 MG/DL (ref 70–130)
GLUCOSE SERPL-MCNC: 113 MG/DL (ref 65–99)
GLUCOSE SERPL-MCNC: 118 MG/DL (ref 65–99)
GLUCOSE SERPL-MCNC: 123 MG/DL (ref 65–99)
GLUCOSE SERPL-MCNC: 134 MG/DL (ref 65–99)
GLUCOSE SERPL-MCNC: 139 MG/DL (ref 65–99)
GLUCOSE SERPL-MCNC: 77 MG/DL (ref 65–99)
GLUCOSE SERPL-MCNC: 91 MG/DL (ref 65–99)
GLUCOSE SERPL-MCNC: 97 MG/DL (ref 65–99)
GLUCOSE UR STRIP-MCNC: NEGATIVE MG/DL
HBA1C MFR BLD: 5.7 % (ref 4.8–5.6)
HCO3 BLDA-SCNC: 27.9 MMOL/L (ref 20–26)
HCO3 BLDA-SCNC: 30.4 MMOL/L (ref 20–26)
HCT VFR BLD AUTO: 47.8 % (ref 37.5–51)
HCT VFR BLD AUTO: 47.8 % (ref 37.5–51)
HCT VFR BLD AUTO: 47.9 % (ref 37.5–51)
HCT VFR BLD AUTO: 48.2 % (ref 37.5–51)
HCT VFR BLD AUTO: 48.3 % (ref 37.5–51)
HCT VFR BLD AUTO: 51.1 % (ref 37.5–51)
HCT VFR BLD AUTO: 52.3 % (ref 37.5–51)
HCT VFR BLD AUTO: 53.8 % (ref 37.5–51)
HCT VFR BLD CALC: 47.9 % (ref 38–51)
HCT VFR BLD CALC: 48.1 % (ref 38–51)
HDLC SERPL-MCNC: 37 MG/DL (ref 40–60)
HGB BLD-MCNC: 15.2 G/DL (ref 13–17.7)
HGB BLD-MCNC: 15.2 G/DL (ref 13–17.7)
HGB BLD-MCNC: 15.5 G/DL (ref 13–17.7)
HGB BLD-MCNC: 15.7 G/DL (ref 13–17.7)
HGB BLD-MCNC: 16 G/DL (ref 13–17.7)
HGB BLD-MCNC: 16.1 G/DL (ref 13–17.7)
HGB BLD-MCNC: 16.5 G/DL (ref 13–17.7)
HGB BLD-MCNC: 17 G/DL (ref 13–17.7)
HGB BLDA-MCNC: 15.6 G/DL (ref 13.5–17.5)
HGB BLDA-MCNC: 15.7 G/DL (ref 13.5–17.5)
HGB UR QL STRIP.AUTO: ABNORMAL
HOLD SPECIMEN: NORMAL
HYALINE CASTS UR QL AUTO: NORMAL /LPF
IMM GRANULOCYTES # BLD AUTO: 0.05 10*3/MM3 (ref 0–0.05)
IMM GRANULOCYTES # BLD AUTO: 0.07 10*3/MM3 (ref 0–0.05)
IMM GRANULOCYTES NFR BLD AUTO: 0.5 % (ref 0–0.5)
IMM GRANULOCYTES NFR BLD AUTO: 0.6 % (ref 0–0.5)
INHALED O2 CONCENTRATION: 40 %
INHALED O2 CONCENTRATION: 40 %
IPAP: 0
IPAP: 0
KETONES UR QL STRIP: NEGATIVE
LDLC SERPL CALC-MCNC: 104 MG/DL (ref 0–100)
LDLC/HDLC SERPL: 2.78 {RATIO}
LEFT ATRIUM VOLUME INDEX: 42.2 ML/M^2
LEFT ATRIUM VOLUME: 89 ML
LEUKOCYTE ESTERASE UR QL STRIP.AUTO: NEGATIVE
LV EF 2D ECHO EST: 35 %
LYMPHOCYTES # BLD AUTO: 1.13 10*3/MM3 (ref 0.7–3.1)
LYMPHOCYTES # BLD AUTO: 1.32 10*3/MM3 (ref 0.7–3.1)
LYMPHOCYTES # BLD AUTO: 2.05 10*3/MM3 (ref 0.7–3.1)
LYMPHOCYTES # BLD AUTO: 2.13 10*3/MM3 (ref 0.7–3.1)
LYMPHOCYTES NFR BLD AUTO: 12.9 % (ref 19.6–45.3)
LYMPHOCYTES NFR BLD AUTO: 20.1 % (ref 19.6–45.3)
LYMPHOCYTES NFR BLD AUTO: 20.6 % (ref 19.6–45.3)
LYMPHOCYTES NFR BLD AUTO: 9.5 % (ref 19.6–45.3)
MAGNESIUM SERPL-MCNC: 2.3 MG/DL (ref 1.7–2.3)
MAGNESIUM SERPL-MCNC: 2.4 MG/DL (ref 1.7–2.3)
MAGNESIUM SERPL-MCNC: 2.5 MG/DL (ref 1.7–2.3)
MAGNESIUM SERPL-MCNC: 2.6 MG/DL (ref 1.7–2.3)
MAXIMAL PREDICTED HEART RATE: 127 BPM
MCH RBC QN AUTO: 31.1 PG (ref 26.6–33)
MCH RBC QN AUTO: 31.3 PG (ref 26.6–33)
MCH RBC QN AUTO: 31.5 PG (ref 26.6–33)
MCH RBC QN AUTO: 31.6 PG (ref 26.6–33)
MCH RBC QN AUTO: 31.7 PG (ref 26.6–33)
MCH RBC QN AUTO: 31.7 PG (ref 26.6–33)
MCH RBC QN AUTO: 31.9 PG (ref 26.6–33)
MCH RBC QN AUTO: 31.9 PG (ref 26.6–33)
MCHC RBC AUTO-ENTMCNC: 31.3 G/DL (ref 31.5–35.7)
MCHC RBC AUTO-ENTMCNC: 31.5 G/DL (ref 31.5–35.7)
MCHC RBC AUTO-ENTMCNC: 31.5 G/DL (ref 31.5–35.7)
MCHC RBC AUTO-ENTMCNC: 31.6 G/DL (ref 31.5–35.7)
MCHC RBC AUTO-ENTMCNC: 31.7 G/DL (ref 31.5–35.7)
MCHC RBC AUTO-ENTMCNC: 32.4 G/DL (ref 31.5–35.7)
MCHC RBC AUTO-ENTMCNC: 32.8 G/DL (ref 31.5–35.7)
MCHC RBC AUTO-ENTMCNC: 33.4 G/DL (ref 31.5–35.7)
MCV RBC AUTO: 100.2 FL (ref 79–97)
MCV RBC AUTO: 100.4 FL (ref 79–97)
MCV RBC AUTO: 100.4 FL (ref 79–97)
MCV RBC AUTO: 102 FL (ref 79–97)
MCV RBC AUTO: 95.4 FL (ref 79–97)
MCV RBC AUTO: 95.4 FL (ref 79–97)
MCV RBC AUTO: 95.8 FL (ref 79–97)
MCV RBC AUTO: 99.2 FL (ref 79–97)
METHGB BLD QL: 0.3 % (ref 0–1.5)
METHGB BLD QL: 0.3 % (ref 0–1.5)
MODALITY: ABNORMAL
MODALITY: ABNORMAL
MONOCYTES # BLD AUTO: 0.99 10*3/MM3 (ref 0.1–0.9)
MONOCYTES # BLD AUTO: 1.2 10*3/MM3 (ref 0.1–0.9)
MONOCYTES # BLD AUTO: 1.23 10*3/MM3 (ref 0.1–0.9)
MONOCYTES # BLD AUTO: 1.3 10*3/MM3 (ref 0.1–0.9)
MONOCYTES NFR BLD AUTO: 11.7 % (ref 5–12)
MONOCYTES NFR BLD AUTO: 11.9 % (ref 5–12)
MONOCYTES NFR BLD AUTO: 12.7 % (ref 5–12)
MONOCYTES NFR BLD AUTO: 8.3 % (ref 5–12)
NEUTROPHILS NFR BLD AUTO: 6.63 10*3/MM3 (ref 1.7–7)
NEUTROPHILS NFR BLD AUTO: 6.72 10*3/MM3 (ref 1.7–7)
NEUTROPHILS NFR BLD AUTO: 64.8 % (ref 42.7–76)
NEUTROPHILS NFR BLD AUTO: 65 % (ref 42.7–76)
NEUTROPHILS NFR BLD AUTO: 7.54 10*3/MM3 (ref 1.7–7)
NEUTROPHILS NFR BLD AUTO: 73.3 % (ref 42.7–76)
NEUTROPHILS NFR BLD AUTO: 80.8 % (ref 42.7–76)
NEUTROPHILS NFR BLD AUTO: 9.59 10*3/MM3 (ref 1.7–7)
NITRITE UR QL STRIP: NEGATIVE
NOTE: ABNORMAL
NOTE: ABNORMAL
NRBC BLD AUTO-RTO: 0 /100 WBC (ref 0–0.2)
NT-PROBNP SERPL-MCNC: 6908 PG/ML (ref 0–1800)
NT-PROBNP SERPL-MCNC: 9350 PG/ML (ref 0–1800)
OXYHGB MFR BLDV: 94.2 % (ref 94–99)
OXYHGB MFR BLDV: 95.3 % (ref 94–99)
PAW @ PEAK INSP FLOW SETTING VENT: 0 CMH2O
PAW @ PEAK INSP FLOW SETTING VENT: 0 CMH2O
PCO2 BLDA: 40.4 MM HG (ref 35–45)
PCO2 BLDA: 43.6 MM HG (ref 35–45)
PCO2 TEMP ADJ BLD: 40.4 MM HG (ref 35–48)
PCO2 TEMP ADJ BLD: 43.6 MM HG (ref 35–48)
PH BLDA: 7.45 PH UNITS (ref 7.35–7.45)
PH BLDA: 7.45 PH UNITS (ref 7.35–7.45)
PH UR STRIP.AUTO: <=5 [PH] (ref 5–8)
PH, TEMP CORRECTED: 7.45 PH UNITS
PH, TEMP CORRECTED: 7.45 PH UNITS
PLATELET # BLD AUTO: 195 10*3/MM3 (ref 140–450)
PLATELET # BLD AUTO: 204 10*3/MM3 (ref 140–450)
PLATELET # BLD AUTO: 209 10*3/MM3 (ref 140–450)
PLATELET # BLD AUTO: 213 10*3/MM3 (ref 140–450)
PLATELET # BLD AUTO: 216 10*3/MM3 (ref 140–450)
PLATELET # BLD AUTO: 220 10*3/MM3 (ref 140–450)
PLATELET # BLD AUTO: 227 10*3/MM3 (ref 140–450)
PLATELET # BLD AUTO: 230 10*3/MM3 (ref 140–450)
PMV BLD AUTO: 10.1 FL (ref 6–12)
PMV BLD AUTO: 10.2 FL (ref 6–12)
PMV BLD AUTO: 10.3 FL (ref 6–12)
PMV BLD AUTO: 10.5 FL (ref 6–12)
PMV BLD AUTO: 10.6 FL (ref 6–12)
PMV BLD AUTO: 10.7 FL (ref 6–12)
PMV BLD AUTO: 10.8 FL (ref 6–12)
PMV BLD AUTO: 10.9 FL (ref 6–12)
PO2 BLDA: 72.1 MM HG (ref 83–108)
PO2 BLDA: 80.5 MM HG (ref 83–108)
PO2 TEMP ADJ BLD: 72.1 MM HG (ref 83–108)
PO2 TEMP ADJ BLD: 80.5 MM HG (ref 83–108)
POTASSIUM SERPL-SCNC: 3.8 MMOL/L (ref 3.5–5.2)
POTASSIUM SERPL-SCNC: 3.9 MMOL/L (ref 3.5–5.2)
POTASSIUM SERPL-SCNC: 4 MMOL/L (ref 3.5–5.2)
POTASSIUM SERPL-SCNC: 4 MMOL/L (ref 3.5–5.2)
POTASSIUM SERPL-SCNC: 4.4 MMOL/L (ref 3.5–5.2)
POTASSIUM SERPL-SCNC: 4.4 MMOL/L (ref 3.5–5.2)
POTASSIUM SERPL-SCNC: 4.6 MMOL/L (ref 3.5–5.2)
POTASSIUM SERPL-SCNC: 4.6 MMOL/L (ref 3.5–5.2)
PROCALCITONIN SERPL-MCNC: 0.07 NG/ML (ref 0–0.25)
PROT SERPL-MCNC: 7.9 G/DL (ref 6–8.5)
PROT UR QL STRIP: NEGATIVE
QT INTERVAL: 422 MS
QT INTERVAL: 458 MS
QT INTERVAL: 474 MS
QT INTERVAL: 476 MS
QTC INTERVAL: 442 MS
QTC INTERVAL: 472 MS
QTC INTERVAL: 474 MS
QTC INTERVAL: 507 MS
RBC # BLD AUTO: 4.81 10*6/MM3 (ref 4.14–5.8)
RBC # BLD AUTO: 4.83 10*6/MM3 (ref 4.14–5.8)
RBC # BLD AUTO: 4.99 10*6/MM3 (ref 4.14–5.8)
RBC # BLD AUTO: 5.01 10*6/MM3 (ref 4.14–5.8)
RBC # BLD AUTO: 5.01 10*6/MM3 (ref 4.14–5.8)
RBC # BLD AUTO: 5.05 10*6/MM3 (ref 4.14–5.8)
RBC # BLD AUTO: 5.21 10*6/MM3 (ref 4.14–5.8)
RBC # BLD AUTO: 5.37 10*6/MM3 (ref 4.14–5.8)
RBC # UR STRIP: NORMAL /HPF
REF LAB TEST METHOD: NORMAL
RIGHT ARM BP: NORMAL MMHG
RSV RNA NPH QL NAA+NON-PROBE: NOT DETECTED
SARS-COV-2 RNA RESP QL NAA+PROBE: NOT DETECTED
SODIUM SERPL-SCNC: 135 MMOL/L (ref 136–145)
SODIUM SERPL-SCNC: 139 MMOL/L (ref 136–145)
SODIUM SERPL-SCNC: 139 MMOL/L (ref 136–145)
SODIUM SERPL-SCNC: 140 MMOL/L (ref 136–145)
SODIUM SERPL-SCNC: 140 MMOL/L (ref 136–145)
SODIUM SERPL-SCNC: 141 MMOL/L (ref 136–145)
SODIUM SERPL-SCNC: 141 MMOL/L (ref 136–145)
SODIUM SERPL-SCNC: 143 MMOL/L (ref 136–145)
SP GR UR STRIP: 1.04 (ref 1–1.03)
SQUAMOUS #/AREA URNS HPF: NORMAL /HPF
STRESS TARGET HR: 108 BPM
TOTAL RATE: 0 BREATHS/MINUTE
TOTAL RATE: 0 BREATHS/MINUTE
TRIGL SERPL-MCNC: 100 MG/DL (ref 0–150)
TROPONIN T SERPL-MCNC: <0.01 NG/ML (ref 0–0.03)
UROBILINOGEN UR QL STRIP: ABNORMAL
VLDLC SERPL-MCNC: 19 MG/DL (ref 5–40)
WBC # UR STRIP: NORMAL /HPF
WBC NRBC COR # BLD: 10.22 10*3/MM3 (ref 3.4–10.8)
WBC NRBC COR # BLD: 10.27 10*3/MM3 (ref 3.4–10.8)
WBC NRBC COR # BLD: 10.33 10*3/MM3 (ref 3.4–10.8)
WBC NRBC COR # BLD: 10.77 10*3/MM3 (ref 3.4–10.8)
WBC NRBC COR # BLD: 10.8 10*3/MM3 (ref 3.4–10.8)
WBC NRBC COR # BLD: 11.01 10*3/MM3 (ref 3.4–10.8)
WBC NRBC COR # BLD: 11.88 10*3/MM3 (ref 3.4–10.8)
WBC NRBC COR # BLD: 12.62 10*3/MM3 (ref 3.4–10.8)
WHOLE BLOOD HOLD SPECIMEN: NORMAL
WHOLE BLOOD HOLD SPECIMEN: NORMAL

## 2022-01-01 PROCEDURE — 97110 THERAPEUTIC EXERCISES: CPT

## 2022-01-01 PROCEDURE — 25010000002 MORPHINE PER 10 MG: Performed by: NURSE PRACTITIONER

## 2022-01-01 PROCEDURE — 85027 COMPLETE CBC AUTOMATED: CPT | Performed by: NURSE PRACTITIONER

## 2022-01-01 PROCEDURE — 83880 ASSAY OF NATRIURETIC PEPTIDE: CPT | Performed by: INTERNAL MEDICINE

## 2022-01-01 PROCEDURE — 94660 CPAP INITIATION&MGMT: CPT

## 2022-01-01 PROCEDURE — 25010000002 LORAZEPAM PER 2 MG: Performed by: NURSE PRACTITIONER

## 2022-01-01 PROCEDURE — 97168 OT RE-EVAL EST PLAN CARE: CPT

## 2022-01-01 PROCEDURE — 84484 ASSAY OF TROPONIN QUANT: CPT

## 2022-01-01 PROCEDURE — 95819 EEG AWAKE AND ASLEEP: CPT

## 2022-01-01 PROCEDURE — 99232 SBSQ HOSP IP/OBS MODERATE 35: CPT | Performed by: INTERNAL MEDICINE

## 2022-01-01 PROCEDURE — 25010000002 FUROSEMIDE PER 20 MG: Performed by: INTERNAL MEDICINE

## 2022-01-01 PROCEDURE — 99284 EMERGENCY DEPT VISIT MOD MDM: CPT

## 2022-01-01 PROCEDURE — 93325 DOPPLER ECHO COLOR FLOW MAPG: CPT | Performed by: INTERNAL MEDICINE

## 2022-01-01 PROCEDURE — 25010000002 LEVETRIRACETAM PER 10 MG: Performed by: NURSE PRACTITIONER

## 2022-01-01 PROCEDURE — 94799 UNLISTED PULMONARY SVC/PX: CPT

## 2022-01-01 PROCEDURE — 83050 HGB METHEMOGLOBIN QUAN: CPT

## 2022-01-01 PROCEDURE — 97530 THERAPEUTIC ACTIVITIES: CPT

## 2022-01-01 PROCEDURE — 99233 SBSQ HOSP IP/OBS HIGH 50: CPT | Performed by: INTERNAL MEDICINE

## 2022-01-01 PROCEDURE — 99222 1ST HOSP IP/OBS MODERATE 55: CPT | Performed by: INTERNAL MEDICINE

## 2022-01-01 PROCEDURE — 93312 ECHO TRANSESOPHAGEAL: CPT

## 2022-01-01 PROCEDURE — 92610 EVALUATE SWALLOWING FUNCTION: CPT

## 2022-01-01 PROCEDURE — 83036 HEMOGLOBIN GLYCOSYLATED A1C: CPT | Performed by: NURSE PRACTITIONER

## 2022-01-01 PROCEDURE — 97164 PT RE-EVAL EST PLAN CARE: CPT

## 2022-01-01 PROCEDURE — 99152 MOD SED SAME PHYS/QHP 5/>YRS: CPT

## 2022-01-01 PROCEDURE — 99239 HOSP IP/OBS DSCHRG MGMT >30: CPT | Performed by: NURSE PRACTITIONER

## 2022-01-01 PROCEDURE — 85025 COMPLETE CBC W/AUTO DIFF WBC: CPT | Performed by: INTERNAL MEDICINE

## 2022-01-01 PROCEDURE — 93010 ELECTROCARDIOGRAM REPORT: CPT | Performed by: INTERNAL MEDICINE

## 2022-01-01 PROCEDURE — 82962 GLUCOSE BLOOD TEST: CPT

## 2022-01-01 PROCEDURE — 97116 GAIT TRAINING THERAPY: CPT

## 2022-01-01 PROCEDURE — 83880 ASSAY OF NATRIURETIC PEPTIDE: CPT

## 2022-01-01 PROCEDURE — 82805 BLOOD GASES W/O2 SATURATION: CPT

## 2022-01-01 PROCEDURE — 85027 COMPLETE CBC AUTOMATED: CPT | Performed by: INTERNAL MEDICINE

## 2022-01-01 PROCEDURE — 83735 ASSAY OF MAGNESIUM: CPT | Performed by: INTERNAL MEDICINE

## 2022-01-01 PROCEDURE — 71250 CT THORAX DX C-: CPT

## 2022-01-01 PROCEDURE — 25010000002 KETOROLAC TROMETHAMINE PER 15 MG: Performed by: NURSE PRACTITIONER

## 2022-01-01 PROCEDURE — 25010000002 HEPARIN (PORCINE) PER 1000 UNITS: Performed by: INTERNAL MEDICINE

## 2022-01-01 PROCEDURE — 93312 ECHO TRANSESOPHAGEAL: CPT | Performed by: INTERNAL MEDICINE

## 2022-01-01 PROCEDURE — 97161 PT EVAL LOW COMPLEX 20 MIN: CPT

## 2022-01-01 PROCEDURE — 80048 BASIC METABOLIC PNL TOTAL CA: CPT | Performed by: NURSE PRACTITIONER

## 2022-01-01 PROCEDURE — 93458 L HRT ARTERY/VENTRICLE ANGIO: CPT | Performed by: INTERNAL MEDICINE

## 2022-01-01 PROCEDURE — 36415 COLL VENOUS BLD VENIPUNCTURE: CPT

## 2022-01-01 PROCEDURE — 25010000002 FUROSEMIDE PER 20 MG: Performed by: STUDENT IN AN ORGANIZED HEALTH CARE EDUCATION/TRAINING PROGRAM

## 2022-01-01 PROCEDURE — 25010000002 FUROSEMIDE PER 20 MG: Performed by: NURSE PRACTITIONER

## 2022-01-01 PROCEDURE — 25010000002 MIDAZOLAM PER 1 MG: Performed by: INTERNAL MEDICINE

## 2022-01-01 PROCEDURE — 71275 CT ANGIOGRAPHY CHEST: CPT

## 2022-01-01 PROCEDURE — 99153 MOD SED SAME PHYS/QHP EA: CPT

## 2022-01-01 PROCEDURE — 93321 DOPPLER ECHO F-UP/LMTD STD: CPT | Performed by: INTERNAL MEDICINE

## 2022-01-01 PROCEDURE — 99222 1ST HOSP IP/OBS MODERATE 55: CPT | Performed by: NURSE PRACTITIONER

## 2022-01-01 PROCEDURE — 99231 SBSQ HOSP IP/OBS SF/LOW 25: CPT | Performed by: PHYSICIAN ASSISTANT

## 2022-01-01 PROCEDURE — 80048 BASIC METABOLIC PNL TOTAL CA: CPT | Performed by: INTERNAL MEDICINE

## 2022-01-01 PROCEDURE — 93306 TTE W/DOPPLER COMPLETE: CPT | Performed by: INTERNAL MEDICINE

## 2022-01-01 PROCEDURE — 93005 ELECTROCARDIOGRAM TRACING: CPT | Performed by: INTERNAL MEDICINE

## 2022-01-01 PROCEDURE — 80061 LIPID PANEL: CPT | Performed by: NURSE PRACTITIONER

## 2022-01-01 PROCEDURE — 93880 EXTRACRANIAL BILAT STUDY: CPT | Performed by: INTERNAL MEDICINE

## 2022-01-01 PROCEDURE — 25010000002 HALOPERIDOL LACTATE PER 5 MG: Performed by: NURSE PRACTITIONER

## 2022-01-01 PROCEDURE — 70496 CT ANGIOGRAPHY HEAD: CPT

## 2022-01-01 PROCEDURE — 93005 ELECTROCARDIOGRAM TRACING: CPT | Performed by: NURSE PRACTITIONER

## 2022-01-01 PROCEDURE — C1894 INTRO/SHEATH, NON-LASER: HCPCS | Performed by: INTERNAL MEDICINE

## 2022-01-01 PROCEDURE — 99232 SBSQ HOSP IP/OBS MODERATE 35: CPT | Performed by: NURSE PRACTITIONER

## 2022-01-01 PROCEDURE — C1769 GUIDE WIRE: HCPCS | Performed by: INTERNAL MEDICINE

## 2022-01-01 PROCEDURE — 99223 1ST HOSP IP/OBS HIGH 75: CPT | Performed by: INTERNAL MEDICINE

## 2022-01-01 PROCEDURE — 25010000002 SULFUR HEXAFLUORIDE MICROSPH 60.7-25 MG RECONSTITUTED SUSPENSION: Performed by: INTERNAL MEDICINE

## 2022-01-01 PROCEDURE — 93325 DOPPLER ECHO COLOR FLOW MAPG: CPT

## 2022-01-01 PROCEDURE — 99233 SBSQ HOSP IP/OBS HIGH 50: CPT | Performed by: NURSE PRACTITIONER

## 2022-01-01 PROCEDURE — 97165 OT EVAL LOW COMPLEX 30 MIN: CPT

## 2022-01-01 PROCEDURE — 93454 CORONARY ARTERY ANGIO S&I: CPT | Performed by: INTERNAL MEDICINE

## 2022-01-01 PROCEDURE — 84145 PROCALCITONIN (PCT): CPT | Performed by: INTERNAL MEDICINE

## 2022-01-01 PROCEDURE — 81001 URINALYSIS AUTO W/SCOPE: CPT | Performed by: INTERNAL MEDICINE

## 2022-01-01 PROCEDURE — 0 IOPAMIDOL PER 1 ML: Performed by: STUDENT IN AN ORGANIZED HEALTH CARE EDUCATION/TRAINING PROGRAM

## 2022-01-01 PROCEDURE — 82375 ASSAY CARBOXYHB QUANT: CPT

## 2022-01-01 PROCEDURE — 87637 SARSCOV2&INF A&B&RSV AMP PRB: CPT | Performed by: INTERNAL MEDICINE

## 2022-01-01 PROCEDURE — 99233 SBSQ HOSP IP/OBS HIGH 50: CPT | Performed by: STUDENT IN AN ORGANIZED HEALTH CARE EDUCATION/TRAINING PROGRAM

## 2022-01-01 PROCEDURE — 93306 TTE W/DOPPLER COMPLETE: CPT

## 2022-01-01 PROCEDURE — B24BZZ4 ULTRASONOGRAPHY OF HEART WITH AORTA, TRANSESOPHAGEAL: ICD-10-PCS | Performed by: INTERNAL MEDICINE

## 2022-01-01 PROCEDURE — 70450 CT HEAD/BRAIN W/O DYE: CPT

## 2022-01-01 PROCEDURE — 25010000002 FENTANYL CITRATE (PF) 50 MCG/ML SOLUTION: Performed by: INTERNAL MEDICINE

## 2022-01-01 PROCEDURE — 93880 EXTRACRANIAL BILAT STUDY: CPT

## 2022-01-01 PROCEDURE — 93005 ELECTROCARDIOGRAM TRACING: CPT | Performed by: STUDENT IN AN ORGANIZED HEALTH CARE EDUCATION/TRAINING PROGRAM

## 2022-01-01 PROCEDURE — 71045 X-RAY EXAM CHEST 1 VIEW: CPT

## 2022-01-01 PROCEDURE — 99232 SBSQ HOSP IP/OBS MODERATE 35: CPT | Performed by: STUDENT IN AN ORGANIZED HEALTH CARE EDUCATION/TRAINING PROGRAM

## 2022-01-01 PROCEDURE — 93321 DOPPLER ECHO F-UP/LMTD STD: CPT

## 2022-01-01 PROCEDURE — C1887 CATHETER, GUIDING: HCPCS | Performed by: INTERNAL MEDICINE

## 2022-01-01 PROCEDURE — 85025 COMPLETE CBC W/AUTO DIFF WBC: CPT

## 2022-01-01 PROCEDURE — B2111ZZ FLUOROSCOPY OF MULTIPLE CORONARY ARTERIES USING LOW OSMOLAR CONTRAST: ICD-10-PCS | Performed by: INTERNAL MEDICINE

## 2022-01-01 PROCEDURE — B2151ZZ FLUOROSCOPY OF LEFT HEART USING LOW OSMOLAR CONTRAST: ICD-10-PCS | Performed by: INTERNAL MEDICINE

## 2022-01-01 PROCEDURE — 74174 CTA ABD&PLVS W/CONTRAST: CPT

## 2022-01-01 PROCEDURE — 70551 MRI BRAIN STEM W/O DYE: CPT

## 2022-01-01 PROCEDURE — 0 IOPAMIDOL PER 1 ML: Performed by: INTERNAL MEDICINE

## 2022-01-01 PROCEDURE — 25010000002 FUROSEMIDE PER 20 MG: Performed by: HOSPITALIST

## 2022-01-01 PROCEDURE — 4A03X5D MEASUREMENT OF ARTERIAL FLOW, INTRACRANIAL, EXTERNAL APPROACH: ICD-10-PCS | Performed by: RADIOLOGY

## 2022-01-01 PROCEDURE — 36600 WITHDRAWAL OF ARTERIAL BLOOD: CPT

## 2022-01-01 PROCEDURE — 93005 ELECTROCARDIOGRAM TRACING: CPT

## 2022-01-01 PROCEDURE — 80053 COMPREHEN METABOLIC PANEL: CPT

## 2022-01-01 PROCEDURE — 4A023N7 MEASUREMENT OF CARDIAC SAMPLING AND PRESSURE, LEFT HEART, PERCUTANEOUS APPROACH: ICD-10-PCS | Performed by: INTERNAL MEDICINE

## 2022-01-01 PROCEDURE — 70498 CT ANGIOGRAPHY NECK: CPT

## 2022-01-01 PROCEDURE — 97535 SELF CARE MNGMENT TRAINING: CPT

## 2022-01-01 RX ORDER — SODIUM CHLORIDE 0.9 % (FLUSH) 0.9 %
10 SYRINGE (ML) INJECTION AS NEEDED
Status: CANCELLED | OUTPATIENT
Start: 2022-01-01

## 2022-01-01 RX ORDER — MORPHINE SULFATE 2 MG/ML
2 INJECTION, SOLUTION INTRAMUSCULAR; INTRAVENOUS
Status: DISCONTINUED | OUTPATIENT
Start: 2022-01-01 | End: 2022-01-01 | Stop reason: HOSPADM

## 2022-01-01 RX ORDER — FENTANYL CITRATE 50 UG/ML
INJECTION, SOLUTION INTRAMUSCULAR; INTRAVENOUS
Status: COMPLETED | OUTPATIENT
Start: 2022-01-01 | End: 2022-01-01

## 2022-01-01 RX ORDER — LORAZEPAM 2 MG/ML
0.5 INJECTION INTRAMUSCULAR EVERY 6 HOURS
Status: DISCONTINUED | OUTPATIENT
Start: 2022-01-01 | End: 2022-01-01

## 2022-01-01 RX ORDER — LORAZEPAM 2 MG/ML
0.5 INJECTION INTRAMUSCULAR EVERY 4 HOURS PRN
Status: CANCELLED | OUTPATIENT
Start: 2022-01-01 | End: 2022-03-21

## 2022-01-01 RX ORDER — POLYVINYL ALCOHOL 14 MG/ML
2 SOLUTION/ DROPS OPHTHALMIC 2 TIMES DAILY
Status: DISCONTINUED | OUTPATIENT
Start: 2022-01-01 | End: 2022-01-01 | Stop reason: HOSPADM

## 2022-01-01 RX ORDER — LORAZEPAM 2 MG/ML
0.25 INJECTION INTRAMUSCULAR EVERY 4 HOURS PRN
Status: CANCELLED | OUTPATIENT
Start: 2022-01-01 | End: 2022-03-21

## 2022-01-01 RX ORDER — ATORVASTATIN CALCIUM 40 MG/1
80 TABLET, FILM COATED ORAL NIGHTLY
Status: DISCONTINUED | OUTPATIENT
Start: 2022-01-01 | End: 2022-01-01 | Stop reason: HOSPADM

## 2022-01-01 RX ORDER — ASPIRIN 300 MG/1
300 SUPPOSITORY RECTAL DAILY
Status: DISCONTINUED | OUTPATIENT
Start: 2022-01-01 | End: 2022-01-01 | Stop reason: HOSPADM

## 2022-01-01 RX ORDER — BISACODYL 10 MG
10 SUPPOSITORY, RECTAL RECTAL DAILY PRN
Status: CANCELLED | OUTPATIENT
Start: 2022-01-01

## 2022-01-01 RX ORDER — ONDANSETRON 2 MG/ML
4 INJECTION INTRAMUSCULAR; INTRAVENOUS EVERY 6 HOURS PRN
Status: DISCONTINUED | OUTPATIENT
Start: 2022-01-01 | End: 2022-01-01 | Stop reason: HOSPADM

## 2022-01-01 RX ORDER — MORPHINE SULFATE 2 MG/ML
2 INJECTION, SOLUTION INTRAMUSCULAR; INTRAVENOUS
Status: CANCELLED | OUTPATIENT
Start: 2022-01-01

## 2022-01-01 RX ORDER — MORPHINE SULFATE 2 MG/ML
2 INJECTION, SOLUTION INTRAMUSCULAR; INTRAVENOUS EVERY 6 HOURS
Status: DISCONTINUED | OUTPATIENT
Start: 2022-01-01 | End: 2022-01-01

## 2022-01-01 RX ORDER — BISACODYL 5 MG/1
10 TABLET, DELAYED RELEASE ORAL DAILY PRN
Status: CANCELLED | OUTPATIENT
Start: 2022-01-01

## 2022-01-01 RX ORDER — SPIRONOLACTONE 25 MG/1
25 TABLET ORAL DAILY
Status: DISCONTINUED | OUTPATIENT
Start: 2022-01-01 | End: 2022-01-01 | Stop reason: HOSPADM

## 2022-01-01 RX ORDER — HALOPERIDOL 5 MG/ML
1 INJECTION INTRAMUSCULAR EVERY 4 HOURS PRN
Status: DISCONTINUED | OUTPATIENT
Start: 2022-01-01 | End: 2022-01-01 | Stop reason: HOSPADM

## 2022-01-01 RX ORDER — MORPHINE SULFATE 4 MG/ML
1 INJECTION, SOLUTION INTRAMUSCULAR; INTRAVENOUS EVERY 4 HOURS PRN
Status: ACTIVE | OUTPATIENT
Start: 2022-01-01 | End: 2022-01-01

## 2022-01-01 RX ORDER — LEVETIRACETAM 500 MG/1
250 TABLET ORAL 2 TIMES DAILY
Status: DISCONTINUED | OUTPATIENT
Start: 2022-01-01 | End: 2022-01-01

## 2022-01-01 RX ORDER — NALOXONE HYDROCHLORIDE 0.4 MG/ML
INJECTION, SOLUTION INTRAMUSCULAR; INTRAVENOUS; SUBCUTANEOUS
Status: DISCONTINUED
Start: 2022-01-01 | End: 2022-01-01 | Stop reason: WASHOUT

## 2022-01-01 RX ORDER — LIDOCAINE HYDROCHLORIDE 10 MG/ML
INJECTION, SOLUTION EPIDURAL; INFILTRATION; INTRACAUDAL; PERINEURAL AS NEEDED
Status: DISCONTINUED | OUTPATIENT
Start: 2022-01-01 | End: 2022-01-01 | Stop reason: HOSPADM

## 2022-01-01 RX ORDER — AMOXICILLIN 250 MG
2 CAPSULE ORAL 2 TIMES DAILY
Status: CANCELLED | OUTPATIENT
Start: 2022-01-01

## 2022-01-01 RX ORDER — CARVEDILOL 3.12 MG/1
3.12 TABLET ORAL 2 TIMES DAILY WITH MEALS
Status: CANCELLED | OUTPATIENT
Start: 2022-01-01

## 2022-01-01 RX ORDER — HALOPERIDOL 5 MG/ML
2 INJECTION INTRAMUSCULAR EVERY 8 HOURS
Status: DISCONTINUED | OUTPATIENT
Start: 2022-01-01 | End: 2022-01-01 | Stop reason: HOSPADM

## 2022-01-01 RX ORDER — SPIRONOLACTONE 25 MG/1
25 TABLET ORAL DAILY
Status: CANCELLED | OUTPATIENT
Start: 2022-01-01

## 2022-01-01 RX ORDER — MORPHINE SULFATE 4 MG/ML
4 INJECTION, SOLUTION INTRAMUSCULAR; INTRAVENOUS
Status: DISCONTINUED | OUTPATIENT
Start: 2022-01-01 | End: 2022-01-01 | Stop reason: HOSPADM

## 2022-01-01 RX ORDER — SODIUM CHLORIDE 0.9 % (FLUSH) 0.9 %
10 SYRINGE (ML) INJECTION AS NEEDED
Status: DISCONTINUED | OUTPATIENT
Start: 2022-01-01 | End: 2022-01-01 | Stop reason: HOSPADM

## 2022-01-01 RX ORDER — SODIUM CHLORIDE 0.9 % (FLUSH) 0.9 %
10 SYRINGE (ML) INJECTION EVERY 12 HOURS SCHEDULED
Status: CANCELLED | OUTPATIENT
Start: 2022-01-01

## 2022-01-01 RX ORDER — ACETAMINOPHEN 650 MG/1
650 SUPPOSITORY RECTAL EVERY 4 HOURS PRN
Status: CANCELLED | OUTPATIENT
Start: 2022-01-01

## 2022-01-01 RX ORDER — FUROSEMIDE 10 MG/ML
20 INJECTION INTRAMUSCULAR; INTRAVENOUS EVERY 8 HOURS
Status: DISCONTINUED | OUTPATIENT
Start: 2022-01-01 | End: 2022-01-01 | Stop reason: HOSPADM

## 2022-01-01 RX ORDER — FUROSEMIDE 10 MG/ML
40 INJECTION INTRAMUSCULAR; INTRAVENOUS EVERY 12 HOURS SCHEDULED
Status: DISCONTINUED | OUTPATIENT
Start: 2022-01-01 | End: 2022-01-01

## 2022-01-01 RX ORDER — ONDANSETRON 2 MG/ML
4 INJECTION INTRAMUSCULAR; INTRAVENOUS EVERY 6 HOURS PRN
Status: CANCELLED | OUTPATIENT
Start: 2022-01-01

## 2022-01-01 RX ORDER — POLYVINYL ALCOHOL 14 MG/ML
2 SOLUTION/ DROPS OPHTHALMIC
Status: DISCONTINUED | OUTPATIENT
Start: 2022-01-01 | End: 2022-01-01 | Stop reason: HOSPADM

## 2022-01-01 RX ORDER — ALPRAZOLAM 0.25 MG/1
0.25 TABLET ORAL 3 TIMES DAILY PRN
Status: ACTIVE | OUTPATIENT
Start: 2022-01-01 | End: 2022-01-01

## 2022-01-01 RX ORDER — AMOXICILLIN 250 MG
2 CAPSULE ORAL 2 TIMES DAILY
Status: DISCONTINUED | OUTPATIENT
Start: 2022-01-01 | End: 2022-01-01 | Stop reason: HOSPADM

## 2022-01-01 RX ORDER — FUROSEMIDE 10 MG/ML
40 INJECTION INTRAMUSCULAR; INTRAVENOUS ONCE
Status: COMPLETED | OUTPATIENT
Start: 2022-01-01 | End: 2022-01-01

## 2022-01-01 RX ORDER — NITROGLYCERIN 20 MG/100ML
5-200 INJECTION INTRAVENOUS
Status: DISCONTINUED | OUTPATIENT
Start: 2022-01-01 | End: 2022-01-01

## 2022-01-01 RX ORDER — FLUMAZENIL 0.1 MG/ML
INJECTION INTRAVENOUS
Status: DISCONTINUED
Start: 2022-01-01 | End: 2022-01-01 | Stop reason: WASHOUT

## 2022-01-01 RX ORDER — LORAZEPAM 2 MG/ML
0.25 INJECTION INTRAMUSCULAR EVERY 8 HOURS
Status: DISCONTINUED | OUTPATIENT
Start: 2022-01-01 | End: 2022-01-01 | Stop reason: HOSPADM

## 2022-01-01 RX ORDER — LORAZEPAM 2 MG/ML
0.5 INJECTION INTRAMUSCULAR EVERY 4 HOURS PRN
Status: DISCONTINUED | OUTPATIENT
Start: 2022-01-01 | End: 2022-01-01

## 2022-01-01 RX ORDER — KETOROLAC TROMETHAMINE 15 MG/ML
15 INJECTION, SOLUTION INTRAMUSCULAR; INTRAVENOUS EVERY 6 HOURS PRN
Status: DISCONTINUED | OUTPATIENT
Start: 2022-01-01 | End: 2022-01-01 | Stop reason: HOSPADM

## 2022-01-01 RX ORDER — NALOXONE HCL 0.4 MG/ML
0.4 VIAL (ML) INJECTION
Status: DISCONTINUED | OUTPATIENT
Start: 2022-01-01 | End: 2022-01-01

## 2022-01-01 RX ORDER — LORAZEPAM 2 MG/ML
2 INJECTION INTRAMUSCULAR
Status: DISCONTINUED | OUTPATIENT
Start: 2022-01-01 | End: 2022-01-01 | Stop reason: HOSPADM

## 2022-01-01 RX ORDER — FUROSEMIDE 20 MG/1
20 TABLET ORAL DAILY
Status: DISCONTINUED | OUTPATIENT
Start: 2022-01-01 | End: 2022-01-01 | Stop reason: HOSPADM

## 2022-01-01 RX ORDER — SCOLOPAMINE TRANSDERMAL SYSTEM 1 MG/1
1 PATCH, EXTENDED RELEASE TRANSDERMAL
Status: DISCONTINUED | OUTPATIENT
Start: 2022-01-01 | End: 2022-01-01 | Stop reason: HOSPADM

## 2022-01-01 RX ORDER — MAGNESIUM SULFATE HEPTAHYDRATE 40 MG/ML
2 INJECTION, SOLUTION INTRAVENOUS AS NEEDED
Status: CANCELLED | OUTPATIENT
Start: 2022-01-01

## 2022-01-01 RX ORDER — MORPHINE SULFATE 2 MG/ML
2 INJECTION, SOLUTION INTRAMUSCULAR; INTRAVENOUS
Status: DISCONTINUED | OUTPATIENT
Start: 2022-01-01 | End: 2022-01-01

## 2022-01-01 RX ORDER — ACETAMINOPHEN 650 MG/1
650 SUPPOSITORY RECTAL EVERY 4 HOURS PRN
Status: DISCONTINUED | OUTPATIENT
Start: 2022-01-01 | End: 2022-01-01 | Stop reason: HOSPADM

## 2022-01-01 RX ORDER — HYDROCODONE BITARTRATE AND ACETAMINOPHEN 7.5; 325 MG/1; MG/1
1 TABLET ORAL EVERY 4 HOURS PRN
Status: DISCONTINUED | OUTPATIENT
Start: 2022-01-01 | End: 2022-01-01

## 2022-01-01 RX ORDER — GLYCOPYRROLATE 0.2 MG/ML
0.2 INJECTION INTRAMUSCULAR; INTRAVENOUS EVERY 6 HOURS PRN
Status: DISCONTINUED | OUTPATIENT
Start: 2022-01-01 | End: 2022-01-01 | Stop reason: HOSPADM

## 2022-01-01 RX ORDER — MORPHINE SULFATE 2 MG/ML
1 INJECTION, SOLUTION INTRAMUSCULAR; INTRAVENOUS EVERY 6 HOURS
Status: DISCONTINUED | OUTPATIENT
Start: 2022-01-01 | End: 2022-01-01 | Stop reason: HOSPADM

## 2022-01-01 RX ORDER — SODIUM CHLORIDE 0.9 % (FLUSH) 0.9 %
10 SYRINGE (ML) INJECTION EVERY 12 HOURS SCHEDULED
Status: DISCONTINUED | OUTPATIENT
Start: 2022-01-01 | End: 2022-01-01 | Stop reason: HOSPADM

## 2022-01-01 RX ORDER — BISACODYL 10 MG
10 SUPPOSITORY, RECTAL RECTAL NIGHTLY
Status: COMPLETED | OUTPATIENT
Start: 2022-01-01 | End: 2022-01-01

## 2022-01-01 RX ORDER — MAGNESIUM SULFATE HEPTAHYDRATE 40 MG/ML
4 INJECTION, SOLUTION INTRAVENOUS AS NEEDED
Status: DISCONTINUED | OUTPATIENT
Start: 2022-01-01 | End: 2022-01-01 | Stop reason: HOSPADM

## 2022-01-01 RX ORDER — FUROSEMIDE 40 MG/1
40 TABLET ORAL ONCE
Status: COMPLETED | OUTPATIENT
Start: 2022-01-01 | End: 2022-01-01

## 2022-01-01 RX ORDER — SODIUM CHLORIDE 9 MG/ML
250 INJECTION, SOLUTION INTRAVENOUS ONCE AS NEEDED
Status: CANCELLED | OUTPATIENT
Start: 2022-01-01

## 2022-01-01 RX ORDER — ASPIRIN 81 MG/1
81 TABLET, CHEWABLE ORAL DAILY
Status: DISCONTINUED | OUTPATIENT
Start: 2022-01-01 | End: 2022-01-01 | Stop reason: HOSPADM

## 2022-01-01 RX ORDER — FUROSEMIDE 10 MG/ML
20 INJECTION INTRAMUSCULAR; INTRAVENOUS EVERY 6 HOURS PRN
Status: DISCONTINUED | OUTPATIENT
Start: 2022-01-01 | End: 2022-01-01 | Stop reason: HOSPADM

## 2022-01-01 RX ORDER — MORPHINE SULFATE 2 MG/ML
1 INJECTION, SOLUTION INTRAMUSCULAR; INTRAVENOUS
Status: DISCONTINUED | OUTPATIENT
Start: 2022-01-01 | End: 2022-01-01 | Stop reason: HOSPADM

## 2022-01-01 RX ORDER — ATORVASTATIN CALCIUM 40 MG/1
80 TABLET, FILM COATED ORAL NIGHTLY
Status: CANCELLED | OUTPATIENT
Start: 2022-01-01

## 2022-01-01 RX ORDER — BISACODYL 5 MG/1
10 TABLET, DELAYED RELEASE ORAL DAILY PRN
Status: DISCONTINUED | OUTPATIENT
Start: 2022-01-01 | End: 2022-01-01 | Stop reason: HOSPADM

## 2022-01-01 RX ORDER — LEVETIRACETAM 250 MG/1
250 TABLET ORAL 2 TIMES DAILY
Status: DISCONTINUED | OUTPATIENT
Start: 2022-01-01 | End: 2022-01-01 | Stop reason: HOSPADM

## 2022-01-01 RX ORDER — FUROSEMIDE 10 MG/ML
20 INJECTION INTRAMUSCULAR; INTRAVENOUS ONCE
Status: COMPLETED | OUTPATIENT
Start: 2022-01-01 | End: 2022-01-01

## 2022-01-01 RX ORDER — LORAZEPAM 2 MG/ML
0.25 INJECTION INTRAMUSCULAR EVERY 8 HOURS
Status: CANCELLED | OUTPATIENT
Start: 2022-01-01 | End: 2022-03-21

## 2022-01-01 RX ORDER — LORAZEPAM 2 MG/ML
0.25 INJECTION INTRAMUSCULAR EVERY 4 HOURS PRN
Status: DISCONTINUED | OUTPATIENT
Start: 2022-01-01 | End: 2022-01-01

## 2022-01-01 RX ORDER — FUROSEMIDE 10 MG/ML
20 INJECTION INTRAMUSCULAR; INTRAVENOUS EVERY 8 HOURS
Status: DISCONTINUED | OUTPATIENT
Start: 2022-01-01 | End: 2022-01-01

## 2022-01-01 RX ORDER — CARVEDILOL 3.12 MG/1
3.12 TABLET ORAL 2 TIMES DAILY WITH MEALS
Status: DISCONTINUED | OUTPATIENT
Start: 2022-01-01 | End: 2022-01-01 | Stop reason: HOSPADM

## 2022-01-01 RX ORDER — SODIUM CHLORIDE 9 MG/ML
250 INJECTION, SOLUTION INTRAVENOUS ONCE AS NEEDED
Status: DISCONTINUED | OUTPATIENT
Start: 2022-01-01 | End: 2022-01-01 | Stop reason: HOSPADM

## 2022-01-01 RX ORDER — MAGNESIUM SULFATE HEPTAHYDRATE 40 MG/ML
2 INJECTION, SOLUTION INTRAVENOUS AS NEEDED
Status: DISCONTINUED | OUTPATIENT
Start: 2022-01-01 | End: 2022-01-01 | Stop reason: HOSPADM

## 2022-01-01 RX ORDER — FUROSEMIDE 10 MG/ML
20 INJECTION INTRAMUSCULAR; INTRAVENOUS EVERY 6 HOURS
Status: DISCONTINUED | OUTPATIENT
Start: 2022-01-01 | End: 2022-01-01

## 2022-01-01 RX ORDER — LORAZEPAM 2 MG/ML
0.25 INJECTION INTRAMUSCULAR EVERY 4 HOURS PRN
Status: DISCONTINUED | OUTPATIENT
Start: 2022-01-01 | End: 2022-01-01 | Stop reason: HOSPADM

## 2022-01-01 RX ORDER — SODIUM CHLORIDE 9 MG/ML
100 INJECTION, SOLUTION INTRAVENOUS CONTINUOUS
Status: ACTIVE | OUTPATIENT
Start: 2022-01-01 | End: 2022-01-01

## 2022-01-01 RX ORDER — LORAZEPAM 2 MG/ML
0.5 INJECTION INTRAMUSCULAR EVERY 4 HOURS PRN
Status: DISCONTINUED | OUTPATIENT
Start: 2022-01-01 | End: 2022-01-01 | Stop reason: HOSPADM

## 2022-01-01 RX ORDER — BISACODYL 10 MG
10 SUPPOSITORY, RECTAL RECTAL DAILY PRN
Status: DISCONTINUED | OUTPATIENT
Start: 2022-01-01 | End: 2022-01-01 | Stop reason: HOSPADM

## 2022-01-01 RX ORDER — LORAZEPAM 2 MG/ML
0.5 INJECTION INTRAMUSCULAR EVERY 4 HOURS
Status: DISCONTINUED | OUTPATIENT
Start: 2022-01-01 | End: 2022-01-01

## 2022-01-01 RX ORDER — FENTANYL CITRATE 50 UG/ML
INJECTION, SOLUTION INTRAMUSCULAR; INTRAVENOUS
Status: DISCONTINUED
Start: 2022-01-01 | End: 2022-01-01 | Stop reason: HOSPADM

## 2022-01-01 RX ORDER — MAGNESIUM SULFATE 1 G/100ML
1 INJECTION INTRAVENOUS AS NEEDED
Status: DISCONTINUED | OUTPATIENT
Start: 2022-01-01 | End: 2022-01-01 | Stop reason: HOSPADM

## 2022-01-01 RX ORDER — LORAZEPAM 2 MG/ML
0.25 INJECTION INTRAMUSCULAR ONCE
Status: DISCONTINUED | OUTPATIENT
Start: 2022-01-01 | End: 2022-01-01

## 2022-01-01 RX ORDER — MAGNESIUM SULFATE 1 G/100ML
1 INJECTION INTRAVENOUS AS NEEDED
Status: CANCELLED | OUTPATIENT
Start: 2022-01-01

## 2022-01-01 RX ORDER — MORPHINE SULFATE 2 MG/ML
1 INJECTION, SOLUTION INTRAMUSCULAR; INTRAVENOUS
Status: CANCELLED | OUTPATIENT
Start: 2022-01-01

## 2022-01-01 RX ORDER — FUROSEMIDE 10 MG/ML
80 INJECTION INTRAMUSCULAR; INTRAVENOUS ONCE
Status: COMPLETED | OUTPATIENT
Start: 2022-01-01 | End: 2022-01-01

## 2022-01-01 RX ORDER — MORPHINE SULFATE 4 MG/ML
4 INJECTION, SOLUTION INTRAMUSCULAR; INTRAVENOUS EVERY 4 HOURS
Status: DISCONTINUED | OUTPATIENT
Start: 2022-01-01 | End: 2022-01-01 | Stop reason: HOSPADM

## 2022-01-01 RX ORDER — MAGNESIUM SULFATE HEPTAHYDRATE 40 MG/ML
4 INJECTION, SOLUTION INTRAVENOUS AS NEEDED
Status: CANCELLED | OUTPATIENT
Start: 2022-01-01

## 2022-01-01 RX ORDER — ACETAMINOPHEN 160 MG/5ML
650 SOLUTION ORAL EVERY 4 HOURS PRN
Status: DISCONTINUED | OUTPATIENT
Start: 2022-01-01 | End: 2022-01-01 | Stop reason: HOSPADM

## 2022-01-01 RX ORDER — MORPHINE SULFATE 2 MG/ML
1 INJECTION, SOLUTION INTRAMUSCULAR; INTRAVENOUS
Status: DISCONTINUED | OUTPATIENT
Start: 2022-01-01 | End: 2022-01-01

## 2022-01-01 RX ORDER — ATROPINE SULFATE 1 MG/ML
0.5 INJECTION, SOLUTION INTRAMUSCULAR; INTRAVENOUS; SUBCUTANEOUS
Status: CANCELLED | OUTPATIENT
Start: 2022-01-01

## 2022-01-01 RX ORDER — ACETAMINOPHEN 325 MG/1
650 TABLET ORAL EVERY 4 HOURS PRN
Status: DISCONTINUED | OUTPATIENT
Start: 2022-01-01 | End: 2022-01-01 | Stop reason: HOSPADM

## 2022-01-01 RX ORDER — ACETAMINOPHEN 160 MG/5ML
650 SOLUTION ORAL EVERY 4 HOURS PRN
Status: CANCELLED | OUTPATIENT
Start: 2022-01-01

## 2022-01-01 RX ORDER — LEVETIRACETAM 250 MG/1
250 TABLET ORAL 2 TIMES DAILY
Status: CANCELLED | OUTPATIENT
Start: 2022-01-01

## 2022-01-01 RX ORDER — ACETAMINOPHEN 325 MG/1
650 TABLET ORAL EVERY 4 HOURS PRN
Status: DISCONTINUED | OUTPATIENT
Start: 2022-01-01 | End: 2022-01-01 | Stop reason: SDUPTHER

## 2022-01-01 RX ORDER — MORPHINE SULFATE 2 MG/ML
2 INJECTION, SOLUTION INTRAMUSCULAR; INTRAVENOUS EVERY 4 HOURS
Status: DISCONTINUED | OUTPATIENT
Start: 2022-01-01 | End: 2022-01-01

## 2022-01-01 RX ORDER — HEPARIN SODIUM 5000 [USP'U]/ML
5000 INJECTION, SOLUTION INTRAVENOUS; SUBCUTANEOUS EVERY 12 HOURS SCHEDULED
Status: DISCONTINUED | OUTPATIENT
Start: 2022-01-01 | End: 2022-01-01

## 2022-01-01 RX ORDER — MIDAZOLAM HYDROCHLORIDE 1 MG/ML
INJECTION INTRAMUSCULAR; INTRAVENOUS
Status: DISCONTINUED
Start: 2022-01-01 | End: 2022-01-01 | Stop reason: HOSPADM

## 2022-01-01 RX ORDER — MIDAZOLAM HYDROCHLORIDE 1 MG/ML
INJECTION INTRAMUSCULAR; INTRAVENOUS
Status: COMPLETED | OUTPATIENT
Start: 2022-01-01 | End: 2022-01-01

## 2022-01-01 RX ORDER — LORAZEPAM 2 MG/ML
1 INJECTION INTRAMUSCULAR
Status: DISCONTINUED | OUTPATIENT
Start: 2022-01-01 | End: 2022-01-01 | Stop reason: HOSPADM

## 2022-01-01 RX ORDER — MORPHINE SULFATE 2 MG/ML
1 INJECTION, SOLUTION INTRAMUSCULAR; INTRAVENOUS EVERY 6 HOURS
Status: CANCELLED | OUTPATIENT
Start: 2022-01-01

## 2022-01-01 RX ORDER — ACETAMINOPHEN 325 MG/1
650 TABLET ORAL EVERY 4 HOURS PRN
Status: CANCELLED | OUTPATIENT
Start: 2022-01-01

## 2022-01-01 RX ORDER — LORAZEPAM 2 MG/ML
1 INJECTION INTRAMUSCULAR EVERY 4 HOURS
Status: DISCONTINUED | OUTPATIENT
Start: 2022-01-01 | End: 2022-01-01 | Stop reason: HOSPADM

## 2022-01-01 RX ORDER — ASPIRIN 81 MG/1
81 TABLET, CHEWABLE ORAL DAILY
Status: CANCELLED | OUTPATIENT
Start: 2022-01-01

## 2022-01-01 RX ORDER — LORAZEPAM 2 MG/ML
1 INJECTION INTRAMUSCULAR EVERY 4 HOURS PRN
Status: DISCONTINUED | OUTPATIENT
Start: 2022-01-01 | End: 2022-01-01

## 2022-01-01 RX ORDER — ASPIRIN 300 MG/1
300 SUPPOSITORY RECTAL DAILY
Status: CANCELLED | OUTPATIENT
Start: 2022-01-01

## 2022-01-01 RX ORDER — FUROSEMIDE 20 MG/1
20 TABLET ORAL DAILY
Status: CANCELLED | OUTPATIENT
Start: 2022-01-01

## 2022-01-01 RX ADMIN — Medication 10 MG: at 22:27

## 2022-01-01 RX ADMIN — SENNOSIDES AND DOCUSATE SODIUM 2 TABLET: 50; 8.6 TABLET ORAL at 09:07

## 2022-01-01 RX ADMIN — FUROSEMIDE 20 MG: 10 INJECTION INTRAMUSCULAR; INTRAVENOUS at 18:04

## 2022-01-01 RX ADMIN — HALOPERIDOL LACTATE 2 MG: 5 INJECTION, SOLUTION INTRAMUSCULAR at 06:45

## 2022-01-01 RX ADMIN — LORAZEPAM 0.5 MG: 2 INJECTION INTRAMUSCULAR; INTRAVENOUS at 21:39

## 2022-01-01 RX ADMIN — MINERAL OIL: 1000 LIQUID ORAL at 04:41

## 2022-01-01 RX ADMIN — SCOPALAMINE 1 PATCH: 1 PATCH, EXTENDED RELEASE TRANSDERMAL at 14:30

## 2022-01-01 RX ADMIN — HALOPERIDOL LACTATE 2 MG: 5 INJECTION, SOLUTION INTRAMUSCULAR at 14:29

## 2022-01-01 RX ADMIN — LORAZEPAM 0.5 MG: 2 INJECTION INTRAMUSCULAR; INTRAVENOUS at 04:45

## 2022-01-01 RX ADMIN — SPIRONOLACTONE 25 MG: 25 TABLET ORAL at 08:18

## 2022-01-01 RX ADMIN — CARVEDILOL 3.12 MG: 3.12 TABLET, FILM COATED ORAL at 18:32

## 2022-01-01 RX ADMIN — HEPARIN SODIUM 5000 UNITS: 5000 INJECTION, SOLUTION INTRAVENOUS; SUBCUTANEOUS at 21:26

## 2022-01-01 RX ADMIN — LORAZEPAM 0.5 MG: 2 INJECTION INTRAMUSCULAR; INTRAVENOUS at 13:53

## 2022-01-01 RX ADMIN — MINERAL OIL: 1000 LIQUID ORAL at 13:15

## 2022-01-01 RX ADMIN — MINERAL OIL: 1000 LIQUID ORAL at 01:05

## 2022-01-01 RX ADMIN — FUROSEMIDE 40 MG: 10 INJECTION, SOLUTION INTRAMUSCULAR; INTRAVENOUS at 17:22

## 2022-01-01 RX ADMIN — CARVEDILOL 3.12 MG: 3.12 TABLET, FILM COATED ORAL at 17:05

## 2022-01-01 RX ADMIN — POLYVINYL ALCOHOL 2 DROP: 14 SOLUTION/ DROPS OPHTHALMIC at 21:40

## 2022-01-01 RX ADMIN — IOPAMIDOL 75 ML: 755 INJECTION, SOLUTION INTRAVENOUS at 12:19

## 2022-01-01 RX ADMIN — LORAZEPAM 0.25 MG: 2 INJECTION INTRAMUSCULAR; INTRAVENOUS at 02:07

## 2022-01-01 RX ADMIN — LORAZEPAM 1 MG: 2 INJECTION INTRAMUSCULAR; INTRAVENOUS at 20:15

## 2022-01-01 RX ADMIN — LORAZEPAM 0.5 MG: 2 INJECTION INTRAMUSCULAR; INTRAVENOUS at 08:05

## 2022-01-01 RX ADMIN — MORPHINE SULFATE 1 MG: 2 INJECTION, SOLUTION INTRAMUSCULAR; INTRAVENOUS at 04:26

## 2022-01-01 RX ADMIN — FUROSEMIDE 20 MG: 20 TABLET ORAL at 08:19

## 2022-01-01 RX ADMIN — MINERAL OIL: 1000 LIQUID ORAL at 22:05

## 2022-01-01 RX ADMIN — FUROSEMIDE 20 MG: 10 INJECTION INTRAMUSCULAR; INTRAVENOUS at 22:28

## 2022-01-01 RX ADMIN — MINERAL OIL: 1000 LIQUID ORAL at 01:44

## 2022-01-01 RX ADMIN — CARVEDILOL 3.12 MG: 3.12 TABLET, FILM COATED ORAL at 08:18

## 2022-01-01 RX ADMIN — SULFUR HEXAFLUORIDE 3 ML: KIT at 09:10

## 2022-01-01 RX ADMIN — SPIRONOLACTONE 25 MG: 25 TABLET ORAL at 08:25

## 2022-01-01 RX ADMIN — MORPHINE SULFATE 1 MG: 2 INJECTION, SOLUTION INTRAMUSCULAR; INTRAVENOUS at 21:26

## 2022-01-01 RX ADMIN — HALOPERIDOL LACTATE 2 MG: 5 INJECTION, SOLUTION INTRAMUSCULAR at 22:04

## 2022-01-01 RX ADMIN — FENTANYL CITRATE 50 MCG: 50 INJECTION, SOLUTION INTRAMUSCULAR; INTRAVENOUS at 14:09

## 2022-01-01 RX ADMIN — LORAZEPAM 0.5 MG: 2 INJECTION INTRAMUSCULAR; INTRAVENOUS at 16:08

## 2022-01-01 RX ADMIN — LORAZEPAM 0.5 MG: 2 INJECTION INTRAMUSCULAR; INTRAVENOUS at 04:41

## 2022-01-01 RX ADMIN — MORPHINE SULFATE 4 MG: 4 INJECTION, SOLUTION INTRAMUSCULAR; INTRAVENOUS at 14:29

## 2022-01-01 RX ADMIN — FUROSEMIDE 20 MG: 10 INJECTION INTRAMUSCULAR; INTRAVENOUS at 06:17

## 2022-01-01 RX ADMIN — MORPHINE SULFATE 4 MG: 4 INJECTION, SOLUTION INTRAMUSCULAR; INTRAVENOUS at 01:45

## 2022-01-01 RX ADMIN — MORPHINE SULFATE 4 MG: 4 INJECTION, SOLUTION INTRAMUSCULAR; INTRAVENOUS at 01:05

## 2022-01-01 RX ADMIN — SPIRONOLACTONE 25 MG: 25 TABLET ORAL at 09:07

## 2022-01-01 RX ADMIN — LORAZEPAM 0.5 MG: 2 INJECTION INTRAMUSCULAR; INTRAVENOUS at 12:02

## 2022-01-01 RX ADMIN — Medication 10 ML: at 20:03

## 2022-01-01 RX ADMIN — LORAZEPAM 0.25 MG: 2 INJECTION INTRAMUSCULAR; INTRAVENOUS at 09:45

## 2022-01-01 RX ADMIN — FUROSEMIDE 20 MG: 20 TABLET ORAL at 08:25

## 2022-01-01 RX ADMIN — CARVEDILOL 3.12 MG: 3.12 TABLET, FILM COATED ORAL at 09:07

## 2022-01-01 RX ADMIN — MORPHINE SULFATE 2 MG: 2 INJECTION, SOLUTION INTRAMUSCULAR; INTRAVENOUS at 03:11

## 2022-01-01 RX ADMIN — CARVEDILOL 3.12 MG: 3.12 TABLET, FILM COATED ORAL at 09:05

## 2022-01-01 RX ADMIN — Medication 10 ML: at 20:18

## 2022-01-01 RX ADMIN — FUROSEMIDE 20 MG: 20 TABLET ORAL at 08:05

## 2022-01-01 RX ADMIN — LEVETIRACETAM 250 MG: 500 INJECTION, SOLUTION INTRAVENOUS at 08:25

## 2022-01-01 RX ADMIN — HEPARIN SODIUM 5000 UNITS: 5000 INJECTION, SOLUTION INTRAVENOUS; SUBCUTANEOUS at 20:17

## 2022-01-01 RX ADMIN — HEPARIN SODIUM 5000 UNITS: 5000 INJECTION, SOLUTION INTRAVENOUS; SUBCUTANEOUS at 08:24

## 2022-01-01 RX ADMIN — HEPARIN SODIUM 5000 UNITS: 5000 INJECTION, SOLUTION INTRAVENOUS; SUBCUTANEOUS at 20:47

## 2022-01-01 RX ADMIN — CARVEDILOL 3.12 MG: 3.12 TABLET, FILM COATED ORAL at 18:18

## 2022-01-01 RX ADMIN — Medication 10 ML: at 21:09

## 2022-01-01 RX ADMIN — FUROSEMIDE 20 MG: 10 INJECTION INTRAMUSCULAR; INTRAVENOUS at 22:04

## 2022-01-01 RX ADMIN — CARVEDILOL 3.12 MG: 3.12 TABLET, FILM COATED ORAL at 17:08

## 2022-01-01 RX ADMIN — FUROSEMIDE 20 MG: 10 INJECTION INTRAMUSCULAR; INTRAVENOUS at 06:32

## 2022-01-01 RX ADMIN — HEPARIN SODIUM 5000 UNITS: 5000 INJECTION, SOLUTION INTRAVENOUS; SUBCUTANEOUS at 09:05

## 2022-01-01 RX ADMIN — MORPHINE SULFATE 4 MG: 4 INJECTION, SOLUTION INTRAMUSCULAR; INTRAVENOUS at 12:01

## 2022-01-01 RX ADMIN — CARVEDILOL 3.12 MG: 3.12 TABLET, FILM COATED ORAL at 18:04

## 2022-01-01 RX ADMIN — MORPHINE SULFATE 4 MG: 4 INJECTION, SOLUTION INTRAMUSCULAR; INTRAVENOUS at 09:00

## 2022-01-01 RX ADMIN — CARVEDILOL 3.12 MG: 3.12 TABLET, FILM COATED ORAL at 17:11

## 2022-01-01 RX ADMIN — CARVEDILOL 3.12 MG: 3.12 TABLET, FILM COATED ORAL at 08:05

## 2022-01-01 RX ADMIN — MINERAL OIL: 1000 LIQUID ORAL at 16:06

## 2022-01-01 RX ADMIN — POLYVINYL ALCOHOL 2 DROP: 14 SOLUTION/ DROPS OPHTHALMIC at 08:06

## 2022-01-01 RX ADMIN — LORAZEPAM 0.5 MG: 2 INJECTION INTRAMUSCULAR; INTRAVENOUS at 07:35

## 2022-01-01 RX ADMIN — Medication 10 ML: at 09:47

## 2022-01-01 RX ADMIN — LORAZEPAM 1 MG: 2 INJECTION INTRAMUSCULAR; INTRAVENOUS at 15:59

## 2022-01-01 RX ADMIN — LORAZEPAM 0.5 MG: 2 INJECTION INTRAMUSCULAR; INTRAVENOUS at 22:05

## 2022-01-01 RX ADMIN — Medication 10 ML: at 08:19

## 2022-01-01 RX ADMIN — IOPAMIDOL 100 ML: 755 INJECTION, SOLUTION INTRAVENOUS at 13:06

## 2022-01-01 RX ADMIN — MINERAL OIL: 1000 LIQUID ORAL at 19:40

## 2022-01-01 RX ADMIN — MORPHINE SULFATE 2 MG: 2 INJECTION, SOLUTION INTRAMUSCULAR; INTRAVENOUS at 07:35

## 2022-01-01 RX ADMIN — Medication 10 MG: at 22:05

## 2022-01-01 RX ADMIN — SPIRONOLACTONE 25 MG: 25 TABLET ORAL at 09:42

## 2022-01-01 RX ADMIN — MORPHINE SULFATE 4 MG: 4 INJECTION, SOLUTION INTRAMUSCULAR; INTRAVENOUS at 00:44

## 2022-01-01 RX ADMIN — SPIRONOLACTONE 25 MG: 25 TABLET ORAL at 09:05

## 2022-01-01 RX ADMIN — FUROSEMIDE 20 MG: 10 INJECTION INTRAMUSCULAR; INTRAVENOUS at 23:08

## 2022-01-01 RX ADMIN — FUROSEMIDE 20 MG: 10 INJECTION INTRAMUSCULAR; INTRAVENOUS at 14:30

## 2022-01-01 RX ADMIN — MORPHINE SULFATE 4 MG: 4 INJECTION, SOLUTION INTRAMUSCULAR; INTRAVENOUS at 04:45

## 2022-01-01 RX ADMIN — LORAZEPAM 0.5 MG: 2 INJECTION INTRAMUSCULAR; INTRAVENOUS at 19:32

## 2022-01-01 RX ADMIN — HEPARIN SODIUM 5000 UNITS: 5000 INJECTION, SOLUTION INTRAVENOUS; SUBCUTANEOUS at 08:25

## 2022-01-01 RX ADMIN — Medication 10 MG: at 21:39

## 2022-01-01 RX ADMIN — MORPHINE SULFATE 4 MG: 4 INJECTION, SOLUTION INTRAMUSCULAR; INTRAVENOUS at 08:05

## 2022-01-01 RX ADMIN — FUROSEMIDE 20 MG: 20 TABLET ORAL at 09:07

## 2022-01-01 RX ADMIN — LORAZEPAM 1 MG: 2 INJECTION INTRAMUSCULAR; INTRAVENOUS at 18:00

## 2022-01-01 RX ADMIN — Medication 10 ML: at 20:22

## 2022-01-01 RX ADMIN — FUROSEMIDE 20 MG: 10 INJECTION INTRAMUSCULAR; INTRAVENOUS at 16:08

## 2022-01-01 RX ADMIN — Medication 10 ML: at 09:43

## 2022-01-01 RX ADMIN — MORPHINE SULFATE 1 MG: 2 INJECTION, SOLUTION INTRAMUSCULAR; INTRAVENOUS at 14:20

## 2022-01-01 RX ADMIN — KETOROLAC TROMETHAMINE 15 MG: 15 INJECTION, SOLUTION INTRAMUSCULAR; INTRAVENOUS at 18:49

## 2022-01-01 RX ADMIN — Medication 10 ML: at 22:06

## 2022-01-01 RX ADMIN — FUROSEMIDE 80 MG: 10 INJECTION, SOLUTION INTRAMUSCULAR; INTRAVENOUS at 22:22

## 2022-01-01 RX ADMIN — MORPHINE SULFATE 4 MG: 4 INJECTION, SOLUTION INTRAMUSCULAR; INTRAVENOUS at 04:41

## 2022-01-01 RX ADMIN — MORPHINE SULFATE 2 MG: 2 INJECTION, SOLUTION INTRAMUSCULAR; INTRAVENOUS at 00:56

## 2022-01-01 RX ADMIN — Medication 10 ML: at 08:05

## 2022-01-01 RX ADMIN — MORPHINE SULFATE 4 MG: 4 INJECTION, SOLUTION INTRAMUSCULAR; INTRAVENOUS at 21:48

## 2022-01-01 RX ADMIN — HALOPERIDOL LACTATE 2 MG: 5 INJECTION, SOLUTION INTRAMUSCULAR at 15:26

## 2022-01-01 RX ADMIN — MORPHINE SULFATE 4 MG: 4 INJECTION, SOLUTION INTRAMUSCULAR; INTRAVENOUS at 16:00

## 2022-01-01 RX ADMIN — MORPHINE SULFATE 4 MG: 4 INJECTION, SOLUTION INTRAMUSCULAR; INTRAVENOUS at 12:12

## 2022-01-01 RX ADMIN — LORAZEPAM 0.5 MG: 2 INJECTION INTRAMUSCULAR; INTRAVENOUS at 15:30

## 2022-01-01 RX ADMIN — HALOPERIDOL LACTATE 2 MG: 5 INJECTION, SOLUTION INTRAMUSCULAR at 23:08

## 2022-01-01 RX ADMIN — LORAZEPAM 0.5 MG: 2 INJECTION INTRAMUSCULAR; INTRAVENOUS at 01:05

## 2022-01-01 RX ADMIN — Medication 10 ML: at 09:37

## 2022-01-01 RX ADMIN — MORPHINE SULFATE 2 MG: 2 INJECTION, SOLUTION INTRAMUSCULAR; INTRAVENOUS at 16:22

## 2022-01-01 RX ADMIN — LORAZEPAM 0.5 MG: 2 INJECTION INTRAMUSCULAR; INTRAVENOUS at 03:12

## 2022-01-01 RX ADMIN — KETOROLAC TROMETHAMINE 15 MG: 15 INJECTION, SOLUTION INTRAMUSCULAR; INTRAVENOUS at 10:52

## 2022-01-01 RX ADMIN — FUROSEMIDE 40 MG: 10 INJECTION, SOLUTION INTRAMUSCULAR; INTRAVENOUS at 05:32

## 2022-01-01 RX ADMIN — MORPHINE SULFATE 4 MG: 4 INJECTION, SOLUTION INTRAMUSCULAR; INTRAVENOUS at 13:16

## 2022-01-01 RX ADMIN — MORPHINE SULFATE 4 MG: 4 INJECTION, SOLUTION INTRAMUSCULAR; INTRAVENOUS at 21:38

## 2022-01-01 RX ADMIN — HALOPERIDOL LACTATE 2 MG: 5 INJECTION, SOLUTION INTRAMUSCULAR at 16:09

## 2022-01-01 RX ADMIN — MORPHINE SULFATE 4 MG: 4 INJECTION, SOLUTION INTRAMUSCULAR; INTRAVENOUS at 20:15

## 2022-01-01 RX ADMIN — LORAZEPAM 0.5 MG: 2 INJECTION INTRAMUSCULAR; INTRAVENOUS at 12:12

## 2022-01-01 RX ADMIN — FUROSEMIDE 40 MG: 40 TABLET ORAL at 11:57

## 2022-01-01 RX ADMIN — MINERAL OIL: 1000 LIQUID ORAL at 07:36

## 2022-01-01 RX ADMIN — POLYVINYL ALCOHOL 2 DROP: 14 SOLUTION/ DROPS OPHTHALMIC at 21:39

## 2022-01-01 RX ADMIN — LORAZEPAM 0.5 MG: 2 INJECTION INTRAMUSCULAR; INTRAVENOUS at 23:08

## 2022-01-01 RX ADMIN — CARVEDILOL 3.12 MG: 3.12 TABLET, FILM COATED ORAL at 09:38

## 2022-01-01 RX ADMIN — MINERAL OIL: 1000 LIQUID ORAL at 00:48

## 2022-01-01 RX ADMIN — KETOROLAC TROMETHAMINE 15 MG: 15 INJECTION, SOLUTION INTRAMUSCULAR; INTRAVENOUS at 00:50

## 2022-01-01 RX ADMIN — FUROSEMIDE 20 MG: 20 TABLET ORAL at 09:42

## 2022-01-01 RX ADMIN — FUROSEMIDE 20 MG: 10 INJECTION INTRAMUSCULAR; INTRAVENOUS at 21:45

## 2022-01-01 RX ADMIN — SCOPALAMINE 1 PATCH: 1 PATCH, EXTENDED RELEASE TRANSDERMAL at 18:06

## 2022-01-01 RX ADMIN — SPIRONOLACTONE 25 MG: 25 TABLET ORAL at 08:17

## 2022-01-01 RX ADMIN — MORPHINE SULFATE 2 MG: 2 INJECTION, SOLUTION INTRAMUSCULAR; INTRAVENOUS at 19:33

## 2022-01-01 RX ADMIN — HEPARIN SODIUM 5000 UNITS: 5000 INJECTION, SOLUTION INTRAVENOUS; SUBCUTANEOUS at 09:38

## 2022-01-01 RX ADMIN — MORPHINE SULFATE 2 MG: 2 INJECTION, SOLUTION INTRAMUSCULAR; INTRAVENOUS at 13:53

## 2022-01-01 RX ADMIN — CARVEDILOL 3.12 MG: 3.12 TABLET, FILM COATED ORAL at 17:01

## 2022-01-01 RX ADMIN — FUROSEMIDE 20 MG: 10 INJECTION INTRAMUSCULAR; INTRAVENOUS at 06:45

## 2022-01-01 RX ADMIN — HALOPERIDOL LACTATE 2 MG: 5 INJECTION, SOLUTION INTRAMUSCULAR at 06:33

## 2022-01-01 RX ADMIN — MIDAZOLAM 2 MG: 1 INJECTION INTRAMUSCULAR; INTRAVENOUS at 14:08

## 2022-01-01 RX ADMIN — MORPHINE SULFATE 4 MG: 4 INJECTION, SOLUTION INTRAMUSCULAR; INTRAVENOUS at 22:05

## 2022-01-01 RX ADMIN — MORPHINE SULFATE 4 MG: 4 INJECTION, SOLUTION INTRAMUSCULAR; INTRAVENOUS at 16:08

## 2022-01-01 RX ADMIN — CARVEDILOL 3.12 MG: 3.12 TABLET, FILM COATED ORAL at 17:04

## 2022-01-01 RX ADMIN — MINERAL OIL: 1000 LIQUID ORAL at 21:40

## 2022-01-01 RX ADMIN — MINERAL OIL: 1000 LIQUID ORAL at 12:13

## 2022-01-01 RX ADMIN — MINERAL OIL: 1000 LIQUID ORAL at 04:48

## 2022-01-01 RX ADMIN — Medication 10 ML: at 20:08

## 2022-01-01 RX ADMIN — FUROSEMIDE 40 MG: 10 INJECTION, SOLUTION INTRAMUSCULAR; INTRAVENOUS at 08:51

## 2022-01-01 RX ADMIN — ATORVASTATIN CALCIUM 80 MG: 40 TABLET, FILM COATED ORAL at 20:06

## 2022-01-01 RX ADMIN — FUROSEMIDE 20 MG: 10 INJECTION INTRAMUSCULAR; INTRAVENOUS at 20:02

## 2022-01-01 RX ADMIN — MORPHINE SULFATE 4 MG: 4 INJECTION, SOLUTION INTRAMUSCULAR; INTRAVENOUS at 22:28

## 2022-01-01 RX ADMIN — MINERAL OIL: 1000 LIQUID ORAL at 20:16

## 2022-01-01 RX ADMIN — Medication 10 ML: at 08:13

## 2022-01-01 RX ADMIN — FUROSEMIDE 20 MG: 20 TABLET ORAL at 08:26

## 2022-01-01 RX ADMIN — KETOROLAC TROMETHAMINE 15 MG: 15 INJECTION, SOLUTION INTRAMUSCULAR; INTRAVENOUS at 18:09

## 2022-01-01 RX ADMIN — MINERAL OIL: 1000 LIQUID ORAL at 12:01

## 2022-01-01 RX ADMIN — SENNOSIDES AND DOCUSATE SODIUM 2 TABLET: 50; 8.6 TABLET ORAL at 20:06

## 2022-01-01 RX ADMIN — FUROSEMIDE 20 MG: 10 INJECTION INTRAMUSCULAR; INTRAVENOUS at 15:26

## 2022-01-01 RX ADMIN — GLYCOPYRROLATE 0.2 MG: 0.2 INJECTION INTRAMUSCULAR; INTRAVENOUS at 20:15

## 2022-01-01 RX ADMIN — Medication 10 ML: at 09:08

## 2022-01-01 RX ADMIN — FUROSEMIDE 20 MG: 10 INJECTION INTRAMUSCULAR; INTRAVENOUS at 05:50

## 2022-01-01 RX ADMIN — HALOPERIDOL LACTATE 2 MG: 5 INJECTION, SOLUTION INTRAMUSCULAR at 22:28

## 2022-01-01 RX ADMIN — LORAZEPAM 0.5 MG: 2 INJECTION INTRAMUSCULAR; INTRAVENOUS at 16:22

## 2022-01-01 RX ADMIN — LORAZEPAM 0.5 MG: 2 INJECTION INTRAMUSCULAR; INTRAVENOUS at 13:15

## 2022-01-01 RX ADMIN — Medication 10 ML: at 21:26

## 2022-01-01 RX ADMIN — HEPARIN SODIUM 5000 UNITS: 5000 INJECTION, SOLUTION INTRAVENOUS; SUBCUTANEOUS at 22:06

## 2022-01-01 RX ADMIN — FUROSEMIDE 40 MG: 10 INJECTION, SOLUTION INTRAMUSCULAR; INTRAVENOUS at 08:48

## 2022-01-01 RX ADMIN — MORPHINE SULFATE 4 MG: 4 INJECTION, SOLUTION INTRAMUSCULAR; INTRAVENOUS at 15:26

## 2022-01-01 RX ADMIN — LORAZEPAM 1 MG: 2 INJECTION INTRAMUSCULAR; INTRAVENOUS at 00:44

## 2022-01-01 RX ADMIN — POLYVINYL ALCOHOL 2 DROP: 14 SOLUTION/ DROPS OPHTHALMIC at 22:06

## 2022-01-01 RX ADMIN — CARVEDILOL 3.12 MG: 3.12 TABLET, FILM COATED ORAL at 08:25

## 2022-01-01 RX ADMIN — MORPHINE SULFATE 4 MG: 4 INJECTION, SOLUTION INTRAMUSCULAR; INTRAVENOUS at 18:00

## 2022-01-01 RX ADMIN — MORPHINE SULFATE 1 MG: 2 INJECTION, SOLUTION INTRAMUSCULAR; INTRAVENOUS at 11:52

## 2022-01-01 RX ADMIN — KETOROLAC TROMETHAMINE 15 MG: 15 INJECTION, SOLUTION INTRAMUSCULAR; INTRAVENOUS at 18:00

## 2022-01-01 RX ADMIN — POLYVINYL ALCOHOL 2 DROP: 14 SOLUTION/ DROPS OPHTHALMIC at 08:59

## 2022-01-01 RX ADMIN — CARVEDILOL 3.12 MG: 3.12 TABLET, FILM COATED ORAL at 17:16

## 2022-01-01 RX ADMIN — HEPARIN SODIUM 5000 UNITS: 5000 INJECTION, SOLUTION INTRAVENOUS; SUBCUTANEOUS at 21:08

## 2022-01-01 RX ADMIN — FUROSEMIDE 20 MG: 20 TABLET ORAL at 08:17

## 2022-01-01 RX ADMIN — SPIRONOLACTONE 25 MG: 25 TABLET ORAL at 09:38

## 2022-01-01 RX ADMIN — LEVETIRACETAM 250 MG: 500 INJECTION, SOLUTION INTRAVENOUS at 20:32

## 2022-01-01 RX ADMIN — AMLODIPINE BESYLATE 7.5 MG: 5 TABLET ORAL at 08:24

## 2022-01-01 RX ADMIN — LORAZEPAM 0.25 MG: 2 INJECTION INTRAMUSCULAR; INTRAVENOUS at 17:13

## 2022-01-01 RX ADMIN — LORAZEPAM 0.5 MG: 2 INJECTION INTRAMUSCULAR; INTRAVENOUS at 00:55

## 2022-01-01 RX ADMIN — CARVEDILOL 3.12 MG: 3.12 TABLET, FILM COATED ORAL at 17:29

## 2022-01-01 RX ADMIN — ASPIRIN 300 MG: 300 SUPPOSITORY RECTAL at 15:43

## 2022-01-01 RX ADMIN — MINERAL OIL: 1000 LIQUID ORAL at 03:14

## 2022-01-01 RX ADMIN — POLYVINYL ALCOHOL 2 DROP: 14 SOLUTION/ DROPS OPHTHALMIC at 20:16

## 2022-01-01 RX ADMIN — HALOPERIDOL LACTATE 1 MG: 5 INJECTION, SOLUTION INTRAMUSCULAR at 18:49

## 2022-01-01 RX ADMIN — FUROSEMIDE 20 MG: 10 INJECTION INTRAMUSCULAR; INTRAVENOUS at 14:00

## 2022-01-01 RX ADMIN — AMLODIPINE BESYLATE 7.5 MG: 5 TABLET ORAL at 08:48

## 2022-01-01 RX ADMIN — MINERAL OIL: 1000 LIQUID ORAL at 00:58

## 2022-01-01 RX ADMIN — LORAZEPAM 0.5 MG: 2 INJECTION INTRAMUSCULAR; INTRAVENOUS at 09:00

## 2022-01-01 RX ADMIN — SPIRONOLACTONE 25 MG: 25 TABLET ORAL at 08:05

## 2022-01-01 RX ADMIN — MINERAL OIL: 1000 LIQUID ORAL at 08:06

## 2022-01-01 RX ADMIN — CARVEDILOL 3.12 MG: 3.12 TABLET, FILM COATED ORAL at 09:42

## 2022-01-01 RX ADMIN — Medication 10 ML: at 20:23

## 2022-01-01 RX ADMIN — LEVETIRACETAM 250 MG: 500 INJECTION, SOLUTION INTRAVENOUS at 21:23

## 2022-01-01 RX ADMIN — Medication 10 ML: at 20:47

## 2022-01-01 RX ADMIN — MINERAL OIL: 1000 LIQUID ORAL at 09:00

## 2022-01-01 RX ADMIN — HEPARIN SODIUM 5000 UNITS: 5000 INJECTION, SOLUTION INTRAVENOUS; SUBCUTANEOUS at 08:48

## 2022-03-02 PROBLEM — I50.30 DIASTOLIC CONGESTIVE HEART FAILURE (HCC): Status: ACTIVE | Noted: 2022-01-01

## 2022-03-03 PROBLEM — I50.9 CHF DUE TO VALVULAR DISEASE: Status: ACTIVE | Noted: 2022-01-01

## 2022-03-03 PROBLEM — I35.1 NONRHEUMATIC AORTIC VALVE INSUFFICIENCY: Status: ACTIVE | Noted: 2022-01-01

## 2022-03-03 PROBLEM — I38 CHF DUE TO VALVULAR DISEASE (HCC): Status: ACTIVE | Noted: 2022-01-01

## 2022-03-03 NOTE — THERAPY EVALUATION
Patient Name: Ash Diez  : 10/4/1928    MRN: [id number]                              Today's Date: 3/3/2022       Admit Date: 3/2/2022    Visit Dx: No diagnosis found.  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 has been evaluted at . pt decided agaisnt surgery    • Cancer (HCC)     colon   • Cellulitis     RLE   • CHF (congestive heart failure) (HCC)    • Hyperlipidemia    • Hypertension    • Pneumonia      Past Surgical History:   Procedure Laterality Date   • COLON SURGERY     • CORONARY STENT PLACEMENT     • VASCULAR SURGERY      Vein removal      General Information     Row Name 22          OT Time and Intention    Document Type evaluation  -     Mode of Treatment occupational therapy  -     Row Name 22 09          General Information    Patient Profile Reviewed yes  -LC     Prior Level of Function independent:; all household mobility; transfer; feeding; grooming; min assist:; dressing; bathing  SPC at baseline  -     Existing Precautions/Restrictions fall; oxygen therapy device and L/min  -     Barriers to Rehab previous functional deficit  -     Row Name 22          Living Environment    Lives With facility resident  -     Row Name 22 09          Home Main Entrance    Number of Stairs, Main Entrance none  -     Row Name 22          Stairs Within Home, Primary    Number of Stairs, Within Home, Primary none  -     Row Name 22          Cognition    Orientation Status (Cognition) oriented x 3  -     Row Name 22          Safety Issues,  Functional Mobility    Safety Issues Affecting Function (Mobility) insight into deficits/self-awareness; safety precaution awareness; safety precautions follow-through/compliance; awareness of need for assistance  -     Impairments Affecting Function (Mobility) balance; endurance/activity tolerance; range of motion (ROM); strength  -           User Key  (r) = Recorded By, (t) = Taken By, (c) = Cosigned By    Initials Name Provider Type     Jenn Guevara OT Occupational Therapist                 Mobility/ADL's     Row Name 03/03/22 0941          Bed Mobility    Bed Mobility scooting/bridging; supine-sit  -LC     Scooting/Bridging [place] (Bed Mobility) verbal cues; minimum assist (75% patient effort)  -     Supine-Sit [place] (Bed Mobility) minimum assist (75% patient effort); verbal cues  -     Assistive Device (Bed Mobility) bed rails; head of bed elevated  -     Comment (Bed Mobility) VC's to sequence transitioning from supine to sit EOB.  -     Row Name 03/03/22 0941          Transfers    Transfers sit-stand transfer; bed-chair transfer  -     Comment (Transfers) VC's for hand placement and sequencing.  -     Bed-Chair [place] (Transfers) minimum assist (75% patient effort); verbal cues  -     Assistive Device (Bed-Chair Transfers) other (see comments)  UE support  -     Sit-Stand [place] (Transfers) minimum assist (75% patient effort); verbal cues  -     Row Name 03/03/22 0941          Sit-Stand Transfer    Assistive Device (Sit-Stand Transfers) other (see comments)  UE support  -     Row Name 03/03/22 0941          Activities of Daily Living    BADL Assessment/Intervention lower body dressing; grooming  -     Row Name 03/03/22 0941          Lower Body Dressing Assessment/Training    [place] Level (Lower Body Dressing) don; doff; socks; dependent (less than 25% patient effort)  -     Position (Lower Body Dressing) supported sitting  -     Row Name  03/03/22 0941          Grooming Assessment/Training    [place] Level (Grooming) wash face, hands; set up  -     Position (Grooming) supported sitting  -           User Key  (r) = Recorded By, (t) = Taken By, (c) = Cosigned By    Initials Name Provider Type     Jenn Guevara OT Occupational Therapist               Obj/Interventions     Row Name 03/03/22 0944          Sensory Assessment (Somatosensory)    Sensory Assessment (Somatosensory) UE sensation intact  -     Row Name 03/03/22 0944          Vision Assessment/Intervention    Visual Impairment/Limitations corrective lenses for reading  -     Row Name 03/03/22 0944          Range of Motion Comprehensive    General Range of Motion upper extremity range of motion deficits identified  -     Comment, General Range of Motion RUE WFL, LUE deferred. Pt. reports falling on L shoulder a few months ago and ROM has been limited since fall. Pt. reports pain with LUE movement.   -     Row Name 03/03/22 0944          Strength Comprehensive (MMT)    Comment, General Manual Muscle Testing (MMT) Assessment RUE WFL, LUE deferred  -     Row Name 03/03/22 0944          Motor Skills    Motor Skills coordination  -     Coordination bilateral; upper extremity; fine motor deficit; WFL  -     Row Name 03/03/22 0944          Balance    Balance Assessment sitting static balance; sitting dynamic balance; standing static balance; standing dynamic balance  -     Static Sitting Balance WNL; unsupported; sitting, edge of bed  -     Dynamic Sitting Balance WFL; unsupported; sitting, edge of bed  -     Static Standing Balance mild impairment; standing; supported  -     Dynamic Standing Balance mild impairment; supported; standing  -     Balance Interventions sitting; standing; sit to stand; weight shifting activity  -     Comment, Balance Min A in standing for safety and to steady.  -           User Key  (r) = Recorded By, (t) = Taken By, (c) = Cosigned By     Initials Name Provider Type     Jenn Guevara, OT Occupational Therapist               Goals/Plan     Row Name 03/03/22 0958          Transfer Goal 1 (OT)    Activity/Assistive Device (Transfer Goal 1, OT) sit-to-stand/stand-to-sit; bed-to-chair/chair-to-bed  -     [place] Level/Cues Needed (Transfer Goal 1, OT) standby assist; verbal cues required  -     Time Frame (Transfer Goal 1, OT) 10 days; long term goal (LTG)  -LC     Progress/Outcome (Transfer Goal 1, OT) goal ongoing  -     Row Name 03/03/22 0958          Dressing Goal 1 (OT)    Activity/Device (Dressing Goal 1, OT) reacher; long-handled shoe horn; sock-aid  -     [place]/Cues Needed (Dressing Goal 1, OT) minimum assist (75% or more patient effort); verbal cues required  -     Time Frame (Dressing Goal 1, OT) 10 days; long term goal (LTG)  -     Progress/Outcome (Dressing Goal 1, OT) goal ongoing  -     Row Name 03/03/22 0958          Toileting Goal 1 (OT)    Activity/Device (Toileting Goal 1, OT) adjust/manage clothing; perform perineal hygiene; commode; grab bar/safety frame  -     [place] Level/Cues Needed (Toileting Goal 1, OT) verbal cues required; minimum assist (75% or more patient effort)  -     Time Frame (Toileting Goal 1, OT) 10 days; long term goal (LTG)  -     Progress/Outcome (Toileting Goal 1, OT) goal ongoing  -           User Key  (r) = Recorded By, (t) = Taken By, (c) = Cosigned By    Initials Name Provider Type     Jenn Guevara, OT Occupational Therapist               Clinical Impression     Row Name 03/03/22 0955          Pain Assessment    Additional Documentation Pain Scale: Numbers Pre/Post-Treatment (Group)  -     Row Name 03/03/22 0955          Pain Scale: Numbers Pre/Post-Treatment    Pretreatment Pain Rating 0/10 - no pain  -     Posttreatment Pain Rating 0/10 - no pain  -     Row Name 03/03/22 0955          Plan of Care Review    Plan of Care Reviewed With patient  -      Progress no change  -     Outcome Summary Pt. presents with impaired activity tolerance, balance, limited LUE ROM, and a decline in ADL performance. Pt. demonstrates bed mobility and T/Fs with Min A. Required SUA for grooming and DEP for LBD. Skilled OT services warranted to promote return to PLOF. Recommend SNF at discharge.  -     Row Name 03/03/22 0955          Therapy Assessment/Plan (OT)    Patient/Family Therapy Goal Statement (OT) To return to PLOF  -     Therapy Frequency (OT) daily  -     Row Name 03/03/22 0955          Therapy Plan Review/Discharge Plan (OT)    Anticipated Discharge Disposition (OT) skilled nursing facility  -     Row Name 03/03/22 0955          Vital Signs    Pre Systolic BP Rehab 140  -LC     Pre Treatment Diastolic BP 87  -LC     Pre Patient Position Supine  -LC     Intra Patient Position Standing  -LC     Post Patient Position Sitting  -     Row Name 03/03/22 0955          Positioning and Restraints    Pre-Treatment Position in bed  -LC     Post Treatment Position chair  -LC     In Chair notified nsg; reclined; call light within reach; exit alarm on; encouraged to call for assist; compression device; legs elevated  -           User Key  (r) = Recorded By, (t) = Taken By, (c) = Cosigned By    Initials Name Provider Type     Jenn Guevara, MAMADOU Occupational Therapist               Outcome Measures     Row Name 03/03/22 1000          How much help from another is currently needed...    Putting on and taking off regular lower body clothing? 1  -LC     Bathing (including washing, rinsing, and drying) 2  -LC     Toileting (which includes using toilet bed pan or urinal) 2  -LC     Putting on and taking off regular upper body clothing 3  -LC     Taking care of personal grooming (such as brushing teeth) 3  -LC     Eating meals 3  -LC     AM-PAC 6 Clicks Score (OT) 14  -LC     Row Name 03/03/22 1154 03/03/22 0800       How much help from another person do you currently need...     Turning from your back to your side while in flat bed without using bedrails? 3  -KG 4  -EN    Moving from lying on back to sitting on the side of a flat bed without bedrails? 3  -KG 4  -EN    Moving to and from a bed to a chair (including a wheelchair)? 3  -KG 3  -EN    Standing up from a chair using your arms (e.g., wheelchair, bedside chair)? 3  -KG 3  -EN    Climbing 3-5 steps with a railing? 2  -KG 2  -EN    To walk in hospital room? 3  -KG 3  -EN    AM-PAC 6 Clicks Score (PT) 17  -KG 19  -EN    Row Name 03/03/22 1154 03/03/22 1000       Functional Assessment    Outcome Measure Options AM-PAC 6 Clicks Basic Mobility (PT)  -KG AM-PAC 6 Clicks Daily Activity (OT)  -          User Key  (r) = Recorded By, (t) = Taken By, (c) = Cosigned By    Initials Name Provider Type    KG Tatyana Shay, PT Physical Therapist    Jenn Armenta OT Occupational Therapist    Bonnie Mazariegos, RN Registered Nurse                Occupational Therapy Education                 Title: PT OT SLP Therapies (In Progress)     Topic: Occupational Therapy (In Progress)     Point: ADL training (In Progress)     Description:   Instruct learner(s) on proper safety adaptation and remediation techniques during self care or transfers.   Instruct in proper use of assistive devices.              Learning Progress Summary           Patient Acceptance, E, NR by  at 3/3/2022 0820                   Point: Home exercise program (Not Started)     Description:   Instruct learner(s) on appropriate technique for monitoring, assisting and/or progressing therapeutic exercises/activities.              Learner Progress:  Not documented in this visit.          Point: Precautions (In Progress)     Description:   Instruct learner(s) on prescribed precautions during self-care and functional transfers.              Learning Progress Summary           Patient Acceptance, E, NR by  at 3/3/2022 0820                   Point: Body mechanics (In  Progress)     Description:   Instruct learner(s) on proper positioning and spine alignment during self-care, functional mobility activities and/or exercises.              Learning Progress Summary           Patient Acceptance, E, NR by  at 3/3/2022 0820                               User Key     Initials Effective Dates Name Provider Type Discipline     06/16/21 -  Jenn Guevara OT Occupational Therapist OT              OT Recommendation and Plan  Therapy Frequency (OT): daily  Plan of Care Review  Plan of Care Reviewed With: patient  Progress: no change  Outcome Summary: Pt. presents with impaired activity tolerance, balance, limited LUE ROM, and a decline in ADL performance. Pt. demonstrates bed mobility and T/Fs with Min A. Required SUA for grooming and DEP for LBD. Skilled OT services warranted to promote return to PLOF. Recommend SNF at discharge.     Time Calculation:    Time Calculation- OT     Row Name 03/03/22 1002             Time Calculation- OT    OT Start Time 0820  -      OT Received On 03/03/22  -      OT Goal Re-Cert Due Date 03/13/22  -              Untimed Charges    OT Eval/Re-eval Minutes 53  -              Total Minutes    Untimed Charges Total Minutes 53  -       Total Minutes 53  -LC            User Key  (r) = Recorded By, (t) = Taken By, (c) = Cosigned By    Initials Name Provider Type     Jenn Guevara OT Occupational Therapist              Therapy Charges for Today     Code Description Service Date Service Provider Modifiers Qty    58493896258 HC OT EVAL LOW COMPLEXITY 4 3/3/2022 Jenn Guevara OT GO 1    23032317424 HC OT THER SUPP EA 15 MIN 3/3/2022 Jenn Guevara OT GO 2               Jenn Guevara OT  3/3/2022

## 2022-03-03 NOTE — PROGRESS NOTES
Patient requested a  visit. Patient requested prayer. I prayed according to his wishes. I provided supportive conversation, active listening and spiritual encouragement during this patient encounter.

## 2022-03-03 NOTE — CONSULTS
[facility]  Cardiovascular Consultation Note    Reason for consultation: Heart failure with new onset reduced EF #2 valvular heart disease #3 ascending aortic aneurysm    History of Present Illness:  89-year-old patient with CAD, known ascending aortic aneurysm, previously known normal EF, with moderate aortic stenosis with a heavily calcified valve moderate AI, hypertension, hyperlipidemia.  The patient presents with increased shortness of breath.  The patient states that about 4 years ago he was at  and he was offered the option of bypass, aortic valve replacement, and repair of his ascending aortic aneurysm.  However he declined to do so.  The patient states he has not on diuretics at home.  He states he usually is able to do his activities of daily living and walk without any dyspnea.  On Saturday he began to notice dyspnea on exertion which progressively worsened to the point of orthopnea.  He has history of chronic lower extremity edema.  The patient denies palpitations.  He denies tightness heaviness squeezing pressure in his chest jaw throat arms.  He denies orthostatic symptoms.  When I arrived in the room the patient just finished ambulating with physical therapy and was markedly dyspneic with a sat down to 89%.    Cardiac risk factors: #1 male sex #2 age greater than 55 #3 hypertension 4 hyperlipidemia #5 prior CAD    Past medical and surgical history, social and family history reviewed in EMR.    REVIEW OF SYSTEMS:     Past Medical History:   Diagnosis Date   • Aneurysm (HCC)     on aortic valve. has been evaluted at . pt decided agaisnt surgery    • Cancer (HCC)     colon   • Cellulitis     RLE   • CHF (congestive heart failure) (HCC)    • Hyperlipidemia    • Hypertension    • Pneumonia      Past Surgical History:   Procedure Laterality Date   • COLON SURGERY     • CORONARY STENT PLACEMENT     • VASCULAR SURGERY      Vein removal     Social History      Socioeconomic History   • Marital status:    Tobacco Use   • Smoking status: Never Smoker   • Smokeless tobacco: Never Used   Vaping Use   • Vaping Use: Never used   Substance and Sexual Activity   • Alcohol use: Not Currently     Comment: social   • Drug use: Never   • Sexual activity: Defer     Family History   Problem Relation Age of Onset   • Arthritis Mother    • Heart attack Mother    • Stroke Father    • Stroke Sister    • Stroke Brother    • Hypertension Daughter        H&P ROS reviewed and pertinent CV ROS as noted in HPI.    Cardiac: Patient has known CAD from prior cardiac cath at  data deficient.  Denies tightness heaviness squeezing pressure chest jaw throat arms.  Has known aortic stenosis and ascending aortic aneurysm  Respiratory: Complains of dyspnea which is progressed to the point of orthopnea.  No sputum production  Lower Extremities: Has chronic lower extremity edema which has been stable  GI: No nausea vomiting diarrhea bright blood per rectum or melena  Neuro: No stroke TIA or neurologic event.  No radicular pain  Hematology: No bleeding diathesis ecchymosis or petechia  Renal: Has noted prior CKD by my review of prior labs  Musculoskeletal: Does not complain of any joint pain  Endocrine: No hypothyroidism or diabetes  Constitutional: No weight loss fever chills  Psych: No anxiety or depression      Physical Exam: General well-developed elderly male who is quite tachypneic after ambulating with physical therapy.  It took him several minutes for his breathing to slow down.       HEENT: No obvious JVP.  No obvious bruits.  Tongue midline.  No icterus       Respiratory: Equal bilateral symmetrical expansion with bilateral rails       Cardiovascular: Regular rate and rhythm with occasional ectopic there is a high-pitched systolic ejection murmur loudest at the cardiac apex       GI: Soft flat nontender positive bowel sounds       Lower Extremities: No lesions       Neuro: Facial  expressions are symmetrical moves all 4 extremities       Skin: Warm and dry no lesions/the patient has 2+ pitting sacral edema       Psych: Pleasant affect oriented x3    Results Review: I personally looked at his echocardiogram his EF is now decreased to the 36 to 40% range.  Is a significantly calcified aortic valve with reduced excursion.  Mean gradient range is 28-38.  Significant dilation of the ascending aorta.  The BNP is 9350.  2 years ago the BNP was 5519.  GFR is 49.8.  In July his GFR was 41.  EKG shows sinus rhythm first-degree block PVCs and PACs  When viewing the echocardiogram there is clearly an E and A wave across the mitral valve           Vital Sign Min/Max for last 24 hours  Temp  Min: 97.4 °F (36.3 °C)  Max: 98.3 °F (36.8 °C)   BP  Min: 129/88  Max: 166/98   Pulse  Min: 78  Max: 95   Resp  Min: 18  Max: 28   SpO2  Min: 84 %  Max: 96 %   No data recorded      Intake/Output Summary (Last 24 hours) at 3/3/2022 0933  Last data filed at 3/3/2022 0600  Gross per 24 hour   Intake --   Output 2700 ml   Net -2700 ml             Current Facility-Administered Medications:   •  acetaminophen (TYLENOL) tablet 650 mg, 650 mg, Oral, Q4H PRN **OR** acetaminophen (TYLENOL) 160 MG/5ML solution 650 mg, 650 mg, Oral, Q4H PRN **OR** acetaminophen (TYLENOL) suppository 650 mg, 650 mg, Rectal, Q4H PRN, Jesús Boyer,   •  amLODIPine (NORVASC) tablet 7.5 mg, 7.5 mg, Oral, Daily, Jesús Boyer, DO, 7.5 mg at 03/03/22 0848  •  furosemide (LASIX) injection 40 mg, 40 mg, Intravenous, Q12H, Jesús Boyer, , 40 mg at 03/03/22 0848  •  heparin (porcine) 5000 UNIT/ML injection 5,000 Units, 5,000 Units, Subcutaneous, Q12H, Jesús Boyer, , 5,000 Units at 03/03/22 0848  •  ondansetron (ZOFRAN) injection 4 mg, 4 mg, Intravenous, Q6H PRN, Jesús Boyer, DO  •  Pharmacy Consult - [facility], , Does not apply, Daily, Marisol Alvarenga, PharmD  •  sodium chloride 0.9 % flush 10 mL, 10 mL, Intravenous, PRN, Jesús Boyer, DO  •   sodium chloride 0.9 % flush 10 mL, 10 mL, Intravenous, Q12H, Merlin, Jesús, DO  •  sodium chloride 0.9 % flush 10 mL, 10 mL, Intravenous, PRN, Merlin, Jesús, DO    Lab Review:   Results from last 7 days   Lab Units 03/02/22 1924   WBC 10*3/mm3 11.88*   HEMOGLOBIN g/dL 15.7   PLATELETS 10*3/mm3 230     Results from last 7 days   Lab Units 03/02/22  1924   SODIUM mmol/L 135*   POTASSIUM mmol/L 4.4   CO2 mmol/L 25.0   BUN mg/dL 21   CREATININE mg/dL 1.33*   MAGNESIUM mg/dL 2.4*   GLUCOSE mg/dL 139*     Estimated Creatinine Clearance: 41.5 mL/min (A) (by C-G formula based on SCr of 1.33 mg/dL (H)).        .lipd  Lab Results   Component Value Date    TRIG 170 (H) 07/08/2021    HDL 45 07/08/2021    AST 17 03/02/2022    ALT 12 03/02/2022       Radiology Reports:  Imaging Results (Last 72 Hours)     Procedure Component Value Units Date/Time    XR Chest 1 View [[id number]] Collected: 03/02/22 2100     Updated: 03/02/22 2106    Narrative:      EXAMINATION: XR CHEST 1 VW-     INDICATION: SOA triage protocol     COMPARISON: 9/8/2019     FINDINGS: Lung volumes are low. Heart is enlarged and there is a diffuse  pulmonary edema pattern present. No definite effusion or evidence of  pneumothorax is seen. Prominent mediastinal shadow and ectatic appearing  aortic knob shadow are stable. Chronic left proximal humerus fracture is  again seen as on 7/8/2021 shoulder films.          Impression:      Congestive heart failure.         This report was finalized on 3/2/2022 9:03 PM by Dr. Tam Montoya MD.             Assessment/Plan: 1 CHF now with reduced EF and elevated BNP-patient still has significant dyspnea with minimal activity.  He has been started on IV Lasix 40 mg IV every 12 hours.  We will have to monitor renal function but the patient still has significant volume overload  2 or 2 valvular heart disease-the mean gradient appears to be worse.  This is in conjunction with worsening EF.  I doubt the patient could have a TAVR since  he has an ascending aortic aneurysm.  In addition I think he has no interest  #3 ascending aortic aneurysm  Patient states he is never had a coronary stent.  Would like to get the cardiac cath report from  done 4 years ago.  Dr. Mckeon will resume care in the morning    Ciro Patino MD  03/03/22  09:33 EST

## 2022-03-03 NOTE — PLAN OF CARE
Goal Outcome Evaluation:  Plan of Care Reviewed With: patient           Outcome Summary: PT initial evaluation completed for pt presenting with generalized weakness, impaired balance, increased SOA, and decreased functional mobility. Pt ambulated 50ft with RW and Ranjeet. Pt's decreased independence warrants PT skilled care. Recommend D/C to SNF.

## 2022-03-03 NOTE — THERAPY EVALUATION
Patient Name: Ash Diez  : 10/4/1928    MRN: [id number]                              Today's Date: 3/3/2022       Admit Date: 3/2/2022    Visit Dx: No diagnosis found.  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 has been evaluted at . pt decided agaisnt surgery    • Cancer (HCC)     colon   • Cellulitis     RLE   • CHF (congestive heart failure) (HCC)    • Hyperlipidemia    • Hypertension    • Pneumonia      Past Surgical History:   Procedure Laterality Date   • COLON SURGERY     • CORONARY STENT PLACEMENT     • VASCULAR SURGERY      Vein removal      General Information     Row Name 22 1141          Physical Therapy Time and Intention    Document Type evaluation  -KG     Mode of Treatment physical therapy  -KG     Row Name 22 1141          General Information    Patient Profile Reviewed yes  -KG     Prior Level of Function independent:; all household mobility; gait; transfer; min assist:; ADL's; dressing; bathing  -KG     Existing Precautions/Restrictions fall; oxygen therapy device and L/min; other (see comments)  confusion  -KG     Barriers to Rehab previous functional deficit; cognitive status  -KG     Row Name 22 1141          Living Environment    Lives With facility resident  -KG     Row Name 22 1141          Home Main Entrance    Number of Stairs, Main Entrance none  -KG     Row Name 22 1141          Stairs Within Home, Primary    Number of Stairs, Within Home, Primary none  -KG     Row Name 22 1141          Cognition    Orientation Status (Cognition) oriented x 3  -KG     Row Name  03/03/22 1141          Safety Issues, Functional Mobility    Safety Issues Affecting Function (Mobility) awareness of need for assistance; insight into deficits/self-awareness; safety precaution awareness; safety precautions follow-through/compliance  -KG     Impairments Affecting Function (Mobility) balance; cognition; coordination; endurance/activity tolerance; postural/trunk control; shortness of breath; strength  -KG     Cognitive Impairments, Mobility Safety/Performance attention; awareness, need for assistance; insight into deficits/self-awareness; safety precaution awareness; safety precaution follow-through  -KG           User Key  (r) = Recorded By, (t) = Taken By, (c) = Cosigned By    Initials Name Provider Type    KG Tatyana Shay, PT Physical Therapist               Mobility     Row Name 03/03/22 1147          Bed Mobility    Comment (Bed Mobility) UIC  -KG     Row Name 03/03/22 1147          Transfers    Comment (Transfers) VC's for sequencing and safe hand placement. Pt attempting to stand prior to instruction.  -KG     Row Name 03/03/22 1147          Sit-Stand Transfer    Sit-Stand [place] (Transfers) minimum assist (75% patient effort); verbal cues  -KG     Assistive Device (Sit-Stand Transfers) walker, front-wheeled  -KG     Row Name 03/03/22 1147          Gait/Stairs (Locomotion)    [place] Level (Gait) minimum assist (75% patient effort); verbal cues  -KG     Assistive Device (Gait) walker, front-wheeled  -KG     Distance in Feet (Gait) 50  -KG     Deviations/Abnormal Patterns (Gait) base of support, narrow; noel decreased; festinating/shuffling; stride length decreased  -KG     Bilateral Gait Deviations forward flexed posture; heel strike decreased  -KG     Comment (Gait/Stairs) Pt demonstrated step through gait pattern with slow noel and decreased step length. Pt with very forward flexed posture; frequent cues to correct. Pt unsteady throughout ambulation and required  increased verbal and tactile cues to keep RW close with feet inside middle. Ambulation distance limited by increased SOA and toileting needs. Pt with periods of poor safety awareness due to intermittent confusion.  -KG           User Key  (r) = Recorded By, (t) = Taken By, (c) = Cosigned By    Initials Name Provider Type    Tatyana [place], PT Physical Therapist               Obj/Interventions     Row Name 03/03/22 1151          Range of Motion Comprehensive    Comment, General Range of Motion BLE WFL  -KG     Row Name 03/03/22 1151          Strength Comprehensive (MMT)    Comment, General Manual Muscle Testing (MMT) Assessment BLE grossly 3+/5  -KG     Row Name 03/03/22 1151          Balance    Balance Assessment sitting static balance; standing static balance; standing dynamic balance  -KG     Static Sitting Balance WFL; sitting in chair  -KG     Static Standing Balance mild impairment; supported; standing  -KG     Dynamic Standing Balance mild impairment; supported; standing  -KG     Row Name 03/03/22 1151          Sensory Assessment (Somatosensory)    Sensory Assessment (Somatosensory) LE sensation intact  -KG           User Key  (r) = Recorded By, (t) = Taken By, (c) = Cosigned By    Initials Name Provider Type    KG Tatyana Shay, PT Physical Therapist               Goals/Plan     Row Name 03/03/22 1153          Bed Mobility Goal 1 (PT)    Activity/Assistive Device (Bed Mobility Goal 1, PT) sit to supine; supine to sit  -KG     [place] Level/Cues Needed (Bed Mobility Goal 1, PT) contact guard assist  -KG     Time Frame (Bed Mobility Goal 1, PT) 2 weeks  -KG     Progress/Outcomes (Bed Mobility Goal 1, PT) goal ongoing  -KG     Row Name 03/03/22 1153          Transfer Goal 1 (PT)    Activity/Assistive Device (Transfer Goal 1, PT) sit-to-stand/stand-to-sit; bed-to-chair/chair-to-bed; walker, rolling  -KG     [place] Level/Cues Needed (Transfer Goal 1, PT) contact guard assist  -KG      Time Frame (Transfer Goal 1, PT) 2 weeks  -KG     Progress/Outcome (Transfer Goal 1, PT) goal ongoing  -KG     Row Name 03/03/22 1153          Gait Training Goal 1 (PT)    Activity/Assistive Device (Gait Training Goal 1, PT) gait (walking locomotion); assistive device use; walker, rolling  -KG     [place] Level (Gait Training Goal 1, PT) contact guard assist  -KG     Distance (Gait Training Goal 1, PT) 150 feet  -KG     Time Frame (Gait Training Goal 1, PT) 2 weeks  -KG     Progress/Outcome (Gait Training Goal 1, PT) goal ongoing  -KG           User Key  (r) = Recorded By, (t) = Taken By, (c) = Cosigned By    Initials Name Provider Type    KG Tatyana Shay, PT Physical Therapist               Clinical Impression     [facility] Name 03/03/22 1152          Pain    Additional Documentation Pain Scale: Numbers Pre/Post-Treatment (Group)  -KG     Row Name 03/03/22 1152          Pain Scale: Numbers Pre/Post-Treatment    Pretreatment Pain Rating 0/10 - no pain  -KG     Posttreatment Pain Rating 0/10 - no pain  -KG     [facility] Name 03/03/22 1152          Plan of Care Review    Plan of Care Reviewed With patient  -KG     Outcome Summary PT initial evaluation completed for pt presenting with generalized weakness, impaired balance, increased SOA, and decreased functional mobility. Pt ambulated 50ft with RW and Ranjeet. Pt's decreased independence warrants PT skilled care. Recommend D/C to SNF.  -KG     [facility] Name 03/03/22 1152          Therapy Assessment/Plan (PT)    Patient/Family Therapy Goals Statement (PT) return to PLOF  -KG     Rehab Potential (PT) good, to achieve stated therapy goals  -KG     Criteria for Skilled Interventions Met (PT) yes; skilled treatment is necessary  -KG     Row Name 03/03/22 1152          Vital Signs    Pre Systolic BP Rehab 140  -KG     Pre Treatment Diastolic BP 87  -KG     Pretreatment Heart Rate (beats/min) 92  -KG     Posttreatment Heart Rate (beats/min) 94  -KG     Pre SpO2 (%) 96  -KG     O2  Delivery Pre Treatment supplemental O2  -KG     Intra SpO2 (%) 88  -KG     O2 Delivery Intra Treatment supplemental O2  -KG     Post SpO2 (%) 93  -KG     O2 Delivery Post Treatment supplemental O2  -KG     Pre Patient Position Sitting  -KG     Intra Patient Position Standing  -KG     Post Patient Position Sitting  -KG     Row Name 03/03/22 1152          Positioning and Restraints    Pre-Treatment Position sitting in chair/recliner  -KG     Post Treatment Position chair  -KG     In Chair notified nsg; reclined; call light within reach; encouraged to call for assist; exit alarm on; RUE elevated; LUE elevated; legs elevated  -KG           User Key  (r) = Recorded By, (t) = Taken By, (c) = Cosigned By    Initials Name Provider Type    KG Tatyana Shay, PT Physical Therapist               Outcome Measures     Row Name 03/03/22 1154 03/03/22 0800       How much help from another person do you currently need...    Turning from your back to your side while in flat bed without using bedrails? 3  -KG 4  -EN    Moving from lying on back to sitting on the side of a flat bed without bedrails? 3  -KG 4  -EN    Moving to and from a bed to a chair (including a wheelchair)? 3  -KG 3  -EN    Standing up from a chair using your arms (e.g., wheelchair, bedside chair)? 3  -KG 3  -EN    Climbing 3-5 steps with a railing? 2  -KG 2  -EN    To walk in hospital room? 3  -KG 3  -EN    AM-PAC 6 Clicks Score (PT) 17  -KG 19  -EN    Row Name 03/03/22 0000          How much help from another person do you currently need...    Turning from your back to your side while in flat bed without using bedrails? 4  -CN     Moving from lying on back to sitting on the side of a flat bed without bedrails? 4  -CN     Moving to and from a bed to a chair (including a wheelchair)? 3  -CN     Standing up from a chair using your arms (e.g., wheelchair, bedside chair)? 3  -CN     Climbing 3-5 steps with a railing? 2  -CN     To walk in hospital room? 3  -CN      AM-PAC 6 Clicks Score (PT) 19  -CN     Row Name 03/03/22 1154 03/03/22 1000       Functional Assessment    Outcome Measure Options AM-PAC 6 Clicks Basic Mobility (PT)  -KG AM-PAC 6 Clicks Daily Activity (OT)  -LC          User Key  (r) = Recorded By, (t) = Taken By, (c) = Cosigned By    Initials Name Provider Type    KG Tatyana Shay, PT Physical Therapist    Jenn Armenta, OT Occupational Therapist    Melinda Sanabria, RN Registered Nurse    Bonnie Mazariegos RN Registered Nurse                             Physical Therapy Education                 Title: PT OT SLP Therapies (In Progress)     Topic: Physical Therapy (In Progress)     Point: Mobility training (In Progress)     Learning Progress Summary           Patient Acceptance, E, NR by KG at 3/3/2022 0936                   Point: Home exercise program (Not Started)     Learner Progress:  Not documented in this visit.          Point: Body mechanics (In Progress)     Learning Progress Summary           Patient Acceptance, E, NR by KG at 3/3/2022 0936                   Point: Precautions (In Progress)     Learning Progress Summary           Patient Acceptance, E, NR by KG at 3/3/2022 0936                               User Key     Initials Effective Dates Name Provider Type Discipline    KG 05/22/20 -  Tatyana Shay, PT Physical Therapist PT              PT Recommendation and Plan  Planned Therapy Interventions (PT): balance training, bed mobility training, gait training, strengthening, transfer training  Plan of Care Reviewed With: patient  Outcome Summary: PT initial evaluation completed for pt presenting with generalized weakness, impaired balance, increased SOA, and decreased functional mobility. Pt ambulated 50ft with RW and Ranjeet. Pt's decreased independence warrants PT skilled care. Recommend D/C to SNF.     Time Calculation:    PT Charges     Row Name 03/03/22 0936             Time Calculation    Start Time 0936  -KG      PT  Received On 03/03/22  -KG      PT Goal Re-Cert Due Date 03/13/22  -KG              Untimed Charges    PT Eval/Re-eval Minutes 47  -KG              Total Minutes    Untimed Charges Total Minutes 47  -KG       Total Minutes 47  -KG            User Key  (r) = Recorded By, (t) = Taken By, (c) = Cosigned By    Initials Name Provider Type    KG Tatyana Shay, PT Physical Therapist              Therapy Charges for Today     Code Description Service Date Service Provider Modifiers Qty    92163034309 HC PT EVAL LOW COMPLEXITY 4 3/3/2022 Tatyana Shay, PT GP 1          PT G-Codes  Outcome Measure Options: AM-PAC 6 Clicks Basic Mobility (PT)  AM-PAC 6 Clicks Score (PT): 17  AM-PAC 6 Clicks Score (OT): 14    Kami Shay, LUCIUS  3/3/2022

## 2022-03-03 NOTE — PLAN OF CARE
Goal Outcome Evaluation:  Plan of Care Reviewed With: patient        Progress: no change   Pt admitted from ED.

## 2022-03-03 NOTE — PLAN OF CARE
Problem: Adult Inpatient Plan of Care  Goal: Plan of Care Review  Outcome: Ongoing, Progressing  Flowsheets (Taken 3/3/2022 1847)  Outcome Summary: VSS, 2LNC. Pt resting, worked with PT/OT. Echo done this morning. No complaints of pain at this time, waiting for cath records from . Continue to monitor   Goal Outcome Evaluation:              Outcome Summary: VSS, 2LNC. Pt resting, worked with PT/OT. Echo done this morning. No complaints of pain at this time, waiting for cath records from [facility]. Continue to monitor

## 2022-03-03 NOTE — PLAN OF CARE
Goal Outcome Evaluation:  Plan of Care Reviewed With: patient        Progress: no change  Outcome Summary: Pt. presents with impaired activity tolerance, balance, limited LUE ROM, and a decline in ADL performance. Pt. demonstrates bed mobility and T/Fs with Min A. Required SUA for grooming and DEP for LBD. Skilled OT services warranted to promote return to PLOF. Recommend SNF at discharge.

## 2022-03-03 NOTE — H&P (VIEW-ONLY)
[facility] Medicine Services  HISTORY AND PHYSICAL    Patient Name: Ash Diez  : 10/4/1928  MRN: [id number]  Primary Care Physician: Karthikeyan Moreno MD  Date of admission: 3/2/2022      Subjective   Subjective     Chief Complaint:  Shortness of air    HPI:  Ash Diez is a 89 y.o. male with past medical history of diastolic CHF, mild to moderate aortic regurgitation, moderate aortic stenosis as of 2019, prediabetes, essential hypertension, hyperlipidemia, AAA who presents to the ER with complaints of dyspnea mainly with exertion over the past 24 hours and orthopnea.    Patient has prior history of diastolic CHF and valvular dysfunction.  At some point he was taken off of diuretics.  He does not remember the reasoning why or when this actually happened.  Patient states that over the past 24 hours he has had increasing shortness of air.  Mainly with exertion.  Some shortness of air with rest.  He reports chronic lower extremity edema which has not changed in the last several days.  He denies any chest pain or pressure.  Denies any nausea or vomiting or abdominal pain.  He currently is living at [facility] living [facility] and normally is able to perform ADLs without any issue.  Denies any cough, fever, or chills.  Does report some increased orthopnea starting last night.  SOA constant, rated 4/10 in severity, worse with exertion.    Work-up in the ER revealing pulmonary edema.  Patient given 80 mg Lasix in the ER and has already diuresed greater than 500 cc.  He is feeling some improved and his shortness of breath      COVID Details:    Symptoms:    [] NONE [] Fever []  Cough [x] Shortness of breath [] Change in taste/smell      Review of Systems   Gen- No fevers, chills  CV- No chest pain, palpitations  Resp- No cough, reports dyspnea  GI- No N/V/D, abd pain      All other systems reviewed and are negative.     Personal History     Past Medical History:   Diagnosis Date    • Aneurysm (HCC)     on aortic valve. has been evaluted at . pt decided agaisnt surgery    • Cancer (HCC)     colon   • Cellulitis     RLE   • CHF (congestive heart failure) (HCC)    • Hyperlipidemia    • Hypertension    • Pneumonia        Past Surgical History:   Procedure Laterality Date   • COLON SURGERY     • CORONARY STENT PLACEMENT     • VASCULAR SURGERY      Vein removal       Family History:  family history includes Arthritis in his mother; Heart attack in his mother; Hypertension in his daughter; Stroke in his brother, father, and sister. Otherwise pertinent FHx was reviewed and unremarkable.     Social History:  reports that he has never smoked. He has never used smokeless tobacco. He reports previous alcohol use. He reports that he does not use drugs.  Social History     Social History Narrative    2021: From [place], [place]. , has been living with his elderly daughter and son-in-law, but now living with his granddaughter in [place] to get access to care. Overall patient has been more or less bedbound since fall in January 2021 with left shoulder fracture.  Has barely been able to get around with cane, does not have a wheelchair.       Medications:  Available home medication information reviewed.  Medications Prior to Admission   Medication Sig Dispense Refill Last Dose   • amLODIPine (NORVASC) 5 MG tablet Take 1.5 tablets by mouth Daily. Needs to keep appointment 12/23/2020 and complete lab work that will be ordered for any ongoing refills. 45 tablet 6        No Known Allergies    Objective   Objective     Vital Signs:   Temp:  [97.4 °F (36.3 °C)-97.7 °F (36.5 °C)] 97.7 °F (36.5 °C)  Heart Rate:  [83-95] 95  Resp:  [18-28] 22  BP: (129-166)/(68-98) 129/88       Physical Exam   Constitutional: Awake, alert, appears much younger than stated age  Eyes: PERRLA, sclerae anicteric, no conjunctival injection  HENT: NCAT, mucous membranes moist  Neck: Supple, no thyromegaly, no lymphadenopathy, trachea  midline  Respiratory: Clear to auscultation bilaterally, nonlabored respirations   Cardiovascular: RRR, + murmur, palpable pedal pulses bilaterally  Gastrointestinal: Positive bowel sounds, soft, nontender, nondistended  Musculoskeletal: 2+ pitting bilateral ankle edema, no clubbing or cyanosis to extremities  Psychiatric: Appropriate affect, cooperative  Neurologic: Oriented x 3, strength symmetric in all extremities, Cranial Nerves grossly intact to confrontation, speech clear  Skin: No rashes      Result Review:  I have personally reviewed the results from the time of this admission to 3/3/2022 00:35 EST and agree with these findings:  [x]  Laboratory  []  Microbiology  [x]  Radiology  [x]  EKG/Telemetry   []  Cardiology/Vascular   []  Pathology  []  Old records  []  Other:  Most notable findings include: EKG without any acute ischemic changes, chest x-ray with evidence of pulmonary edema      LAB RESULTS:      Lab 03/02/22 1924   WBC 11.88*   HEMOGLOBIN 15.7   HEMATOCRIT 47.8   PLATELETS 230   NEUTROS ABS 9.59*   IMMATURE GRANS (ABS) 0.07*   LYMPHS ABS 1.13   MONOS ABS 0.99*   EOS ABS 0.06   MCV 95.4   PROCALCITONIN 0.07         Lab 03/02/22 1924   SODIUM 135*   POTASSIUM 4.4   CHLORIDE 100   CO2 25.0   ANION GAP 10.0   BUN 21   CREATININE 1.33*   EGFR 49.8*   GLUCOSE 139*   CALCIUM 9.6         Lab 03/02/22 1924   TOTAL PROTEIN 7.9   ALBUMIN 4.00   GLOBULIN 3.9   ALT (SGPT) 12   AST (SGOT) 17   BILIRUBIN 0.7   ALK PHOS 79         Lab 03/02/22 1924   PROBNP 9,350.0*   TROPONIN T <0.010                     Microbiology Results (last 10 days)     Procedure Component Value - Date/Time    COVID PRE-OP / PRE-PROCEDURE SCREENING ORDER (NO ISOLATION) - Swab, Nasopharynx [[id number]]  (Normal) Collected: 03/02/22 2241    Lab Status: Final result Specimen: Swab from Nasopharynx Updated: 03/02/22 2343    Narrative:      The following orders were created for panel order COVID PRE-OP / PRE-PROCEDURE SCREENING ORDER (NO  ISOLATION) - Swab, Nasopharynx.  Procedure                               Abnormality         Status                     ---------                               -----------         ------                     COVID-19, FLU A/B, RSV P...[[id number]]  Normal              Final result                 Please view results for these tests on the individual orders.    COVID-19, FLU A/B, RSV PCR - Swab, Nasopharynx [[id number]]  (Normal) Collected: 03/02/22 2241    Lab Status: Final result Specimen: Swab from Nasopharynx Updated: 03/02/22 2343     COVID19 Not Detected     Influenza A PCR Not Detected     Influenza B PCR Not Detected     RSV, PCR Not Detected    Narrative:      Fact sheet for providers: https://www.fda.gov/media/826325/download    Fact sheet for patients: https://www.fda.gov/media/581696/download    Test performed by PCR.          XR Chest 1 View    Result Date: 3/2/2022  EXAMINATION: XR CHEST 1 VW-  INDICATION: SOA triage protocol  COMPARISON: 9/8/2019  FINDINGS: Lung volumes are low. Heart is enlarged and there is a diffuse pulmonary edema pattern present. No definite effusion or evidence of pneumothorax is seen. Prominent mediastinal shadow and ectatic appearing aortic knob shadow are stable. Chronic left proximal humerus fracture is again seen as on 7/8/2021 shoulder films.       Impression: Congestive heart failure.    This report was finalized on 3/2/2022 9:03 PM by Dr. Tam Montoya MD.        Results for orders placed during the hospital encounter of 09/16/19    Adult Transthoracic Echo Complete W/ Cont if Necessary Per Protocol    Interpretation Summary  · Estimated EF appears to be in the range of 56 - 60%.  · Left ventricular diastolic dysfunction (grade I) consistent with impaired relaxation.  · Left ventricular wall thickness is consistent with mild concentric hypertrophy.  · Left atrial cavity size is mildly dilated.  · Moderate aortic valve stenosis is present.  · Mild to moderate aortic valve  regurgitation is present.  · Mild tricuspid valve regurgitation is present.  · Calculated right ventricular systolic pressure from tricuspid regurgitation is 27 mmHg.  · Severe dilation of the aortic root is present. Severe dilation of the proximal aorta is present.      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) [I50.9, I38]  Unknown   • Nonrheumatic aortic valve insufficiency [I35.1]  Unknown   • Diastolic congestive heart failure (HCC) [I50.30]  Yes   • Prediabetes [R73.03]  Yes     A1c 5.7 on 5/29/19 with prior PCP     • Aortic stenosis, moderate [I35.0]  Yes   • Hyperlipidemia [E78.5]  Yes   • Essential hypertension [I10]  Yes   • Abdominal aortic aneurysm (AAA) without rupture (HCC) [I71.4]  Yes     Patient is a 89-year-old male with past medical history of diastolic CHF, mild to moderate aortic regurgitation, moderate aortic stenosis as of 2019, prediabetes, essential hypertension, hyperlipidemia, AAA who presents to the ER with complaints of dyspnea mainly with exertion over the past 24 hours and orthopnea.    1.  Acute on chronic diastolic CHF  2.  CHF exacerbated by valvular heart disease/AR/AS  3.  Preload dependency secondary to AS  4.  Essential hypertension  5.  Hyperlipidemia  6.  Severe dilation of aortic root  7.  AAA    --Lasix 80 mg given in ER  --Apply external urinary cath  --PT/OT  --Echo  --Cardiology consult, reports seeing someone in our systemp before. ?previously seen Dr. Mckeon  --Continue 40 mg twice daily IV starting in the a.m.  --strict is and os        DVT prophylaxis: heparin      CODE STATUS: full code  There are no questions and answers to display.         Jesús Boyer DO  03/03/22

## 2022-03-03 NOTE — H&P
[facility] Medicine Services  HISTORY AND PHYSICAL    Patient Name: Ash Diez  : 10/4/1928  MRN: [id number]  Primary Care Physician: Karthikeyan Moreno MD  Date of admission: 3/2/2022      Subjective   Subjective     Chief Complaint:  Shortness of air    HPI:  Ash Diez is a 89 y.o. male with past medical history of diastolic CHF, mild to moderate aortic regurgitation, moderate aortic stenosis as of 2019, prediabetes, essential hypertension, hyperlipidemia, AAA who presents to the ER with complaints of dyspnea mainly with exertion over the past 24 hours and orthopnea.    Patient has prior history of diastolic CHF and valvular dysfunction.  At some point he was taken off of diuretics.  He does not remember the reasoning why or when this actually happened.  Patient states that over the past 24 hours he has had increasing shortness of air.  Mainly with exertion.  Some shortness of air with rest.  He reports chronic lower extremity edema which has not changed in the last several days.  He denies any chest pain or pressure.  Denies any nausea or vomiting or abdominal pain.  He currently is living at [facility] living [facility] and normally is able to perform ADLs without any issue.  Denies any cough, fever, or chills.  Does report some increased orthopnea starting last night.  SOA constant, rated 4/10 in severity, worse with exertion.    Work-up in the ER revealing pulmonary edema.  Patient given 80 mg Lasix in the ER and has already diuresed greater than 500 cc.  He is feeling some improved and his shortness of breath      COVID Details:    Symptoms:    [] NONE [] Fever []  Cough [x] Shortness of breath [] Change in taste/smell      Review of Systems   Gen- No fevers, chills  CV- No chest pain, palpitations  Resp- No cough, reports dyspnea  GI- No N/V/D, abd pain      All other systems reviewed and are negative.     Personal History     Past Medical History:   Diagnosis Date    • Aneurysm (HCC)     on aortic valve. has been evaluted at . pt decided agaisnt surgery    • Cancer (HCC)     colon   • Cellulitis     RLE   • CHF (congestive heart failure) (HCC)    • Hyperlipidemia    • Hypertension    • Pneumonia        Past Surgical History:   Procedure Laterality Date   • COLON SURGERY     • CORONARY STENT PLACEMENT     • VASCULAR SURGERY      Vein removal       Family History:  family history includes Arthritis in his mother; Heart attack in his mother; Hypertension in his daughter; Stroke in his brother, father, and sister. Otherwise pertinent FHx was reviewed and unremarkable.     Social History:  reports that he has never smoked. He has never used smokeless tobacco. He reports previous alcohol use. He reports that he does not use drugs.  Social History     Social History Narrative    2021: From [place], [place]. , has been living with his elderly daughter and son-in-law, but now living with his granddaughter in [place] to get access to care. Overall patient has been more or less bedbound since fall in January 2021 with left shoulder fracture.  Has barely been able to get around with cane, does not have a wheelchair.       Medications:  Available home medication information reviewed.  Medications Prior to Admission   Medication Sig Dispense Refill Last Dose   • amLODIPine (NORVASC) 5 MG tablet Take 1.5 tablets by mouth Daily. Needs to keep appointment 12/23/2020 and complete lab work that will be ordered for any ongoing refills. 45 tablet 6        No Known Allergies    Objective   Objective     Vital Signs:   Temp:  [97.4 °F (36.3 °C)-97.7 °F (36.5 °C)] 97.7 °F (36.5 °C)  Heart Rate:  [83-95] 95  Resp:  [18-28] 22  BP: (129-166)/(68-98) 129/88       Physical Exam   Constitutional: Awake, alert, appears much younger than stated age  Eyes: PERRLA, sclerae anicteric, no conjunctival injection  HENT: NCAT, mucous membranes moist  Neck: Supple, no thyromegaly, no lymphadenopathy, trachea  midline  Respiratory: Clear to auscultation bilaterally, nonlabored respirations   Cardiovascular: RRR, + murmur, palpable pedal pulses bilaterally  Gastrointestinal: Positive bowel sounds, soft, nontender, nondistended  Musculoskeletal: 2+ pitting bilateral ankle edema, no clubbing or cyanosis to extremities  Psychiatric: Appropriate affect, cooperative  Neurologic: Oriented x 3, strength symmetric in all extremities, Cranial Nerves grossly intact to confrontation, speech clear  Skin: No rashes      Result Review:  I have personally reviewed the results from the time of this admission to 3/3/2022 00:35 EST and agree with these findings:  [x]  Laboratory  []  Microbiology  [x]  Radiology  [x]  EKG/Telemetry   []  Cardiology/Vascular   []  Pathology  []  Old records  []  Other:  Most notable findings include: EKG without any acute ischemic changes, chest x-ray with evidence of pulmonary edema      LAB RESULTS:      Lab 03/02/22 1924   WBC 11.88*   HEMOGLOBIN 15.7   HEMATOCRIT 47.8   PLATELETS 230   NEUTROS ABS 9.59*   IMMATURE GRANS (ABS) 0.07*   LYMPHS ABS 1.13   MONOS ABS 0.99*   EOS ABS 0.06   MCV 95.4   PROCALCITONIN 0.07         Lab 03/02/22 1924   SODIUM 135*   POTASSIUM 4.4   CHLORIDE 100   CO2 25.0   ANION GAP 10.0   BUN 21   CREATININE 1.33*   EGFR 49.8*   GLUCOSE 139*   CALCIUM 9.6         Lab 03/02/22 1924   TOTAL PROTEIN 7.9   ALBUMIN 4.00   GLOBULIN 3.9   ALT (SGPT) 12   AST (SGOT) 17   BILIRUBIN 0.7   ALK PHOS 79         Lab 03/02/22 1924   PROBNP 9,350.0*   TROPONIN T <0.010                     Microbiology Results (last 10 days)     Procedure Component Value - Date/Time    COVID PRE-OP / PRE-PROCEDURE SCREENING ORDER (NO ISOLATION) - Swab, Nasopharynx [[id number]]  (Normal) Collected: 03/02/22 2241    Lab Status: Final result Specimen: Swab from Nasopharynx Updated: 03/02/22 2343    Narrative:      The following orders were created for panel order COVID PRE-OP / PRE-PROCEDURE SCREENING ORDER (NO  ISOLATION) - Swab, Nasopharynx.  Procedure                               Abnormality         Status                     ---------                               -----------         ------                     COVID-19, FLU A/B, RSV P...[[id number]]  Normal              Final result                 Please view results for these tests on the individual orders.    COVID-19, FLU A/B, RSV PCR - Swab, Nasopharynx [[id number]]  (Normal) Collected: 03/02/22 2241    Lab Status: Final result Specimen: Swab from Nasopharynx Updated: 03/02/22 2343     COVID19 Not Detected     Influenza A PCR Not Detected     Influenza B PCR Not Detected     RSV, PCR Not Detected    Narrative:      Fact sheet for providers: https://www.fda.gov/media/989171/download    Fact sheet for patients: https://www.fda.gov/media/034566/download    Test performed by PCR.          XR Chest 1 View    Result Date: 3/2/2022  EXAMINATION: XR CHEST 1 VW-  INDICATION: SOA triage protocol  COMPARISON: 9/8/2019  FINDINGS: Lung volumes are low. Heart is enlarged and there is a diffuse pulmonary edema pattern present. No definite effusion or evidence of pneumothorax is seen. Prominent mediastinal shadow and ectatic appearing aortic knob shadow are stable. Chronic left proximal humerus fracture is again seen as on 7/8/2021 shoulder films.       Impression: Congestive heart failure.    This report was finalized on 3/2/2022 9:03 PM by Dr. Tam Montoya MD.        Results for orders placed during the hospital encounter of 09/16/19    Adult Transthoracic Echo Complete W/ Cont if Necessary Per Protocol    Interpretation Summary  · Estimated EF appears to be in the range of 56 - 60%.  · Left ventricular diastolic dysfunction (grade I) consistent with impaired relaxation.  · Left ventricular wall thickness is consistent with mild concentric hypertrophy.  · Left atrial cavity size is mildly dilated.  · Moderate aortic valve stenosis is present.  · Mild to moderate aortic valve  regurgitation is present.  · Mild tricuspid valve regurgitation is present.  · Calculated right ventricular systolic pressure from tricuspid regurgitation is 27 mmHg.  · Severe dilation of the aortic root is present. Severe dilation of the proximal aorta is present.      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) [I50.9, I38]  Unknown   • Nonrheumatic aortic valve insufficiency [I35.1]  Unknown   • Diastolic congestive heart failure (HCC) [I50.30]  Yes   • Prediabetes [R73.03]  Yes     A1c 5.7 on 5/29/19 with prior PCP     • Aortic stenosis, moderate [I35.0]  Yes   • Hyperlipidemia [E78.5]  Yes   • Essential hypertension [I10]  Yes   • Abdominal aortic aneurysm (AAA) without rupture (HCC) [I71.4]  Yes     Patient is a 89-year-old male with past medical history of diastolic CHF, mild to moderate aortic regurgitation, moderate aortic stenosis as of 2019, prediabetes, essential hypertension, hyperlipidemia, AAA who presents to the ER with complaints of dyspnea mainly with exertion over the past 24 hours and orthopnea.    1.  Acute on chronic diastolic CHF  2.  CHF exacerbated by valvular heart disease/AR/AS  3.  Preload dependency secondary to AS  4.  Essential hypertension  5.  Hyperlipidemia  6.  Severe dilation of aortic root  7.  AAA    --Lasix 80 mg given in ER  --Apply external urinary cath  --PT/OT  --Echo  --Cardiology consult, reports seeing someone in our systemp before. ?previously seen Dr. Mckeon  --Continue 40 mg twice daily IV starting in the a.m.  --strict is and os        DVT prophylaxis: heparin      CODE STATUS: full code  There are no questions and answers to display.         Jesús Boyer DO  03/03/22

## 2022-03-03 NOTE — PROGRESS NOTES
Patient seen and examined at the bedside. Patient tells me that he has improved although he still has shortness of air with minimal exertion. Cardiology has seen and evaluated the patient. Patient has received diuresis and we will monitor the renal function.  -Continue current care  -Check labs in a.m.  -Medicine will follow

## 2022-03-03 NOTE — CASE MANAGEMENT/SOCIAL WORK
Discharge Planning Assessment  [facility]     Patient Name: Ash Diez  MRN: [id number]  Today's Date: 3/3/2022    Admit Date: 3/2/2022     Discharge Needs Assessment     Row Name 03/03/22 1205       Living Environment    Lives With facility resident    Current Living Arrangements independent/assisted living facility    Primary Care Provided by other (see comments); self  facility staff assists with personal care    Provides Primary Care For no one, unable/limited ability to care for self    Family Caregiver if Needed grandchild(arthur), adult; child(arthur), adult    Quality of Family Relationships helpful; involved; supportive    Able to Return to Prior Arrangements yes       Transition Planning    Transportation Anticipated family or friend will provide; health plan transportation       Discharge Needs Assessment    Equipment Currently Used at Home cane, quad    Discharge Facility/Level of Care Needs assisted living facility; nursing facility, skilled    Current Discharge Risk chronically ill; dependent with mobility/activities of daily living               Discharge Plan     Row Name 03/03/22 1207       Plan    Plan initial    Patient/Family in Agreement with Plan yes    Plan Comments I spoke with Mr. Diez in room to discuss discharge planning.  He lives at [facility] living [facility] in [facility].  The staff assists him with ADL's.  He uses a cane for mobility assistance.  He has prescription coverage for his medications.  He does not have a living will or POA.  He is not current with Home Health or PT.  Plan is to return to assisted Living at [facility].    Final Discharge Disposition Code 30 - still a patient              Continued Care and Services - Admitted Since 3/2/2022    Coordination has not been started for this encounter.          Demographic Summary     Row Name 03/03/22 1205       General Information    Admission Type inpatient    Reason for Consult discharge planning    Preferred  Language English    General Information Comments PCP Karthikeyan Moreno               Functional Status     Row Name 03/03/22 1205       Functional Status    Usual Activity Tolerance moderate    Current Activity Tolerance fair       Functional Status, IADL    Medications assistive person    Meal Preparation completely dependent    Housekeeping completely dependent    Laundry completely dependent    Shopping completely dependent               Psychosocial    No documentation.                Abuse/Neglect    No documentation.                Legal    No documentation.                Substance Abuse    No documentation.                Patient Forms    No documentation.                   Cher Parisi RN

## 2022-03-04 NOTE — PROGRESS NOTES
[facility]  PROGRESS NOTE    Patient Name: Ash Diez  : 10/4/1928  MRN: [id number]    Date of Admission: 3/2/2022  Primary Care Physician: Karthikeyan Moreno MD    Subjective   Subjective     CC:  SOB    HPI:  Resting in bed no acute distress and tells me that he feels a little better.  Tells me that he is breathing has improved since admission.  No fever or chills.  No chest pain or palpitation.  No nausea vomiting or diarrhea or abdominal pain.  No headache.    ROS:  As above    Objective   Objective     Vital Signs:   Temp:  [98 °F (36.7 °C)-98.5 °F (36.9 °C)] 98.3 °F (36.8 °C)  Heart Rate:  [67-95] (P) 95  Resp:  [16-18] 16  BP: (122-135)/(59-77) 125/59  Flow (L/min):  [2] (P) 2     Physical Exam:  Constitutional: No acute distress, looks comfortable  HENT: NCAT, mucous membranes moist  Respiratory: Clear to auscultation bilaterally, respiratory effort normal   Cardiovascular: RRR, systolic murmurs, no rubs or gallops  Gastrointestinal: Positive bowel sounds, soft, nontender, nondistended  Musculoskeletal: No bilateral ankle edema  Psychiatric: Appropriate affect, cooperative  Neurologic: Awake, alert, follows commands, no focality present, speech clear  Skin: No rashes    Results Reviewed:  LAB RESULTS:      Lab 22  0628 22   WBC 10.22 11.88*   HEMOGLOBIN 16.1 15.7   HEMATOCRIT 48.2 47.8   PLATELETS 227 230   NEUTROS ABS 6.63 9.59*   IMMATURE GRANS (ABS) 0.05 0.07*   LYMPHS ABS 2.05 1.13   MONOS ABS 1.30* 0.99*   EOS ABS 0.14 0.06   MCV 95.4 95.4   PROCALCITONIN  --  0.07         Lab 22  1015 22   SODIUM 140 135*   POTASSIUM 3.9 4.4   CHLORIDE 99 100   CO2 28.0 25.0   ANION GAP 13.0 10.0   BUN 23 21   CREATININE 1.28* 1.33*   EGFR 52.2* 49.8*   GLUCOSE 77 139*   CALCIUM 9.6 9.6   MAGNESIUM 2.3 2.4*         Lab 03/02/22  192   TOTAL PROTEIN 7.9   ALBUMIN 4.00   GLOBULIN 3.9   ALT (SGPT) 12   AST (SGOT) 17   BILIRUBIN 0.7   ALK  PHOS 79         Lab 03/02/22  1924   PROBNP 9,350.0*   TROPONIN T <0.010                 Brief Urine Lab Results     None          Microbiology Results Abnormal     Procedure Component Value - Date/Time    COVID PRE-OP / PRE-PROCEDURE SCREENING ORDER (NO ISOLATION) - Swab, Nasopharynx [[id number]]  (Normal) Collected: 03/02/22 2241    Lab Status: Final result Specimen: Swab from Nasopharynx Updated: 03/02/22 2343    Narrative:      The following orders were created for panel order COVID PRE-OP / PRE-PROCEDURE SCREENING ORDER (NO ISOLATION) - Swab, Nasopharynx.  Procedure                               Abnormality         Status                     ---------                               -----------         ------                     COVID-19, FLU A/B, RSV P...[[id number]]  Normal              Final result                 Please view results for these tests on the individual orders.    COVID-19, FLU A/B, RSV PCR - Swab, Nasopharynx [[id number]]  (Normal) Collected: 03/02/22 2241    Lab Status: Final result Specimen: Swab from Nasopharynx Updated: 03/02/22 2343     COVID19 Not Detected     Influenza A PCR Not Detected     Influenza B PCR Not Detected     RSV, PCR Not Detected    Narrative:      Fact sheet for providers: https://www.fda.gov/media/862987/download    Fact sheet for patients: https://www.fda.gov/media/677041/download    Test performed by PCR.          Adult Transthoracic Echo Complete W/ Cont if Necessary Per Protocol    Result Date: 3/3/2022  · Left ventricular ejection fraction appears to be 31 - 35%. Left ventricular systolic function is moderately decreased. · Left ventricular diastolic function is consistent with (grade II w/high LAP) pseudonormalization. · Left atrial volume is mildly increased. · Severe aortic valve stenosis is present. Aortic valve area is 0.64 cm2. · Peak velocity of the flow distal to the aortic valve is 426.7 cm/s. Aortic valve mean pressure gradient is 38.8 mmHg. · Mild dilation  of the aortic root is present. Moderate dilation of the ascending aorta is present.      XR Chest 1 View    Result Date: 3/2/2022  EXAMINATION: XR CHEST 1 VW-  INDICATION: SOA triage protocol  COMPARISON: 9/8/2019  FINDINGS: Lung volumes are low. Heart is enlarged and there is a diffuse pulmonary edema pattern present. No definite effusion or evidence of pneumothorax is seen. Prominent mediastinal shadow and ectatic appearing aortic knob shadow are stable. Chronic left proximal humerus fracture is again seen as on 7/8/2021 shoulder films.       Impression: Congestive heart failure.    This report was finalized on 3/2/2022 9:03 PM by Dr. Tam Montoya MD.        Results for orders placed during the hospital encounter of 03/02/22    Adult Transthoracic Echo Complete W/ Cont if Necessary Per Protocol    Interpretation Summary  · Left ventricular ejection fraction appears to be 31 - 35%. Left ventricular systolic function is moderately decreased.  · Left ventricular diastolic function is consistent with (grade II w/high LAP) pseudonormalization.  · Left atrial volume is mildly increased.  · Severe aortic valve stenosis is present. Aortic valve area is 0.64 cm2.  · Peak velocity of the flow distal to the aortic valve is 426.7 cm/s. Aortic valve mean pressure gradient is 38.8 mmHg.  · Mild dilation of the aortic root is present. Moderate dilation of the ascending aorta is present.      I have reviewed the medications:  Scheduled Meds:carvedilol, 3.125 mg, Oral, BID With Meals  heparin (porcine), 5,000 Units, Subcutaneous, Q12H  pharmacy consult - MTM, , Does not apply, Daily  sodium chloride, 10 mL, Intravenous, Q12H  [START ON 3/5/2022] spironolactone, 25 mg, Oral, Daily      Continuous Infusions:   PRN Meds:.•  acetaminophen **OR** acetaminophen **OR** acetaminophen  •  ondansetron  •  sodium chloride  •  sodium chloride    Assessment/Plan   Assessment & Plan     Active Hospital Problems    Diagnosis  POA   • CHF due to  valvular disease (HCC) [I50.9, I38]  Unknown   • Nonrheumatic aortic valve insufficiency [I35.1]  Unknown   • Diastolic congestive heart failure (HCC) [I50.30]  Yes   • Prediabetes [R73.03]  Yes   • Aortic stenosis, moderate [I35.0]  Yes   • Hyperlipidemia [E78.5]  Yes   • Essential hypertension [I10]  Yes   • Abdominal aortic aneurysm (AAA) without rupture (HCC) [I71.4]  Yes      Resolved Hospital Problems   No resolved problems to display.        Brief Hospital Course to date:  Ash Diez is a 89 y.o. male with past medical history significant for hypertension, AAA, hyperlipidemia, diastolic heart failure, valvular heart disease.  Patient was admitted for shortness of breath secondary to acute on chronic heart failure.  Improved with diuresis.    *Shortness of breath secondary to acute on chronic heart failure.  Patient symptom has improved with diuresis.  Cardiology following.    *Diastolic and systolic heart failure, acute on chronic.  Echocardiogram which was done on this admission shows ejection fraction between 30 to 35%.    *Severe aortic valve stenosis.    *Mild dilatation of the aortic root.  Also moderate dilation of ascending aorta.    *Hypertension, controlled.    *Aortic aneurysm.  Patient is not a candidate for surgery.    *Hyperlipidemia.    PLAN:  -Continue current care  -Home soon as patient becomes more stable, possibly tomorrow.    DVT prophylaxis:  Medical DVT prophylaxis orders are present.       AM-PAC 6 Clicks Score (PT): 19 (03/04/22 0800)    Disposition: Home soon possibly tomorrow..    CODE STATUS:   There are no questions and answers to display.       Jason Olmos MD  03/04/22

## 2022-03-04 NOTE — PLAN OF CARE
Problem: Adult Inpatient Plan of Care  Goal: Plan of Care Review  Outcome: Ongoing, Progressing  Flowsheets (Taken 3/4/2022 1411)  Outcome Summary: VSS, 2LNC. Pt resting today, no complaints of pain or discomfort. Using urinal independently. Continuing to monitor overnight. Waiting for SNF placement   Goal Outcome Evaluation:              Outcome Summary: VSS, 2LNC. Pt resting today, no complaints of pain or discomfort. Using urinal independently. Continuing to monitor overnight. Waiting for SNF placement

## 2022-03-04 NOTE — CASE MANAGEMENT/SOCIAL WORK
Continued Stay Note  [facility]     Patient Name: Ash Diez  MRN: [id number]  Today's Date: 3/4/2022    Admit Date: 3/2/2022     Discharge Plan     Row Name 03/04/22 1400       Plan    Plan update    Plan Comments Have attempted to call [facility] at [facility] but unable to speak with anyone.  Will keep trying.    Final Discharge Disposition Code 03 - skilled nursing facility (SNF)    Row Name 03/04/22 1346       Plan    Plan SNF    Patient/Family in Agreement with Plan yes    Plan Comments PT and OT have evaluated Mr. Diez and have recommeded SNF at discharge.  I attempted to call daughter but she is apparently on route to hospital and it takes her about 3 hours to arrive here.  I called referral to April at [facility] but they will not have a male bed until next Thursday.  I called referrals to Stacy at [facility] and to Lilia for [facility].  Will continue to try to reach daughter.  CM following.    Final Discharge Disposition Code 03 - skilled nursing facility (SNF)               Discharge Codes    No documentation.                     Cher Parisi RN

## 2022-03-04 NOTE — NURSING NOTE
Patient Name:  Ash Diez  :  10/4/1928  DOS:  22    Active Hospital Problems    Diagnosis     CHF due to valvular disease (HCC)     Nonrheumatic aortic valve insufficiency     Diastolic congestive heart failure (HCC)     Prediabetes      A1c 5.7 on 19 with prior PCP      Aortic stenosis, moderate     Hyperlipidemia     Essential hypertension     Abdominal aortic aneurysm (AAA) without rupture (HCC)        Consult has been received.  Medical records have been reviewed.  Education provided.  Education time 5 minutes .  Patient to be scheduled follow up 3-5 days post discharge.    Echo Results: Left ventricular ejection fraction appears to be 31 - 35%. Left ventricular systolic function is moderately decreased.  Left ventricular diastolic function is consistent with (grade II w/high LAP) pseudonormalization.  Left atrial volume is mildly increased.  Severe aortic valve stenosis is present. Aortic valve area is 0.64 cm2.  Peak velocity of the flow distal to the aortic valve is 426.7 cm/s. Aortic valve mean pressure gradient is 38.8 mmHg.  Mild dilation of the aortic root is present. Moderate dilation of the ascending aorta is present.         NYHA Class: IV    Heart Failure Quality Measures    ACE I, ARB, ARNI (if EF <40%)     If no contraindications:  RAMESH / CKD / Renal Stenosis    Evidence-based Beta Blocker (EF<40%)    (Bisoprolol, Carvedilol, Metoprolol Succinate)  Consider initiation of toprol, coreg prior to discharge from the hospital     Aldosterone Antagonist (EF <40%)  No     RAMESH / CKD / Renal Stenosis    Heart Failure Education    Medications management and adherance, Daily weight monitoring, Role of [facility] and when to call and EF teaching    If EF < 35%

## 2022-03-04 NOTE — ED PROVIDER NOTES
EMERGENCY DEPARTMENT ENCOUNTER    Pt Name: Ash Diez  MRN: [id number]  Pt :   10/4/1928  Room Number:  S223/1  Date of encounter:  3/2/2022  PCP: Karthikeyan Moreno MD  ED Provider: Mark Carlos MD    Historian: Patient      HPI:  Chief Complaint: Dyspnea, chest tightness        Context: Ash Diez is a 89-year-old man who presents for evaluation of 2 days of chest pain and shortness of breath.  He knows of no history of CHF but it is documented on his chart he says he used to be on a water pill.  He says for the last 2 days he has had significant worsening shortness of breath.  He gets very short of breath when he lies flat and feels better when he sits up.  Denies fevers or systemic symptoms.  He has significant exertional dyspnea.  He also feels moderate pain chest tightness when lying flat which also resolves with getting up.  Knows of no sick contacts.  No other complaints at this time.      PAST MEDICAL HISTORY  Past Medical History:   Diagnosis Date   • Aneurysm (HCC)     on aortic valve. has been evaluted at . pt decided agaisnt surgery    • Cancer (HCC)     colon   • Cellulitis     RLE   • CHF (congestive heart failure) (HCC)    • Hyperlipidemia    • Hypertension    • Pneumonia          PAST SURGICAL HISTORY  Past Surgical History:   Procedure Laterality Date   • COLON SURGERY     • CORONARY STENT PLACEMENT     • VASCULAR SURGERY      Vein removal         FAMILY HISTORY  Family History   Problem Relation Age of Onset   • Arthritis Mother    • Heart attack Mother    • Stroke Father    • Stroke Sister    • Stroke Brother    • Hypertension Daughter          SOCIAL HISTORY  Social History     Socioeconomic History   • Marital status:    Tobacco Use   • Smoking status: Never Smoker   • Smokeless tobacco: Never Used   Vaping Use   • Vaping Use: Never used   Substance and Sexual Activity   • Alcohol use: Not Currently     Comment: social   • Drug use: Never   • Sexual activity: Defer          ALLERGIES  Patient has no known allergies.        REVIEW OF SYSTEMS  Review of Systems       All systems reviewed and negative except for those discussed in HPI.       PHYSICAL EXAM    I have reviewed the triage vital signs and nursing notes.    ED Triage Vitals [03/02/22 1833]   Temp Heart Rate Resp BP SpO2   97.4 °F (36.3 °C) 83 18 146/68 92 %      Temp src Heart Rate Source Patient Position BP Location FiO2 (%)   Oral Monitor Sitting Right arm --       Physical Exam  GENERAL:   Appears acutely ill  HENT: Nares patent.  Dry mucous membranes  EYES: No scleral icterus.  Extraocular movements intact  CV: Regular rhythm, regular rate.  RESPIRATORY: Diffuse wet rhonchi without appreciated focal consolidation or rails  ABDOMEN: Soft, nontender  MUSCULOSKELETAL: No deformities.   NEURO: Alert, moves all extremities, follows commands.  SKIN: Warm, dry, no rash visualized.        LAB RESULTS  Recent Results (from the past 24 hour(s))   COVID-19, FLU A/B, RSV PCR - Swab, Nasopharynx    Collection Time: 03/02/22 10:41 PM    Specimen: Nasopharynx; Swab   Result Value Ref Range    COVID19 Not Detected Not Detected - Ref. Range    Influenza A PCR Not Detected Not Detected    Influenza B PCR Not Detected Not Detected    RSV, PCR Not Detected Not Detected   Adult Transthoracic Echo Complete W/ Cont if Necessary Per Protocol    Collection Time: 03/03/22  9:09 AM   Result Value Ref Range    BSA 2.1 m^2    IVSd 1.1 cm    LVIDd 5.5 cm    LVIDs 4.6 cm    LVPWd 1.1 cm    IVS/LVPW 1.0     FS 16.7 %    EDV(Teich) 149.1 ml    ESV(Teich) 97.5 ml    EF(Teich) 34.6 %    EDV(cubed) 168.8 ml    ESV(cubed) 97.5 ml    EF(cubed) 42.2 %    LV mass(C)d 250.9 grams    LV mass(C)dI 119.0 grams/m^2    SV(Teich) 51.6 ml    SI(Teich) 24.5 ml/m^2    SV(cubed) 71.3 ml    SI(cubed) 33.8 ml/m^2    Ao root diam 4.2 cm    Ao root area 13.9 cm^2    LA dimension 4.8 cm    asc Aorta Diam 5.9 cm    LA/Ao 1.1     LVOT diam 2.4 cm    LVOT area 4.4 cm^2     LVOT area(traced) 4.5 cm^2    LAd major 6.0 cm    LVLd ap4 7.7 cm    EDV(MOD-sp4) 139.0 ml    LVLs ap4 6.8 cm    ESV(MOD-sp4) 88.0 ml    EF(MOD-sp4) 36.7 %    LVLd ap2 8.6 cm    EDV(MOD-sp2) 132.0 ml    LVLs ap2 8.1 cm    ESV(MOD-sp2) 96.0 ml    EF(MOD-sp2) 27.3 %    LA volume 89.0 ml    EF(MOD-bp) 27.0 %    SV(MOD-sp4) 51.0 ml    SI(MOD-sp4) 24.2 ml/m^2    SV(MOD-sp2) 36.0 ml    SI(MOD-sp2) 17.1 ml/m^2    Ao root area (BSA corrected) 2.0     LV Aguirre Vol (BSA corrected) 65.9 ml/m^2    LV Sys Vol (BSA corrected) 41.7 ml/m^2    LA Volume Index 42.2 ml/m^2    MV E max andre 85.4 cm/sec    MV A max andre 69.6 cm/sec    MV E/A 1.2     MV P1/2t max andre 156.4 cm/sec    MV P1/2t 98.0 msec    MVA(P1/2t) 2.2 cm^2    MV dec slope 467.5 cm/sec^2    MV dec time 0.19 sec    Ao pk andre 426.7 cm/sec    Ao max PG 72.8 mmHg    Ao max PG (full) 71.3 mmHg    Ao V2 mean 295.0 cm/sec    Ao mean PG 38.8 mmHg    Ao mean PG (full) 38.2 mmHg    Ao V2 VTI 92.4 cm    DANIELA(I,A) 0.64 cm^2    DANIELA(I,D) 0.64 cm^2    DANIELA(V,A) 0.63 cm^2    DANIELA(V,D) 0.63 cm^2    AI max andre 406.1 cm/sec    AI max PG 66.0 mmHg    AI dec slope 396.0 cm/sec^2    AI P1/2t 300.4 msec    LV V1 max PG 1.5 mmHg    LV V1 mean PG 0.64 mmHg    LV V1 max 61.7 cm/sec    LV V1 mean 36.2 cm/sec    LV V1 VTI 13.5 cm    SV(Ao) 1,288 ml    SI(Ao) 610.9 ml/m^2    SV(LVOT) 59.2 ml    SI(LVOT) 28.1 ml/m^2    PA acc slope 871.1 cm/sec^2    PA acc time 0.1 sec    TR max andre 224.0 cm/sec    TR max PG 20.1 mmHg    RVSP(TR) 23.1 mmHg    RAP systole 3.0 mmHg    PA pr(Accel) 34.6 mmHg    MVA P1/2T LCG 1.4 cm^2    Lat E/e'  8.5     Med E/e' 10.5     Lat Peak E' Andre 9.9 cm/sec    Med Peak E' Andre 8.0 cm/sec     CV ECHO WENCESLAO - BZI_BMI 27.9 kilograms/m^2     CV ECHO WENCESLAO - BSA(HAYCOCK) 2.1 m^2     CV ECHO WENCESLAO - BZI_METRIC_WEIGHT 90.7 kg     CV ECHO WENCESLAO - BZI_METRIC_HEIGHT 180.3 cm    Avg E/e' ratio 9.54     RV Base 3.00 cm    RV Length 5.40 cm    RV Mid 3.10 cm    TAPSE (>1.6) 2.10 cm    Ascending  aorta 4.6 cm       If labs were ordered, I independently reviewed the results.        RADIOLOGY  Adult Transthoracic Echo Complete W/ Cont if Necessary Per Protocol    Result Date: 3/3/2022  · Left ventricular ejection fraction appears to be 31 - 35%. Left ventricular systolic function is moderately decreased. · Left ventricular diastolic function is consistent with (grade II w/high LAP) pseudonormalization. · Left atrial volume is mildly increased. · Severe aortic valve stenosis is present. Aortic valve area is 0.64 cm2. · Peak velocity of the flow distal to the aortic valve is 426.7 cm/s. Aortic valve mean pressure gradient is 38.8 mmHg. · Mild dilation of the aortic root is present. Moderate dilation of the ascending aorta is present.      XR Chest 1 View    Result Date: 3/2/2022  EXAMINATION: XR CHEST 1 VW-  INDICATION: SOA triage protocol  COMPARISON: 9/8/2019  FINDINGS: Lung volumes are low. Heart is enlarged and there is a diffuse pulmonary edema pattern present. No definite effusion or evidence of pneumothorax is seen. Prominent mediastinal shadow and ectatic appearing aortic knob shadow are stable. Chronic left proximal humerus fracture is again seen as on 7/8/2021 shoulder films.       Congestive heart failure.    This report was finalized on 3/2/2022 9:03 PM by Dr. Tam Montoya MD.        I ordered and reviewed the above noted radiographic studies.      I viewed images of chest x-ray which reveals cardiomegaly and obvious fluid overload consistent with CHF exacerbation.    See radiologist's dictation for official interpretation.        PROCEDURES    Procedures    ECG 12 Lead             MEDICATIONS GIVEN IN ER    Medications   sodium chloride 0.9 % flush 10 mL (has no administration in time range)   furosemide (LASIX) injection 40 mg (40 mg Intravenous Given 3/3/22 8570)   amLODIPine (NORVASC) tablet 7.5 mg (7.5 mg Oral Given 3/3/22 9856)   sodium chloride 0.9 % flush 10 mL ( Intravenous Canceled Entry  3/3/22 0853)   sodium chloride 0.9 % flush 10 mL (has no administration in time range)   heparin (porcine) 5000 UNIT/ML injection 5,000 Units (5,000 Units Subcutaneous Given 3/3/22 0848)   acetaminophen (TYLENOL) tablet 650 mg (has no administration in time range)     Or   acetaminophen (TYLENOL) 160 MG/5ML solution 650 mg (has no administration in time range)     Or   acetaminophen (TYLENOL) suppository 650 mg (has no administration in time range)   ondansetron (ZOFRAN) injection 4 mg (has no administration in time range)   Pharmacy Consult - MTM ( Does not apply Canceled Entry 3/3/22 0853)   furosemide (LASIX) injection 80 mg (80 mg Intravenous Given 3/2/22 2222)   sulfur hexafluoride microsph (LUMASON) 60.7-25 MG IV reconstituted suspension reconstituted suspension 3 mL (3 mL Intravenous Given 3/3/22 0910)         PROGRESS, DATA ANALYSIS, CONSULTS, AND MEDICAL DECISION MAKING    All labs have been independently reviewed by me.  All radiology studies have been reviewed by me and the radiologist dictating the report.   EKG's have been independently viewed and interpreted by me.      Differential diagnoses: CHF, COPD, MI, pneumonia, pneumothorax, pulmonary embolism      ED Course as of 03/03/22 2005   Wed Mar 02, 2022   2216 In summary this is a very nice 89-year-old man who presents for evaluation of 2 days of chest pain and shortness of breath.  He knows of no history of CHF but it is documented on his chart he says he used to be on a water pill.  He says for the last 2 days he has had significant worsening shortness of breath.  He gets very short of breath when he lies flat and feels better when he sits up.  Denies fevers or systemic symptoms.  He has significant exertional dyspnea.  He also feels moderate pain chest tightness when lying flat which also resolves with getting up.  Knows of no sick contacts.  No other complaints at this time. [CC]   2217 He arrived awake and alert was initially satting in the low  90s on room air but ultimately had to be put on 2 L nasal cannula to maintain sats in the low 90s.  Labs reveal mild leukocytosis and significantly elevated BNP of nearly 10,000.  Starting him on 80 of IV Lasix.  Troponin is not elevated.  Chest x-ray reveals cardiomegaly and obvious fluid overload consistent with CHF.  Other labs not actionable.  He will need admitted to the hospital for CHF exacerbation and cardiology evaluation.  Medicine team consulted for admission. [CC]      ED Course User Index  [CC] Mark Carlos MD             AS OF 20:05 EST VITALS:    BP - 134/70  HR - 76  TEMP - 98.5 °F (36.9 °C) (Oral)  O2 SATS - 93%                  DIAGNOSIS  Final diagnoses:   Acute respiratory failure with hypoxia (HCC)   Acute on chronic congestive heart failure, unspecified heart failure type (HCC)   COVID-19 ruled out by laboratory testing         DISPOSITION               Mark Carlos MD  03/03/22 2007

## 2022-03-04 NOTE — PROGRESS NOTES
"[facility] Progress Note     LOS: 2 days   Patient Care Team:  Karthikeyan Moreno MD as PCP - General (Family Medicine)  PCP:  Karthikeyan Moreno MD    Chief Complaint: CHF, aortic stenosis    SUBJECTIVE: Feels much better today, reports that he is back to his baseline state of health.      Review of Systems:   All systems have been reviewed and are negative with the exception of those mentioned above.      OBJECTIVE:    Vital Sign Min/Max for last 24 hours  Temp  Min: 98 °F (36.7 °C)  Max: 98.5 °F (36.9 °C)   BP  Min: 122/71  Max: 135/77   Pulse  Min: 67  Max: 91   Resp  Min: 16  Max: 18   SpO2  Min: 91 %  Max: 97 %   No data recorded   No data recorded     Flowsheet Rows      First Filed Value   Admission Height 181.6 cm (71.5\") Documented at 03/02/2022 1833   Admission Weight 90.7 kg (200 lb) Documented at 03/02/2022 1833              Intake/Output Summary (Last 24 hours) at 3/4/2022 1206  Last data filed at 3/4/2022 0734  Gross per 24 hour   Intake 240 ml   Output 1300 ml   Net -1060 ml     Intake & Output (last 3 days)       03/01 0701  03/02 0700 03/02 0701  03/03 0700 03/03 0701  03/04 0700 03/04 0701  03/05 0700    P.O.   240     Total Intake(mL/kg)   240 (2.5)     Urine (mL/kg/hr)  2700 1050 (0.5) 250 (0.5)    Stool   0     Total Output  2700 1050 250    Net  -2700 -810 -250            Urine Unmeasured Occurrence   1 x     Stool Unmeasured Occurrence   1 x            Physical Exam:    General Appearance:    Alert, cooperative, no distress, appears stated age   Neck:   Supple, symmetrical, trachea midline.   Lungs:     Clear to auscultation bilaterally, respirations unlabored   Chest Wall:    No tenderness or deformity    Heart:    Regular rate and rhythm, S1 and S2 normal, 3/6 mid peaking systolic murmur heard at the right upper sternal border with radiation to apex, rub   or gallop, normal carotid impulse bilaterally without bruit.   Extremities:   " Extremities normal, atraumatic, no cyanosis or edema   Pulses:   2+ and symmetric all extremities   Skin:   Skin color, texture, turgor normal, no rashes or lesions      LABS/DIAGNOSTIC DATA:  Results from last 7 days   Lab Units 03/04/22  0628 03/02/22  1924   WBC 10*3/mm3 10.22 11.88*   HEMOGLOBIN g/dL 16.1 15.7   HEMATOCRIT % 48.2 47.8   PLATELETS 10*3/mm3 227 230     Lab Results   Lab Value Date/Time    TROPONINT <0.010 03/02/2022 1924    TROPONINT 0.030 09/05/2019 0751         Results from last 7 days   Lab Units 03/04/22  1015 03/02/22  1924   SODIUM mmol/L 140 135*   POTASSIUM mmol/L 3.9 4.4   CHLORIDE mmol/L 99 100   CO2 mmol/L 28.0 25.0   BUN mg/dL 23 21   CREATININE mg/dL 1.28* 1.33*   CALCIUM mg/dL 9.6 9.6   BILIRUBIN mg/dL  --  0.7   ALK PHOS U/L  --  79   ALT (SGPT) U/L  --  12   AST (SGOT) U/L  --  17   GLUCOSE mg/dL 77 139*                       Medication Review:   carvedilol, 3.125 mg, Oral, BID With Meals  heparin (porcine), 5,000 Units, Subcutaneous, Q12H  pharmacy consult - [facility], , Does not apply, Daily  sodium chloride, 10 mL, Intravenous, Q12H  [START ON 3/5/2022] spironolactone, 25 mg, Oral, Daily             ASSESSMENT/PLAN:    Abdominal aortic aneurysm (AAA) without rupture (HCC)    Essential hypertension    Hyperlipidemia    Aortic stenosis, moderate    Prediabetes    Diastolic congestive heart failure (HCC)    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  Afterload well controlled.  Suspect that this is due to underlying coronary ischemia and valvular heart disease.  He does not desire any further invasive cardiac procedures at this time.  We will proceed with medical therapy and close clinical follow-up.  -Discontinue amlodipine  -Starting low-dose carvedilol  -Starting Aldactone 25 mg p.o. twice daily, discontinuing scheduled Lasix.  -Repeat BMP in 1  week  -Lasix 40 mg p.o. as needed for weight gain greater than 2 pounds above baseline with associated symptoms.    Severe aortic stenosis: Currently does not desire any further evaluation for percutaneous therapy, he is not a candidate for surgical AVR.  Continue medical therapy.    -Currently not a candidate for ACE/ARB/Arni due to CKD  -Not a candidate for LifeVest or ICD.    -Overall appears stable for discharge from a cardiac standpoint if we can arrange for outpatient follow-up towards the end of next week to assess volume status and repeat BMP.      Carlito Mckeon III, MD   03/04/22  12:06 EST

## 2022-03-04 NOTE — CASE MANAGEMENT/SOCIAL WORK
Continued Stay Note  [facility]     Patient Name: Ash Diez  MRN: [id number]  Today's Date: 3/4/2022    Admit Date: 3/2/2022     Discharge Plan     Row Name 03/04/22 1346       Plan    Plan SNF    Patient/Family in Agreement with Plan yes    Plan Comments PT and OT have evaluated Mr. Diez and have recommeded SNF at discharge.  I attempted to call daughter but she is apparently on route to hospital and it takes her about 3 hours to arrive here.  I called referral to April at [facility] but they will not have a male bed until next Thursday.  I called referrals to Stacy at [facility] and to Lilia for [facility].  Will continue to try to reach daughter.  CM following.    Final Discharge Disposition Code 03 - skilled nursing facility (SNF)               Discharge Codes    No documentation.                     Cher Parisi RN

## 2022-03-04 NOTE — CASE MANAGEMENT/SOCIAL WORK
Continued Stay Note  [facility]     Patient Name: Ash Diez  MRN: [id number]  Today's Date: 3/4/2022    Admit Date: 3/2/2022     Discharge Plan     Row Name 03/04/22 1517       Plan    Plan update    Plan Comments [facility] may offer rehab bed for Mr. Diez.  So far assessment looks good.  Need to know final discharge plan when able to speak with daughter.  CM needs to call Stacy at [facility] first thing Monday morning.    Final Discharge Disposition Code 03 - skilled nursing facility (SNF)    Row Name 03/04/22 1400       Plan    Plan update    Plan Comments [facility] but unable to speak with anyone.  Will keep trying.    Final Discharge Disposition Code 03 - skilled nursing facility (SNF)    Row Name 03/04/22 1346       Plan    Plan SNF    Patient/Family in Agreement with Plan yes    Plan Comments PT and OT have evaluated Mr. Diez and have recommeded SNF at discharge.  I attempted to call daughter but she is apparently on route to hospital and it takes her about 3 hours to arrive here.  I called referral to April at [facility] but they will not have a male bed until next Thursday.  I called referrals to Stacy at [facility] and to Lilia for [facility].  Will continue to try to reach daughter.  CM following.    Final Discharge Disposition Code 03 - skilled nursing facility (SNF)               Discharge Codes    No documentation.                     Cher Parisi RN

## 2022-03-05 NOTE — THERAPY TREATMENT NOTE
Patient Name: Ash Diez  : 10/4/1928    MRN: [id number]                              Today's Date: 3/5/2022       Admit Date: 3/2/2022    Visit Dx:     ICD-10-CM ICD-9-CM   1. Acute respiratory failure with hypoxia (HCC)  J96.01 518.81   2. Acute on chronic congestive heart failure, unspecified heart failure type (HCC)  I50.9 428.0   3. 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 has been evaluted at . pt decided agaisnt surgery    • Cancer (HCC)     colon   • Cellulitis     RLE   • CHF (congestive heart failure) (HCC)    • Hyperlipidemia    • Hypertension    • Pneumonia      Past Surgical History:   Procedure Laterality Date   • COLON SURGERY     • CORONARY STENT PLACEMENT     • VASCULAR SURGERY      Vein removal      General Information     Row Name 22 1607          Physical Therapy Time and Intention    Document Type therapy note (daily note)  -KG     Mode of Treatment physical therapy  -KG     Row Name 22 1607          General Information    Existing Precautions/Restrictions fall;oxygen therapy device and L/min;other (see comments)  [place]  -KG     Row Name 22 1607          Cognition    Orientation Status (Cognition) oriented x 3  -KG     Row Name 22 1607          Safety Issues, Functional Mobility    Safety Issues Affecting Function (Mobility) awareness of need for assistance;insight into deficits/self-awareness;safety precaution awareness;safety precautions follow-through/compliance  -KG     Impairments Affecting Function (Mobility)  balance;coordination;endurance/activity tolerance;postural/trunk control;shortness of breath;strength  -KG           User Key  (r) = Recorded By, (t) = Taken By, (c) = Cosigned By    Initials Name Provider Type    Tatyana North PT Physical Therapist               Mobility     Row Name 03/05/22 1607          Bed Mobility    Bed Mobility supine-sit;sit-supine  -KG     Supine-Sit [place] (Bed Mobility) standby assist;verbal cues  -KG     Sit-Supine [place] (Bed Mobility) standby assist;verbal cues  -KG     Assistive Device (Bed Mobility) bed rails;head of bed elevated  -KG     Comment, (Bed Mobility) VC's for sequencing.  -KG     Row Name 03/05/22 1607          Transfers    Comment, (Transfers) VC's for sequencing and safe hand placement. Pt attempting to stand prior to instruction.  -KG     Row Name 03/05/22 1607          Sit-Stand Transfer    Sit-Stand [place] (Transfers) standby assist;verbal cues  -KG     Assistive Device (Sit-Stand Transfers) walker, front-wheeled  -KG     Row Name 03/05/22 1607          Gait/Stairs (Locomotion)    [place] Level (Gait) minimum assist (75% patient effort);verbal cues  -KG     Assistive Device (Gait) walker, front-wheeled  -KG     Distance in Feet (Gait) 175  -KG     Deviations/Abnormal Patterns (Gait) base of support, narrow;noel decreased;stride length decreased  -KG     Bilateral Gait Deviations forward flexed posture;heel strike decreased  -KG     Comment, (Gait/Stairs) Pt demonstrated step through gait pattern with slow noel and decreased step length. Pt with very forward flexed posture; frequent cues to correct. Pt required verbal and tactile cues to keep RW close with feet inside middle. Ambulation distance limited by fatigue and SOA.  -KG           User Key  (r) = Recorded By, (t) = Taken By, (c) = Cosigned By    Initials Name Provider Type    Tatyana North PT Physical Therapist               Obj/Interventions     Row Name  03/05/22 1609          Balance    Balance Assessment sitting static balance;standing static balance;standing dynamic balance  -KG     Static Sitting Balance supervision  -KG     Position, Sitting Balance unsupported;sitting edge of bed  -KG     Static Standing Balance contact guard  -KG     Dynamic Standing Balance minimal assist  -KG     Position/Device Used, Standing Balance supported  -KG           User Key  (r) = Recorded By, (t) = Taken By, (c) = Cosigned By    Initials Name Provider Type    KG Tatyana Shay, PT Physical Therapist               Goals/Plan    No documentation.                Clinical Impression     Row Name 03/05/22 1609          Pain    Pretreatment Pain Rating 0/10 - no pain  -KG     Posttreatment Pain Rating 0/10 - no pain  -KG     Row Name 03/05/22 1609          Plan of Care Review    Plan of Care Reviewed With patient  -KG     Progress improving  -KG     Outcome Evaluation Pt increased ambulation distance to 175ft with RW and Ranjeet. VC's for upright posture and to keep RW close with feet inside middle. Pt's ambulation distance limited by c/o fatigue and SOA. Continue to progress as appropriate.  -KG     Row Name 03/05/22 1609          Vital Signs    Pre Systolic BP Rehab 125  -KG     Pre Treatment Diastolic BP 72  -KG     Pretreatment Heart Rate (beats/min) 69  -KG     Posttreatment Heart Rate (beats/min) 65  -KG     Pre SpO2 (%) 95  -KG     O2 Delivery Pre Treatment supplemental O2  -KG     Intra SpO2 (%) 88  -KG     O2 Delivery Intra Treatment supplemental O2  -KG     Post SpO2 (%) 92  -KG     O2 Delivery Post Treatment supplemental O2  -KG     Pre Patient Position Supine  -KG     Intra Patient Position Standing  -KG     Post Patient Position Supine  -KG     Row Name 03/05/22 1609          Positioning and Restraints    Pre-Treatment Position in bed  -KG     Post Treatment Position bed  -KG     In Bed notified nsg;supine;call light within reach;encouraged to call for assist;exit  alarm on;with family/caregiver;side rails up x2  -KG           User Key  (r) = Recorded By, (t) = Taken By, (c) = Cosigned By    Initials Name Provider Type    Tatyana North PT Physical Therapist               Outcome Measures     Row Name 03/05/22 1611 03/05/22 1000       How much help from another person do you currently need...    Turning from your back to your side while in flat bed without using bedrails? 3  -KG --  -EB    Moving from lying on back to sitting on the side of a flat bed without bedrails? 3  -KG --  -EB    Moving to and from a bed to a chair (including a wheelchair)? 3  -KG --  -EB    Standing up from a chair using your arms (e.g., wheelchair, bedside chair)? 3  -KG --  -EB    Climbing 3-5 steps with a railing? 2  -KG --  -EB    To walk in hospital room? 3  -KG --  -EB    AM-PAC 6 Clicks Score (PT) 17  -KG --  -EB    Row Name 03/05/22 0800          How much help from another person do you currently need...    Turning from your back to your side while in flat bed without using bedrails? 4  -EB     Moving from lying on back to sitting on the side of a flat bed without bedrails? 4  -EB     Moving to and from a bed to a chair (including a wheelchair)? 3  -EB     Standing up from a chair using your arms (e.g., wheelchair, bedside chair)? 3  -EB     Climbing 3-5 steps with a railing? 2  -EB     To walk in hospital room? 3  -EB     AM-PAC 6 Clicks Score (PT) 19  -EB     Row Name 03/05/22 1611          Functional Assessment    Outcome Measure Options AM-PAC 6 Clicks Basic Mobility (PT)  -KG           User Key  (r) = Recorded By, (t) = Taken By, (c) = Cosigned By    Initials Name Provider Type    Bonnie Ashraf, RN Registered Nurse    Tatyana North PT Physical Therapist                             Physical Therapy Education                 Title: PT OT SLP Therapies (In Progress)     Topic: Physical Therapy (In Progress)     Point: Mobility training (In Progress)      Learning Progress Summary           Patient Acceptance, E, NR by KG at 3/5/2022 1532    Acceptance, E, NR by KG at 3/3/2022 0936                   Point: Home exercise program (In Progress)     Learning Progress Summary           Patient Acceptance, E, NR by KG at 3/5/2022 1532                   Point: Body mechanics (In Progress)     Learning Progress Summary           Patient Acceptance, E, NR by KG at 3/5/2022 1532    Acceptance, E, NR by KG at 3/3/2022 0936                   Point: Precautions (In Progress)     Learning Progress Summary           Patient Acceptance, E, NR by KG at 3/5/2022 1532    Acceptance, E, NR by KG at 3/3/2022 0936                               User Key     Initials Effective Dates Name Provider Type Discipline    KG 05/22/20 -  Tatyana Shay, PT Physical Therapist PT              PT Recommendation and Plan  Planned Therapy Interventions (PT): balance training, bed mobility training, gait training, strengthening, transfer training  Plan of Care Reviewed With: patient  Progress: improving  Outcome Evaluation: Pt increased ambulation distance to 175ft with RW and Ranjeet. VC's for upright posture and to keep RW close with feet inside middle. Pt's ambulation distance limited by c/o fatigue and SOA. Continue to progress as appropriate.     Time Calculation:    PT Charges     Row Name 03/05/22 1532             Time Calculation    Start Time 1532  -KG      PT Received On 03/05/22  -KG      PT Goal Re-Cert Due Date 03/13/22  -KG              Time Calculation- PT    Total Timed Code Minutes- PT 24 minute(s)  -KG              Timed Charges    16099 - PT Therapeutic Activity Minutes 24  -KG              Total Minutes    Timed Charges Total Minutes 24  -KG       Total Minutes 24  -KG            User Key  (r) = Recorded By, (t) = Taken By, (c) = Cosigned By    Initials Name Provider Type    KG Tatyana Shay, PT Physical Therapist              Therapy Charges for Today     Code Description  Service Date Service Provider Modifiers Qty    [id number]  PT THERAPEUTIC ACT EA 15 MIN 3/5/2022 Tatyana Shay, PT GP 2          PT G-Codes  Outcome Measure Options: AM-PAC 6 Clicks Basic Mobility (PT)  AM-PAC 6 Clicks Score (PT): 17  AM-PAC 6 Clicks Score (OT): 14    Kami Shay, PT  3/5/2022

## 2022-03-05 NOTE — PLAN OF CARE
Goal Outcome Evaluation:  Plan of Care Reviewed With: patient        Progress: improving  Outcome Evaluation: Patient has had a decent shift, sleeping on and off tonight. VSS, and patient has been alert and oriented times four. No complaints of pain tonight. Nursing staff will continue to monitor and assess the patient.

## 2022-03-05 NOTE — PROGRESS NOTES
[facility] Medicine Services  PROGRESS NOTE    Patient Name: Ash Diez  : 10/4/1928  MRN: [id number]    Date of Admission: 3/2/2022  Primary Care Physician: Karthikeyan Moreno MD    Subjective   Subjective     CC:  F/U dyspnea     HPI:  Feels well today. Back to normal.    ROS:  Gen- No fevers, chills  CV- No chest pain, palpitations  Resp- No cough, dyspnea    Objective   Objective     Vital Signs:   Temp:  [98 °F (36.7 °C)-98.3 °F (36.8 °C)] 98.2 °F (36.8 °C)  Heart Rate:  [57-95] 69  Resp:  [16-22] 18  BP: (113-140)/(59-87) 127/76  Flow (L/min):  [2] 2     Physical Exam:  Constitutional: No acute distress, awake, alert, laying in bed  HENT: NCAT, mucous membranes moist  Respiratory: Clear to auscultation bilaterally, respiratory effort normal   Cardiovascular: RRR, 3/6 systolic ejection murmur  Gastrointestinal: Positive bowel sounds, soft, nontender, nondistended  Musculoskeletal: No bilateral ankle edema  Psychiatric: Appropriate affect, cooperative  Neurologic: Cranial Nerves grossly intact to confrontation, speech clear  Skin: No rashes on exposed surfaces    Results Reviewed:  LAB RESULTS:      Lab 22  0628 22   WBC 10.33 10.22 11.88*   HEMOGLOBIN 17.0 16.1 15.7   HEMATOCRIT 53.8* 48.2 47.8   PLATELETS 195 227 230   NEUTROS ABS 6.72 6.63 9.59*   IMMATURE GRANS (ABS) 0.05 0.05 0.07*   LYMPHS ABS 2.13 2.05 1.13   MONOS ABS 1.23* 1.30* 0.99*   EOS ABS 0.15 0.14 0.06   .2* 95.4 95.4   PROCALCITONIN  --   --  0.07         Lab 22  0431 22  1015 22   SODIUM 141 140 135*   POTASSIUM 4.0 3.9 4.4   CHLORIDE 102 99 100   CO2 27.0 28.0 25.0   ANION GAP 12.0 13.0 10.0   BUN 26* 23 21   CREATININE 1.23 1.28* 1.33*   EGFR 54.7* 52.2* 49.8*   GLUCOSE 97 77 139*   CALCIUM 9.3 9.6 9.6   MAGNESIUM 2.4* 2.3 2.4*         Lab 22  1924   TOTAL PROTEIN 7.9   ALBUMIN 4.00   GLOBULIN 3.9   ALT (SGPT) 12   AST (SGOT) 17   BILIRUBIN  0.7   ALK PHOS 79         Lab 03/02/22  1924   PROBNP 9,350.0*   TROPONIN T <0.010                 Brief Urine Lab Results     None          Microbiology Results Abnormal     Procedure Component Value - Date/Time    COVID PRE-OP / PRE-PROCEDURE SCREENING ORDER (NO ISOLATION) - Swab, Nasopharynx [[id number]]  (Normal) Collected: 03/02/22 2241    Lab Status: Final result Specimen: Swab from Nasopharynx Updated: 03/02/22 2343    Narrative:      The following orders were created for panel order COVID PRE-OP / PRE-PROCEDURE SCREENING ORDER (NO ISOLATION) - Swab, Nasopharynx.  Procedure                               Abnormality         Status                     ---------                               -----------         ------                     COVID-19, FLU A/B, RSV P...[[id number]]  Normal              Final result                 Please view results for these tests on the individual orders.    COVID-19, FLU A/B, RSV PCR - Swab, Nasopharynx [[id number]]  (Normal) Collected: 03/02/22 2241    Lab Status: Final result Specimen: Swab from Nasopharynx Updated: 03/02/22 2343     COVID19 Not Detected     Influenza A PCR Not Detected     Influenza B PCR Not Detected     RSV, PCR Not Detected    Narrative:      Fact sheet for providers: https://www.fda.gov/media/481956/download    Fact sheet for patients: https://www.fda.gov/media/478288/download    Test performed by PCR.          Adult Transthoracic Echo Complete W/ Cont if Necessary Per Protocol    Result Date: 3/3/2022  · Left ventricular ejection fraction appears to be 31 - 35%. Left ventricular systolic function is moderately decreased. · Left ventricular diastolic function is consistent with (grade II w/high LAP) pseudonormalization. · Left atrial volume is mildly increased. · Severe aortic valve stenosis is present. Aortic valve area is 0.64 cm2. · Peak velocity of the flow distal to the aortic valve is 426.7 cm/s. Aortic valve mean pressure gradient is 38.8 mmHg. · Mild  dilation of the aortic root is present. Moderate dilation of the ascending aorta is present.        Results for orders placed during the hospital encounter of 03/02/22    Adult Transthoracic Echo Complete W/ Cont if Necessary Per Protocol    Interpretation Summary  · Left ventricular ejection fraction appears to be 31 - 35%. Left ventricular systolic function is moderately decreased.  · Left ventricular diastolic function is consistent with (grade II w/high LAP) pseudonormalization.  · Left atrial volume is mildly increased.  · Severe aortic valve stenosis is present. Aortic valve area is 0.64 cm2.  · Peak velocity of the flow distal to the aortic valve is 426.7 cm/s. Aortic valve mean pressure gradient is 38.8 mmHg.  · Mild dilation of the aortic root is present. Moderate dilation of the ascending aorta is present.      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        Brief Hospital Course to date:  Ash Diez is a 89 y.o. male with hypertension, AAA, hyperlipidemia, diastolic heart failure, valvular  heart disease admitted for shortness of breath secondary to acute on chronic heart failure.  Improved with diuresis.    This patient's problems and plans were partially entered by my partner and updated as appropriate by me 03/05/22.    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    -Seen by cardiology  -Amlodipine discontinued, started carvedilol, aldactone.  Lasix 40mg PO as needed for weight gain >2lbs and dyspnea or edema  -Per cardiology, patient does not desire to pursue TAVR    DVT prophylaxis:  Medical DVT prophylaxis orders are present.       AM-PAC 6 Clicks Score (PT): 19 (03/04/22 2000)    Disposition: He lives in assisted living at [facility] and PT/OT have recommended SNF rehab.  Case management has made referrals.  Medically ready anytime.    CODE STATUS:   Code Status and Medical Interventions:   Ordered at: 03/05/22 0942     Medical Intervention Limits:    NO intubation (DNI)     Level Of Support Discussed With:    Patient     Code Status (Patient has no pulse and is not breathing):    No CPR (Do Not Attempt to Resuscitate)     Medical Interventions (Patient has pulse or is breathing):    Limited Support       Sarah Carcamo MD  03/05/22

## 2022-03-05 NOTE — PLAN OF CARE
Goal Outcome Evaluation:  Plan of Care Reviewed With: patient        Progress: improving  Outcome Evaluation: Pt increased ambulation distance to 175ft with RW and Ranjeet. VC's for upright posture and to keep RW close with feet inside middle. Pt's ambulation distance limited by c/o fatigue and SOA. Continue to progress as appropriate.

## 2022-03-06 PROBLEM — I48.91 ATRIAL FIBRILLATION: Status: ACTIVE | Noted: 2022-01-01

## 2022-03-06 PROBLEM — I35.0 SEVERE AORTIC STENOSIS: Chronic | Status: ACTIVE | Noted: 2022-01-01

## 2022-03-06 NOTE — PLAN OF CARE
Goal Outcome Evaluation:  Plan of Care Reviewed With: patient        Progress: improving   VSS, RA, A/ox4. No complaints this shift. Currently resting in bed. Will continue plan of care.

## 2022-03-06 NOTE — PLAN OF CARE
Goal Outcome Evaluation:      VSS on 3L, c/o of occassional SOA, NP notified, lasix given, no other complaints per patient, will continue to monitor

## 2022-03-06 NOTE — PROGRESS NOTES
[facility] Medicine Services  PROGRESS NOTE    Patient Name: Ash Diez  : 10/4/1928  MRN: [id number]    Date of Admission: 3/2/2022  Primary Care Physician: Karthikeyan Moreno MD    Subjective   Subjective   CC:  F/U dyspnea     HPI:  Patient having intermittent episodes of shortness of air and he feels very tired today.  Daughter and granddaughter are in the room and concerned about his prognosis.  Dr. Mckeon will be coming down to talk to patient.    ROS:  Gen- No fevers, chills  CV- No chest pain, palpitations  Resp- No cough; +dyspnea  GI- No N/V/D, abd pain  All other systems have been reviewed and the pertinent positives and negatives are listed above in the HPI or ROS    Objective   Objective   Vital Signs:   Temp:  [97.4 °F (36.3 °C)-98.1 °F (36.7 °C)] 98.1 °F (36.7 °C)  Heart Rate:  [53-83] 55  Resp:  [18] 18  BP: (116-149)/(65-88) 118/68  Flow (L/min):  [2-3] 3  Physical Exam:  Constitutional: Alert, elderly ill-appearing male with intermittent shortness of air  Eyes: EOMI, sclerae anicteric, no conjunctival injection  Head: NCAT  ENT: [place], moist mucous membranes   Respiratory: Nonlabored, symmetrical chest expansion, CTAB, 95% 3L; diminished throughout  Cardiovascular: IRR, HR 52, no R/G, +murmur, +DP pulses bilaterally  Gastrointestinal: Soft, NT, ND +BS  Musculoskeletal: CALL; no LE edema bilaterally  Neurologic: Oriented x4, strength symmetric in all extremities, follows all commands, speech clear  Skin: No rashes on exposed skin  Psychiatric: Pleasant and cooperative; normal affect    Results Reviewed:  LAB RESULTS:      Lab 22  1020 22  0431 22  0628 22   WBC 10.27 10.33 10.22 11.88*   HEMOGLOBIN 15.5 17.0 16.1 15.7   HEMATOCRIT 47.8 53.8* 48.2 47.8   PLATELETS 209 195 227 230   NEUTROS ABS 7.54* 6.72 6.63 9.59*   IMMATURE GRANS (ABS) 0.05 0.05 0.05 0.07*   LYMPHS ABS 1.32 2.13 2.05 1.13   MONOS ABS 1.20* 1.23* 1.30* 0.99*   EOS ABS 0.12  0.15 0.14 0.06   MCV 95.8 100.2* 95.4 95.4   PROCALCITONIN  --   --   --  0.07         Lab 03/06/22  1020 03/05/22  0431 03/04/22  1015 03/02/22 1924   SODIUM 139 141 140 135*   POTASSIUM 3.8 4.0 3.9 4.4   CHLORIDE 103 102 99 100   CO2 27.0 27.0 28.0 25.0   ANION GAP 9.0 12.0 13.0 10.0   BUN 32* 26* 23 21   CREATININE 1.13 1.23 1.28* 1.33*   EGFR 60.6 54.7* 52.2* 49.8*   GLUCOSE 134* 97 77 139*   CALCIUM 8.9 9.3 9.6 9.6   MAGNESIUM 2.4* 2.4* 2.3 2.4*         Lab 03/02/22 1924   TOTAL PROTEIN 7.9   ALBUMIN 4.00   GLOBULIN 3.9   ALT (SGPT) 12   AST (SGOT) 17   BILIRUBIN 0.7   ALK PHOS 79         Lab 03/02/22 1924   PROBNP 9,350.0*   TROPONIN T <0.010                 Brief Urine Lab Results     None          Microbiology Results Abnormal     Procedure Component Value - Date/Time    COVID PRE-OP / PRE-PROCEDURE SCREENING ORDER (NO ISOLATION) - Swab, Nasopharynx [[id number]]  (Normal) Collected: 03/02/22 2241    Lab Status: Final result Specimen: Swab from Nasopharynx Updated: 03/02/22 2343    Narrative:      The following orders were created for panel order COVID PRE-OP / PRE-PROCEDURE SCREENING ORDER (NO ISOLATION) - Swab, Nasopharynx.  Procedure                               Abnormality         Status                     ---------                               -----------         ------                     COVID-19, FLU A/B, RSV P...[[id number]]  Normal              Final result                 Please view results for these tests on the individual orders.    COVID-19, FLU A/B, RSV PCR - Swab, Nasopharynx [[id number]]  (Normal) Collected: 03/02/22 2241    Lab Status: Final result Specimen: Swab from Nasopharynx Updated: 03/02/22 2343     COVID19 Not Detected     Influenza A PCR Not Detected     Influenza B PCR Not Detected     RSV, PCR Not Detected    Narrative:      Fact sheet for providers: https://www.fda.gov/media/975542/download    Fact sheet for patients: https://www.fda.gov/media/116259/download    Test  performed by PCR.          XR Chest 1 View    Result Date: 3/6/2022   DATE OF EXAM: 3/6/2022 12:22 PM  PROCEDURE: XR CHEST 1 VW-  INDICATIONS: SOA; J96.01-Acute respiratory failure with hypoxia; I50.9-Heart failure, unspecified; Z20.822-Contact with and (suspected) exposure to covid-19  COMPARISON: 03/02/2022 at 8:35 PM  TECHNIQUE: [Portable chest radiograph]  FINDINGS: Ill-defined multifocal airspace infiltrates are again seen bilaterally with associated diffuse interstitial changes. The findings are stable. The cardiac silhouette and mediastinum are stable.      Impression: Stable multifocal airspace infiltrates bilaterally with diffuse interstitial changes.  This report was finalized on 3/6/2022 12:45 PM by Carlo Quintana MD.        Results for orders placed during the hospital encounter of 03/02/22    Adult Transthoracic Echo Complete W/ Cont if Necessary Per Protocol    Interpretation Summary  · Left ventricular ejection fraction appears to be 31 - 35%. Left ventricular systolic function is moderately decreased.  · Left ventricular diastolic function is consistent with (grade II w/high LAP) pseudonormalization.  · Left atrial volume is mildly increased.  · Severe aortic valve stenosis is present. Aortic valve area is 0.64 cm2.  · Peak velocity of the flow distal to the aortic valve is 426.7 cm/s. Aortic valve mean pressure gradient is 38.8 mmHg.  · Mild dilation of the aortic root is present. Moderate dilation of the ascending aorta is present.      I have reviewed the medications:  Scheduled Meds:carvedilol, 3.125 mg, Oral, BID With Meals  heparin (porcine), 5,000 Units, Subcutaneous, Q12H  pharmacy consult - [place], , Does not apply, Daily  sodium chloride, 10 mL, Intravenous, Q12H  spironolactone, 25 mg, Oral, Daily      Continuous Infusions:   PRN Meds:.•  acetaminophen **OR** acetaminophen **OR** acetaminophen  •  ondansetron  •  sodium chloride  •  sodium chloride    Assessment/Plan   Assessment & Plan      Active Hospital Problems    Diagnosis  POA   • Severe aortic stenosis [I35.0]  Yes   • Atrial fibrillation (HCC) [I48.91]  Yes   • 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     Brief Hospital Course to date:  Ash Diez is a 89 y.o. male with hypertension, AAA, hyperlipidemia, diastolic heart failure, valvular heart disease admitted for shortness of breath secondary to acute on chronic heart failure.  Improved with diuresis.    These problems are new to me today    This patient's problems and plans were partially entered by my partner and updated as appropriate by me 03/06/22.    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    --Seen by cardiology; Mike to see today  --3/6/22 EKG shows bradycardia w/1st degree block   --Amlodipine discontinued, started carvedilol, aldactone.    --Lasix 40mg PO as needed for weight gain >2lbs and dyspnea or edema  --Per cardiology, patient does not desire to pursue TAVR  --Hospice consult after talking with Dr Mckeon    DVT prophylaxis:  Medical DVT prophylaxis orders are present.     AM-PAC 6 Clicks Score (PT): 17 (03/06/22 0800)    Disposition: He lives in assisted living at [facility] and PT/OT have recommended SNF rehab.  Case management has made referrals.      CODE STATUS:   Code Status and Medical Interventions:   Ordered at: 03/05/22 0942     Medical Intervention Limits:    NO intubation (DNI)     Level Of Support Discussed With:    Patient     Code Status (Patient has no pulse and is not breathing):    No CPR (Do Not  Attempt to Resuscitate)     Medical Interventions (Patient has pulse or is breathing):    Limited Support     More than 50% of time spent counseling on current illness and plan of care. Case discussed with: Patient, granddaughter, daughter, Dr. Carcamo and Dr. Mckeon  Total time spent face to face with the patient was 45 minutes.  Total time of the encounter was 60 minutes.    Jenn Delarosa, VALENTINO  03/06/22

## 2022-03-06 NOTE — PROGRESS NOTES
"[facility] Progress Note     LOS: 4 days   Patient Care Team:  Karthikeyan Moreno MD as PCP - General (Family Medicine)  PCP:  Karthikeyan Moreno MD    Chief Complaint: CHF, aortic stenosis    SUBJECTIVE: Intermittent dyspnea today.  Currently with shortness of breath although he does have clear lung fields and his O2 sats are 93%.  Telemetry reveals sinus rhythm with frequent premature atrial contractions.      Review of Systems:   All systems have been reviewed and are negative with the exception of those mentioned above.      OBJECTIVE:    Vital Sign Min/Max for last 24 hours  Temp  Min: 97.4 °F (36.3 °C)  Max: 98.1 °F (36.7 °C)   BP  Min: 116/65  Max: 149/88   Pulse  Min: 53  Max: 83   Resp  Min: 18  Max: 18   SpO2  Min: 91 %  Max: 95 %   No data recorded   No data recorded     Flowsheet Rows      First Filed Value   Admission Height 181.6 cm (71.5\") Documented at 03/02/2022 1833   Admission Weight 90.7 kg (200 lb) Documented at 03/02/2022 1833              Intake/Output Summary (Last 24 hours) at 3/6/2022 1355  Last data filed at 3/6/2022 0800  Gross per 24 hour   Intake 480 ml   Output 525 ml   Net -45 ml     Intake & Output (last 3 days)       03/03 0701  03/04 0700 03/04 0701  03/05 0700 03/05 0701  03/06 0700 03/06 0701  03/07 0700    P.O. 240   480    Total Intake(mL/kg) 240 (2.5)   480 (5)    Urine (mL/kg/hr) 1050 (0.5) 400 (0.2) 675 (0.3) 150 (0.2)    Stool 0       Total Output 1050 400 675 150    Net -810 -400 -675 +330            Urine Unmeasured Occurrence 1 x       Stool Unmeasured Occurrence 1 x              Physical Exam:    General Appearance:    Alert, cooperative, no distress, appears stated age   Neck:   Supple, symmetrical, trachea midline.   Lungs:     Clear to auscultation bilaterally, respirations unlabored   Chest Wall:    No tenderness or deformity    Heart:   Regularly irregular, S1 and S2 normal, 3/6 mid peaking systolic murmur heard at " the right upper sternal border with radiation to apex, rub   or gallop, normal carotid impulse bilaterally without bruit.   Extremities:   Extremities normal, atraumatic, no cyanosis or edema   Pulses:   2+ and symmetric all extremities   Skin:   Skin color, texture, turgor normal, no rashes or lesions      LABS/DIAGNOSTIC DATA:  Results from last 7 days   Lab Units 03/06/22  1020 03/05/22  0431 03/04/22  0628   WBC 10*3/mm3 10.27 10.33 10.22   HEMOGLOBIN g/dL 15.5 17.0 16.1   HEMATOCRIT % 47.8 53.8* 48.2   PLATELETS 10*3/mm3 209 195 227     Lab Results   Lab Value Date/Time    TROPONINT <0.010 03/02/2022 1924    TROPONINT 0.030 09/05/2019 0751         Results from last 7 days   Lab Units 03/06/22  1020 03/05/22  0431 03/04/22  1015 03/02/22  1924   SODIUM mmol/L 139 141 140 135*   POTASSIUM mmol/L 3.8 4.0 3.9 4.4   CHLORIDE mmol/L 103 102 99 100   CO2 mmol/L 27.0 27.0 28.0 25.0   BUN mg/dL 32* 26* 23 21   CREATININE mg/dL 1.13 1.23 1.28* 1.33*   CALCIUM mg/dL 8.9 9.3 9.6 9.6   BILIRUBIN mg/dL  --   --   --  0.7   ALK PHOS U/L  --   --   --  79   ALT (SGPT) U/L  --   --   --  12   AST (SGOT) U/L  --   --   --  17   GLUCOSE mg/dL 134* 97 77 139*                       Medication Review:   carvedilol, 3.125 mg, Oral, BID With Meals  heparin (porcine), 5,000 Units, Subcutaneous, Q12H  pharmacy consult - [facility], , Does not apply, Daily  sodium chloride, 10 mL, Intravenous, Q12H  spironolactone, 25 mg, Oral, Daily             ASSESSMENT/PLAN:    Abdominal aortic aneurysm (AAA) without rupture (HCC)    Essential hypertension    Hyperlipidemia    Prediabetes    Diastolic congestive heart failure (HCC)    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  Afterload well controlled.  Suspect that this is due to underlying coronary ischemia and valvular heart disease.  He does  not desire any further invasive cardiac procedures at this time.  We again had a prolonged discussion with the patient and the family at bedside discussing options for therapy to include consideration for TAVR, continued medical therapy, and even evaluation for hospice.  We will proceed with medical therapy and close clinical follow-up and arrange for hospice consult.  -Continue current medical therapy, adding low-dose Lasix.    Severe aortic stenosis: Currently does not desire any further evaluation for percutaneous therapy, he is not a candidate for surgical AVR.  Continue medical therapy.    -Currently not a candidate for ACE/ARB/Arni due to intermittent hypotension  -Not a candidate for LifeVest or ICD.      Carlito Mckeon III, MD   03/06/22  13:55 EST

## 2022-03-07 NOTE — PROGRESS NOTES
"[facility] Progress Note     LOS: 5 days   Patient Care Team:  Karthikeyan Moreno MD as PCP - General (Family Medicine)  PCP:  Karthikeyan Moreno MD    Chief Complaint: CHF, aortic stenosis    SUBJECTIVE: Still with intermittent dyspnea.  Requiring 2 L nasal cannula to maintain O2 sat greater than 90%.  He was able to ambulate at least 100 feet around the unit yesterday.      Review of Systems:   All systems have been reviewed and are negative with the exception of those mentioned above.      OBJECTIVE:    Vital Sign Min/Max for last 24 hours  Temp  Min: 97.5 °F (36.4 °C)  Max: 98.1 °F (36.7 °C)   BP  Min: 118/68  Max: 146/79   Pulse  Min: 55  Max: 85   Resp  Min: 16  Max: 20   SpO2  Min: 88 %  Max: 98 %   No data recorded   No data recorded     Flowsheet Rows      First Filed Value   Admission Height 181.6 cm (71.5\") Documented at 03/02/2022 1833   Admission Weight 90.7 kg (200 lb) Documented at 03/02/2022 1833              Intake/Output Summary (Last 24 hours) at 3/7/2022 0925  Last data filed at 3/7/2022 0712  Gross per 24 hour   Intake --   Output 575 ml   Net -575 ml     Intake & Output (last 3 days)       03/04 0701  03/05 0700 03/05 0701  03/06 0700 03/06 0701  03/07 0700 03/07 0701  03/08 0700    P.O.   480     Total Intake(mL/kg)   480 (5)     Urine (mL/kg/hr) 400 (0.2) 675 (0.3) 525 (0.2) 200 (0.9)    Stool        Total Output 400 675 525 200    Net -400 -675 -45 -200                   Physical Exam:    General Appearance:    Alert, cooperative, no distress, appears stated age   Neck:   Supple, symmetrical, trachea midline.   Lungs:     Clear to auscultation bilaterally, respirations unlabored   Chest Wall:    No tenderness or deformity    Heart:   Regularly irregular, S1 and S2 normal, 3/6 mid peaking systolic murmur heard at the right upper sternal border with radiation to apex, rub   or gallop, normal carotid impulse bilaterally without bruit. "   Extremities:   Extremities normal, atraumatic, no cyanosis or edema   Pulses:   2+ and symmetric all extremities   Skin:   Skin color, texture, turgor normal, no rashes or lesions      LABS/DIAGNOSTIC DATA:  Results from last 7 days   Lab Units 03/06/22  1020 03/05/22  0431 03/04/22  0628   WBC 10*3/mm3 10.27 10.33 10.22   HEMOGLOBIN g/dL 15.5 17.0 16.1   HEMATOCRIT % 47.8 53.8* 48.2   PLATELETS 10*3/mm3 209 195 227     Lab Results   Lab Value Date/Time    TROPONINT <0.010 03/02/2022 1924    TROPONINT 0.030 09/05/2019 0751         Results from last 7 days   Lab Units 03/06/22  1020 03/05/22  0431 03/04/22  1015 03/02/22  1924   SODIUM mmol/L 139 141 140 135*   POTASSIUM mmol/L 3.8 4.0 3.9 4.4   CHLORIDE mmol/L 103 102 99 100   CO2 mmol/L 27.0 27.0 28.0 25.0   BUN mg/dL 32* 26* 23 21   CREATININE mg/dL 1.13 1.23 1.28* 1.33*   CALCIUM mg/dL 8.9 9.3 9.6 9.6   BILIRUBIN mg/dL  --   --   --  0.7   ALK PHOS U/L  --   --   --  79   ALT (SGPT) U/L  --   --   --  12   AST (SGOT) U/L  --   --   --  17   GLUCOSE mg/dL 134* 97 77 139*                       Medication Review:   carvedilol, 3.125 mg, Oral, BID With Meals  furosemide, 20 mg, Oral, Daily  heparin (porcine), 5,000 Units, Subcutaneous, Q12H  pharmacy consult - MTM, , Does not apply, Daily  sodium chloride, 10 mL, Intravenous, Q12H  spironolactone, 25 mg, Oral, Daily             ASSESSMENT/PLAN:    Abdominal aortic aneurysm (AAA) without rupture (HCC)    Essential hypertension    Hyperlipidemia    Prediabetes    Diastolic congestive heart failure (HCC)    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  Excellent response to diuresis.  Afterload well controlled.  Suspect that this is due to underlying coronary ischemia and valvular heart disease.  Since he is still having intermittent  dyspnea now that he is on a stable medical regimen and appears to have optimized preload and afterload conditions he has now decided to proceed with further evaluation regarding definitive ischemia evaluation and potential TAVR.  This decision making included his family to include his daughter and granddaughter.  We will schedule left heart catheterization and KIARA tomorrow.    Severe aortic stenosis: Consideration for TAVR.    -Currently not a candidate for ACE/ARB/Arni due to intermittent hypotension  -Not a candidate for LifeVest or ICD.      Carlito Mckeon III, MD   03/07/22  09:25 EST

## 2022-03-07 NOTE — THERAPY TREATMENT NOTE
Patient Name: Ash Diez  : 10/4/1928    MRN: [id number]                              Today's Date: 3/7/2022       Admit Date: 3/2/2022    Visit Dx:     ICD-10-CM ICD-9-CM   1. Acute respiratory failure with hypoxia (HCC)  J96.01 518.81   2. Acute on chronic congestive heart failure, unspecified heart failure type (HCC)  I50.9 428.0   3. 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 has been evaluted at . pt decided agaisnt surgery    • Cancer (HCC)     colon   • Cellulitis     RLE   • CHF (congestive heart failure) (HCC)    • Hyperlipidemia    • Hypertension    • Pneumonia      Past Surgical History:   Procedure Laterality Date   • COLON SURGERY     • CORONARY STENT PLACEMENT     • VASCULAR SURGERY      Vein removal      General Information     Row Name 22 1016          OT Time and Intention    Document Type therapy note (daily note)  -KF     Mode of Treatment occupational therapy  -KF     Row Name 22 1016          General Information    Patient Profile Reviewed yes  -KF     Existing Precautions/Restrictions fall;oxygen therapy device and L/min  -KF     Barriers to Rehab hearing deficit;previous functional deficit  -KF     Row Name 22 1016          Cognition    Orientation Status (Cognition) oriented x 4  -KF     Row Name 22 1016          Safety Issues, Functional Mobility    Safety Issues Affecting Function (Mobility) awareness of need for assistance;insight into deficits/self-awareness;safety precaution awareness  -KF     Impairments Affecting Function  (Mobility) balance;coordination;endurance/activity tolerance;postural/trunk control;shortness of breath;strength  -KF           User Key  (r) = Recorded By, (t) = Taken By, (c) = Cosigned By    Initials Name Provider Type    KF Charo Trinidad, OT Occupational Therapist                 Mobility/ADL's     Row Name 03/07/22 1016          Bed Mobility    Bed Mobility scooting/bridging;supine-sit  -KF     Scooting/Bridging [place] (Bed Mobility) verbal cues;standby assist  -KF     Supine-Sit [place] (Bed Mobility) standby assist;verbal cues  -KF     Bed Mobility, Safety Issues decreased use of arms for pushing/pulling  -KF     Assistive Device (Bed Mobility) bed rails;head of bed elevated  -KF     Comment, (Bed Mobility) cues for seq  -KF     Row Name 03/07/22 1016          Transfers    Transfers bed-chair transfer;sit-stand transfer  -KF     Comment, (Transfers) cues for seq and HP, demonstrated improvement with 3 STS reps total  -KF     Bed-Chair [place] (Transfers) verbal cues;contact guard  -KF     Assistive Device (Bed-Chair Transfers) walker, front-wheeled  -KF     Sit-Stand [place] (Transfers) standby assist;verbal cues  -KF     Row Name 03/07/22 1016          Sit-Stand Transfer    Assistive Device (Sit-Stand Transfers) walker, front-wheeled  -KF     Row Name 03/07/22 1016          Functional Mobility    Functional Mobility- Ind. Level contact guard assist;1 person;verbal cues required  -KF     Functional Mobility- Device rolling walker  -KF     Functional Mobility-Distance (Feet) 12  -KF     Functional Mobility- Safety Issues supplemental O2;weight-shifting ability decreased  -KF     Functional Mobility- Comment no LOB slightly unsteady with turns and improved with time  -KF     Row Name 03/07/22 1016          Activities of Daily Living    BADL Assessment/Intervention upper body dressing;lower body dressing;grooming  -     Row Name 03/07/22 1016          Lower Body Dressing  Assessment/Training    [place] Level (Lower Body Dressing) don;doff;socks;dependent (less than 25% patient effort)  -     Position (Lower Body Dressing) sitting up in bed  -     Comment, (Lower Body Dressing) reports this is baseline  -     Row Name 03/07/22 1016          Grooming Assessment/Training    [place] Level (Grooming) oral care regimen;hair care, combing/brushing;set up  -KF     Position (Grooming) supported sitting  -     Row Name 03/07/22 1016          Upper Body Dressing Assessment/Training    [place] Level (Upper Body Dressing) don;front opening garment;minimum assist (75% patient effort)  -KF     Position (Upper Body Dressing) edge of bed sitting  -     Comment, (Upper Body Dressing) cues for dressing LUE first  -           User Key  (r) = Recorded By, (t) = Taken By, (c) = Cosigned By    Initials Name Provider Type    KF Charo Trinidad, OT Occupational Therapist               Obj/Interventions     Row Name 03/07/22 1018          Shoulder (Therapeutic Exercise)    Shoulder (Therapeutic Exercise) AROM (active range of motion)  -     Shoulder AROM (Therapeutic Exercise) right;flexion;extension;horizontal aBduction/aDduction;10 repetitions;standing;sitting;3 sets  1 set in sitting then 1 in standing with seated rest breaks  -     Row Name 03/07/22 1018          Motor Skills    Therapeutic Exercise shoulder  -     Row Name 03/07/22 1018          Balance    Balance Assessment sitting static balance;sitting dynamic balance;standing static balance;standing dynamic balance  -     Static Sitting Balance supervision  -     Dynamic Sitting Balance supervision  -     Position, Sitting Balance unsupported;sitting edge of bed  -KF     Static Standing Balance contact guard;verbal cues  -     Dynamic Standing Balance minimal assist;verbal cues;contact guard  -KF     Balance Interventions sit to stand;standing;supported;weight shifting activity;dynamic reaching;UE activity  with balance activity  -KF     Comment, Balance progressing towards CGA at [facility]  -KF           User Key  (r) = Recorded By, (t) = Taken By, (c) = Cosigned By    Initials Name Provider Type    Charo Dalton, OT Occupational Therapist               Goals/Plan    No documentation.                Clinical Impression     Row Name 03/07/22 1020          Pain Assessment    Pretreatment Pain Rating 0/10 - no pain  -KF     Posttreatment Pain Rating 0/10 - no pain  -KF     Pre/Posttreatment Pain Comment tolerated  -KF     Pain Intervention(s) Repositioned;Ambulation/increased activity  -     Row Name 03/07/22 1020          Plan of Care Review    Plan of Care Reviewed With patient;family  -     Progress improving  -     Outcome Evaluation Pt progressing with activity tolerance, completed 2 sets standing TA and BUE TE with improving balance, SBA bed mob, CGA 12ft functional mob at [facility], cont IPOT per POC  -     Row Name 03/07/22 1020          Therapy Assessment/Plan (OT)    Rehab Potential (OT) good, to achieve stated therapy goals  -     Criteria for Skilled Therapeutic Interventions Met (OT) skilled treatment is necessary;meets criteria  -KF     Therapy Frequency (OT) daily  -KF     Row Name 03/07/22 1020          Therapy Plan Review/Discharge Plan (OT)    Anticipated Discharge Disposition (OT) skilled nursing facility  -     Row Name 03/07/22 1020          Vital Signs    Pre Systolic BP Rehab --  RN cleared VSS  -KF     Pre SpO2 (%) 92  -KF     O2 Delivery Pre Treatment supplemental O2  3L  -KF     Intra SpO2 (%) 87  -KF     O2 Delivery Intra Treatment supplemental O2  -KF     Post SpO2 (%) 94  -KF     O2 Delivery Post Treatment supplemental O2  -KF     Pre Patient Position Supine  -KF     Intra Patient Position Standing  -KF     Post Patient Position Sitting  -KF     Rest Breaks  3  -KF     Row Name 03/07/22 1020          Positioning and Restraints    Pre-Treatment Position in bed  -KF     Post Treatment  Position chair  -KF     In Chair notified nsg;reclined;sitting;call light within reach;encouraged to call for assist;exit alarm on;waffle cushion;legs elevated;with other staff;with family/caregiver  -KF           User Key  (r) = Recorded By, (t) = Taken By, (c) = Cosigned By    Initials Name Provider Type    Charo Dalton OT Occupational Therapist               Outcome Measures     Row Name 03/07/22 1022          How much help from another is currently needed...    Putting on and taking off regular lower body clothing? 1  -KF     Bathing (including washing, rinsing, and drying) 2  -KF     Toileting (which includes using toilet bed pan or urinal) 3  -KF     Putting on and taking off regular upper body clothing 3  -KF     Taking care of personal grooming (such as brushing teeth) 3  -KF     Eating meals 4  -KF     AM-PAC 6 Clicks Score (OT) 16  -KF     Row Name 03/07/22 0800          How much help from another person do you currently need...    Turning from your back to your side while in flat bed without using bedrails? 3  -MR     Moving from lying on back to sitting on the side of a flat bed without bedrails? 3  -MR     Moving to and from a bed to a chair (including a wheelchair)? 3  -MR     Standing up from a chair using your arms (e.g., wheelchair, bedside chair)? 3  -MR     Climbing 3-5 steps with a railing? 2  -MR     To walk in hospital room? 3  -MR     AM-PAC 6 Clicks Score (PT) 17  -MR     Row Name 03/07/22 1022          Functional Assessment    Outcome Measure Options AM-PAC 6 Clicks Daily Activity (OT)  -KF           User Key  (r) = Recorded By, (t) = Taken By, (c) = Cosigned By    Initials Name Provider Type    Charo Dalton OT Occupational Therapist    Roxanne Leon, RN Registered Nurse                Occupational Therapy Education                 Title: PT OT SLP Therapies (In Progress)     Topic: Occupational Therapy (Done)     Point: ADL training (Done)     Description:   Instruct  learner(s) on proper safety adaptation and remediation techniques during self care or transfers.   Instruct in proper use of assistive devices.              Learning Progress Summary           Patient Acceptance, E,TB,D, DU,VU,NR by  at 3/7/2022 1023    Comment: incorporated PLB and seq    Acceptance, E, NR by  at 3/3/2022 0820   Family Acceptance, E,TB,D, DU,VU,NR by  at 3/7/2022 1023    Comment: incorporated PLB and seq                   Point: Home exercise program (Done)     Description:   Instruct learner(s) on appropriate technique for monitoring, assisting and/or progressing therapeutic exercises/activities.              Learning Progress Summary           Patient Acceptance, E,TB,D, DU,VU,NR by  at 3/7/2022 1023    Comment: incorporated PLB and seq   Family Acceptance, E,TB,D, DU,VU,NR by  at 3/7/2022 1023    Comment: incorporated PLB and seq                   Point: Precautions (Done)     Description:   Instruct learner(s) on prescribed precautions during self-care and functional transfers.              Learning Progress Summary           Patient Acceptance, E,TB,D, DU,VU,NR by  at 3/7/2022 1023    Comment: incorporated PLB and seq    Acceptance, E, NR by  at 3/3/2022 0820   Family Acceptance, E,TB,D, DU,VU,NR by  at 3/7/2022 1023    Comment: incorporated PLB and seq                   Point: Body mechanics (Done)     Description:   Instruct learner(s) on proper positioning and spine alignment during self-care, functional mobility activities and/or exercises.              Learning Progress Summary           Patient Acceptance, E,TB,D, DU,VU,NR by  at 3/7/2022 1023    Comment: incorporated PLB and seq    Acceptance, E, NR by  at 3/3/2022 0820   Family Acceptance, E,TB,D, DU,VU,NR by  at 3/7/2022 1023    Comment: incorporated PLB and seq                               User Key     Initials Effective Dates Name Provider Type Discipline     06/16/21 -  Charo Trinidad, OT Occupational  Therapist OT    EMANI 06/16/21 -  Jenn Guevara OT Occupational Therapist OT              OT Recommendation and Plan  Therapy Frequency (OT): daily  Plan of Care Review  Plan of Care Reviewed With: patient, family  Progress: improving  Outcome Evaluation: Pt progressing with activity tolerance, completed 2 sets standing TA and BUE TE with improving balance, SBA bed mob, CGA 12ft functional mob at [facility], cont IPOT per POC     Time Calculation:    Time Calculation- OT     Row Name 03/07/22 0849             Time Calculation- OT    OT Start Time 0849  -KF      OT Received On 03/07/22  -KF              Timed Charges    67430 - OT Therapeutic Exercise Minutes 5  -KF      06450 - OT Therapeutic Activity Minutes 28  -KF      40305 - OT Self Care/Mgmt Minutes 5  -KF              Total Minutes    Timed Charges Total Minutes 38  -KF       Total Minutes 38  -KF            User Key  (r) = Recorded By, (t) = Taken By, (c) = Cosigned By    Initials Name Provider Type    Charo Dalton OT Occupational Therapist              Therapy Charges for Today     Code Description Service Date Service Provider Modifiers Qty    [id number]  OT THERAPEUTIC ACT EA 15 MIN 3/7/2022 Charo Triindad OT GO 2    [id number]  OT THER PROC EA 15 MIN 3/7/2022 Charo Trinidad OT GO 1               Charo Trinidad OT  3/7/2022

## 2022-03-07 NOTE — DISCHARGE PLACEMENT REQUEST
"Ash Diez (89 y.o. Male)     - Yoanna [phone]              Date of Birth   10/04/1928    Social Security Number       Address   [address] [place] [address]    Home Phone   [phone]    MRN   [id number]       Hinduism   Roman Catholic    Marital Status                               Admission Date   3/2/22    Admission Type   Emergency    Admitting Provider   Sarah Carcamo MD    Attending Provider   Sarah Carcamo MD    Department, Room/Bed   [address], S205/1       Discharge Date       Discharge Disposition       Discharge Destination                               Attending Provider: Sarah Carcamo MD    Allergies: No Known Allergies    Isolation: None   Infection: None   Code Status: No CPR   Advance Care Planning Activity    Ht: 181.6 cm (71.5\")   Wt: 96.8 kg (213 lb 4.8 oz)    Admission Cmt: None   Principal Problem: None                Active Insurance as of 3/2/2022     Primary Coverage     Payor Plan Insurance Group Employer/Plan Group    MEDICARE MEDICARE A & B      Payor Plan Address Payor Plan Phone Number Payor Plan Fax Number Effective Dates    [address] [phone]  10/1/1993 - None Entered    [facility] [address]       Subscriber Name Subscriber Birth Date Member ID       ASH DIEZ 10/4/1928 [id number]           Secondary Coverage     Payor Plan Insurance Group Employer/Plan Group    [employer] SUP AAR HEALTH CARE OPTIONS      Payor Plan Address Payor Plan Phone Number Payor Plan Fax Number Effective Dates    [facility] [phone]  1/1/2019 - None Entered    [address]       [facility] [address]       Subscriber Name Subscriber Birth Date Member ID       ASH DIEZ 10/4/1928 [id number]                 Emergency Contacts      (Rel.) Home Phone Work Phone Mobile Phone    PEPE HERNÁNDEZ (Daughter) [phone] -- [phone]    MateusGregoria dorsey (Grandchild) [phone] -- --            Insurance " Information                MEDICARE/MEDICARE A & B Phone: [phone]    Subscriber: Ash Diez Subscriber#: [id number]    Group#: -- Precert#: --        [facility] HEALTH CARE OPTIONS Phone: [phone]    Subscriber: Ash Diez Subscriber#: [id number]    Group#: -- Precert#: --             History & Physical      MerlinJesús hill DO at 22 0032              [facility] Medicine Services  HISTORY AND PHYSICAL    Patient Name: Ash Diez  : 10/4/1928  MRN: [id number]  Primary Care Physician: Karthikeyan Moreno MD  Date of admission: 3/2/2022      Subjective   Subjective     Chief Complaint:  Shortness of air    HPI:  Ash Diez is a 89 y.o. male with past medical history of diastolic CHF, mild to moderate aortic regurgitation, moderate aortic stenosis as of , prediabetes, essential hypertension, hyperlipidemia, AAA who presents to the ER with complaints of dyspnea mainly with exertion over the past 24 hours and orthopnea.    Patient has prior history of diastolic CHF and valvular dysfunction.  At some point he was taken off of diuretics.  He does not remember the reasoning why or when this actually happened.  Patient states that over the past 24 hours he has had increasing shortness of air.  Mainly with exertion.  Some shortness of air with rest.  He reports chronic lower extremity edema which has not changed in the last several days.  He denies any chest pain or pressure.  Denies any nausea or vomiting or abdominal pain.  He currently is living at [facility] living [facility] and normally is able to perform ADLs without any issue.  Denies any cough, fever, or chills.  Does report some increased orthopnea starting last night.  SOA constant, rated 4/10 in severity, worse with exertion.    Work-up in the ER revealing pulmonary edema.  Patient given 80 mg Lasix in the ER and has already diuresed greater than 500 cc.  He is feeling some improved and his shortness  of breath      COVID Details:    Symptoms:    [] NONE [] Fever []  Cough [x] Shortness of breath [] Change in taste/smell      Review of Systems   Gen- No fevers, chills  CV- No chest pain, palpitations  Resp- No cough, reports dyspnea  GI- No N/V/D, abd pain      All other systems reviewed and are negative.     Personal History     Past Medical History:   Diagnosis Date   • Aneurysm (HCC)     on aortic valve. has been evaluted at . pt decided agaisnt surgery    • Cancer (HCC)     colon   • Cellulitis     RLE   • CHF (congestive heart failure) (HCC)    • Hyperlipidemia    • Hypertension    • Pneumonia        Past Surgical History:   Procedure Laterality Date   • COLON SURGERY     • CORONARY STENT PLACEMENT     • VASCULAR SURGERY      Vein removal       Family History:  family history includes Arthritis in his mother; Heart attack in his mother; Hypertension in his daughter; Stroke in his brother, father, and sister. Otherwise pertinent FHx was reviewed and unremarkable.     Social History:  reports that he has never smoked. He has never used smokeless tobacco. He reports previous alcohol use. He reports that he does not use drugs.  Social History     Social History Narrative    2021: From [place], [place]. , has been living with his elderly daughter and son-in-law, but now living with his granddaughter in [place] to get access to care. Overall patient has been more or less bedbound since fall in January 2021 with left shoulder fracture.  Has barely been able to get around with cane, does not have a wheelchair.       Medications:  Available home medication information reviewed.  Medications Prior to Admission   Medication Sig Dispense Refill Last Dose   • amLODIPine (NORVASC) 5 MG tablet Take 1.5 tablets by mouth Daily. Needs to keep appointment 12/23/2020 and complete lab work that will be ordered for any ongoing refills. 45 tablet 6        No Known Allergies    Objective   Objective     Vital Signs:   Temp:   [97.4 °F (36.3 °C)-97.7 °F (36.5 °C)] 97.7 °F (36.5 °C)  Heart Rate:  [83-95] 95  Resp:  [18-28] 22  BP: (129-166)/(68-98) 129/88       Physical Exam   Constitutional: Awake, alert, appears much younger than stated age  Eyes: PERRLA, sclerae anicteric, no conjunctival injection  HENT: NCAT, mucous membranes moist  Neck: Supple, no thyromegaly, no lymphadenopathy, trachea midline  Respiratory: Clear to auscultation bilaterally, nonlabored respirations   Cardiovascular: RRR, + murmur, palpable pedal pulses bilaterally  Gastrointestinal: Positive bowel sounds, soft, nontender, nondistended  Musculoskeletal: 2+ pitting bilateral ankle edema, no clubbing or cyanosis to extremities  Psychiatric: Appropriate affect, cooperative  Neurologic: Oriented x 3, strength symmetric in all extremities, Cranial Nerves grossly intact to confrontation, speech clear  Skin: No rashes      Result Review:  I have personally reviewed the results from the time of this admission to 3/3/2022 00:35 EST and agree with these findings:  [x]  Laboratory  []  Microbiology  [x]  Radiology  [x]  EKG/Telemetry   []  Cardiology/Vascular   []  Pathology  []  Old records  []  Other:  Most notable findings include: EKG without any acute ischemic changes, chest x-ray with evidence of pulmonary edema      LAB RESULTS:      Lab 03/02/22 1924   WBC 11.88*   HEMOGLOBIN 15.7   HEMATOCRIT 47.8   PLATELETS 230   NEUTROS ABS 9.59*   IMMATURE GRANS (ABS) 0.07*   LYMPHS ABS 1.13   MONOS ABS 0.99*   EOS ABS 0.06   MCV 95.4   PROCALCITONIN 0.07         Lab 03/02/22 1924   SODIUM 135*   POTASSIUM 4.4   CHLORIDE 100   CO2 25.0   ANION GAP 10.0   BUN 21   CREATININE 1.33*   EGFR 49.8*   GLUCOSE 139*   CALCIUM 9.6         Lab 03/02/22 1924   TOTAL PROTEIN 7.9   ALBUMIN 4.00   GLOBULIN 3.9   ALT (SGPT) 12   AST (SGOT) 17   BILIRUBIN 0.7   ALK PHOS 79         Lab 03/02/22 1924   PROBNP 9,350.0*   TROPONIN T <0.010                     Microbiology Results (last 10 days)      Procedure Component Value - Date/Time    COVID PRE-OP / PRE-PROCEDURE SCREENING ORDER (NO ISOLATION) - Swab, Nasopharynx [[id number]]  (Normal) Collected: 03/02/22 2241    Lab Status: Final result Specimen: Swab from Nasopharynx Updated: 03/02/22 2343    Narrative:      The following orders were created for panel order COVID PRE-OP / PRE-PROCEDURE SCREENING ORDER (NO ISOLATION) - Swab, Nasopharynx.  Procedure                               Abnormality         Status                     ---------                               -----------         ------                     COVID-19, FLU A/B, RSV P...[[id number]]  Normal              Final result                 Please view results for these tests on the individual orders.    COVID-19, FLU A/B, RSV PCR - Swab, Nasopharynx [[id number]]  (Normal) Collected: 03/02/22 2241    Lab Status: Final result Specimen: Swab from Nasopharynx Updated: 03/02/22 2343     COVID19 Not Detected     Influenza A PCR Not Detected     Influenza B PCR Not Detected     RSV, PCR Not Detected    Narrative:      Fact sheet for providers: https://www.fda.gov/media/282109/download    Fact sheet for patients: https://www.fda.gov/media/719859/download    Test performed by PCR.          XR Chest 1 View    Result Date: 3/2/2022  EXAMINATION: XR CHEST 1 VW-  INDICATION: SOA triage protocol  COMPARISON: 9/8/2019  FINDINGS: Lung volumes are low. Heart is enlarged and there is a diffuse pulmonary edema pattern present. No definite effusion or evidence of pneumothorax is seen. Prominent mediastinal shadow and ectatic appearing aortic knob shadow are stable. Chronic left proximal humerus fracture is again seen as on 7/8/2021 shoulder films.       Impression: Congestive heart failure.    This report was finalized on 3/2/2022 9:03 PM by Dr. Tam Montoya MD.        Results for orders placed during the hospital encounter of 09/16/19    Adult Transthoracic Echo Complete W/ Cont if Necessary Per  Protocol    Interpretation Summary  · Estimated EF appears to be in the range of 56 - 60%.  · Left ventricular diastolic dysfunction (grade I) consistent with impaired relaxation.  · Left ventricular wall thickness is consistent with mild concentric hypertrophy.  · Left atrial cavity size is mildly dilated.  · Moderate aortic valve stenosis is present.  · Mild to moderate aortic valve regurgitation is present.  · Mild tricuspid valve regurgitation is present.  · Calculated right ventricular systolic pressure from tricuspid regurgitation is 27 mmHg.  · Severe dilation of the aortic root is present. Severe dilation of the proximal aorta is present.      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) [I50.9, I38]  Unknown   • Nonrheumatic aortic valve insufficiency [I35.1]  Unknown   • Diastolic congestive heart failure (HCC) [I50.30]  Yes   • Prediabetes [R73.03]  Yes     A1c 5.7 on 5/29/19 with prior PCP     • Aortic stenosis, moderate [I35.0]  Yes   • Hyperlipidemia [E78.5]  Yes   • Essential hypertension [I10]  Yes   • Abdominal aortic aneurysm (AAA) without rupture (HCC) [I71.4]  Yes     Patient is a 89-year-old male with past medical history of diastolic CHF, mild to moderate aortic regurgitation, moderate aortic stenosis as of 2019, prediabetes, essential hypertension, hyperlipidemia, AAA who presents to the ER with complaints of dyspnea mainly with exertion over the past 24 hours and orthopnea.    1.  Acute on chronic diastolic CHF  2.  CHF exacerbated by valvular heart disease/AR/AS  3.  Preload dependency secondary to AS  4.  Essential hypertension  5.  Hyperlipidemia  6.  Severe dilation of aortic root  7.  AAA    --Lasix 80 mg given in ER  --Apply external urinary cath  --PT/OT  --Echo  --Cardiology consult, reports seeing someone in our systemp before. ?previously seen Dr. Mckeon  --Continue 40 mg twice daily IV starting in the  "a.m.  --strict is and os        DVT prophylaxis: heparin      CODE STATUS: full code  There are no questions and answers to display.         Jesús Boyer DO  03/03/22      Electronically signed by Jesús Boyer DO at 03/03/22 0045          Physician Progress Notes (last 24 hours)      Carlito Mckeon III, MD at 03/07/22 0925          [facility] Progress Note     LOS: 5 days   Patient Care Team:  Karthikeyan Moreno MD as PCP - General (Family Medicine)  PCP:  Karthikeyan Moreno MD    Chief Complaint: CHF, aortic stenosis    SUBJECTIVE: Still with intermittent dyspnea.  Requiring 2 L nasal cannula to maintain O2 sat greater than 90%.  He was able to ambulate at least 100 feet around the unit yesterday.      Review of Systems:   All systems have been reviewed and are negative with the exception of those mentioned above.      OBJECTIVE:    Vital Sign Min/Max for last 24 hours  Temp  Min: 97.5 °F (36.4 °C)  Max: 98.1 °F (36.7 °C)   BP  Min: 118/68  Max: 146/79   Pulse  Min: 55  Max: 85   Resp  Min: 16  Max: 20   SpO2  Min: 88 %  Max: 98 %   No data recorded   No data recorded     Flowsheet Rows      First Filed Value   Admission Height 181.6 cm (71.5\") Documented at 03/02/2022 1833   Admission Weight 90.7 kg (200 lb) Documented at 03/02/2022 1833              Intake/Output Summary (Last 24 hours) at 3/7/2022 0925  Last data filed at 3/7/2022 0712  Gross per 24 hour   Intake --   Output 575 ml   Net -575 ml     Intake & Output (last 3 days)       03/04 0701 03/05 0700 03/05 0701  03/06 0700 03/06 0701  03/07 0700 03/07 0701  03/08 0700    P.O.   480     Total Intake(mL/kg)   480 (5)     Urine (mL/kg/hr) 400 (0.2) 675 (0.3) 525 (0.2) 200 (0.9)    Stool        Total Output 400 675 525 200    Net -400 -675 -45 -200                   Physical Exam:    General Appearance:    Alert, cooperative, no distress, appears stated age   Neck:   Supple, symmetrical, trachea midline. "   Lungs:     Clear to auscultation bilaterally, respirations unlabored   Chest Wall:    No tenderness or deformity    Heart:   Regularly irregular, S1 and S2 normal, 3/6 mid peaking systolic murmur heard at the right upper sternal border with radiation to apex, rub   or gallop, normal carotid impulse bilaterally without bruit.   Extremities:   Extremities normal, atraumatic, no cyanosis or edema   Pulses:   2+ and symmetric all extremities   Skin:   Skin color, texture, turgor normal, no rashes or lesions      LABS/DIAGNOSTIC DATA:  Results from last 7 days   Lab Units 03/06/22  1020 03/05/22  0431 03/04/22  0628   WBC 10*3/mm3 10.27 10.33 10.22   HEMOGLOBIN g/dL 15.5 17.0 16.1   HEMATOCRIT % 47.8 53.8* 48.2   PLATELETS 10*3/mm3 209 195 227     Lab Results   Lab Value Date/Time    TROPONINT <0.010 03/02/2022 1924    TROPONINT 0.030 09/05/2019 0751         Results from last 7 days   Lab Units 03/06/22  1020 03/05/22  0431 03/04/22  1015 03/02/22  1924   SODIUM mmol/L 139 141 140 135*   POTASSIUM mmol/L 3.8 4.0 3.9 4.4   CHLORIDE mmol/L 103 102 99 100   CO2 mmol/L 27.0 27.0 28.0 25.0   BUN mg/dL 32* 26* 23 21   CREATININE mg/dL 1.13 1.23 1.28* 1.33*   CALCIUM mg/dL 8.9 9.3 9.6 9.6   BILIRUBIN mg/dL  --   --   --  0.7   ALK PHOS U/L  --   --   --  79   ALT (SGPT) U/L  --   --   --  12   AST (SGOT) U/L  --   --   --  17   GLUCOSE mg/dL 134* 97 77 139*                       Medication Review:   carvedilol, 3.125 mg, Oral, BID With Meals  furosemide, 20 mg, Oral, Daily  heparin (porcine), 5,000 Units, Subcutaneous, Q12H  pharmacy consult - [place], , Does not apply, Daily  sodium chloride, 10 mL, Intravenous, Q12H  spironolactone, 25 mg, Oral, Daily             ASSESSMENT/PLAN:    Abdominal aortic aneurysm (AAA) without rupture (HCC)    Essential hypertension    Hyperlipidemia    Prediabetes    Diastolic congestive heart failure (HCC)    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  Excellent response to diuresis.  Afterload well controlled.  Suspect that this is due to underlying coronary ischemia and valvular heart disease.  Since he is still having intermittent dyspnea now that he is on a stable medical regimen and appears to have optimized preload and afterload conditions he has now decided to proceed with further evaluation regarding definitive ischemia evaluation and potential TAVR.  This decision making included his family to include his daughter and granddaughter.  We will schedule left heart catheterization and KIARA tomorrow.    Severe aortic stenosis: Consideration for TAVR.    -Currently not a candidate for ACE/ARB/Arni due to intermittent hypotension  -Not a candidate for LifeVest or ICD.      Carlito Mckeon III, MD   03/07/22  09:25 EST    Electronically signed by Carlito Mckeon III, MD at 03/07/22 0953     Carlito Mckeon III, MD at 03/06/22 5866          [facility] Progress Note     LOS: 4 days   Patient Care Team:  Karthikeyan Moreno MD as PCP - General (Family Medicine)  PCP:  Karthikeyan Moreno MD    Chief Complaint: CHF, aortic stenosis    SUBJECTIVE: Intermittent dyspnea today.  Currently with shortness of breath although he does have clear lung fields and his O2 sats are 93%.  Telemetry reveals sinus rhythm with frequent premature atrial contractions.      Review of Systems:   All systems have been reviewed and are negative with the exception of those mentioned above.      OBJECTIVE:    Vital Sign Min/Max for last 24 hours  Temp  Min: 97.4 °F (36.3 °C)  Max: 98.1 °F (36.7 °C)   BP  Min: 116/65  Max: 149/88   Pulse  Min: 53  Max: 83   Resp  Min: 18  Max: 18   SpO2  Min: 91 %  Max: 95 %   No data recorded   No data recorded     Flowsheet Rows      First Filed Value   Admission Height 181.6  "cm (71.5\") Documented at 03/02/2022 1833   Admission Weight 90.7 kg (200 lb) Documented at 03/02/2022 1833              Intake/Output Summary (Last 24 hours) at 3/6/2022 1355  Last data filed at 3/6/2022 0800  Gross per 24 hour   Intake 480 ml   Output 525 ml   Net -45 ml     Intake & Output (last 3 days)       03/03 0701 03/04 0700 03/04 0701 03/05 0700 03/05 0701 03/06 0700 03/06 0701  03/07 0700    P.O. 240   480    Total Intake(mL/kg) 240 (2.5)   480 (5)    Urine (mL/kg/hr) 1050 (0.5) 400 (0.2) 675 (0.3) 150 (0.2)    Stool 0       Total Output 1050 400 675 150    Net -810 -400 -675 +330            Urine Unmeasured Occurrence 1 x       Stool Unmeasured Occurrence 1 x              Physical Exam:    General Appearance:    Alert, cooperative, no distress, appears stated age   Neck:   Supple, symmetrical, trachea midline.   Lungs:     Clear to auscultation bilaterally, respirations unlabored   Chest Wall:    No tenderness or deformity    Heart:   Regularly irregular, S1 and S2 normal, 3/6 mid peaking systolic murmur heard at the right upper sternal border with radiation to apex, rub   or gallop, normal carotid impulse bilaterally without bruit.   Extremities:   Extremities normal, atraumatic, no cyanosis or edema   Pulses:   2+ and symmetric all extremities   Skin:   Skin color, texture, turgor normal, no rashes or lesions      LABS/DIAGNOSTIC DATA:  Results from last 7 days   Lab Units 03/06/22  1020 03/05/22  0431 03/04/22  0628   WBC 10*3/mm3 10.27 10.33 10.22   HEMOGLOBIN g/dL 15.5 17.0 16.1   HEMATOCRIT % 47.8 53.8* 48.2   PLATELETS 10*3/mm3 209 195 227     Lab Results   Lab Value Date/Time    TROPONINT <0.010 03/02/2022 1924    TROPONINT 0.030 09/05/2019 0751         Results from last 7 days   Lab Units 03/06/22  1020 03/05/22  0431 03/04/22  1015 03/02/22  1924   SODIUM mmol/L 139 141 140 135*   POTASSIUM mmol/L 3.8 4.0 3.9 4.4   CHLORIDE mmol/L 103 102 99 100   CO2 mmol/L 27.0 27.0 28.0 25.0   BUN mg/dL " 32* 26* 23 21   CREATININE mg/dL 1.13 1.23 1.28* 1.33*   CALCIUM mg/dL 8.9 9.3 9.6 9.6   BILIRUBIN mg/dL  --   --   --  0.7   ALK PHOS U/L  --   --   --  79   ALT (SGPT) U/L  --   --   --  12   AST (SGOT) U/L  --   --   --  17   GLUCOSE mg/dL 134* 97 77 139*                       Medication Review:   carvedilol, 3.125 mg, Oral, BID With Meals  heparin (porcine), 5,000 Units, Subcutaneous, Q12H  [facility]acy consult - [facility], , Does not apply, Daily  sodium chloride, 10 mL, Intravenous, Q12H  spironolactone, 25 mg, Oral, Daily             ASSESSMENT/PLAN:    Abdominal aortic aneurysm (AAA) without rupture (HCC)    Essential hypertension    Hyperlipidemia    Prediabetes    Diastolic congestive heart failure (HCC)    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  Afterload well controlled.  Suspect that this is due to underlying coronary ischemia and valvular heart disease.  He does not desire any further invasive cardiac procedures at this time.  We again had a prolonged discussion with the patient and the family at bedside discussing options for therapy to include consideration for TAVR, continued medical therapy, and even evaluation for hospice.  We will proceed with medical therapy and close clinical follow-up and arrange for hospice consult.  -Continue current medical therapy, adding low-dose Lasix.    Severe aortic stenosis: Currently does not desire any further evaluation for percutaneous therapy, he is not a candidate for surgical AVR.  Continue medical therapy.    -Currently not a candidate for ACE/ARB/Arni due to intermittent hypotension  -Not a candidate for LifeVest or ICD.      Carlito Mckeon III, MD   03/06/22  13:55 EST    Electronically signed by Carlito Mckeon III, MD at 03/06/22 7837     Jenn Delarosa APRN at 03/06/22 1217              [facility] Medicine Services  PROGRESS NOTE    Patient Name: Ash Diez  : 10/4/1928  MRN: [id number]    Date of Admission: 3/2/2022  Primary Care Physician: Karthikeyan Moreno MD    Subjective   Subjective   CC:  F/U dyspnea     HPI:  Patient having intermittent episodes of shortness of air and he feels very tired today.  Daughter and granddaughter are in the room and concerned about his prognosis.  Dr. Mckeon will be coming down to talk to patient.    ROS:  Gen- No fevers, chills  CV- No chest pain, palpitations  Resp- No cough; +dyspnea  GI- No N/V/D, abd pain  All other systems have been reviewed and the pertinent positives and negatives are listed above in the HPI or ROS    Objective   Objective   Vital Signs:   Temp:  [97.4 °F (36.3 °C)-98.1 °F (36.7 °C)] 98.1 °F (36.7 °C)  Heart Rate:  [53-83] 55  Resp:  [18] 18  BP: (116-149)/(65-88) 118/68  Flow (L/min):  [2-3] 3  Physical Exam:  Constitutional: Alert, elderly ill-appearing male with intermittent shortness of air  Eyes: EOMI, sclerae anicteric, no conjunctival injection  Head: NCAT  ENT: [place], moist mucous membranes   Respiratory: Nonlabored, symmetrical chest expansion, CTAB, 95% 3L; diminished throughout  Cardiovascular: IRR, HR 52, no R/G, +murmur, +DP pulses bilaterally  Gastrointestinal: Soft, NT, ND +BS  Musculoskeletal: CALL; no LE edema bilaterally  Neurologic: Oriented x4, strength symmetric in all extremities, follows all commands, speech clear  Skin: No rashes on exposed skin  Psychiatric: Pleasant and cooperative; normal affect    Results Reviewed:  LAB RESULTS:      Lab 22  1020 22  0431 22  0628 22   WBC 10.27 10.33 10.22 11.88*   HEMOGLOBIN 15.5 17.0 16.1 15.7   HEMATOCRIT 47.8 53.8* 48.2 47.8   PLATELETS 209 195 227 230   NEUTROS ABS 7.54* 6.72 6.63 9.59*   IMMATURE GRANS (ABS) 0.05 0.05 0.05 0.07*   LYMPHS ABS 1.32 2.13 2.05 1.13   MONOS ABS 1.20* 1.23* 1.30* 0.99*   EOS ABS 0.12 0.15 0.14 0.06   MCV  95.8 100.2* 95.4 95.4   PROCALCITONIN  --   --   --  0.07         Lab 03/06/22  1020 03/05/22  0431 03/04/22  1015 03/02/22  1924   SODIUM 139 141 140 135*   POTASSIUM 3.8 4.0 3.9 4.4   CHLORIDE 103 102 99 100   CO2 27.0 27.0 28.0 25.0   ANION GAP 9.0 12.0 13.0 10.0   BUN 32* 26* 23 21   CREATININE 1.13 1.23 1.28* 1.33*   EGFR 60.6 54.7* 52.2* 49.8*   GLUCOSE 134* 97 77 139*   CALCIUM 8.9 9.3 9.6 9.6   MAGNESIUM 2.4* 2.4* 2.3 2.4*         Lab 03/02/22 1924   TOTAL PROTEIN 7.9   ALBUMIN 4.00   GLOBULIN 3.9   ALT (SGPT) 12   AST (SGOT) 17   BILIRUBIN 0.7   ALK PHOS 79         Lab 03/02/22 1924   PROBNP 9,350.0*   TROPONIN T <0.010                 Brief Urine Lab Results     None          Microbiology Results Abnormal     Procedure Component Value - Date/Time    COVID PRE-OP / PRE-PROCEDURE SCREENING ORDER (NO ISOLATION) - Swab, Nasopharynx [[id number]]  (Normal) Collected: 03/02/22 2241    Lab Status: Final result Specimen: Swab from Nasopharynx Updated: 03/02/22 2343    Narrative:      The following orders were created for panel order COVID PRE-OP / PRE-PROCEDURE SCREENING ORDER (NO ISOLATION) - Swab, Nasopharynx.  Procedure                               Abnormality         Status                     ---------                               -----------         ------                     COVID-19, FLU A/B, RSV P...[[id number]]  Normal              Final result                 Please view results for these tests on the individual orders.    COVID-19, FLU A/B, RSV PCR - Swab, Nasopharynx [[id number]]  (Normal) Collected: 03/02/22 2241    Lab Status: Final result Specimen: Swab from Nasopharynx Updated: 03/02/22 2343     COVID19 Not Detected     Influenza A PCR Not Detected     Influenza B PCR Not Detected     RSV, PCR Not Detected    Narrative:      Fact sheet for providers: https://www.fda.gov/media/715352/download    Fact sheet for patients: https://www.fda.gov/media/182928/download    Test performed by PCR.          XR  Chest 1 View    Result Date: 3/6/2022   DATE OF EXAM: 3/6/2022 12:22 PM  PROCEDURE: XR CHEST 1 VW-  INDICATIONS: SOA; J96.01-Acute respiratory failure with hypoxia; I50.9-Heart failure, unspecified; Z20.822-Contact with and (suspected) exposure to covid-19  COMPARISON: 03/02/2022 at 8:35 PM  TECHNIQUE: [Portable chest radiograph]  FINDINGS: Ill-defined multifocal airspace infiltrates are again seen bilaterally with associated diffuse interstitial changes. The findings are stable. The cardiac silhouette and mediastinum are stable.      Impression: Stable multifocal airspace infiltrates bilaterally with diffuse interstitial changes.  This report was finalized on 3/6/2022 12:45 PM by Carlo Quintana MD.        Results for orders placed during the hospital encounter of 03/02/22    Adult Transthoracic Echo Complete W/ Cont if Necessary Per Protocol    Interpretation Summary  · Left ventricular ejection fraction appears to be 31 - 35%. Left ventricular systolic function is moderately decreased.  · Left ventricular diastolic function is consistent with (grade II w/high LAP) pseudonormalization.  · Left atrial volume is mildly increased.  · Severe aortic valve stenosis is present. Aortic valve area is 0.64 cm2.  · Peak velocity of the flow distal to the aortic valve is 426.7 cm/s. Aortic valve mean pressure gradient is 38.8 mmHg.  · Mild dilation of the aortic root is present. Moderate dilation of the ascending aorta is present.      I have reviewed the medications:  Scheduled Meds:carvedilol, 3.125 mg, Oral, BID With Meals  heparin (porcine), 5,000 Units, Subcutaneous, Q12H  pharmacy consult - [place], , Does not apply, Daily  sodium chloride, 10 mL, Intravenous, Q12H  spironolactone, 25 mg, Oral, Daily      Continuous Infusions:   PRN Meds:.•  acetaminophen **OR** acetaminophen **OR** acetaminophen  •  ondansetron  •  sodium chloride  •  sodium chloride    Assessment/Plan   Assessment & Plan     Active Hospital Problems     Diagnosis  POA   • Severe aortic stenosis [I35.0]  Yes   • Atrial fibrillation (HCC) [I48.91]  Yes   • 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     Brief Hospital Course to date:  Ash Diez is a 89 y.o. male with hypertension, AAA, hyperlipidemia, diastolic heart failure, valvular heart disease admitted for shortness of breath secondary to acute on chronic heart failure.  Improved with diuresis.    These problems are new to me today    This patient's problems and plans were partially entered by my partner and updated as appropriate by me 03/06/22.    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    --Seen by cardiology; Mike to see today  --3/6/22 EKG shows bradycardia w/1st degree block   --Amlodipine discontinued, started carvedilol, aldactone.    --Lasix 40mg PO as needed for weight gain >2lbs and dyspnea or edema  --Per cardiology, patient does not desire to pursue TAVR  --Hospice consult after talking with Dr Mckeon    DVT prophylaxis:  Medical DVT prophylaxis orders are present.     AM-PAC 6 Clicks Score (PT): 17 (03/06/22 0800)    Disposition: He lives in assisted living at [facility] and PT/OT have recommended SNF rehab.  Case management has made referrals.      CODE STATUS:   Code Status and Medical Interventions:   Ordered at: 03/05/22 0942     Medical Intervention Limits:    NO intubation (DNI)     Level Of Support Discussed With:    Patient     Code Status (Patient has no pulse and is not breathing):    No CPR (Do Not Attempt to Resuscitate)      Medical Interventions (Patient has pulse or is breathing):    Limited Support     More than 50% of time spent counseling on current illness and plan of care. Case discussed with: Patient, granddaughter, daughter, Dr. Carcamo and Dr. Mckeon  Total time spent face to face with the patient was 45 minutes.  Total time of the encounter was 60 minutes.    VALENTINO Hernandez  22                Electronically signed by Jenn Delarosa APRN at 22 1400       Consult Notes (last 24 hours)  Notes from 22 1022 through 22 1022   No notes of this type exist for this encounter.            Physical Therapy Notes (last 48 hours)      Tatyana Shay PT at 22 1532  Version 1 of 1       Goal Outcome Evaluation:  Plan of Care Reviewed With: patient        Progress: improving  Outcome Evaluation: Pt increased ambulation distance to 175ft with RW and Ranjeet. VC's for upright posture and to keep RW close with feet inside middle. Pt's ambulation distance limited by c/o fatigue and SOA. Continue to progress as appropriate.    Electronically signed by Tatyana Shay PT at 22 1611     Tatyana Shay PT at 22 1532  Version 1 of 1         Patient Name: Ash Diez  : 10/4/1928    MRN: [id number]                              Today's Date: 3/5/2022       Admit Date: 3/2/2022    Visit Dx:     ICD-10-CM ICD-9-CM   1. Acute respiratory failure with hypoxia (HCC)  J96.01 518.81   2. Acute on chronic congestive heart failure, unspecified heart failure type (HCC)  I50.9 428.0   3. 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 has been evaluted at . pt decided agaisnt surgery    • Cancer (HCC)     colon   • Cellulitis     RLE   • CHF (congestive heart failure) (HCC)    • Hyperlipidemia    • Hypertension    • Pneumonia      Past Surgical History:   Procedure Laterality Date   • COLON SURGERY     • CORONARY STENT PLACEMENT     • VASCULAR SURGERY      Vein removal      General Information     Row Name 03/05/22 1607          Physical Therapy Time and Intention    Document Type therapy note (daily note)  -KG     Mode of Treatment physical therapy  -KG     Row Name 03/05/22 1607          General Information    Existing Precautions/Restrictions fall;oxygen therapy device and L/min;other (see comments)  [place]  -KG     Row Name 03/05/22 1607          Cognition    Orientation Status (Cognition) oriented x 3  -KG     Row Name 03/05/22 1607          Safety Issues, Functional Mobility    Safety Issues Affecting Function (Mobility) awareness of need for assistance;insight into deficits/self-awareness;safety precaution awareness;safety precautions follow-through/compliance  -KG     Impairments Affecting Function (Mobility) balance;coordination;endurance/activity tolerance;postural/trunk control;shortness of breath;strength  -KG           User Key  (r) = Recorded By, (t) = Taken By, (c) = Cosigned By    Initials Name Provider Type    KG Tatyana Shay, PT Physical Therapist               Mobility     Row Name 03/05/22 1607          Bed Mobility    Bed Mobility supine-sit;sit-supine  -KG     Supine-Sit [place] (Bed Mobility) standby assist;verbal cues  -KG     Sit-Supine [place] (Bed Mobility) standby assist;verbal cues  -KG     Assistive Device (Bed Mobility) bed rails;head of bed elevated  -KG     Comment, (Bed Mobility) VC's for sequencing.  -KG     Row Name 03/05/22 1607          Transfers    Comment, (Transfers) VC's for sequencing and safe hand placement. Pt  attempting to stand prior to instruction.  -KG     Row Name 03/05/22 1607          Sit-Stand Transfer    Sit-Stand [place] (Transfers) standby assist;verbal cues  -KG     Assistive Device (Sit-Stand Transfers) walker, front-wheeled  -KG     Row Name 03/05/22 1607          Gait/Stairs (Locomotion)    [place] Level (Gait) minimum assist (75% patient effort);verbal cues  -KG     Assistive Device (Gait) walker, front-wheeled  -KG     Distance in Feet (Gait) 175  -KG     Deviations/Abnormal Patterns (Gait) base of support, narrow;noel decreased;stride length decreased  -KG     Bilateral Gait Deviations forward flexed posture;heel strike decreased  -KG     Comment, (Gait/Stairs) Pt demonstrated step through gait pattern with slow noel and decreased step length. Pt with very forward flexed posture; frequent cues to correct. Pt required verbal and tactile cues to keep RW close with feet inside middle. Ambulation distance limited by fatigue and SOA.  -KG           User Key  (r) = Recorded By, (t) = Taken By, (c) = Cosigned By    Initials Name Provider Type    Tatyana North, PT Physical Therapist               Obj/Interventions     Row Name 03/05/22 1609          Balance    Balance Assessment sitting static balance;standing static balance;standing dynamic balance  -KG     Static Sitting Balance supervision  -KG     Position, Sitting Balance unsupported;sitting edge of bed  -KG     Static Standing Balance contact guard  -KG     Dynamic Standing Balance minimal assist  -KG     Position/Device Used, Standing Balance supported  -KG           User Key  (r) = Recorded By, (t) = Taken By, (c) = Cosigned By    Initials Name Provider Type    Tatyana North, PT Physical Therapist               Goals/Plan    No documentation.                Clinical Impression     Row Name 03/05/22 1609          Pain    Pretreatment Pain Rating 0/10 - no pain  -KG     Posttreatment Pain Rating 0/10 - no pain  -KG      Row Name 03/05/22 1609          Plan of Care Review    Plan of Care Reviewed With patient  -KG     Progress improving  -KG     Outcome Evaluation Pt increased ambulation distance to 175ft with RW and Ranjeet. VC's for upright posture and to keep RW close with feet inside middle. Pt's ambulation distance limited by c/o fatigue and SOA. Continue to progress as appropriate.  -KG     Row Name 03/05/22 1609          Vital Signs    Pre Systolic BP Rehab 125  -KG     Pre Treatment Diastolic BP 72  -KG     Pretreatment Heart Rate (beats/min) 69  -KG     Posttreatment Heart Rate (beats/min) 65  -KG     Pre SpO2 (%) 95  -KG     O2 Delivery Pre Treatment supplemental O2  -KG     Intra SpO2 (%) 88  -KG     O2 Delivery Intra Treatment supplemental O2  -KG     Post SpO2 (%) 92  -KG     O2 Delivery Post Treatment supplemental O2  -KG     Pre Patient Position Supine  -KG     Intra Patient Position Standing  -KG     Post Patient Position Supine  -KG     Row Name 03/05/22 1609          Positioning and Restraints    Pre-Treatment Position in bed  -KG     Post Treatment Position bed  -KG     In Bed notified nsg;supine;call light within reach;encouraged to call for assist;exit alarm on;with family/caregiver;side rails up x2  -KG           User Key  (r) = Recorded By, (t) = Taken By, (c) = Cosigned By    Initials Name Provider Type    KG Tatyana Shay, PT Physical Therapist               Outcome Measures     Row Name 03/05/22 1611 03/05/22 1000       How much help from another person do you currently need...    Turning from your back to your side while in flat bed without using bedrails? 3  -KG --  -EB    Moving from lying on back to sitting on the side of a flat bed without bedrails? 3  -KG --  -EB    Moving to and from a bed to a chair (including a wheelchair)? 3  -KG --  -EB    Standing up from a chair using your arms (e.g., wheelchair, bedside chair)? 3  -KG --  -EB    Climbing 3-5 steps with a railing? 2  -KG --  -EB    To walk  in hospital room? 3  -KG --  -EB    AM-PAC 6 Clicks Score (PT) 17  -KG --  -EB    Row Name 03/05/22 0800          How much help from another person do you currently need...    Turning from your back to your side while in flat bed without using bedrails? 4  -EB     Moving from lying on back to sitting on the side of a flat bed without bedrails? 4  -EB     Moving to and from a bed to a chair (including a wheelchair)? 3  -EB     Standing up from a chair using your arms (e.g., wheelchair, bedside chair)? 3  -EB     Climbing 3-5 steps with a railing? 2  -EB     To walk in hospital room? 3  -EB     AM-PAC 6 Clicks Score (PT) 19  -EB     Row Name 03/05/22 1611          Functional Assessment    Outcome Measure Options AM-PAC 6 Clicks Basic Mobility (PT)  -KG           User Key  (r) = Recorded By, (t) = Taken By, (c) = Cosigned By    Initials Name Provider Type    Bonnie Ashraf RN Registered Nurse    Tatyana North, PT Physical Therapist                             Physical Therapy Education                 Title: PT OT SLP Therapies (In Progress)     Topic: Physical Therapy (In Progress)     Point: Mobility training (In Progress)     Learning Progress Summary           Patient Acceptance, E, NR by KG at 3/5/2022 1532    Acceptance, E, NR by KG at 3/3/2022 0936                   Point: Home exercise program (In Progress)     Learning Progress Summary           Patient Acceptance, E, NR by KG at 3/5/2022 1532                   Point: Body mechanics (In Progress)     Learning Progress Summary           Patient Acceptance, E, NR by KG at 3/5/2022 1532    Acceptance, E, NR by KG at 3/3/2022 0936                   Point: Precautions (In Progress)     Learning Progress Summary           Patient Acceptance, E, NR by KG at 3/5/2022 1532    Acceptance, E, NR by KG at 3/3/2022 0936                               User Key     Initials Effective Dates Name Provider Type [facility]    DOE 05/22/20 -  Tatyana Shay  N, PT Physical Therapist PT              PT Recommendation and Plan  Planned Therapy Interventions (PT): balance training, bed mobility training, gait training, strengthening, transfer training  Plan of Care Reviewed With: patient  Progress: improving  Outcome Evaluation: Pt increased ambulation distance to 175ft with RW and Ranjeet. VC's for upright posture and to keep RW close with feet inside middle. Pt's ambulation distance limited by c/o fatigue and SOA. Continue to progress as appropriate.     Time Calculation:    PT Charges     Row Name 03/05/22 1532             Time Calculation    Start Time 1532  -KG      PT Received On 03/05/22  -KG      PT Goal Re-Cert Due Date 03/13/22  -KG              Time Calculation- PT    Total Timed Code Minutes- PT 24 minute(s)  -KG              Timed Charges    50409 - PT Therapeutic Activity Minutes 24  -KG              Total Minutes    Timed Charges Total Minutes 24  -KG       Total Minutes 24  -KG            User Key  (r) = Recorded By, (t) = Taken By, (c) = Cosigned By    Initials Name Provider Type    KG Tatyana Shay, PT Physical Therapist              Therapy Charges for Today     Code Description Service Date Service Provider Modifiers Qty    [id number]  PT THERAPEUTIC ACT EA 15 MIN 3/5/2022 Tatyana Shay, PT GP 2          PT G-Codes  Outcome Measure Options: AM-PAC 6 Clicks Basic Mobility (PT)  AM-PAC 6 Clicks Score (PT): 17  AM-PAC 6 Clicks Score (OT): 14    Kami Shay PT  3/5/2022      Electronically signed by Tatyana Shay, PT at 03/05/22 1612       Occupational Therapy Notes (last 48 hours)  Notes from 03/05/22 1022 through 03/07/22 1022   No notes exist for this encounter.

## 2022-03-07 NOTE — THERAPY TREATMENT NOTE
Patient Name: Ash Diez  : 10/4/1928    MRN: [id number]                              Today's Date: 3/7/2022       Admit Date: 3/2/2022    Visit Dx:     ICD-10-CM ICD-9-CM   1. Acute respiratory failure with hypoxia (HCC)  J96.01 518.81   2. Acute on chronic congestive heart failure, unspecified heart failure type (HCC)  I50.9 428.0   3. 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 has been evaluted at . pt decided agaisnt surgery    • Cancer (HCC)     colon   • Cellulitis     RLE   • CHF (congestive heart failure) (HCC)    • Hyperlipidemia    • Hypertension    • Pneumonia      Past Surgical History:   Procedure Laterality Date   • COLON SURGERY     • CORONARY STENT PLACEMENT     • VASCULAR SURGERY      Vein removal      General Information     Row Name 22 1155          Physical Therapy Time and Intention    Document Type therapy note (daily note)  -KG     Mode of Treatment physical therapy  -KG     Row Name 22 1155          General Information    Patient Profile Reviewed yes  -KG     Existing Precautions/Restrictions fall;oxygen therapy device and L/min  -KG     Row Name 22 1155          Cognition    Orientation Status (Cognition) oriented x 4  -KG     Row Name 22 1155          Safety Issues, Functional Mobility    Impairments Affecting Function (Mobility) balance;coordination;endurance/activity tolerance;postural/trunk control;shortness of breath;strength  -KG           User Key  (r) = Recorded By, (t) = Taken By, (c) = Cosigned By    Initials Name  Provider Type    DOE Callie Huizar Physical Therapist               Mobility     Row Name 03/07/22 1155          Bed Mobility    Sit-Supine [place] (Bed Mobility) standby assist;verbal cues  -KG     Assistive Device (Bed Mobility) bed rails;head of bed elevated  -KG     Row Name 03/07/22 1155          Transfers    Comment, (Transfers) cues safe HP  -KG     Row Name 03/07/22 1155          Sit-Stand Transfer    Sit-Stand [place] (Transfers) standby assist;verbal cues  -KG     Assistive Device (Sit-Stand Transfers) walker, front-wheeled  -KG     Row Name 03/07/22 1155          Gait/Stairs (Locomotion)    [place] Level (Gait) minimum assist (75% patient effort);verbal cues  -KG     Assistive Device (Gait) walker, front-wheeled  -KG     Distance in Feet (Gait) 80  -KG     Deviations/Abnormal Patterns (Gait) base of support, narrow;noel decreased;stride length decreased  -KG     Bilateral Gait Deviations forward flexed posture;heel strike decreased  -KG     Comment, (Gait/Stairs) Pt with 2 bouts ambulation, 40' each, RW with CGA and min-A with turns on 4L O2.  Pt limited by stamina and desat to 87% each bout.  Required sitting breathing breaks, recovered to 90s O2 after 1 min  -KG           User Key  (r) = Recorded By, (t) = Taken By, (c) = Cosigned By    Initials Name Provider Type    DOE Callie Huizar Physical Therapist               Obj/Interventions     Row Name 03/07/22 1158          Motor Skills    Therapeutic Exercise knee;ankle  -KG     Row Name 03/07/22 1158          Knee (Therapeutic Exercise)    Knee (Therapeutic Exercise) strengthening exercise  -KG     Knee Strengthening (Therapeutic Exercise) bilateral;SLR (straight leg raise);LAQ (long arc quad);heel slides;10 repetitions;2 sets  -KG     Row Name 03/07/22 1158          Ankle (Therapeutic Exercise)    Ankle (Therapeutic Exercise) strengthening exercise  -KG     Ankle Strengthening (Therapeutic Exercise)  bilateral;dorsiflexion;plantarflexion;10 repetitions;2 sets  -KG     Row Name 03/07/22 1158          Balance    Balance Assessment sitting static balance;standing dynamic balance  -KG     Dynamic Standing Balance minimal assist  -KG     Balance Interventions standing;sit to stand;weight shifting activity  -KG           User Key  (r) = Recorded By, (t) = Taken By, (c) = Cosigned By    Initials Name Provider Type    DOE Callie Huizar Physical Therapist               Goals/Plan    No documentation.                Clinical Impression     Row Name 03/07/22 1159          Pain    Pretreatment Pain Rating 0/10 - no pain  -KG     Posttreatment Pain Rating 0/10 - no pain  -KG     Row Name 03/07/22 1159          Plan of Care Review    Plan of Care Reviewed With patient;family  -KG     Progress improving  -KG     Outcome Evaluation Pt with 2 bouts ambulation, 40' each, RW with CGA and min-A for turns on 4L O2.  Pt limited by stamina and desat to 87% each bout.  Required sitting breathing breaks, recovered to 90s O2 after 1 min.  Family interested in IPR, would greatly benefit patient.  -KG     Row Name 03/07/22 1159          Vital Signs    Post Systolic BP Rehab 126  -KG     Post Treatment Diastolic BP 68  -KG     Pre SpO2 (%) 92  -KG     O2 Delivery Pre Treatment supplemental O2  -KG     Intra SpO2 (%) 87  -KG     O2 Delivery Intra Treatment supplemental O2  -KG     Post SpO2 (%) 95  -KG     O2 Delivery Post Treatment supplemental O2  -KG     Row Name 03/07/22 1159          Positioning and Restraints    Pre-Treatment Position sitting in chair/recliner  -KG     Post Treatment Position bed  -KG     In Bed notified nsg;fowlers;call light within reach;encouraged to call for assist;exit alarm on;with family/caregiver  -KG           User Key  (r) = Recorded By, (t) = Taken By, (c) = Cosigned By    Initials Name Provider Type    DOE Callie Huizar Physical Therapist               Outcome Measures     Row Name 03/07/22 1205  03/07/22 0800       How much help from another person do you currently need...    Turning from your back to your side while in flat bed without using bedrails? 3  -KG 3  -MR    Moving from lying on back to sitting on the side of a flat bed without bedrails? 3  -KG 3  -MR    Moving to and from a bed to a chair (including a wheelchair)? 3  -KG 3  -MR    Standing up from a chair using your arms (e.g., wheelchair, bedside chair)? 3  -KG 3  -MR    Climbing 3-5 steps with a railing? 2  -KG 2  -MR    To walk in hospital room? 3  -KG 3  -MR    AM-PAC 6 Clicks Score (PT) 17  -KG 17  -MR    Row Name 03/07/22 1201 03/07/22 1022       Functional Assessment    Outcome Measure Options AM-PAC 6 Clicks Basic Mobility (PT)  -KG AM-PAC 6 Clicks Daily Activity (OT)  -KF          User Key  (r) = Recorded By, (t) = Taken By, (c) = Cosigned By    Initials Name Provider Type    Charo Dalton OT Occupational Therapist    Callie Galdamez Physical Therapist    Roxanne Leon, RN Registered Nurse                             Physical Therapy Education                 Title: PT OT SLP Therapies (Done)     Topic: Physical Therapy (Done)     Point: Mobility training (Done)     Learning Progress Summary           Patient Acceptance, E, NR,VU by KG at 3/7/2022 1202    Acceptance, E, NR by KG1 at 3/5/2022 1532    Acceptance, E, NR by KG1 at 3/3/2022 0936                   Point: Home exercise program (Done)     Learning Progress Summary           Patient Acceptance, E, NR,VU by KG at 3/7/2022 1202    Acceptance, E, NR by KG1 at 3/5/2022 1532                   Point: Body mechanics (Done)     Learning Progress Summary           Patient Acceptance, E, NR,VU by KG at 3/7/2022 1202    Acceptance, E, NR by KG1 at 3/5/2022 1532    Acceptance, E, NR by KG1 at 3/3/2022 0936                   Point: Precautions (Done)     Learning Progress Summary           Patient Acceptance, E, NR,VU by KG at 3/7/2022 1202    Acceptance, E, NR by KG1 at  3/5/2022 1532    Acceptance, E, NR by KG1 at 3/3/2022 0936                               User Key     Initials Effective Dates Name Provider Type Discipline    KG1 05/22/20 -  Tatyana Shay, PT Physical Therapist PT    KG 06/16/21 -  Callie Huizar Physical Therapist PT              PT Recommendation and Plan     Plan of Care Reviewed With: patient, family  Progress: improving  Outcome Evaluation: Pt with 2 bouts ambulation, 40' each, RW with CGA and min-A for turns on 4L O2.  Pt limited by stamina and desat to 87% each bout.  Required sitting breathing breaks, recovered to 90s O2 after 1 min.  Family interested in IPR, would greatly benefit patient.     Time Calculation:    PT Charges     Row Name 03/07/22 1202             Time Calculation    Start Time 1056  -KG      PT Received On 03/07/22  -KG      PT Goal Re-Cert Due Date 03/13/22  -KG              Time Calculation- PT    Total Timed Code Minutes- PT 38 minute(s)  -KG              Timed Charges    91497 - PT Therapeutic Exercise Minutes 15  -KG      71450 - Gait Training Minutes  23  -KG              Total Minutes    Timed Charges Total Minutes 38  -KG       Total Minutes 38  -KG            User Key  (r) = Recorded By, (t) = Taken By, (c) = Cosigned By    Initials Name Provider Type    KG Callie Huizar Physical Therapist              Therapy Charges for Today     Code Description Service Date Service Provider Modifiers Qty    93040959810 HC PT THER PROC EA 15 MIN 3/7/2022 Callie Huizar GP 1    [id number] HC GAIT TRAINING EA 15 MIN 3/7/2022 Callie Huizar GP 2          PT G-Codes  Outcome Measure Options: AM-PAC 6 Clicks Basic Mobility (PT)  AM-PAC 6 Clicks Score (PT): 17  AM-PAC 6 Clicks Score (OT): 16    Callie Huizar  3/7/2022

## 2022-03-07 NOTE — PROGRESS NOTES
[facility] Medicine Services  PROGRESS NOTE    Patient Name: Ash Diez  : 10/4/1928  MRN: [id number]    Date of Admission: 3/2/2022  Primary Care Physician: Karthikeyan Moreno MD    Subjective   Subjective   CC:  F/U dyspnea     HPI:  Patient sitting up in a chair in NAD.  Patient states he still having intermittent shortness of air and respiratory is talk to him about breathing to help him with his shortness of air.  Patient has elected to do a TAVR and Dr. Mckeon was notified and has seen the patient.  He will be working patient up further to see if he is a candidate.    ROS:  Gen- No fevers, chills  CV- No chest pain, palpitations  Resp- No cough, dyspnea  GI- No N/V/D, abd pain  All other systems have been reviewed and the pertinent positives and negatives are listed above in the HPI or ROS    Objective   Objective   Vital Signs:   Temp:  [97.5 °F (36.4 °C)-98.2 °F (36.8 °C)] 98.2 °F (36.8 °C)  Heart Rate:  [61-85] 72  Resp:  [16-20] 20  BP: (126-146)/(61-79) 126/68  Flow (L/min):  [3] 3  Physical Exam:  Constitutional: Alert, elderly ill-appearing male sitting up in a chair talking to his granddaughter in NAD.  Eyes: EOMI, sclerae anicteric, no conjunctival injection  Head: NCAT  ENT: [place], moist mucous membranes   Respiratory: Nonlabored, symmetrical chest expansion, CTAB, 92% 3L; diminished throughout  Cardiovascular: IRR, HR 77, no R/G, +murmur, +DP pulses bilaterally  Gastrointestinal: Soft, NT, ND +BS  Musculoskeletal: CALL; no LE edema bilaterally  Neurologic: Oriented x4, strength symmetric in all extremities, follows all commands, speech clear  Skin: No rashes on exposed skin  Psychiatric: Pleasant and cooperative; normal affect    Results Reviewed:  LAB RESULTS:      Lab 22  0844 22  1020 22  0431 22  0628 22  1924   WBC 10.80 10.27 10.33 10.22 11.88*   HEMOGLOBIN 16.5 15.5 17.0 16.1 15.7   HEMATOCRIT 52.3* 47.8 53.8* 48.2 47.8   PLATELETS 216  209 195 227 230   NEUTROS ABS  --  7.54* 6.72 6.63 9.59*   IMMATURE GRANS (ABS)  --  0.05 0.05 0.05 0.07*   LYMPHS ABS  --  1.32 2.13 2.05 1.13   MONOS ABS  --  1.20* 1.23* 1.30* 0.99*   EOS ABS  --  0.12 0.15 0.14 0.06   .4* 95.8 100.2* 95.4 95.4   PROCALCITONIN  --   --   --   --  0.07         Lab 03/07/22  1019 03/06/22  1020 03/05/22  0431 03/04/22  1015 03/02/22  1924   SODIUM 141 139 141 140 135*   POTASSIUM 4.0 3.8 4.0 3.9 4.4   CHLORIDE 102 103 102 99 100   CO2 31.0* 27.0 27.0 28.0 25.0   ANION GAP 8.0 9.0 12.0 13.0 10.0   BUN 36* 32* 26* 23 21   CREATININE 1.19 1.13 1.23 1.28* 1.33*   EGFR 57.0* 60.6 54.7* 52.2* 49.8*   GLUCOSE 123* 134* 97 77 139*   CALCIUM 9.1 8.9 9.3 9.6 9.6   MAGNESIUM  --  2.4* 2.4* 2.3 2.4*         Lab 03/02/22 1924   TOTAL PROTEIN 7.9   ALBUMIN 4.00   GLOBULIN 3.9   ALT (SGPT) 12   AST (SGOT) 17   BILIRUBIN 0.7   ALK PHOS 79         Lab 03/02/22  1924   PROBNP 9,350.0*   TROPONIN T <0.010                 Brief Urine Lab Results     None          Microbiology Results Abnormal     Procedure Component Value - Date/Time    COVID PRE-OP / PRE-PROCEDURE SCREENING ORDER (NO ISOLATION) - Swab, Nasopharynx [[id number]]  (Normal) Collected: 03/02/22 2241    Lab Status: Final result Specimen: Swab from Nasopharynx Updated: 03/02/22 2343    Narrative:      The following orders were created for panel order COVID PRE-OP / PRE-PROCEDURE SCREENING ORDER (NO ISOLATION) - Swab, Nasopharynx.  Procedure                               Abnormality         Status                     ---------                               -----------         ------                     COVID-19, FLU A/B, RSV P...[[id number]]  Normal              Final result                 Please view results for these tests on the individual orders.    COVID-19, FLU A/B, RSV PCR - Swab, Nasopharynx [[id number]]  (Normal) Collected: 03/02/22 5991    Lab Status: Final result Specimen: Swab from Nasopharynx Updated: 03/02/22 4524      COVID19 Not Detected     Influenza A PCR Not Detected     Influenza B PCR Not Detected     RSV, PCR Not Detected    Narrative:      Fact sheet for providers: https://www.fda.gov/media/633008/download    Fact sheet for patients: https://www.fda.gov/media/176816/download    Test performed by PCR.          XR Chest 1 View    Result Date: 3/6/2022   DATE OF EXAM: 3/6/2022 12:22 PM  PROCEDURE: XR CHEST 1 VW-  INDICATIONS: SOA; J96.01-Acute respiratory failure with hypoxia; I50.9-Heart failure, unspecified; Z20.822-Contact with and (suspected) exposure to covid-19  COMPARISON: 03/02/2022 at 8:35 PM  TECHNIQUE: [Portable chest radiograph]  FINDINGS: Ill-defined multifocal airspace infiltrates are again seen bilaterally with associated diffuse interstitial changes. The findings are stable. The cardiac silhouette and mediastinum are stable.      Impression: Stable multifocal airspace infiltrates bilaterally with diffuse interstitial changes.  This report was finalized on 3/6/2022 12:45 PM by Carlo Quintana MD.        Results for orders placed during the hospital encounter of 03/02/22    Adult Transthoracic Echo Complete W/ Cont if Necessary Per Protocol    Interpretation Summary  · Left ventricular ejection fraction appears to be 31 - 35%. Left ventricular systolic function is moderately decreased.  · Left ventricular diastolic function is consistent with (grade II w/high LAP) pseudonormalization.  · Left atrial volume is mildly increased.  · Severe aortic valve stenosis is present. Aortic valve area is 0.64 cm2.  · Peak velocity of the flow distal to the aortic valve is 426.7 cm/s. Aortic valve mean pressure gradient is 38.8 mmHg.  · Mild dilation of the aortic root is present. Moderate dilation of the ascending aorta is present.      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     Brief Hospital Course to date:  Ash Diez is a 89 y.o. male with hypertension, AAA, hyperlipidemia, diastolic heart failure, valvular heart disease admitted for shortness of breath secondary to acute on chronic heart failure.  Improved with diuresis.    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    --Seen by cardiology; Mike to see today  --3/6/22 EKG shows bradycardia w/1st degree block   --Amlodipine discontinued, started carvedilol, aldactone.    --Lasix 40mg PO as needed for weight gain >2lbs and dyspnea or edema  --Per cardiology, patient is now wanting to have a TAVR; Dr. Mckeon will begin work-up  --Hospice consult but patient has elected to possibly have a TAVR, so hospice is not appropriate at this time; patient and family spoke with him and took the information.    DVT prophylaxis:  Medical DVT prophylaxis orders are present.     AM-PAC 6 Clicks Score (PT): 17 (03/07/22 1201)    Disposition: He lives in assisted living at [facility] and PT/OT have recommended SNF rehab.  Case management has made referrals.       CODE STATUS:   Code Status and Medical Interventions:   Ordered at: 03/05/22 0942     Medical Intervention Limits:    NO intubation (DNI)     Level Of Support Discussed With:    Patient     Code Status (Patient has no pulse and is not breathing):    No CPR (Do Not Attempt to Resuscitate)     Medical Interventions (Patient has pulse or is breathing):    Limited Support       VALENTINO Hernandez  03/07/22

## 2022-03-07 NOTE — PROGRESS NOTES
Continued Stay Note  SAVANAH Inman     Patient Name: Ash Diez  MRN: [id number]  Today's Date: 3/7/2022    Admit Date: 3/2/2022     Discharge Plan     Row Name 03/07/22 4742       Plan    Plan SNF    Plan Comments Hospice consult received. Chart reviewed. Visit made to the BS with pt./granddaughter. Hospice services/GOC/philosophy. Hospice related questions/concerns answered/addressed. Granddaughter states that given the fact that the pt. has done well since his diuretics were restarted. Pt./granddaughter are hopeful to moving forward & being a candidate for a TAVR, which likely means that the pt.’s Hospice referral can either be placed on hold or canceled as the pt.’s admitting Hospice dx would be Diastolic CHF with valvular dysfunction. Pt./family are also hoping for STR @ d/c. Pt./family were agreeable to the written information provided by hospice & for hospice to follow peripherally throughout the pt.’s hospitalization. 24hr Hospice contact number was provided along with the hospice contact information for [facility]. If further assistance is needed, please call [phone].    Row Name 03/07/22 1000                                                    Discharge Codes    No documentation.                     Shikha Branch

## 2022-03-07 NOTE — PLAN OF CARE
Goal Outcome Evaluation:  Plan of Care Reviewed With: patient, family        Progress: improving  Outcome Evaluation: Pt with 2 bouts ambulation, 40' each, RW with CGA and min-A for turns on 4L O2.  Pt limited by stamina and desat to 87% each bout.  Required sitting breathing breaks, recovered to 90s O2 after 1 min.  Family interested in IPR, would greatly benefit patient.

## 2022-03-07 NOTE — TELEPHONE ENCOUNTER
I can sign off on orders, however if he needs more complex care and it would make sense for the hospice physician to take over I would recommend at that time to transition care responsibility.  Thanks

## 2022-03-07 NOTE — TELEPHONE ENCOUNTER
Called [facility] talked to Milind cruz per note he said he would let Sammi know

## 2022-03-07 NOTE — DISCHARGE PLACEMENT REQUEST
"Ash Diez (89 y.o. Male)   Referring: Phani UREÑA  PCP: Karthikeyan Moreno  Hospice Dx; Diastolic HF/. CAD/ Valvular dysfunction  Covid negative on 32/22  BMI: 29.33kg            Date of Birth   10/04/1928    Social Security Number       Address   [address] [place] [address]    Home Phone   [phone]    MRN   [id number]       Latter-day   Hinduism    Marital Status                               Admission Date   3/2/22    Admission Type   Emergency    Admitting Provider   Sarah Carcamo MD    Attending Provider   Sarah Carcamo MD    Department, Room/Bed   [address], S205/1       Discharge Date       Discharge Disposition       Discharge Destination                               Attending Provider: Sarah Carcamo MD    Allergies: No Known Allergies    Isolation: None   Infection: None   Code Status: No CPR   Advance Care Planning Activity    Ht: 181.6 cm (71.5\")   Wt: 96.8 kg (213 lb 4.8 oz)    Admission Cmt: None   Principal Problem: None                Active Insurance as of 3/2/2022     Primary Coverage     Payor Plan Insurance Group Employer/Plan Group    MEDICARE MEDICARE A & B      Payor Plan Address Payor Plan Phone Number Payor Plan Fax Number Effective Dates    [address] [phone]  10/1/1993 - None Entered    [facility] [id number]       Subscriber Name Subscriber Birth Date Member ID       ASH DIEZ 10/4/1928 [id number]           Secondary Coverage     Payor Plan Insurance Group Employer/Plan Group    AARP MC SUP AARP HEALTH CARE OPTIONS      Payor Plan Address Payor Plan Phone Number Payor Plan Fax Number Effective Dates    [facility] [phone]  1/1/2019 - None Entered    [address]       [facility] [address]       Subscriber Name Subscriber Birth Date Member ID       ASH DIEZ 10/4/1928 [id number]                 Emergency Contacts      (Rel.) Home Phone Work Phone Mobile Phone    PEPE HERNÁNDEZ (Daughter) [phone] -- " [phone]    Gregoria Hughes (Grandchild) [phone] -- --            Emergency Contact Information     Name Relation Home Work Mobile    PEPE HERNÁNDEZ Daughter [phone]    Gregoria Hughes Grandchild [phone]            Insurance Information                MEDICARE/MEDICARE A & B Phone: [phone]    Subscriber: Ash Diez Subscriber#: [id number]    Group#: -- Precert#: --        [facility] HEALTH CARE OPTIONS Phone: [phone]    Subscriber: CharyAsh Subscriber#: [id number]    Group#: -- Precert#: --          Problem List           Codes Noted - Resolved       Hospital    Severe aortic stenosis (Chronic) ICD-10-CM: I35.0  ICD-9-CM: 424.1 3/6/2022 - Present    CHF due to valvular disease (HCC) ICD-10-CM: I50.9, I38  ICD-9-CM: 428.0, 424.90 3/3/2022 - Present    Nonrheumatic aortic valve insufficiency ICD-10-CM: I35.1  ICD-9-CM: 424.1 3/3/2022 - Present    Diastolic congestive heart failure (HCC) ICD-10-CM: I50.30  ICD-9-CM: 428.30, 428.0 3/2/2022 - Present    Prediabetes ICD-10-CM: R73.03  ICD-9-CM: 790.29 2019 - Present    Essential hypertension ICD-10-CM: I10  ICD-9-CM: 401.9 2019 - Present    Hyperlipidemia ICD-10-CM: E78.5  ICD-9-CM: 272.4 2019 - Present    Abdominal aortic aneurysm (AAA) without rupture (HCC) ICD-10-CM: I71.4  ICD-9-CM: 441.4 2019 - Present       Non-Hospital    Obesity (BMI 30.0-34.9) ICD-10-CM: E66.9  ICD-9-CM: 278.00 2021 - Present             History & Physical      Jesús Boyer DO at 0332              [facility]  HISTORY AND PHYSICAL    Patient Name: Ash Diez  : 10/4/1928  MRN: [id number]  Primary Care Physician: Karthikeyan Moreno MD  Date of admission: 3/2/2022      Subjective   Subjective     Chief Complaint:  Shortness of air    HPI:  Ash Diez is a 89 y.o. male with past medical history of diastolic CHF, mild to moderate aortic regurgitation,  moderate aortic stenosis as of 2019, prediabetes, essential hypertension, hyperlipidemia, AAA who presents to the ER with complaints of dyspnea mainly with exertion over the past 24 hours and orthopnea.    Patient has prior history of diastolic CHF and valvular dysfunction.  At some point he was taken off of diuretics.  He does not remember the reasoning why or when this actually happened.  Patient states that over the past 24 hours he has had increasing shortness of air.  Mainly with exertion.  Some shortness of air with rest.  He reports chronic lower extremity edema which has not changed in the last several days.  He denies any chest pain or pressure.  Denies any nausea or vomiting or abdominal pain.  He currently is living at [facility] and normally is able to perform ADLs without any issue.  Denies any cough, fever, or chills.  Does report some increased orthopnea starting last night.  SOA constant, rated 4/10 in severity, worse with exertion.    Work-up in the ER revealing pulmonary edema.  Patient given 80 mg Lasix in the ER and has already diuresed greater than 500 cc.  He is feeling some improved and his shortness of breath      COVID Details:    Symptoms:    [] NONE [] Fever []  Cough [x] Shortness of breath [] Change in taste/smell      Review of Systems   Gen- No fevers, chills  CV- No chest pain, palpitations  Resp- No cough, reports dyspnea  GI- No N/V/D, abd pain      All other systems reviewed and are negative.     Personal History     Past Medical History:   Diagnosis Date   • Aneurysm (HCC)     on aortic valve. has been evaluted at . pt decided agaisnt surgery    • Cancer (HCC)     colon   • Cellulitis     RLE   • CHF (congestive heart failure) (HCC)    • Hyperlipidemia    • Hypertension    • Pneumonia        Past Surgical History:   Procedure Laterality Date   • COLON SURGERY     • CORONARY STENT PLACEMENT     • VASCULAR SURGERY      Vein removal       Family History:   family history includes Arthritis in his mother; Heart attack in his mother; Hypertension in his daughter; Stroke in his brother, father, and sister. Otherwise pertinent FHx was reviewed and unremarkable.     Social History:  reports that he has never smoked. He has never used smokeless tobacco. He reports previous alcohol use. He reports that he does not use drugs.  Social History     Social History Narrative    2021: From [place], [place]. , has been living with his elderly daughter and son-in-law, but now living with his granddaughter in [place] to get access to care. Overall patient has been more or less bedbound since fall in January 2021 with left shoulder fracture.  Has barely been able to get around with cane, does not have a wheelchair.       Medications:  Available home medication information reviewed.  Medications Prior to Admission   Medication Sig Dispense Refill Last Dose   • amLODIPine (NORVASC) 5 MG tablet Take 1.5 tablets by mouth Daily. Needs to keep appointment 12/23/2020 and complete lab work that will be ordered for any ongoing refills. 45 tablet 6        No Known Allergies    Objective   Objective     Vital Signs:   Temp:  [97.4 °F (36.3 °C)-97.7 °F (36.5 °C)] 97.7 °F (36.5 °C)  Heart Rate:  [83-95] 95  Resp:  [18-28] 22  BP: (129-166)/(68-98) 129/88       Physical Exam   Constitutional: Awake, alert, appears much younger than stated age  Eyes: PERRLA, sclerae anicteric, no conjunctival injection  HENT: NCAT, mucous membranes moist  Neck: Supple, no thyromegaly, no lymphadenopathy, trachea midline  Respiratory: Clear to auscultation bilaterally, nonlabored respirations   Cardiovascular: RRR, + murmur, palpable pedal pulses bilaterally  Gastrointestinal: Positive bowel sounds, soft, nontender, nondistended  Musculoskeletal: 2+ pitting bilateral ankle edema, no clubbing or cyanosis to extremities  Psychiatric: Appropriate affect, cooperative  Neurologic: Oriented x 3, strength symmetric in  all extremities, Cranial Nerves grossly intact to confrontation, speech clear  Skin: No rashes      Result Review:  I have personally reviewed the results from the time of this admission to 3/3/2022 00:35 EST and agree with these findings:  [x]  Laboratory  []  Microbiology  [x]  Radiology  [x]  EKG/Telemetry   []  Cardiology/Vascular   []  Pathology  []  Old records  []  Other:  Most notable findings include: EKG without any acute ischemic changes, chest x-ray with evidence of pulmonary edema      LAB RESULTS:      Lab 03/02/22 1924   WBC 11.88*   HEMOGLOBIN 15.7   HEMATOCRIT 47.8   PLATELETS 230   NEUTROS ABS 9.59*   IMMATURE GRANS (ABS) 0.07*   LYMPHS ABS 1.13   MONOS ABS 0.99*   EOS ABS 0.06   MCV 95.4   PROCALCITONIN 0.07         Lab 03/02/22 1924   SODIUM 135*   POTASSIUM 4.4   CHLORIDE 100   CO2 25.0   ANION GAP 10.0   BUN 21   CREATININE 1.33*   EGFR 49.8*   GLUCOSE 139*   CALCIUM 9.6         Lab 03/02/22 1924   TOTAL PROTEIN 7.9   ALBUMIN 4.00   GLOBULIN 3.9   ALT (SGPT) 12   AST (SGOT) 17   BILIRUBIN 0.7   ALK PHOS 79         Lab 03/02/22 1924   PROBNP 9,350.0*   TROPONIN T <0.010                     Microbiology Results (last 10 days)     Procedure Component Value - Date/Time    COVID PRE-OP / PRE-PROCEDURE SCREENING ORDER (NO ISOLATION) - Swab, Nasopharynx [[id number]]  (Normal) Collected: 03/02/22 2241    Lab Status: Final result Specimen: Swab from Nasopharynx Updated: 03/02/22 2343    Narrative:      The following orders were created for panel order COVID PRE-OP / PRE-PROCEDURE SCREENING ORDER (NO ISOLATION) - Swab, Nasopharynx.  Procedure                               Abnormality         Status                     ---------                               -----------         ------                     COVID-19, FLU A/B, RSV P...[[id number]]  Normal              Final result                 Please view results for these tests on the individual orders.    COVID-19, FLU A/B, RSV PCR - Swab, Nasopharynx  [[id number]]  (Normal) Collected: 03/02/22 2241    Lab Status: Final result Specimen: Swab from Nasopharynx Updated: 03/02/22 2343     COVID19 Not Detected     Influenza A PCR Not Detected     Influenza B PCR Not Detected     RSV, PCR Not Detected    Narrative:      Fact sheet for providers: https://www.fda.gov/media/842250/download    Fact sheet for patients: https://www.fda.gov/media/683013/download    Test performed by PCR.          XR Chest 1 View    Result Date: 3/2/2022  EXAMINATION: XR CHEST 1 VW-  INDICATION: SOA triage protocol  COMPARISON: 9/8/2019  FINDINGS: Lung volumes are low. Heart is enlarged and there is a diffuse pulmonary edema pattern present. No definite effusion or evidence of pneumothorax is seen. Prominent mediastinal shadow and ectatic appearing aortic knob shadow are stable. Chronic left proximal humerus fracture is again seen as on 7/8/2021 shoulder films.       Impression: Congestive heart failure.    This report was finalized on 3/2/2022 9:03 PM by Dr. Tam Montoya MD.        Results for orders placed during the hospital encounter of 09/16/19    Adult Transthoracic Echo Complete W/ Cont if Necessary Per Protocol    Interpretation Summary  · Estimated EF appears to be in the range of 56 - 60%.  · Left ventricular diastolic dysfunction (grade I) consistent with impaired relaxation.  · Left ventricular wall thickness is consistent with mild concentric hypertrophy.  · Left atrial cavity size is mildly dilated.  · Moderate aortic valve stenosis is present.  · Mild to moderate aortic valve regurgitation is present.  · Mild tricuspid valve regurgitation is present.  · Calculated right ventricular systolic pressure from tricuspid regurgitation is 27 mmHg.  · Severe dilation of the aortic root is present. Severe dilation of the proximal aorta is present.      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) [I50.9, I38]  Unknown    • Nonrheumatic aortic valve insufficiency [I35.1]  Unknown   • Diastolic congestive heart failure (HCC) [I50.30]  Yes   • Prediabetes [R73.03]  Yes     A1c 5.7 on 5/29/19 with prior PCP     • Aortic stenosis, moderate [I35.0]  Yes   • Hyperlipidemia [E78.5]  Yes   • Essential hypertension [I10]  Yes   • Abdominal aortic aneurysm (AAA) without rupture (HCC) [I71.4]  Yes     Patient is a 89-year-old male with past medical history of diastolic CHF, mild to moderate aortic regurgitation, moderate aortic stenosis as of 2019, prediabetes, essential hypertension, hyperlipidemia, AAA who presents to the ER with complaints of dyspnea mainly with exertion over the past 24 hours and orthopnea.    1.  Acute on chronic diastolic CHF  2.  CHF exacerbated by valvular heart disease/AR/AS  3.  Preload dependency secondary to AS  4.  Essential hypertension  5.  Hyperlipidemia  6.  Severe dilation of aortic root  7.  AAA    --Lasix 80 mg given in ER  --Apply external urinary cath  --PT/OT  --Echo  --Cardiology consult, reports seeing someone in our systemp before. ?previously seen Dr. Mckeon  --Continue 40 mg twice daily IV starting in the a.m.  --strict is and os        DVT prophylaxis: heparin      CODE STATUS: full code  There are no questions and answers to display.         Jesús Boyer DO  03/03/22      Electronically signed by Jesús Boyer DO at 03/03/22 0045         Current Facility-Administered Medications   Medication Dose Route Frequency Provider Last Rate Last Admin   • acetaminophen (TYLENOL) tablet 650 mg  650 mg Oral Q4H PRN Jesús Boyer DO        Or   • acetaminophen (TYLENOL) 160 MG/5ML solution 650 mg  650 mg Oral Q4H PRN Jesús Boyer DO        Or   • acetaminophen (TYLENOL) suppository 650 mg  650 mg Rectal Q4H PRN Jesús Boyer DO       • carvedilol (COREG) tablet 3.125 mg  3.125 mg Oral BID With Meals Carlito Mckeon III, MD   3.125 mg at 03/07/22 0825   • furosemide (LASIX) tablet 20 mg  20 mg Oral  "Daily Carlito Mckeon III, MD   20 mg at 03/07/22 0825   • heparin (porcine) 5000 UNIT/ML injection 5,000 Units  5,000 Units Subcutaneous Q12H Merlin Jesús, DO   5,000 Units at 03/07/22 0825   • ondansetron (ZOFRAN) injection 4 mg  4 mg Intravenous Q6H PRN Merlin Jesús, DO       • Pharmacy Consult - MTM   Does not apply Daily Marisol Alvarenga, PharmD       • sodium chloride 0.9 % flush 10 mL  10 mL Intravenous PRN Merlin Jesús, DO       • sodium chloride 0.9 % flush 10 mL  10 mL Intravenous Q12H MorristonJesús hill, DO   10 mL at 03/06/22 2047   • sodium chloride 0.9 % flush 10 mL  10 mL Intravenous PRN Merlin, Jesús, DO       • spironolactone (ALDACTONE) tablet 25 mg  25 mg Oral Daily Carlito Mckeon III, MD   25 mg at 03/07/22 0825        Physician Progress Notes (last 72 hours)      Carlito Mckeon III, MD at 03/06/22 1355          [facility] Progress Note     LOS: 4 days   Patient Care Team:  Karthikeyan Moreno MD as PCP - General (Family Medicine)  PCP:  Karthikeyan Moreno MD    Chief Complaint: CHF, aortic stenosis    SUBJECTIVE: Intermittent dyspnea today.  Currently with shortness of breath although he does have clear lung fields and his O2 sats are 93%.  Telemetry reveals sinus rhythm with frequent premature atrial contractions.      Review of Systems:   All systems have been reviewed and are negative with the exception of those mentioned above.      OBJECTIVE:    Vital Sign Min/Max for last 24 hours  Temp  Min: 97.4 °F (36.3 °C)  Max: 98.1 °F (36.7 °C)   BP  Min: 116/65  Max: 149/88   Pulse  Min: 53  Max: 83   Resp  Min: 18  Max: 18   SpO2  Min: 91 %  Max: 95 %   No data recorded   No data recorded     Flowsheet Rows      First Filed Value   Admission Height 181.6 cm (71.5\") Documented at 03/02/2022 1833   Admission Weight 90.7 kg (200 lb) Documented at 03/02/2022 1833              Intake/Output Summary (Last 24 hours) at 3/6/2022 1355  Last data filed at " 3/6/2022 0800  Gross per 24 hour   Intake 480 ml   Output 525 ml   Net -45 ml     Intake & Output (last 3 days)       03/03 0701 03/04 0700 03/04 0701 03/05 0700 03/05 0701 03/06 0700 03/06 0701  03/07 0700    P.O. 240   480    Total Intake(mL/kg) 240 (2.5)   480 (5)    Urine (mL/kg/hr) 1050 (0.5) 400 (0.2) 675 (0.3) 150 (0.2)    Stool 0       Total Output 1050 400 675 150    Net -810 -400 -675 +330            Urine Unmeasured Occurrence 1 x       Stool Unmeasured Occurrence 1 x              Physical Exam:    General Appearance:    Alert, cooperative, no distress, appears stated age   Neck:   Supple, symmetrical, trachea midline.   Lungs:     Clear to auscultation bilaterally, respirations unlabored   Chest Wall:    No tenderness or deformity    Heart:   Regularly irregular, S1 and S2 normal, 3/6 mid peaking systolic murmur heard at the right upper sternal border with radiation to apex, rub   or gallop, normal carotid impulse bilaterally without bruit.   Extremities:   Extremities normal, atraumatic, no cyanosis or edema   Pulses:   2+ and symmetric all extremities   Skin:   Skin color, texture, turgor normal, no rashes or lesions      LABS/DIAGNOSTIC DATA:  Results from last 7 days   Lab Units 03/06/22  1020 03/05/22  0431 03/04/22  0628   WBC 10*3/mm3 10.27 10.33 10.22   HEMOGLOBIN g/dL 15.5 17.0 16.1   HEMATOCRIT % 47.8 53.8* 48.2   PLATELETS 10*3/mm3 209 195 227     Lab Results   Lab Value Date/Time    TROPONINT <0.010 03/02/2022 1924    TROPONINT 0.030 09/05/2019 0751         Results from last 7 days   Lab Units 03/06/22  1020 03/05/22  0431 03/04/22  1015 03/02/22  1924   SODIUM mmol/L 139 141 140 135*   POTASSIUM mmol/L 3.8 4.0 3.9 4.4   CHLORIDE mmol/L 103 102 99 100   CO2 mmol/L 27.0 27.0 28.0 25.0   BUN mg/dL 32* 26* 23 21   CREATININE mg/dL 1.13 1.23 1.28* 1.33*   CALCIUM mg/dL 8.9 9.3 9.6 9.6   BILIRUBIN mg/dL  --   --   --  0.7   ALK PHOS U/L  --   --   --  79   ALT (SGPT) U/L  --   --   --  12   AST  (SGOT) U/L  --   --   --  17   GLUCOSE mg/dL 134* 97 77 139*                       Medication Review:   carvedilol, 3.125 mg, Oral, BID With Meals  heparin (porcine), 5,000 Units, Subcutaneous, Q12H  pharmacy consult - [facility], , Does not apply, Daily  sodium chloride, 10 mL, Intravenous, Q12H  spironolactone, 25 mg, Oral, Daily             ASSESSMENT/PLAN:    Abdominal aortic aneurysm (AAA) without rupture (HCC)    Essential hypertension    Hyperlipidemia    Prediabetes    Diastolic congestive heart failure (HCC)    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  Afterload well controlled.  Suspect that this is due to underlying coronary ischemia and valvular heart disease.  He does not desire any further invasive cardiac procedures at this time.  We again had a prolonged discussion with the patient and the family at bedside discussing options for therapy to include consideration for TAVR, continued medical therapy, and even evaluation for hospice.  We will proceed with medical therapy and close clinical follow-up and arrange for hospice consult.  -Continue current medical therapy, adding low-dose Lasix.    Severe aortic stenosis: Currently does not desire any further evaluation for percutaneous therapy, he is not a candidate for surgical AVR.  Continue medical therapy.    -Currently not a candidate for ACE/ARB/Arni due to intermittent hypotension  -Not a candidate for LifeVest or ICD.      Carlito Mckeon III, MD   22  13:55 EST    Electronically signed by Carlito Mckeon III, MD at 22 1358     Jenn Delaroas APRN at 22 1217              [facility] Medicine Services  PROGRESS NOTE    Patient Name: Ash Diez  : 10/4/1928  MRN: [id number]    Date of Admission: 3/2/2022  Primary Care Physician: Karthikeyan Moreno,  MD    Subjective   Subjective   CC:  F/U dyspnea     HPI:  Patient having intermittent episodes of shortness of air and he feels very tired today.  Daughter and granddaughter are in the room and concerned about his prognosis.  Dr. Mckeon will be coming down to talk to patient.    ROS:  Gen- No fevers, chills  CV- No chest pain, palpitations  Resp- No cough; +dyspnea  GI- No N/V/D, abd pain  All other systems have been reviewed and the pertinent positives and negatives are listed above in the HPI or ROS    Objective   Objective   Vital Signs:   Temp:  [97.4 °F (36.3 °C)-98.1 °F (36.7 °C)] 98.1 °F (36.7 °C)  Heart Rate:  [53-83] 55  Resp:  [18] 18  BP: (116-149)/(65-88) 118/68  Flow (L/min):  [2-3] 3  Physical Exam:  Constitutional: Alert, elderly ill-appearing male with intermittent shortness of air  Eyes: EOMI, sclerae anicteric, no conjunctival injection  Head: NCAT  ENT: [place], moist mucous membranes   Respiratory: Nonlabored, symmetrical chest expansion, CTAB, 95% 3L; diminished throughout  Cardiovascular: IRR, HR 52, no R/G, +murmur, +DP pulses bilaterally  Gastrointestinal: Soft, NT, ND +BS  Musculoskeletal: CALL; no LE edema bilaterally  Neurologic: Oriented x4, strength symmetric in all extremities, follows all commands, speech clear  Skin: No rashes on exposed skin  Psychiatric: Pleasant and cooperative; normal affect    Results Reviewed:  LAB RESULTS:      Lab 03/06/22  1020 03/05/22  0431 03/04/22  0628 03/02/22  1924   WBC 10.27 10.33 10.22 11.88*   HEMOGLOBIN 15.5 17.0 16.1 15.7   HEMATOCRIT 47.8 53.8* 48.2 47.8   PLATELETS 209 195 227 230   NEUTROS ABS 7.54* 6.72 6.63 9.59*   IMMATURE GRANS (ABS) 0.05 0.05 0.05 0.07*   LYMPHS ABS 1.32 2.13 2.05 1.13   MONOS ABS 1.20* 1.23* 1.30* 0.99*   EOS ABS 0.12 0.15 0.14 0.06   MCV 95.8 100.2* 95.4 95.4   PROCALCITONIN  --   --   --  0.07         Lab 03/06/22  1020 03/05/22  0431 03/04/22  1015 03/02/22  1924   SODIUM 139 141 140 135*   POTASSIUM 3.8 4.0 3.9 4.4    CHLORIDE 103 102 99 100   CO2 27.0 27.0 28.0 25.0   ANION GAP 9.0 12.0 13.0 10.0   BUN 32* 26* 23 21   CREATININE 1.13 1.23 1.28* 1.33*   EGFR 60.6 54.7* 52.2* 49.8*   GLUCOSE 134* 97 77 139*   CALCIUM 8.9 9.3 9.6 9.6   MAGNESIUM 2.4* 2.4* 2.3 2.4*         Lab 03/02/22 1924   TOTAL PROTEIN 7.9   ALBUMIN 4.00   GLOBULIN 3.9   ALT (SGPT) 12   AST (SGOT) 17   BILIRUBIN 0.7   ALK PHOS 79         Lab 03/02/22 1924   PROBNP 9,350.0*   TROPONIN T <0.010                 Brief Urine Lab Results     None          Microbiology Results Abnormal     Procedure Component Value - Date/Time    COVID PRE-OP / PRE-PROCEDURE SCREENING ORDER (NO ISOLATION) - Swab, Nasopharynx [[id number]]  (Normal) Collected: 03/02/22 2241    Lab Status: Final result Specimen: Swab from Nasopharynx Updated: 03/02/22 2343    Narrative:      The following orders were created for panel order COVID PRE-OP / PRE-PROCEDURE SCREENING ORDER (NO ISOLATION) - Swab, Nasopharynx.  Procedure                               Abnormality         Status                     ---------                               -----------         ------                     COVID-19, FLU A/B, RSV P...[[id number]]  Normal              Final result                 Please view results for these tests on the individual orders.    COVID-19, FLU A/B, RSV PCR - Swab, Nasopharynx [[id number]]  (Normal) Collected: 03/02/22 2241    Lab Status: Final result Specimen: Swab from Nasopharynx Updated: 03/02/22 2343     COVID19 Not Detected     Influenza A PCR Not Detected     Influenza B PCR Not Detected     RSV, PCR Not Detected    Narrative:      Fact sheet for providers: https://www.fda.gov/media/319419/download    Fact sheet for patients: https://www.fda.gov/media/069328/download    Test performed by PCR.          XR Chest 1 View    Result Date: 3/6/2022   DATE OF EXAM: 3/6/2022 12:22 PM  PROCEDURE: XR CHEST 1 VW-  INDICATIONS: SOA; J96.01-Acute respiratory failure with hypoxia; I50.9-Heart  failure, unspecified; Z20.822-Contact with and (suspected) exposure to covid-19  COMPARISON: 03/02/2022 at 8:35 PM  TECHNIQUE: [Portable chest radiograph]  FINDINGS: Ill-defined multifocal airspace infiltrates are again seen bilaterally with associated diffuse interstitial changes. The findings are stable. The cardiac silhouette and mediastinum are stable.      Impression: Stable multifocal airspace infiltrates bilaterally with diffuse interstitial changes.  This report was finalized on 3/6/2022 12:45 PM by Carlo Quintana MD.        Results for orders placed during the hospital encounter of 03/02/22    Adult Transthoracic Echo Complete W/ Cont if Necessary Per Protocol    Interpretation Summary  · Left ventricular ejection fraction appears to be 31 - 35%. Left ventricular systolic function is moderately decreased.  · Left ventricular diastolic function is consistent with (grade II w/high LAP) pseudonormalization.  · Left atrial volume is mildly increased.  · Severe aortic valve stenosis is present. Aortic valve area is 0.64 cm2.  · Peak velocity of the flow distal to the aortic valve is 426.7 cm/s. Aortic valve mean pressure gradient is 38.8 mmHg.  · Mild dilation of the aortic root is present. Moderate dilation of the ascending aorta is present.      I have reviewed the medications:  Scheduled Meds:carvedilol, 3.125 mg, Oral, BID With Meals  heparin (porcine), 5,000 Units, Subcutaneous, Q12H  pharmacy consult - MTM, , Does not apply, Daily  sodium chloride, 10 mL, Intravenous, Q12H  spironolactone, 25 mg, Oral, Daily      Continuous Infusions:   PRN Meds:.•  acetaminophen **OR** acetaminophen **OR** acetaminophen  •  ondansetron  •  sodium chloride  •  sodium chloride    Assessment/Plan   Assessment & Plan     Active Hospital Problems    Diagnosis  POA   • Severe aortic stenosis [I35.0]  Yes   • Atrial fibrillation (HCC) [I48.91]  Yes   • 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     Brief Hospital Course to date:  Ash Diez is a 89 y.o. male with hypertension, AAA, hyperlipidemia, diastolic heart failure, valvular heart disease admitted for shortness of breath secondary to acute on chronic heart failure.  Improved with diuresis.    These problems are new to me today    This patient's problems and plans were partially entered by my partner and updated as appropriate by me 03/06/22.    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    --Seen by cardiology; Mike to see today  --3/6/22 EKG shows bradycardia w/1st degree block   --Amlodipine discontinued, started carvedilol, aldactone.    --Lasix 40mg PO as needed for weight gain >2lbs and dyspnea or edema  --Per cardiology, patient does not desire to pursue TAVR  --Hospice consult after talking with Dr Mckeon    DVT prophylaxis:  Medical DVT prophylaxis orders are present.     AM-PAC 6 Clicks Score (PT): 17 (03/06/22 0800)    Disposition: He lives in assisted living at [facility] and PT/OT have recommended SNF rehab.  Case management has made referrals.      CODE STATUS:   Code Status and Medical Interventions:   Ordered at: 03/05/22 0942     Medical Intervention Limits:    NO intubation (DNI)     Level Of Support Discussed With:    Patient     Code Status (Patient has no pulse and is not breathing):    No CPR (Do Not Attempt to Resuscitate)     Medical Interventions (Patient has pulse or is breathing):    Limited Support     More than 50% of time spent counseling on current illness and plan of care. Case discussed with:  Patient, granddaughter, daughter, Dr. Carcamo and Dr. Mckeon  Total time spent face to face with the patient was 45 minutes.  Total time of the encounter was 60 minutes.    VALENTINO Hernandez  22                Electronically signed by Jenn Delarosa APRN at 22 1400     Sarah Carcamo MD at 22 0830              [facility] Medicine Services  PROGRESS NOTE    Patient Name: Ash Diez  : 10/4/1928  MRN: [id number]    Date of Admission: 3/2/2022  Primary Care Physician: Karthikeyan Moreno MD    Subjective   Subjective     CC:  F/U dyspnea     HPI:  Feels well today. Back to normal.    ROS:  Gen- No fevers, chills  CV- No chest pain, palpitations  Resp- No cough, dyspnea    Objective   Objective     Vital Signs:   Temp:  [98 °F (36.7 °C)-98.3 °F (36.8 °C)] 98.2 °F (36.8 °C)  Heart Rate:  [57-95] 69  Resp:  [16-22] 18  BP: (113-140)/(59-87) 127/76  Flow (L/min):  [2] 2     Physical Exam:  Constitutional: No acute distress, awake, alert, laying in bed  HENT: NCAT, mucous membranes moist  Respiratory: Clear to auscultation bilaterally, respiratory effort normal   Cardiovascular: RRR, 3/6 systolic ejection murmur  Gastrointestinal: Positive bowel sounds, soft, nontender, nondistended  Musculoskeletal: No bilateral ankle edema  Psychiatric: Appropriate affect, cooperative  Neurologic: Cranial Nerves grossly intact to confrontation, speech clear  Skin: No rashes on exposed surfaces    Results Reviewed:  LAB RESULTS:      Lab 22  0431 22  0628 22   WBC 10.33 10.22 11.88*   HEMOGLOBIN 17.0 16.1 15.7   HEMATOCRIT 53.8* 48.2 47.8   PLATELETS 195 227 230   NEUTROS ABS 6.72 6.63 9.59*   IMMATURE GRANS (ABS) 0.05 0.05 0.07*   LYMPHS ABS 2.13 2.05 1.13   MONOS ABS 1.23* 1.30* 0.99*   EOS ABS 0.15 0.14 0.06   .2* 95.4 95.4   PROCALCITONIN  --   --  0.07         Lab 22  0431 22  1015 22  1924   SODIUM 141 140 135*   POTASSIUM 4.0 3.9 4.4    CHLORIDE 102 99 100   CO2 27.0 28.0 25.0   ANION GAP 12.0 13.0 10.0   BUN 26* 23 21   CREATININE 1.23 1.28* 1.33*   EGFR 54.7* 52.2* 49.8*   GLUCOSE 97 77 139*   CALCIUM 9.3 9.6 9.6   MAGNESIUM 2.4* 2.3 2.4*         Lab 03/02/22 1924   TOTAL PROTEIN 7.9   ALBUMIN 4.00   GLOBULIN 3.9   ALT (SGPT) 12   AST (SGOT) 17   BILIRUBIN 0.7   ALK PHOS 79         Lab 03/02/22  1924   PROBNP 9,350.0*   TROPONIN T <0.010                 Brief Urine Lab Results     None          Microbiology Results Abnormal     Procedure Component Value - Date/Time    COVID PRE-OP / PRE-PROCEDURE SCREENING ORDER (NO ISOLATION) - Swab, Nasopharynx [[id number]]  (Normal) Collected: 03/02/22 2241    Lab Status: Final result Specimen: Swab from Nasopharynx Updated: 03/02/22 2343    Narrative:      The following orders were created for panel order COVID PRE-OP / PRE-PROCEDURE SCREENING ORDER (NO ISOLATION) - Swab, Nasopharynx.  Procedure                               Abnormality         Status                     ---------                               -----------         ------                     COVID-19, FLU A/B, RSV P...[[id number]]  Normal              Final result                 Please view results for these tests on the individual orders.    COVID-19, FLU A/B, RSV PCR - Swab, Nasopharynx [[id number]]  (Normal) Collected: 03/02/22 2241    Lab Status: Final result Specimen: Swab from Nasopharynx Updated: 03/02/22 2343     COVID19 Not Detected     Influenza A PCR Not Detected     Influenza B PCR Not Detected     RSV, PCR Not Detected    Narrative:      Fact sheet for providers: https://www.fda.gov/media/562444/download    Fact sheet for patients: https://www.fda.gov/media/463044/download    Test performed by PCR.          Adult Transthoracic Echo Complete W/ Cont if Necessary Per Protocol    Result Date: 3/3/2022  · Left ventricular ejection fraction appears to be 31 - 35%. Left ventricular systolic function is moderately decreased. · Left  ventricular diastolic function is consistent with (grade II w/high LAP) pseudonormalization. · Left atrial volume is mildly increased. · Severe aortic valve stenosis is present. Aortic valve area is 0.64 cm2. · Peak velocity of the flow distal to the aortic valve is 426.7 cm/s. Aortic valve mean pressure gradient is 38.8 mmHg. · Mild dilation of the aortic root is present. Moderate dilation of the ascending aorta is present.        Results for orders placed during the hospital encounter of 03/02/22    Adult Transthoracic Echo Complete W/ Cont if Necessary Per Protocol    Interpretation Summary  · Left ventricular ejection fraction appears to be 31 - 35%. Left ventricular systolic function is moderately decreased.  · Left ventricular diastolic function is consistent with (grade II w/high LAP) pseudonormalization.  · Left atrial volume is mildly increased.  · Severe aortic valve stenosis is present. Aortic valve area is 0.64 cm2.  · Peak velocity of the flow distal to the aortic valve is 426.7 cm/s. Aortic valve mean pressure gradient is 38.8 mmHg.  · Mild dilation of the aortic root is present. Moderate dilation of the ascending aorta is present.      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        Brief Hospital Course to date:  Ash Diez is a 89 y.o. male with hypertension, AAA, hyperlipidemia, diastolic heart failure, valvular heart disease admitted for shortness of breath secondary to acute on chronic heart failure.  Improved with diuresis.    This patient's problems and plans were partially entered by my partner and updated as appropriate by me 22.    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    -Seen by cardiology  -Amlodipine discontinued, started carvedilol, aldactone.  Lasix 40mg PO as needed for weight gain >2lbs and dyspnea or edema  -Per cardiology, patient does not desire to pursue TAVR    DVT prophylaxis:  Medical DVT prophylaxis orders are present.       AM-PAC 6 Clicks Score (PT): 19 (22)    Disposition: He lives in assisted living at [facility] and PT/OT have recommended SNF rehab.  Case management has made referrals.  Medically ready anytime.    CODE STATUS:   Code Status and Medical Interventions:   Ordered at: 22 0942     Medical Intervention Limits:    NO intubation (DNI)     Level Of Support Discussed With:    Patient     Code Status (Patient has no pulse and is not breathing):    No CPR (Do Not Attempt to Resuscitate)     Medical Interventions (Patient has pulse or is breathing):    Limited Support       Sarah Carcamo MD  22                Electronically signed by Sarah Carcamo MD at 22 0947     Jason Olmos MD at 22 1347              [facility] Medicine Services  PROGRESS NOTE    Patient Name: Ash Diez  : 10/4/1928  MRN: [id number]    Date of Admission: 3/2/2022  Primary Care Physician: Karthikeyan Moreno  MD    Subjective   Subjective     CC:  SOB    HPI:  Resting in bed no acute distress and tells me that he feels a little better.  Tells me that he is breathing has improved since admission.  No fever or chills.  No chest pain or palpitation.  No nausea vomiting or diarrhea or abdominal pain.  No headache.    ROS:  As above    Objective   Objective     Vital Signs:   Temp:  [98 °F (36.7 °C)-98.5 °F (36.9 °C)] 98.3 °F (36.8 °C)  Heart Rate:  [67-95] (P) 95  Resp:  [16-18] 16  BP: (122-135)/(59-77) 125/59  Flow (L/min):  [2] (P) 2     Physical Exam:  Constitutional: No acute distress, looks comfortable  HENT: NCAT, mucous membranes moist  Respiratory: Clear to auscultation bilaterally, respiratory effort normal   Cardiovascular: RRR, systolic murmurs, no rubs or gallops  Gastrointestinal: Positive bowel sounds, soft, nontender, nondistended  Musculoskeletal: No bilateral ankle edema  Psychiatric: Appropriate affect, cooperative  Neurologic: Awake, alert, follows commands, no focality present, speech clear  Skin: No rashes    Results Reviewed:  LAB RESULTS:      Lab 03/04/22  0628 03/02/22 1924   WBC 10.22 11.88*   HEMOGLOBIN 16.1 15.7   HEMATOCRIT 48.2 47.8   PLATELETS 227 230   NEUTROS ABS 6.63 9.59*   IMMATURE GRANS (ABS) 0.05 0.07*   LYMPHS ABS 2.05 1.13   MONOS ABS 1.30* 0.99*   EOS ABS 0.14 0.06   MCV 95.4 95.4   PROCALCITONIN  --  0.07         Lab 03/04/22  1015 03/02/22 1924   SODIUM 140 135*   POTASSIUM 3.9 4.4   CHLORIDE 99 100   CO2 28.0 25.0   ANION GAP 13.0 10.0   BUN 23 21   CREATININE 1.28* 1.33*   EGFR 52.2* 49.8*   GLUCOSE 77 139*   CALCIUM 9.6 9.6   MAGNESIUM 2.3 2.4*         Lab 03/02/22 1924   TOTAL PROTEIN 7.9   ALBUMIN 4.00   GLOBULIN 3.9   ALT (SGPT) 12   AST (SGOT) 17   BILIRUBIN 0.7   ALK PHOS 79         Lab 03/02/22 1924   PROBNP 9,350.0*   TROPONIN T <0.010                 Brief Urine Lab Results     None          Microbiology Results Abnormal     Procedure Component Value - Date/Time     COVID PRE-OP / PRE-PROCEDURE SCREENING ORDER (NO ISOLATION) - Swab, Nasopharynx [[id number]]  (Normal) Collected: 03/02/22 2241    Lab Status: Final result Specimen: Swab from Nasopharynx Updated: 03/02/22 2343    Narrative:      The following orders were created for panel order COVID PRE-OP / PRE-PROCEDURE SCREENING ORDER (NO ISOLATION) - Swab, Nasopharynx.  Procedure                               Abnormality         Status                     ---------                               -----------         ------                     COVID-19, FLU A/B, RSV P...[[id number]]  Normal              Final result                 Please view results for these tests on the individual orders.    COVID-19, FLU A/B, RSV PCR - Swab, Nasopharynx [[id number]]  (Normal) Collected: 03/02/22 2241    Lab Status: Final result Specimen: Swab from Nasopharynx Updated: 03/02/22 2343     COVID19 Not Detected     Influenza A PCR Not Detected     Influenza B PCR Not Detected     RSV, PCR Not Detected    Narrative:      Fact sheet for providers: https://www.fda.gov/media/079526/download    Fact sheet for patients: https://www.fda.gov/media/186111/download    Test performed by PCR.          Adult Transthoracic Echo Complete W/ Cont if Necessary Per Protocol    Result Date: 3/3/2022  · Left ventricular ejection fraction appears to be 31 - 35%. Left ventricular systolic function is moderately decreased. · Left ventricular diastolic function is consistent with (grade II w/high LAP) pseudonormalization. · Left atrial volume is mildly increased. · Severe aortic valve stenosis is present. Aortic valve area is 0.64 cm2. · Peak velocity of the flow distal to the aortic valve is 426.7 cm/s. Aortic valve mean pressure gradient is 38.8 mmHg. · Mild dilation of the aortic root is present. Moderate dilation of the ascending aorta is present.      XR Chest 1 View    Result Date: 3/2/2022  EXAMINATION: XR CHEST 1 VW-  INDICATION: SOA triage protocol  COMPARISON:  9/8/2019  FINDINGS: Lung volumes are low. Heart is enlarged and there is a diffuse pulmonary edema pattern present. No definite effusion or evidence of pneumothorax is seen. Prominent mediastinal shadow and ectatic appearing aortic knob shadow are stable. Chronic left proximal humerus fracture is again seen as on 7/8/2021 shoulder films.       Impression: Congestive heart failure.    This report was finalized on 3/2/2022 9:03 PM by Dr. Tam Montoya MD.        Results for orders placed during the hospital encounter of 03/02/22    Adult Transthoracic Echo Complete W/ Cont if Necessary Per Protocol    Interpretation Summary  · Left ventricular ejection fraction appears to be 31 - 35%. Left ventricular systolic function is moderately decreased.  · Left ventricular diastolic function is consistent with (grade II w/high LAP) pseudonormalization.  · Left atrial volume is mildly increased.  · Severe aortic valve stenosis is present. Aortic valve area is 0.64 cm2.  · Peak velocity of the flow distal to the aortic valve is 426.7 cm/s. Aortic valve mean pressure gradient is 38.8 mmHg.  · Mild dilation of the aortic root is present. Moderate dilation of the ascending aorta is present.      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        Brief Hospital Course to date:  Ash Diez is a 89 y.o. male with past medical history significant for hypertension, AAA, hyperlipidemia, diastolic heart failure, valvular heart disease.  Patient was admitted for shortness of breath secondary to acute on chronic heart failure.  Improved with diuresis.    *Shortness of breath secondary to acute on chronic heart failure.  Patient symptom has improved with diuresis.  Cardiology following.    *Diastolic and systolic heart failure, acute on chronic.  Echocardiogram which was done on this admission shows ejection fraction between 30 to 35%.    *Severe aortic valve stenosis.    *Mild dilatation of the aortic root.  Also moderate dilation of ascending aorta.    *Hypertension, controlled.    *Aortic aneurysm.  Patient is not a candidate for surgery.    *Hyperlipidemia.    PLAN:  -Continue current care  -Home soon as patient becomes more stable, possibly tomorrow.    DVT prophylaxis:  Medical DVT prophylaxis orders are present.       AM-PAC 6 Clicks Score (PT): 19 (03/04/22 0800)    Disposition: Home soon possibly tomorrow..    CODE STATUS:   There are no questions and answers to display.       Jason Olmos MD  03/04/22                Electronically signed by Jason Olmos MD at 03/04/22 1354     Carlito Mckeon III, MD at 03/04/22 1206          [facility] Progress Note     LOS: 2 days   Patient Care Team:  Karthikeyan Moreno MD as PCP - General (Family Medicine)  PCP:  Karthikeyan Moreno MD    Chief Complaint: CHF, aortic stenosis    SUBJECTIVE: Feels much better today, reports that he is back to his baseline state of health.      Review of Systems:   All systems have been  "reviewed and are negative with the exception of those mentioned above.      OBJECTIVE:    Vital Sign Min/Max for last 24 hours  Temp  Min: 98 °F (36.7 °C)  Max: 98.5 °F (36.9 °C)   BP  Min: 122/71  Max: 135/77   Pulse  Min: 67  Max: 91   Resp  Min: 16  Max: 18   SpO2  Min: 91 %  Max: 97 %   No data recorded   No data recorded     Flowsheet Rows      First Filed Value   Admission Height 181.6 cm (71.5\") Documented at 03/02/2022 1833   Admission Weight 90.7 kg (200 lb) Documented at 03/02/2022 1833              Intake/Output Summary (Last 24 hours) at 3/4/2022 1206  Last data filed at 3/4/2022 0734  Gross per 24 hour   Intake 240 ml   Output 1300 ml   Net -1060 ml     Intake & Output (last 3 days)       03/01 0701  03/02 0700 03/02 0701  03/03 0700 03/03 0701  03/04 0700 03/04 0701  03/05 0700    P.O.   240     Total Intake(mL/kg)   240 (2.5)     Urine (mL/kg/hr)  2700 1050 (0.5) 250 (0.5)    Stool   0     Total Output  2700 1050 250    Net  -2700 -810 -250            Urine Unmeasured Occurrence   1 x     Stool Unmeasured Occurrence   1 x            Physical Exam:    General Appearance:    Alert, cooperative, no distress, appears stated age   Neck:   Supple, symmetrical, trachea midline.   Lungs:     Clear to auscultation bilaterally, respirations unlabored   Chest Wall:    No tenderness or deformity    Heart:    Regular rate and rhythm, S1 and S2 normal, 3/6 mid peaking systolic murmur heard at the right upper sternal border with radiation to apex, rub   or gallop, normal carotid impulse bilaterally without bruit.   Extremities:   Extremities normal, atraumatic, no cyanosis or edema   Pulses:   2+ and symmetric all extremities   Skin:   Skin color, texture, turgor normal, no rashes or lesions      LABS/DIAGNOSTIC DATA:  Results from last 7 days   Lab Units 03/04/22 0628 03/02/22  1924   WBC 10*3/mm3 10.22 11.88*   HEMOGLOBIN g/dL 16.1 15.7   HEMATOCRIT % 48.2 47.8   PLATELETS 10*3/mm3 227 230     Lab Results   Lab " Value Date/Time    TROPONINT <0.010 03/02/2022 1924    TROPONINT 0.030 09/05/2019 0751         Results from last 7 days   Lab Units 03/04/22  1015 03/02/22 1924   SODIUM mmol/L 140 135*   POTASSIUM mmol/L 3.9 4.4   CHLORIDE mmol/L 99 100   CO2 mmol/L 28.0 25.0   BUN mg/dL 23 21   CREATININE mg/dL 1.28* 1.33*   CALCIUM mg/dL 9.6 9.6   BILIRUBIN mg/dL  --  0.7   ALK PHOS U/L  --  79   ALT (SGPT) U/L  --  12   AST (SGOT) U/L  --  17   GLUCOSE mg/dL 77 139*                       Medication Review:   carvedilol, 3.125 mg, Oral, BID With Meals  heparin (porcine), 5,000 Units, Subcutaneous, Q12H  pharmacy consult - 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  Afterload well controlled.  Suspect that this is due to underlying coronary ischemia and valvular heart disease.  He does not desire any further invasive cardiac procedures at this time.  We will proceed with medical therapy and close clinical follow-up.  -Discontinue amlodipine  -Starting low-dose carvedilol  -Starting Aldactone 25 mg p.o. twice daily, discontinuing scheduled Lasix.  -Repeat BMP in 1 week  -Lasix 40 mg p.o. as needed for weight gain greater than 2 pounds above baseline with associated symptoms.    Severe aortic stenosis: Currently does not desire any further evaluation for percutaneous therapy, he is not a candidate for surgical AVR.  Continue medical therapy.    -Currently not a candidate for ACE/ARB/Arni due to CKD  -Not a candidate for LifeVest or ICD.    -Overall  appears stable for discharge from a cardiac standpoint if we can arrange for outpatient follow-up towards the end of next week to assess volume status and repeat BMP.      Carlito Mckeon III, MD   03/04/22  12:06 EST    Electronically signed by Carlito Mckeon III, MD at 03/04/22 1210       Consult Notes (last 72 hours)  Notes from 03/04/22 0847 through 03/07/22 0847   No notes of this type exist for this encounter.

## 2022-03-07 NOTE — TELEPHONE ENCOUNTER
Caller: ADAN    Relationship: Other [facility]    Best call back number:     [phone] OPTION 2  [facility]    What orders are you requesting (i.e. lab or imaging): VERBAL ORDERS    In what timeframe would the patient need to come in: DISCHARGE ORDERS FROM [facility] BEING SENT TODAY PER ADAN    Where will you receive your lab/imaging services: NA    Additional notes: ADAN @ [facility] REQUESTING VERBAL ORDERS IF DOCTOR STRONG WILL BE FOLLOWING PATIENT FOR HOSPICE CARE

## 2022-03-07 NOTE — PLAN OF CARE
Goal Outcome Evaluation:  Plan of Care Reviewed With: patient, family        Progress: improving  Outcome Evaluation: Pt progressing with activity tolerance, completed 2 sets standing TA and BUE TE with improving balance, SBA bed mob, CGA 12ft functional mob at FWW, cont IPOT per POC

## 2022-03-07 NOTE — PLAN OF CARE
Goal Outcome Evaluation:  Plan of Care Reviewed With: patient        Progress: no change   VSS, RA, A/Ox4. Currently resting in bed with no complaints, will continue plan of care.

## 2022-03-07 NOTE — PLAN OF CARE
Goal Outcome Evaluation:         VSS on 3L, no complaints per patient, tentative plan for heart cath tomorrow, will continue to monitor

## 2022-03-07 NOTE — CASE MANAGEMENT/SOCIAL WORK
Continued Stay Note  [facility]     Patient Name: Ash Diez  MRN: [id number]  Today's Date: 3/7/2022    Admit Date: 3/2/2022     Discharge Plan     Row Name 03/07/22 1009       Plan    Plan SNF,skilled    Provided Post Acute Provider List? N/A    N/A Provider List Comment Pt's granddaughter Gregoria had list of preferred SNFs    Patient/Family in Agreement with Plan yes    Plan Comments CM spoke with pt and Granddaughter Gregoria at bedside. Pt just finished working wit therapy and remains agreeable for rehab at time of discharge. CM has received call from Stacy with [facility] regarding referral that was made on Friday by previous CM and [facility] can offer pt a skilled bed. CM discussed this with Gregoria and family did not agree for referral to [facility] and would prefer additional referrals to be made. Gregoria has given CM options of [facility], [facility] and [facility]. CM has made referrals to family selected SNF's. Per review of MD notes pt to have heart cath and KIARA tomorrow Tuesday 3/8. CM will continue to follow.    Final Discharge Disposition Code 03 - skilled nursing facility (SNF)               Discharge Codes    No documentation.                     Yoanna Duarte RN

## 2022-03-08 PROBLEM — I50.23 ACUTE ON CHRONIC SYSTOLIC CHF (CONGESTIVE HEART FAILURE) (HCC): Status: ACTIVE | Noted: 2022-01-01

## 2022-03-08 NOTE — PLAN OF CARE
Goal Outcome Evaluation:      VSS on 2L, no co pain/Sob so far this shift, pt. To go for KIARA and LHC today, will continue to monitor

## 2022-03-08 NOTE — PROGRESS NOTES
[facility] Medicine Services  PROGRESS NOTE    Patient Name: Ash Diez  : 10/4/1928  MRN: [id number]    Date of Admission: 3/2/2022  Primary Care Physician: Karthikeyan Moreno MD    Subjective   Subjective   CC:  F/U dyspnea     HPI:  Patient sitting up in a chair in NAD waiting to have LHC and KIARA done this afternoon in anticipation of a TAVR.  Patient states he is breathing easier, but his granddaughter states is not.  Patient still on 3 L oxygen.    ROS:  Gen- No fevers, chills  CV- No chest pain, palpitations  Resp- No cough; +dyspnea  GI- No N/V/D, abd pain  All other systems have been reviewed and the pertinent positives and negatives are listed above in the HPI or ROS    Objective   Objective   Vital Signs:   Temp:  [97.5 °F (36.4 °C)-98.2 °F (36.8 °C)] 98.2 °F (36.8 °C)  Heart Rate:  [56-81] 64  Resp:  [18-20] 20  BP: (121-158)/(56-89) 153/89  Flow (L/min):  [3] 3  Physical Exam:  Constitutional: Alert, elderly ill-appearing male sitting up in a chair talking to his granddaughter in NAD.  Eyes: EOMI, sclerae anicteric, no conjunctival injection  Head: NCAT  ENT: [place], moist mucous membranes   Respiratory: Nonlabored, symmetrical chest expansion, CTAB, 91% 3L; diminished throughout  Cardiovascular: IRR, HR 62, no R/G, +murmur, +DP pulses bilaterally  Gastrointestinal: Soft, NT, ND +BS  Musculoskeletal: CALL; no LE edema bilaterally  Neurologic: Oriented x4, strength symmetric in all extremities, follows all commands, speech clear  Skin: No rashes on exposed skin  Psychiatric: Pleasant and cooperative; normal affect    Results Reviewed:  LAB RESULTS:      Lab 22  0844 22  1020 22  0431 22  0628 22  1924   WBC 10.80 10.27 10.33 10.22 11.88*   HEMOGLOBIN 16.5 15.5 17.0 16.1 15.7   HEMATOCRIT 52.3* 47.8 53.8* 48.2 47.8   PLATELETS 216 209 195 227 230   NEUTROS ABS  --  7.54* 6.72 6.63 9.59*   IMMATURE GRANS (ABS)  --  0.05 0.05 0.05 0.07*   LYMPHS ABS   --  1.32 2.13 2.05 1.13   MONOS ABS  --  1.20* 1.23* 1.30* 0.99*   EOS ABS  --  0.12 0.15 0.14 0.06   .4* 95.8 100.2* 95.4 95.4   PROCALCITONIN  --   --   --   --  0.07         Lab 03/07/22  1019 03/06/22  1020 03/05/22  0431 03/04/22  1015 03/02/22  1924   SODIUM 141 139 141 140 135*   POTASSIUM 4.0 3.8 4.0 3.9 4.4   CHLORIDE 102 103 102 99 100   CO2 31.0* 27.0 27.0 28.0 25.0   ANION GAP 8.0 9.0 12.0 13.0 10.0   BUN 36* 32* 26* 23 21   CREATININE 1.19 1.13 1.23 1.28* 1.33*   EGFR 57.0* 60.6 54.7* 52.2* 49.8*   GLUCOSE 123* 134* 97 77 139*   CALCIUM 9.1 8.9 9.3 9.6 9.6   MAGNESIUM  --  2.4* 2.4* 2.3 2.4*         Lab 03/02/22 1924   TOTAL PROTEIN 7.9   ALBUMIN 4.00   GLOBULIN 3.9   ALT (SGPT) 12   AST (SGOT) 17   BILIRUBIN 0.7   ALK PHOS 79         Lab 03/02/22 1924   PROBNP 9,350.0*   TROPONIN T <0.010                 Brief Urine Lab Results     None          Microbiology Results Abnormal     Procedure Component Value - Date/Time    COVID PRE-OP / PRE-PROCEDURE SCREENING ORDER (NO ISOLATION) - Swab, Nasopharynx [[id number]]  (Normal) Collected: 03/02/22 2241    Lab Status: Final result Specimen: Swab from Nasopharynx Updated: 03/02/22 2343    Narrative:      The following orders were created for panel order COVID PRE-OP / PRE-PROCEDURE SCREENING ORDER (NO ISOLATION) - Swab, Nasopharynx.  Procedure                               Abnormality         Status                     ---------                               -----------         ------                     COVID-19, FLU A/B, RSV P...[[id number]]  Normal              Final result                 Please view results for these tests on the individual orders.    COVID-19, FLU A/B, RSV PCR - Swab, Nasopharynx [[id number]]  (Normal) Collected: 03/02/22 2241    Lab Status: Final result Specimen: Swab from Nasopharynx Updated: 03/02/22 2344     COVID19 Not Detected     Influenza A PCR Not Detected     Influenza B PCR Not Detected     RSV, PCR Not Detected     Narrative:      Fact sheet for providers: https://www.fda.gov/media/628251/download    Fact sheet for patients: https://www.fda.gov/media/966118/download    Test performed by PCR.          XR Chest 1 View    Result Date: 3/6/2022   DATE OF EXAM: 3/6/2022 12:22 PM  PROCEDURE: XR CHEST 1 VW-  INDICATIONS: SOA; J96.01-Acute respiratory failure with hypoxia; I50.9-Heart failure, unspecified; Z20.822-Contact with and (suspected) exposure to covid-19  COMPARISON: 03/02/2022 at 8:35 PM  TECHNIQUE: [Portable chest radiograph]  FINDINGS: Ill-defined multifocal airspace infiltrates are again seen bilaterally with associated diffuse interstitial changes. The findings are stable. The cardiac silhouette and mediastinum are stable.      Impression: Stable multifocal airspace infiltrates bilaterally with diffuse interstitial changes.  This report was finalized on 3/6/2022 12:45 PM by Carlo Quintana MD.        Results for orders placed during the hospital encounter of 03/02/22    Adult Transthoracic Echo Complete W/ Cont if Necessary Per Protocol    Interpretation Summary  · Left ventricular ejection fraction appears to be 31 - 35%. Left ventricular systolic function is moderately decreased.  · Left ventricular diastolic function is consistent with (grade II w/high LAP) pseudonormalization.  · Left atrial volume is mildly increased.  · Severe aortic valve stenosis is present. Aortic valve area is 0.64 cm2.  · Peak velocity of the flow distal to the aortic valve is 426.7 cm/s. Aortic valve mean pressure gradient is 38.8 mmHg.  · Mild dilation of the aortic root is present. Moderate dilation of the ascending aorta is present.      I have reviewed the medications:  Scheduled Meds:carvedilol, 3.125 mg, Oral, BID With Meals  furosemide, 20 mg, Oral, Daily  heparin (porcine), 5,000 Units, Subcutaneous, Q12H  pharmacy consult - MTM, , Does not apply, Daily  sodium chloride, 10 mL, Intravenous, Q12H  spironolactone, 25 mg, Oral,  Daily      Continuous Infusions:   PRN Meds:.•  acetaminophen **OR** acetaminophen **OR** acetaminophen  •  ondansetron  •  sodium chloride  •  sodium chloride    Assessment/Plan   Assessment & Plan     Active Hospital Problems    Diagnosis  POA   • **Acute on chronic systolic CHF (congestive heart failure) (HCC) [I50.23]  Yes   • Severe aortic stenosis [I35.0]  Yes   • 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     Brief Hospital Course to date:  Ash Diez is a 89 y.o. male with hypertension, AAA, hyperlipidemia, diastolic heart failure, valvular heart disease admitted for shortness of breath secondary to acute on chronic heart failure.  Improved with diuresis.    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    --Seen by cardiology; Mike to see today  --3/6/22 EKG shows bradycardia w/1st degree block   --Amlodipine discontinued, started carvedilol, aldactone.    --Lasix 40mg PO as needed for weight gain >2lbs and dyspnea or edema  --Per cardiology, patient is now wanting to have a TAVR; Dr. Mckeon ordered LHC and KIARA to be done today  --Hospice consult but patient has elected to possibly have a TAVR, so hospice is not appropriate at this time; patient and family spoke with them and took the information.    DVT prophylaxis:  Medical DVT prophylaxis orders are present.     AM-PAC 6 Clicks Score (PT): 17 (03/08/22 0800)    Disposition: He lives in assisted living at [facility] and PT/OT have recommended SNF rehab.  Case management has made referrals; family wants  to [facility].      CODE STATUS:   Code Status and Medical Interventions:   Ordered at: 03/05/22 0942     Medical Intervention Limits:    NO intubation (DNI)     Level Of Support Discussed With:    Patient     Code Status (Patient has no pulse and is not breathing):    No CPR (Do Not Attempt to Resuscitate)     Medical Interventions (Patient has pulse or is breathing):    Limited Support       VALENTINO Hernandez  03/08/22

## 2022-03-08 NOTE — PROGRESS NOTES
Mr. Diez underwent KIARA and left heart catheterization without any issues.  He is still very sedated after just receiving 2 mg of Versed and 50 mcg of fentanyl for the KIRAA.  He had minimal coronary disease on cath and required no stents.  There is a 70% distal circumflex stenosis that is too small to fix.  On KIARA his a sending aorta measured 5.8.  He has not had a repeat CT scan since 2019 when his a sending aortic aneurysm measured 6.1.    According to his granddaughter he was playing golf and living independently until he fell and hurt his shoulder a year ago and is currently in an independent living facility.  This is his first hospitalization for aortic stenosis and CHF.    I spoke with the granddaughter at length after his procedures today.  The next steps are evaluation by surgery and a CT scan of his chest abdomen and pelvis.  Given how active he currently is I think that it would be reasonable to consider him for TAVR.  I have spoken with Dr. Mitchell and with Rossy AVELAR.    Khloe Pena MD, FACC

## 2022-03-08 NOTE — PLAN OF CARE
Goal Outcome Evaluation:  Plan of Care Reviewed With: patient        Progress: improving  Outcome Evaluation: VSS on 3L O2. Pt NPO at this time for heart cath in am. No complaints at this time. Will continue plan of care. 0250 3/8/2022

## 2022-03-09 NOTE — CASE MANAGEMENT/SOCIAL WORK
Continued Stay Note  [facility]     Patient Name: Ash Diez  MRN: [id number]  Today's Date: 3/9/2022    Admit Date: 3/2/2022     Discharge Plan     Row Name 03/09/22 1548       Plan    Plan ongoing    Patient/Family in Agreement with Plan yes    Plan Comments I met with Mr. Diez and his granddaughter, Gregoria, at the bedside. Mr. Diez is currently being worked up to have a TAVR. He had a CT scan today that Dr. Mitchell and Dr. Pena will look over together and decide when Mr. Diez will need surgery, this admission or wait and return later to have the TAVR. I placed a referral to Madelyn, admissions liaison with [facility], in hopes of getting an inpatient rehab bed that therapy recommends. Case management will continue to follow.    Final Discharge Disposition Code 30 - still a patient               Discharge Codes    No documentation.                     Juan Manuel Davis RN

## 2022-03-09 NOTE — CONSULTS
[facility] Cardiothoracic Surgery In-Patient Consult    Name:  Ash Diez  MRN Number:  [id number]  Date of Admission:  3/2/2022  Date of Consultation: 3/9/2022    Consulting Provider:    PCP: Karthikeyan Moreno MD  IP Care Team:  Patient Care Team:  Karthikeyan Moreno MD as PCP - General (Family Medicine)    Reason for Consultation: Severe aortic stenosis    History of Present Illness:    Ash Diez is a 89 y.o. male with a history of HTN, CAD, HLD, diastolic heart failure, non-smoker, moderate aortic regurgitation and moderate aortic stenosis diagnosed in 2019, chronic lower extremity edema, prediabetes, and ascending aortic aneurysm.  Resides at [facility] assisted living. Patient presented to [facility] on 3/2/2022 for complaints of shortness of breath on exertion and orthopnea for 1-2 days. ProBNP was 9,350 on admission and he was given 80 mg of Lasix in the ER. TTE done on 3/3 revealed LVEF 31-35%, severe aortic stenosis with a aortic valve area of 0.64 cm², pressure gradient 38.8 mmHg, and moderate dilation of the ascending aorta.  KIARA done 3/8 showed LVEF 35-40%, mild LV hypertrophy, severe aortic valve stenosis, severe dilation of the ascending aorta measuring 5.8 cm, moderate aortic valve regurgitation, mild mitral valve regurgitation, and trace tricuspid valve regurgitation. Left heart cath revealed mild to moderate CAD involving proximal and mid LAD with 50% stenosis, and mid circumflex 70% stenosis. He states 3 to 4 years ago he was evaluated by  and was told he needed coronary artery bypass grafting, aortic valve replacement, and ascending aneurysm repair.  However, at that time he declined surgery. He reports dizziness and a subsequent fall 3 to 4 months ago with injury to his left humerus. He denies any chest pain, syncopal episodes, palpitations, or history of rheumatic fever.  Saw a dentist 6 months ago with no issues.  Our service was consulted for possible surgical  intervention.    Review of Systems:  Review of Systems   Constitutional: Positive for fatigue.   HENT: Negative.    Eyes: Negative.    Respiratory: Positive for shortness of breath.    Cardiovascular: Positive for leg swelling.   Gastrointestinal: Negative.    Endocrine: Negative.    Genitourinary: Negative.    Musculoskeletal: Negative.    Skin: Negative.    Allergic/Immunologic: Negative.    Neurological: Positive for dizziness.   Hematological: Negative.    Psychiatric/Behavioral: Negative.        Past Medical History:    Past Medical History:   Diagnosis Date   • Aneurysm (HCC)     on aortic valve. has been evaluted at . pt decided agaisnt surgery    • Cancer (HCC)     colon   • Cellulitis     RLE   • CHF (congestive heart failure) (HCC)    • Hyperlipidemia    • Hypertension    • Pneumonia        Past Surgical History:    Past Surgical History:   Procedure Laterality Date   • CARDIAC CATHETERIZATION N/A 3/8/2022    Procedure: LEFT HEART CATH;  Surgeon: Khloe Pena MD;  Location: Merged with [facility] INVASIVE LOCATION;  Service: Cardiology;  Laterality: N/A;   • COLON SURGERY     • CORONARY STENT PLACEMENT     • VASCULAR SURGERY      Vein removal       Family History:    Family History   Problem Relation Age of Onset   • Arthritis Mother    • Heart attack Mother    • Stroke Father    • Stroke Sister    • Stroke Brother    • Hypertension Daughter        Social History:    Social History     Socioeconomic History   • Marital status:    Tobacco Use   • Smoking status: Never Smoker   • Smokeless tobacco: Never Used   Vaping Use   • Vaping Use: Never used   Substance and Sexual Activity   • Alcohol use: Not Currently     Comment: social   • Drug use: Never   • Sexual activity: Defer       Allergies:  No Known Allergies      Physical Exam:  Vital Signs:    Vitals:    03/09/22 0100 03/09/22 0320 03/09/22 0500 03/09/22 0710   BP:  136/58  129/54   BP Location:  Right arm  Right arm   Patient Position:  Lying   Lying   Pulse: 57 56 61 57   Resp:  18  18   Temp:  97.5 °F (36.4 °C)  98.2 °F (36.8 °C)   TempSrc:  Oral  Oral   SpO2: 94% 95% 97% 93%   Weight:       Height:         Body mass index is 29.35 kg/m².     Physical Exam  Vitals and nursing note reviewed.   Constitutional:       Appearance: Normal appearance.      Interventions: Nasal cannula in place.      Comments: 3L    HENT:      Head: Normocephalic.      Mouth/Throat:      Pharynx: Oropharynx is clear.   Eyes:      Pupils: Pupils are equal, round, and reactive to light.   Cardiovascular:      Rate and Rhythm: Normal rate.      Pulses:           Dorsalis pedis pulses are 1+ on the right side and 1+ on the left side.        Posterior tibial pulses are 1+ on the right side and 1+ on the left side.      Heart sounds: Murmur heard.    Systolic murmur is present with a grade of 3/6.  Pulmonary:      Effort: Pulmonary effort is normal.      Breath sounds: Rales present.   Abdominal:      General: Bowel sounds are normal.   Musculoskeletal:         General: Normal range of motion.      Cervical back: Normal range of motion and neck supple.   Skin:     General: Skin is warm.      Findings: Bruising present.   Neurological:      General: No focal deficit present.      Mental Status: He is alert and oriented to person, place, and time.   Psychiatric:         Mood and Affect: Mood normal.         Behavior: Behavior normal.         Labs/Imaging/Procedures:   Results from last 7 days   Lab Units 03/07/22  1019 03/04/22  1015 03/02/22  1924   SODIUM mmol/L 141   < > 135*   POTASSIUM mmol/L 4.0   < > 4.4   CHLORIDE mmol/L 102   < > 100   CO2 mmol/L 31.0*   < > 25.0   BUN mg/dL 36*   < > 21   CREATININE mg/dL 1.19   < > 1.33*   CALCIUM mg/dL 9.1   < > 9.6   BILIRUBIN mg/dL  --   --  0.7   ALK PHOS U/L  --   --  79   ALT (SGPT) U/L  --   --  12   AST (SGOT) U/L  --   --  17   GLUCOSE mg/dL 123*   < > 139*    < > = values in this interval not displayed.     Results from last 7 days    Lab Units 03/02/22  1924   TROPONIN T ng/mL <0.010     Results from last 7 days   Lab Units 03/07/22  0844 03/06/22  1020 03/05/22  0431   WBC 10*3/mm3 10.80 10.27 10.33   HEMOGLOBIN g/dL 16.5 15.5 17.0   HEMATOCRIT % 52.3* 47.8 53.8*   PLATELETS 10*3/mm3 216 209 195         Results from last 7 days   Lab Units 03/06/22  1020   MAGNESIUM mg/dL 2.4*         Cardiac Catheterization/Vascular Study    Result Date: 3/8/2022  · Mild to moderate coronary artery disease with an estimated 50% proximal LAD stenosis, 50% mid LAD stenosis, 70% mid circumflex stenosis in the vessel too small for intervention and mild disease in the right coronary. RECOMMENDATIONS: · The patient may proceed with TAVR evaluation. · The patient's EF of 30 to 35% does not appear to be related to his coronary disease. Indications: Coronary artery evaluation for the patient's reduced ejection fraction is well as possible future TAVR for significant aortic stenosis. Access: Right femoral artery as the patient has a dilated ascending aortic aneurysm Estimated blood loss: Less than 5 mL Procedures: · Left heart catheterization. · Left ventriculogram. · Selective coronary angiography. Procedure narrative: The patient was brought to the catheterization lab in a fasting condition.  Access site was prepped and draped in standard sterile fashion.  Lidocaine was injected and arterial access was obtained by percutaneous anterior wall puncture technique.  A 6 [place]an arterial sheath was placed in the right femoral artery using a modified Seldinger technique.  Selective coronary arteriography was performed using the Pema technique with a 6 [place] 4 curved Pema right catheter and a 6 [place] 6 curved Pema left catheter.  Nonionic contrast was used and was injected manually.  No left ventriculography was performed.  No LV pressures were obtained.  Contrast: 45 ml Hemodynamic Findings: Not obtained Left ventriculography: Not performed Angiographic Findings:  RCA: The right coronary artery is dominant for the posterior circulation and gives rise to the PDA as well as a posterolateral branch.  The right coronary artery is a very large vessel which contains an estimated 30-40% proximal stenosis, mid vessel 40% stenosis, and minor irregularities in the PDA and posterolateral branches. LMCA: Left main coronary artery gives rise to LAD and circumflex vessels and is a short vessel free of significant disease. Circumflex: The circumflex coronary artery gives rise to tiny first obtuse marginal branch small second obtuse marginal branch large bifurcating third obtuse marginal branch a small AV groove vessel and a small fourth obtuse marginal branch.  The circumflex within the AV groove vessel going into the fourth obtuse marginal branch contains an estimated 70% stenosis but the vessel is less than 2 mm in diameter. LAD: The LAD gives rise to a small first diagonal branch small second diagonal branch moderate third diagonal branch and terminates as a small apical recurrent branch.  The LAD proximally contains an estimated 50% stenosis just after the second diagonal and just after the third diagonal a 50% stenosis in the mid vessel.     XR Chest 1 View    Result Date: 3/6/2022  Stable multifocal airspace infiltrates bilaterally with diffuse interstitial changes.  This report was finalized on 3/6/2022 12:45 PM by Carlo Quintana MD.      XR Chest 1 View    Result Date: 3/2/2022  Congestive heart failure.    This report was finalized on 3/2/2022 9:03 PM by Dr. Tam Montoya MD.       [facility]: Results for orders placed during the hospital encounter of 03/02/22    Cardiac Catheterization/Vascular Study    Narrative  FINAL    Impression  · Mild to moderate coronary artery disease with an estimated 50% proximal LAD stenosis, 50% mid LAD stenosis, 70% mid circumflex stenosis in the vessel too small for intervention and mild disease in the right coronary.    RECOMMENDATIONS:  · The patient may  proceed with TAVR evaluation.  · The patient's EF of 30 to 35% does not appear to be related to his coronary disease.    Indications: Coronary artery evaluation for the patient's reduced ejection fraction is well as possible future TAVR for significant aortic stenosis.    Access: Right femoral artery as the patient has a dilated ascending aortic aneurysm    Estimated blood loss: Less than 5 mL      Procedures:  · Left heart catheterization.  · Left ventriculogram.  · Selective coronary angiography.      Procedure narrative:  The patient was brought to the catheterization lab in a fasting condition.  Access site was prepped and draped in standard sterile fashion.  Lidocaine was injected and arterial access was obtained by percutaneous anterior wall puncture technique.  A 6 Nepali arterial sheath was placed in the right femoral artery using a modified Seldinger technique.  Selective coronary arteriography was performed using the Pema technique with a 6 Nepali 4 curved Pema right catheter and a 6 Nepali 6 curved Pema left catheter.  Nonionic contrast was used and was injected manually.  No left ventriculography was performed.  No LV pressures were obtained.      Contrast: 45 ml    Hemodynamic Findings: Not obtained    Left ventriculography: Not performed    Angiographic Findings:    RCA: The right coronary artery is dominant for the posterior circulation and gives rise to the PDA as well as a posterolateral branch.  The right coronary artery is a very large vessel which contains an estimated 30-40% proximal stenosis, mid vessel 40% stenosis, and minor irregularities in the PDA and posterolateral branches.    LMCA: Left main coronary artery gives rise to LAD and circumflex vessels and is a short vessel free of significant disease.    Circumflex: The circumflex coronary artery gives rise to tiny first obtuse marginal branch small second obtuse marginal branch large bifurcating third obtuse marginal branch a  small AV groove vessel and a small fourth obtuse marginal branch.  The circumflex within the AV groove vessel going into the fourth obtuse marginal branch contains an estimated 70% stenosis but the vessel is less than 2 mm in diameter.    LAD: The LAD gives rise to a small first diagonal branch small second diagonal branch moderate third diagonal branch and terminates as a small apical recurrent branch.  The LAD proximally contains an estimated 50% stenosis just after the second diagonal and just after the third diagonal a 50% stenosis in the mid vessel.     Echo: Results for orders placed during the hospital encounter of 03/02/22    Adult Transesophageal Echo (KIARA) W/ Cont if Necessary Per Protocol    Interpretation Summary  · Estimated left ventricular EF = 35-40% Left ventricular systolic function is moderately decreased.  · Left ventricular wall thickness is consistent with mild concentric hypertrophy.  · Severe aortic valve stenosis is present.  · Severe dilation of the ascending aorta is present.  · Moderate aortic valve regurgitation is present.  · Mild mitral valve regurgitation is present  · Trace tricuspid valve regurgitation is present    Anesthesia: Cath lab/Echo lab moderate sedation    I was present with the patient for the duration of moderate sedation and supervised staff who had no other duties and monitored the patient for the entire procedure.    Name of independent trained observer: Deja Jha RN  Intra-service start time: 1403  Intra-service end time: 1430     Carotid Duplex: Results for orders placed during the hospital encounter of 09/16/19    Duplex Venous Lower Extremity - Right CAR    Interpretation Summary  · No evidence of deep or superficial venous thrombosis in the right lower extremity.      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 male with a history of HTN, CAD, HLD, diastolic heart failure, non-smoker, moderate aortic regurgitation and moderate aortic stenosis diagnosed in 2019, chronic lower extremity edema, prediabetes, and ascending aortic aneurysm.  Resides at [facility] assisted living. Patient presented to [facility] on 3/2/2022 for complaints of shortness of breath on exertion and orthopnea for 1-2 days. ProBNP was 9,350 on admission and he was given 80 mg of Lasix in the ER. TTE done on 3/3 revealed LVEF 31-35%, severe aortic stenosis with a aortic valve area of 0.64 cm², pressure gradient 38.8 mmHg, and moderate dilation of the ascending aorta.  KIARA done 3/8 showed LVEF 35-40%, mild LV hypertrophy, severe aortic valve stenosis, severe dilation of the ascending aorta measuring 5.8 cm, moderate aortic valve regurgitation, mild mitral valve regurgitation, and trace tricuspid valve regurgitation. Left heart cath revealed mild to moderate CAD involving proximal and mid LAD with 50% stenosis, and mid circumflex 70% stenosis. He states 3 to 4 years ago he was evaluated by  and was told he needed coronary artery bypass grafting, aortic valve replacement, and ascending aneurysm repair.  However, at that time he declined surgery. He reports dizziness and a subsequent fall 3 to 4 months ago with injury to his left humerus. He denies any chest pain, syncopal episodes, palpitations, or history of rheumatic fever.  Saw a dentist 6 months ago with no issues.  Our service was consulted for possible surgical intervention.    Plan:  -Continue with preoperative TAVR work-up  -Awaiting CT TAVR  -Possible surgery TBD by Dr. Paula Parekh, VALENTINO  [facility] Cardiothoracic Surgery  08:55 EST  03/09/22     Thank you for allowing us to  participate in the care of your patient. Please do not hesitate to contact us with additional questions or concerns.

## 2022-03-09 NOTE — PLAN OF CARE
Goal Outcome Evaluation:  Plan of Care Reviewed With: patient, family        Progress: improving  Outcome Evaluation: Pt. performed bed mobility and sit to stand transfer with stand by assist. He ambulated 140' w/front wheeled walker, contact guard assist + chair follow. Gait limited by fatigue. Pt. tolerated ther-ex well. Will continue to progress as tolerated. Recommend SNF upon discharge.

## 2022-03-09 NOTE — PROGRESS NOTES
TAVR APRN Evaluation    Ash Diez, 10/4/1928, [id number]     03/09/22    PCP: Karthikeyan Moreno MD  Primary Cardiologist: Carlito Mckeon III, MD    TAVR Team:  1.  Max Montelongo MD  2.  Carlito Mitchell MD  3.  Khloe Pena MD  4.  Danyell Nieto MD  5.  Watson Sebastian MD  6.  Ciro Dumont MD    Chief Complaint: Severe aortic stenosis/ Combined systolic/diastolic heart failure      Identification: This is a 89 y.o. year old male from [address] [place] [address].    History of Present Illness: Mr. Diez was referred for consideration of TAVR due to inpatient admission for acute CHF.  Cardiac evaluation noted severe aortic stenosis, low LVEF, non-obstructive CAD, and ascending aortic aneurysm.  Further evaluation will continue today with CTA TAVR protocol and carotid duplex.  He completed cardiac cath/ KIARA yesterday with Dr. Pena.  CT Surgery, Dr. Mitchell, will consult today as well.        Problem List:   1.  Aortic valve disease   A.  TTE 9/18/19: LVEF 56-60%, DANIELA 0.67 cm2, AV mean 32.4 mm Hg, Vmax 3.65 m/sec with moderate AI   B.  TTE 3/3/2022: LVEF 31-35%, DANIELA 0.64 cm2, AV mean 38.8 mm Hg, and Vmax 4.26 m/sec with moderate AI and moderate dilation of ascending aorta   C.  KIARA 3/8/22: LVEF 35-40%, severe AS with moderate AI.  Aortic annulus 2.6 cm, STJ 3.4 cm, and Sinus of Valsalva 3.6 cm  2.   CAD, non-obstructive   A.  Cardiac cath 3/8/22: 50% proximal and mid LAD stenosis, 70% mid circumflex stenosis in vessel too small for intervention, LVEF 30-35%  3.  Combined systolic and diastolic heart failure   A.  Admission 3/3/22 with acute hypoxic respiratory failure related to CHF   B.  Admission BNP 9350   C.  NYHA class IV symptoms requiring admission   D.  Extensive pulmonary edema and large pleural effusions R>L noted CTA TAVR protocol 3/9/22  4.  Aortic aneurysm   A.  CT chest wo contrast 9/5/2019: Ascending thoracic aorta measures 6.1 cm, Aortic arch measured 4.1 cm with  descending thoracici aorta measuring 4.3 cm   B.  CTA TAVR protocol 3/9/22: Ascending thoracic aorta measured 6 cm, proximal descending aorta measured 4.4 cm, and no additional aneurysmal dilation seen in the abdomen   5.  HTN  6.  Hyperlipidemia  7.  Hx Colon cancer s/p colon resection 2008 (no chemo or XRT)    Patient Active Problem List   Diagnosis   • Abdominal aortic aneurysm (AAA) without rupture (HCC)   • Essential hypertension   • Hyperlipidemia   • Prediabetes   • Obesity (BMI 30.0-34.9)   • Diastolic congestive heart failure (HCC)   • CHF due to valvular disease (HCC)   • Nonrheumatic aortic valve insufficiency   • Severe aortic stenosis   • Acute on chronic systolic CHF (congestive heart failure) (HCC)       Past Surgical History:  Past Surgical History:   Procedure Laterality Date   • CARDIAC CATHETERIZATION N/A 3/8/2022    Procedure: LEFT HEART CATH;  Surgeon: Khloe Pena MD;  Location: [facility] CATH INVASIVE LOCATION;  Service: Cardiology;  Laterality: N/A;   • COLON SURGERY     • CORONARY STENT PLACEMENT     • VASCULAR SURGERY      Vein removal       Allergies:  Patient has no known allergies.    Social History:  Social History     Socioeconomic History   • Marital status:    Tobacco Use   • Smoking status: Never Smoker   • Smokeless tobacco: Never Used   Vaping Use   • Vaping Use: Never used   Substance and Sexual Activity   • Alcohol use: Not Currently     Comment: social   • Drug use: Never   • Sexual activity: Defer       Last Dental Exam: Six months ago  Requires pre-op Dental Referral: NO    Home Medications:  Medication Sig Start Date End Date Taking? Authorizing Provider   amLODIPine (NORVASC) 5 MG tablet Take 1.5 tablets by mouth Daily. Needs to keep appointment 12/23/2020 and complete lab work that will be ordered for any ongoing refills.  Patient taking differently: Take 5 mg by mouth Daily. Needs to keep appointment 12/23/2020 and complete lab work that will be ordered  for any ongoing refills. 12/23/20  Yes Shirin To MD     Current Medication:  carvedilol, 3.125 mg, Oral, BID With Meals  furosemide, 20 mg, Oral, Daily  spironolactone, 25 mg, Oral, Daily      Review of Systems:  Review of Systems   Constitutional: Positive for malaise/fatigue.   HENT: Negative.    Eyes: Negative.    Cardiovascular: Positive for dyspnea on exertion, leg swelling, orthopnea and paroxysmal nocturnal dyspnea. Negative for chest pain, irregular heartbeat, near-syncope and palpitations.        Acute SORIANO and orthopnea only 3-5 days prior to this admission.     Respiratory: Negative for cough.    Endocrine: Negative.    Hematologic/Lymphatic: Does not bruise/bleed easily.   Skin: Negative.    Musculoskeletal: Positive for arthritis and muscle weakness.   Gastrointestinal: Negative.    Genitourinary: Negative.    Neurological: Positive for loss of balance.   Psychiatric/Behavioral: Negative.    Allergic/Immunologic: Negative.         Physical Exam:  Vitals reviewed.   Constitutional:       Appearance: Not in distress. Frail.   Eyes:      Pupils: Pupils are equal, round, and reactive to light.   Neck:      Thyroid: No thyromegaly.      Lymphadenopathy: No cervical adenopathy.   Pulmonary:      Effort: Pulmonary effort is normal.      Breath sounds: Normal breath sounds. No wheezing. No rhonchi. No rales.      Comments: O2 @ 2 liters  Cardiovascular:      PMI at left midclavicular line. Normal rate. Regular rhythm.      Murmurs: There is a grade 3/6 mid frequency harsh, blowing midsystolic murmur at the URSB, radiating to the neck.      No gallop. No click. No rub.   Pulses:     Intact distal pulses.   Edema:     Pretibial: bilateral trace edema of the pretibial area.     Ankle: bilateral trace edema of the ankle.     Feet: bilateral trace edema of the feet.  Musculoskeletal:      Cervical back: Neck supple. Skin:     General: Skin is warm and dry.      Comments: Right groin cath site soft without  ecchymosis or hematoma   Neurological:      Mental Status: Alert and oriented to person, place and time.      Comments: Alert and conversant.  Good historian         Vitals:    03/09/22 0100 03/09/22 0320 03/09/22 0500 03/09/22 0710   BP:  136/58  129/54   BP Location:  Right arm  Right arm   Patient Position:  Lying  Lying   Pulse: 57 56 61 57   Resp:  18  18   Temp:  97.5 °F (36.4 °C)  98.2 °F (36.8 °C)   TempSrc:  Oral  Oral   SpO2: 94% 95% 97% 93%   Weight:       Height:           Diagnostic Data:  Transthoracic echo: 3/3/22    · Left ventricular ejection fraction appears to be 31 - 35%. Left ventricular systolic function is moderately decreased.  · Left ventricular diastolic function is consistent with (grade II w/high LAP) pseudonormalization.  · Left atrial volume is mildly increased.  · Severe aortic valve stenosis is present. Aortic valve area is 0.64 cm2.  · Peak velocity of the flow distal to the aortic valve is 426.7 cm/s. Aortic valve mean pressure gradient is 38.8 mmHg.  · Mild dilation of the aortic root is present. Moderate dilation of the ascending aorta is present.      Transesophageal echo: 3/8/22     · Estimated left ventricular EF = 35-40% Left ventricular systolic function is moderately decreased.  · Left ventricular wall thickness is consistent with mild concentric hypertrophy.  · Severe aortic valve stenosis is present.  · Severe dilation of the ascending aorta is present.  · Moderate aortic valve regurgitation is present.  · Mild mitral valve regurgitation is present  · Trace tricuspid valve regurgitation is present     Anesthesia: Cath lab/Echo lab moderate sedation     Echocardiogram Findings    Left Ventricle Estimated left ventricular EF = 35% Left ventricular systolic function is moderately decreased.   Normal left ventricular cavity size noted. Left ventricular wall thickness is consistent with mild concentric hypertrophy. There is left ventricular global hypokinesis noted.   Right  Ventricle Normal right ventricular cavity size, wall thickness and systolic function noted.   Left Atrium The left atrial cavity is mild to moderately dilated. There is dense spontaneous echo contrast present in the atrial body and in the atrial appendage. The left atrial appendage was visualized through multiple planes. Doppler interrogation shows normal flow within the left atrial appendage. No evidence of a left atrial appendage thrombus was present.   Right Atrium Normal right atrial cavity size noted.   Aortic Valve The aortic valve appears trileaflet. Moderate aortic valve regurgitation is present. Severe aortic valve stenosis is present. Aortic annulus 2.6 cm  Sinus of Valsalva 3.6 cm  Sinotubular junction 3.4 cm   Mitral Valve The mitral valve is structurally normal with no significant stenosis present. Mild mitral valve regurgitation is present.   Tricuspid Valve The tricuspid valve is structurally normal with no significant stenosis present. Trace tricuspid valve regurgitation is present.   Pulmonic Valve The pulmonic valve is not well visualized. There is no significant pulmonic valve regurgitation present. There is no pulmonic valve stenosis present.   Greater Vessels Severe dilation of the ascending aorta is present. Ascending aorta = 5.8 cm Minimal atheroma seen in the patient's descending aorta and arch.   Pericardium There is no evidence of pericardial effusion. .         Cardiac Cath: 3/8/22        FINAL IMPRESSION:  · Mild to moderate coronary artery disease with an estimated 50% proximal LAD stenosis, 50% mid LAD stenosis, 70% mid circumflex stenosis in the vessel too small for intervention and mild disease in the right coronary.     RECOMMENDATIONS:  · The patient may proceed with TAVR evaluation.  · The patient's EF of 30 to 35% does not appear to be related to his coronary disease.     Indications: Coronary artery evaluation for the patient's reduced ejection fraction is well as possible future  TAVR for significant aortic stenosis.     Access: Right femoral artery as the patient has a dilated ascending aortic aneurysm     Estimated blood loss: Less than 5 mL        Procedures:   · Left heart catheterization.  · Left ventriculogram.  · Selective coronary angiography.        Procedure narrative:  The patient was brought to the catheterization lab in a fasting condition.  Access site was prepped and draped in standard sterile fashion.  Lidocaine was injected and arterial access was obtained by percutaneous anterior wall puncture technique.  A 6 Cymro arterial sheath was placed in the right femoral artery using a modified Seldinger technique.  Selective coronary arteriography was performed using the Pema technique with a 6 Cymro 4 curved Pema right catheter and a 6 Cymro 6 curved Pema left catheter.  Nonionic contrast was used and was injected manually.  No left ventriculography was performed.  No LV pressures were obtained.                     Contrast: 45 ml     Hemodynamic Findings: Not obtained     Left ventriculography: Not performed     Angiographic Findings:      RCA: The right coronary artery is dominant for the posterior circulation and gives rise to the PDA as well as a posterolateral branch.  The right coronary artery is a very large vessel which contains an estimated 30-40% proximal stenosis, mid vessel 40% stenosis, and minor irregularities in the PDA and posterolateral branches.     LMCA: Left main coronary artery gives rise to LAD and circumflex vessels and is a short vessel free of significant disease.     Circumflex: The circumflex coronary artery gives rise to tiny first obtuse marginal branch small second obtuse marginal branch large bifurcating third obtuse marginal branch a small AV groove vessel and a small fourth obtuse marginal branch.  The circumflex within the AV groove vessel going into the fourth obtuse marginal branch contains an estimated 70% stenosis but the vessel is  less than 2 mm in diameter.     LAD: The LAD gives rise to a small first diagonal branch small second diagonal branch moderate third diagonal branch and terminates as a small apical recurrent branch.  The LAD proximally contains an estimated 50% stenosis just after the second diagonal and just after the third diagonal a 50% stenosis in the mid vessel.      CTA Chest, Abdomen, and Pelvis: 3/9/22  DATE OF EXAM: 03/09/2022 1:04 PM     PROCEDURE: CT ANGIO TAVR CHEST ABDOMEN PELVIS-     INDICATIONS: severe AS/tavr planning; J96.01-Acute respiratory failure  with hypoxia; I50.9-Heart failure, unspecified; Z20.822-Contact with and  (suspected) exposure to covid-19     COMPARISON: No comparisons available.     TECHNIQUE: Unenhanced 3 mm axial images through the chest, abdomen,  pelvis, subsequent 1.5 mm arterial phase post IV contrast images  following administration of 100 mL IV Isovue-370. 2-D reconstructions  and 3-D VRT and MIP angiographic reconstructions.     FINDINGS:   Patient history indicates severe aortic stenosis, TAVR planning.  Unenhanced images show extensive aortic valve calcification, and dense  calcification of the LAD. There is moderately extensive calcification of  the right coronary artery and left circumflex artery. There is ectasia  of the ascending thoracic aorta to approximately 6 cm transverse luminal  diameter as measured on both coronal and sagittal reconstructions. At  the level of the proximal descending aorta, the aorta is still ectatic,  approximately 4.4 cm. At the level of the diaphragmatic hiatus, maximal  transverse luminal diameter is 3.9 cm. No additional aneurysmal  dilatation is seen in the abdomen.     Angiographic images show no evidence of aortic dissection. There is  relatively mild smooth margined thrombus of the arch and descending  aorta. The angiographic images show limited contrast on the initial  scan, but immediate repeat scan shows very good contrast of the distal  aorta  and lower extremity arteries. Common and external iliac arteries  and common femoral arteries appear normal in caliber. There is rather  extensive atherosclerotic calcification of the proximal superficial  femoral arteries, but no significant stenosis at the included levels. No  significant celiac axis or SMA stenosis is seen. No significant renal  artery stenosis is identified.     Elsewhere, there appears to be extensive pulmonary edema, and fairly  large free-flowing pleural effusions, generally symmetric in size on the  right and left. A small nodule or nodular scar in the anterior right  apex 2 cm in diameter on axial image 58 is not present on the 2019 chest  CT scan and should be followed for stability. No other focal disease is  identified. There is moderate bilateral lower lobe atelectasis  associated with effusions. There is no significant pericardial effusion,  but there are fairly numerous mediastinal lymph nodes, borderline to  mildly enlarged, the largest node up to 15 mm in diameter in the  preaortic mediastinal fat. Pulmonary artery contrast opacification  sufficient to exclude any large emboli and no embolic disease is  appreciated.     There is diffuse fatty liver change and multiple granulomatous  calcifications are seen in the liver and spleen. Gallbladder is  nondistended. Pancreas appears relatively atrophic. Adrenal glands  appear normal. There is bilateral renal cortical thinning expected for  the patient's age and small renal cysts. No obstructive uropathy. No  upper abdominal free air, ascites, adenopathy or acute inflammatory  change is identified. Rectosigmoid anastomosis is noted clear of mass or  inflammatory change. There are sigmoid diverticula and descending colon  diverticuli without evidence of diverticulitis. Bladder is normally  distended. Incidental note is made of bilateral hydroceles, incompletely  included on this study. No acute bony abnormality is seen. There is a  T6  superior endplate compression deformity, apparently old.           IMPRESSION:     1. CT of the chest, abdomen, and pelvis with image data saved per TAVR  protocol.  2. Extensive aortic valve calcification, and diffuse and extensive  coronary artery calcification noted.  3. Generalized ectasia of the thoracic aorta, to a maximum diameter of 6  cm at the mid ascending aorta. No evidence of dissection, no extensive  thrombus.  4. Findings consistent with congestive heart failure, pulmonary edema  and relatively pleural effusions.  5. Incidentally noted bilateral hydroceles.      Carotid Doppler: 3/9/22  •     Right CCA Dist:  Irregular heterogeneous plaque present.   •     Right Carotid Bulb:  Irregular heterogeneous plaque present.   •     Right ICA Prox:  Irregular heterogeneous plaque present.   •     Right ICA Mid:  Plaque present.   •     Right ICA Dist:  Tortuous vessel.   •     Right ECA:  Irregular heterogeneous plaque present.   •     Right Vertebral:  Antegrade flow noted.       •     Left CCA Prox:  Intima-medial thickening noted.   •     Left CCA Dist:  Intima-medial thickening noted.   •     Left Carotid Bulb:  Plaque present.   •     Left ICA Mid:  Tortuous  vessel.    •     Left ICA Dist:  Tortuous  vessel.   •     Left ECA:  Irregular heterogeneous plaque present.   •     Left Vertebral:  Antegrade flow noted.          Functional Assessment Data:    KCCQ12 Questionnaire Score: (see scanned copy) : 39/70 = NYHA class IV      Rojas Basic Activities of Daily Living (ADL) Scale    Bathing (sponge bath, tub bath, or shower).  Receives either no assistance,   or assistance with bathing only on body part.   Yes    Dressing Gets clothes and dresses without any assistance, except for  tying shoes.        No    Toileting Goes to toilet room, uses toilet, arranges clothes, and returns  without any assistance (may use cane or walker for support and may use  bedpan / urinal at night.      Yes    Transferring  Moves into and out of bed and chair without assistance  (may use cane or walker)      Yes    Continence Controls bowel and bladder completely without occasional  accidents        Yes    Feeding Feeds self without assistance (except for help with cutting meat  or buttering bread)       Yes        Total (Number of Yesses of 6) 5/6      [place] Instrumental Activities of Daily Living Scale (IADL)    Ability to Use Telephone   · Operates telephone on own initiative.  Looks up and dials numbers, etc.         1  · Shopping  Needs to be accompanied on any shopping trip  0     Food Preparation  · Needs to have meals prepared and served  0    · Housekeeping  Does not participate in any housekeeping tasks  0    · Laundry  All laundry must be done by others   0    Mode of Transportation  Travels on public transportation when accompanied by another           0  Responsibility for Own Medications  · Is not capable of dispensing own medication  0    Ability to Handle Finances  · Incapable of handling money    0       Total (Number of Instrumental Activities of 8) 1/8         Five Meter Walk Test (will complete 3/10/22)        STS risk of mortality with open AVR    http://riskcalc.sts.org/stswebriskcalc/calculate       Creatinine Clearance: 46 ml/min  https://reference.[employer]/calculator/creatinine-clearance-cockcroft-gault    Assessment/ Plan:       1. Severe symptomatic non-rheumatic aortic stenosis.  Intermediate risk but very frail gentleman who is not a surgical candidate.  Will review CTA when available with Dr. Pena/ Dr. Mitchell for final decision making regarding TAVR to treat aortic stenois   2. Acute on chronic combined systolic/diastolic HF.  NYHA class IV.  Requiring IV diuresis and supplemental oxygen.  Large bilateral pleural effusions most likely represent a barrier to patient DC to rehab then return for elective TAVR.  Final decision per Dr. Mitchell/ Dr. Pena.   3. CAD.  Moderate,  "non-obstructive disease in LAD and circumflex. Circ too small for PCI.  Medical management.   4. Ascending thoracic aortic aneurysm.  6 cm per follow up CT study.     5. Limited mobility and self care due to fall/ shoulder injury not amenable to surgical repair.  Lives in Assisted Living Facility   6. HTN   7. Dyslipidemia       TAVR education materials reviewed with patient/ daughter today.  This included printed brochure detailing pathophysiology of aortic stenosis, patient specific aortic stenosis symptoms, and treatment options (medical therapy, surgical aortic valve replacement, and transcatheter aortic valve replacement).  A model of the valvealso used to explain TAVR.      We discussed patient's goals of care:  \"I would like to keep going.  I don't want just the medicines\".  We reviewed the [facility] Shared Decision Making Tool for treatment of Aortic Stenosis to again compare/contrast the options of TAVR/ SAVR/ medical management.      Patient has Living Will (N) and Power of  (N). Mr. Diez states his family is very aware of his healthcare wished without these documents.  He does not wish to formulate them.    Our talk also included procedural details, procedure risks, anticipated pre-op and post-op expectations, as well as follow up visit schedule @ one month and one year.  Further discussion and final decision making will occur with Multi- Disciplinary Heart Team following the completion of pre-requisite testing.      Rossy Harrison, VALENTINO, 03/09/22, 09:43 EST  "

## 2022-03-09 NOTE — NURSING NOTE
TAVR Pre-Op Checklist    Patient Name: Ash Thomas y.o. [id number]  Residence: [place], [place]    Referral Date: 3/8/22  : 10/4/1928   Height:71.5 in Weight: 213lb     Referring Cardiologist: Kayla Pena    Initial TTE date: 3/3/22    DANIELA: 0.64 cm AV mean: 38.8 mm Hg AV Vamx: 4.26 m/sec LVEF: 31-35%    Coordinator H&P:Progress Notes by Rossy Harrison APRN (2022 09:43)    CT Surgery Consult/ date: Consults by Marjan Parekh APRN (2022 08:54)    STS Score: 3.06% Creatinine Clearance:46 ml/min   Functional Assessment: Y    Allergy (Contrast):Patient has no known allergies.   Pre-op meds: NA     Anticoagulated: No Last dose:NA Heparin or Lovenox Bridge: NA    CTA Date: CT Angio TAVR Chest Abdomen Pelvis (2022 13:07)    CTA 3D: CARDIOLOGY VISIT - SCAN - TAVR 3D MESIO (03/10/2022)    Left Coronary Height: 10.0 mm  Right Coronary Height: 26.7 mm      Annulus Area: 519.5 mm   CTA Important findings: ascending thoracic aneurysm 6 cm with large bilateral pleural effusions and ongoing evidence of pulmonary edema    Cardiac cath:   Cardiac Catheterization/Vascular Study (2022 15:27)    Physician: JORDIN   Important findings:moderate tho non-obstructive CAD    KIARA:Adult Transesophageal Echo (KIARA) W/ Cont if Necessary Per Protocol (2022 14:40)       Physician: JORDIN   KIARA annulus: 2.6 cm    Carotid duplex: Duplex Carotid Ultrasound CAR (2022 14:14)    Dobutamine GXT: NA    EKG rhythm:  ECG 12 Lead (2022 12:24)  PPM/ Last download : NA    PFT (FEV1):P    Important meds: NA    PAT lab review:  BNP: 9350  BUN/Creatinine: P H&H:P  PLTS:P    P2Y12:P HGA1C: P CXR:P      TAVR Procedure Date: Requesting add on inpatient TAVR date 3/14/22

## 2022-03-09 NOTE — THERAPY TREATMENT NOTE
Patient Name: Ash Diez  : 10/4/1928    MRN: [id number]                              Today's Date: 3/9/2022       Admit Date: 3/2/2022    Visit Dx:     ICD-10-CM ICD-9-CM   1. Acute respiratory failure with hypoxia (HCC)  J96.01 518.81   2. Acute on chronic congestive heart failure, unspecified heart failure type (HCC)  I50.9 428.0   3. 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 has been evaluted at . pt decided agaisnt surgery    • Cancer (HCC)     colon   • Cellulitis     RLE   • CHF (congestive heart failure) (HCC)    • Hyperlipidemia    • Hypertension    • Pneumonia      Past Surgical History:   Procedure Laterality Date   • CARDIAC CATHETERIZATION N/A 3/8/2022    Procedure: LEFT HEART CATH;  Surgeon: Khloe Pena MD;  Location: [facility] CATH INVASIVE LOCATION;  Service: Cardiology;  Laterality: N/A;   • COLON SURGERY     • CORONARY STENT PLACEMENT     • VASCULAR SURGERY      Vein removal      General Information     Row Name 22 9917          Physical Therapy Time and Intention    Document Type therapy note (daily note)  -SS     Mode of Treatment physical therapy  -SS     Row Name 22 5116          General Information    Patient Profile Reviewed yes  -SS     Existing Precautions/Restrictions fall;oxygen therapy device and L/min  L humerus fx - limited AROM ~ 1 year ago, urinary incontinence  -SS     Barriers to Rehab previous functional deficit  -SS     Row Name 22 4828           Cognition    Orientation Status (Cognition) oriented x 4  -     Row Name 03/09/22 1308          Safety Issues, Functional Mobility    Safety Issues Affecting Function (Mobility) insight into deficits/self-awareness;problem-solving;safety precaution awareness;safety precautions follow-through/compliance;sequencing abilities  -     Impairments Affecting Function (Mobility) balance;endurance/activity tolerance;postural/trunk control;shortness of breath;strength  -           User Key  (r) = Recorded By, (t) = Taken By, (c) = Cosigned By    Initials Name Provider Type     Ayaka Mcknight, PT Physical Therapist               Mobility     Row Name 03/09/22 1311          Bed Mobility    Bed Mobility scooting/bridging;supine-sit;sit-supine  -     Scooting/Bridging [place] (Bed Mobility) verbal cues;standby assist  -     Supine-Sit [place] (Bed Mobility) standby assist;verbal cues  -     Sit-Supine [place] (Bed Mobility) standby assist;verbal cues  -     Assistive Device (Bed Mobility) bed rails;head of bed elevated  -     Comment, (Bed Mobility) VC for sequencing; increased time for rest w/positional changes  -     Row Name 03/09/22 1311          Sit-Stand Transfer    Sit-Stand [place] (Transfers) standby assist;verbal cues  -     Assistive Device (Sit-Stand Transfers) walker, front-wheeled  -     Row Name 03/09/22 1311          Gait/Stairs (Locomotion)    [place] Level (Gait) contact guard  -     Assistive Device (Gait) walker, front-wheeled  -     Distance in Feet (Gait) 140  -     Deviations/Abnormal Patterns (Gait) base of support, narrow;noel decreased;stride length decreased;bilateral deviations  -     Bilateral Gait Deviations forward flexed posture;heel strike decreased  -     Comment, (Gait/Stairs) Pt. ambulated with a step through gait pattern. VC for upright posture, looking ahead, deep breathing. Gait limited by fatigue. O2 94% upon completion of  ambulation on 5L.  -           User Key  (r) = Recorded By, (t) = Taken By, (c) = Cosigned By    Initials Name Provider Type    SS Ayaka Mcknight PT Physical Therapist               Obj/Interventions     Row Name 03/09/22 1313          Motor Skills    Therapeutic Exercise hip;ankle;knee  -     Row Name 03/09/22 1313          Hip (Therapeutic Exercise)    Hip (Therapeutic Exercise) strengthening exercise  -     Hip Strengthening (Therapeutic Exercise) bilateral;heel slides;marching while seated;10 repetitions  -     Row Name 03/09/22 1313          Knee (Therapeutic Exercise)    Knee (Therapeutic Exercise) strengthening exercise  -     Knee Strengthening (Therapeutic Exercise) bilateral;LAQ (long arc quad);10 repetitions  -     Row Name 03/09/22 1313          Ankle (Therapeutic Exercise)    Ankle (Therapeutic Exercise) AROM (active range of motion)  -     Ankle AROM (Therapeutic Exercise) bilateral;dorsiflexion;plantarflexion;10 repetitions  -     Row Name 03/09/22 1313          Balance    Balance Assessment sitting static balance;sitting dynamic balance;sit to stand dynamic balance;standing dynamic balance;standing static balance  -     Static Sitting Balance supervision  -     Dynamic Sitting Balance supervision  -     Position, Sitting Balance supported;sitting edge of bed  -     Sit to Stand Dynamic Balance contact guard  -     Static Standing Balance contact guard  -     Dynamic Standing Balance contact guard  -     Position/Device Used, Standing Balance supported;walker, front-wheeled  -     Balance Interventions sitting;standing;sit to stand;supported;static;dynamic  -           User Key  (r) = Recorded By, (t) = Taken By, (c) = Cosigned By    Initials Name Provider Type    SS Ayaka Mcknight PT Physical Therapist               Goals/Plan     Row Name 03/09/22 1321          Bed Mobility Goal 1 (PT)    Activity/Assistive Device (Bed Mobility Goal 1, PT) sit to supine;supine to  sit  -SS     [place] Level/Cues Needed (Bed Mobility Goal 1, PT) independent  -SS     Time Frame (Bed Mobility Goal 1, PT) long term goal (LTG);10 days  -SS     Progress/Outcomes (Bed Mobility Goal 1, PT) goal ongoing;goal revised this date;goal not met  -SS     Row Name 03/09/22 1321          Transfer Goal 1 (PT)    Activity/Assistive Device (Transfer Goal 1, PT) sit-to-stand/stand-to-sit;bed-to-chair/chair-to-bed;walker, rolling  -SS     [place] Level/Cues Needed (Transfer Goal 1, PT) independent  -SS     Time Frame (Transfer Goal 1, PT) long term goal (LTG);10 days  -SS     Progress/Outcome (Transfer Goal 1, PT) goal met;goal ongoing;goal revised this date  -     Row Name 03/09/22 1321          Gait Training Goal 1 (PT)    Activity/Assistive Device (Gait Training Goal 1, PT) gait (walking locomotion);assistive device use;walker, rolling  -SS     [place] Level (Gait Training Goal 1, PT) modified independence  -SS     Distance (Gait Training Goal 1, PT) 150 feet  -SS     Time Frame (Gait Training Goal 1, PT) 2 weeks;long term goal (LTG)  -SS     Progress/Outcome (Gait Training Goal 1, PT) goal revised this date;goal ongoing;goal partially met  -SS           User Key  (r) = Recorded By, (t) = Taken By, (c) = Cosigned By    Initials Name Provider Type     Ayaka Mcknight, PT Physical Therapist               Clinical Impression     Row Name 03/09/22 1315          Pain    Pretreatment Pain Rating 0/10 - no pain  -SS     Posttreatment Pain Rating 0/10 - no pain  -SS     Pain Intervention(s) Repositioned;Ambulation/increased activity  -     Row Name 03/09/22 1315          Plan of Care Review    Plan of Care Reviewed With patient;family  -     Progress improving  -SS     Outcome Evaluation Pt. performed bed mobility and sit to stand transfer with stand by assist. He ambulated 140' w/front wheeled walker, contact guard assist + chair follow. Gait limited by fatigue. Pt. tolerated ther-ex well. Will  continue to progress as tolerated. Recommend SNF upon discharge.  -     Row Name 03/09/22 1315          Therapy Assessment/Plan (PT)    Rehab Potential (PT) good, to achieve stated therapy goals  -     Criteria for Skilled Interventions Met (PT) yes;skilled treatment is necessary;meets criteria  -     Row Name 03/09/22 1315          Vital Signs    Pre Systolic BP Rehab 121  -SS     Pre Treatment Diastolic BP 59  -SS     Pretreatment Heart Rate (beats/min) 61  -SS     Pre SpO2 (%) 97  -SS     O2 Delivery Pre Treatment nasal cannula  5L  -SS     Intra SpO2 (%) 94  -SS     O2 Delivery Intra Treatment nasal cannula  5L  -SS     Post SpO2 (%) 92  -SS     O2 Delivery Post Treatment nasal cannula  5L  -SS     Pre Patient Position Supine  -SS     Intra Patient Position Standing  -SS     Post Patient Position Supine  -SS     Row Name 03/09/22 1315          Positioning and Restraints    Pre-Treatment Position in bed  -SS     Post Treatment Position bed  -SS     In Bed fowlers;notified nsg;call light within reach;encouraged to call for assist;exit alarm on;with family/caregiver  -           User Key  (r) = Recorded By, (t) = Taken By, (c) = Cosigned By    Initials Name Provider Type    SS Ayaka Mcknight, PT Physical Therapist               Outcome Measures     Row Name 03/09/22 1322          How much help from another person do you currently need...    Turning from your back to your side while in flat bed without using bedrails? 3  -SS     Moving from lying on back to sitting on the side of a flat bed without bedrails? 3  -SS     Moving to and from a bed to a chair (including a wheelchair)? 3  -SS     Standing up from a chair using your arms (e.g., wheelchair, bedside chair)? 3  -SS     Climbing 3-5 steps with a railing? 3  -SS     To walk in hospital room? 3  -SS     AM-PAC 6 Clicks Score (PT) 18  -SS     Row Name 03/09/22 1322          Functional Assessment    Outcome Measure Options AM-PAC 6 Clicks Basic Mobility  (PT)  -SS           User Key  (r) = Recorded By, (t) = Taken By, (c) = Cosigned By    Initials Name Provider Type    Ayaka Garcia, PT Physical Therapist                             Physical Therapy Education                 Title: PT OT SLP Therapies (Done)     Topic: Physical Therapy (Done)     Point: Mobility training (Done)     Learning Progress Summary           Patient Eager, E, VU,NR by SS at 3/9/2022 1322    Comment: Reviewed safety/technique with bed mobility, transfers, ambulation, HEP, PT POC, pursed lip breathing    Acceptance, E, NR,VU by KG at 3/7/2022 1202    Acceptance, E, NR by KG1 at 3/5/2022 1532    Acceptance, E, NR by KG1 at 3/3/2022 0936   Family Eager, E, VU,NR by  at 3/9/2022 1322    Comment: Reviewed safety/technique with bed mobility, transfers, ambulation, HEP, PT POC, pursed lip breathing                   Point: Home exercise program (Done)     Learning Progress Summary           Patient Eager, E, VU,NR by SS at 3/9/2022 1322    Comment: Reviewed safety/technique with bed mobility, transfers, ambulation, HEP, PT POC, pursed lip breathing    Acceptance, E, NR,VU by KG at 3/7/2022 1202    Acceptance, E, NR by KG1 at 3/5/2022 1532   Family Eager, E, VU,NR by SS at 3/9/2022 1322    Comment: Reviewed safety/technique with bed mobility, transfers, ambulation, HEP, PT POC, pursed lip breathing                   Point: Body mechanics (Done)     Learning Progress Summary           Patient Eager, E, VU,NR by SS at 3/9/2022 1322    Comment: Reviewed safety/technique with bed mobility, transfers, ambulation, HEP, PT POC, pursed lip breathing    Acceptance, E, NR,VU by KG at 3/7/2022 1202    Acceptance, E, NR by KG1 at 3/5/2022 1532    Acceptance, E, NR by KG1 at 3/3/2022 0936   Family Eager, E, VU,NR by SS at 3/9/2022 1322    Comment: Reviewed safety/technique with bed mobility, transfers, ambulation, HEP, PT POC, pursed lip breathing                   Point: Precautions (Done)     Learning  Progress Summary           Patient Eager, E, VU,NR by  at 3/9/2022 1322    Comment: Reviewed safety/technique with bed mobility, transfers, ambulation, HEP, PT POC, pursed lip breathing    Acceptance, E, NR,VU by KG at 3/7/2022 1202    Acceptance, E, NR by KG1 at 3/5/2022 1532    Acceptance, E, NR by KG1 at 3/3/2022 0936   Family Eager, E, VU,NR by  at 3/9/2022 1322    Comment: Reviewed safety/technique with bed mobility, transfers, ambulation, HEP, PT POC, pursed lip breathing                               User Key     Initials Effective Dates Name Provider Type Discipline    [facility] 05/22/20 -  Tatyana Shay, PT Physical Therapist PT     06/16/21 -  Callie Huizar Physical Therapist PT     06/01/21 -  Ayaka Mcknight PT Physical Therapist PT              PT Recommendation and Plan     Plan of Care Reviewed With: patient, family  Progress: improving  Outcome Evaluation: Pt. performed bed mobility and sit to stand transfer with stand by assist. He ambulated 140' w/front wheeled walker, contact guard assist + chair follow. Gait limited by fatigue. Pt. tolerated ther-ex well. Will continue to progress as tolerated. Recommend SNF upon discharge.     Time Calculation:    PT Charges     Row Name 03/09/22 1323             Time Calculation    Start Time 1119  -SS      Stop Time 1142  -SS      Time Calculation (min) 23 min  -SS      PT Received On 03/09/22  -              Time Calculation- PT    Total Timed Code Minutes- PT 23 minute(s)  -SS              Timed Charges    51199 - PT Therapeutic Exercise Minutes 8  -SS      46822 - Gait Training Minutes  10  -SS      30132 - PT Therapeutic Activity Minutes 5  -SS              Total Minutes    Timed Charges Total Minutes 23  -SS       Total Minutes 23  -SS            User Key  (r) = Recorded By, (t) = Taken By, (c) = Cosigned By    Initials Name Provider Type     Ayaka Mcknight, LUCIUS Physical Therapist              Therapy Charges for Today     Code  Description Service Date Service Provider Modifiers Qty    50989320864 HC PT THER PROC EA 15 MIN 3/9/2022 Ayaka Mcknight, PT GP 1    [id number] HC GAIT TRAINING EA 15 MIN 3/9/2022 Ayaka Mcknight, PT GP 1          PT G-Codes  Outcome Measure Options: AM-PAC 6 Clicks Basic Mobility (PT)  AM-PAC 6 Clicks Score (PT): 18  AM-PAC 6 Clicks Score (OT): 16    Ayaka Mcknight PT  3/9/2022

## 2022-03-09 NOTE — PLAN OF CARE
Problem: Adult Inpatient Plan of Care  Goal: Patient-Specific Goal (Individualized)  Outcome: Ongoing, Progressing   Goal Outcome Evaluation:  Plan of Care Reviewed With: patient        Progress: improving  Outcome Evaluation: a/o x 4; VSS; 3L n.c. overnight; R femoral site soft, no bruising noted overnight; no acute episodes to report overnight; will continue POC

## 2022-03-09 NOTE — PROGRESS NOTES
[facility] Medicine Services  PROGRESS NOTE    Patient Name: Ash Diez  : 10/4/1928  MRN: [id number]    Date of Admission: 3/2/2022  Primary Care Physician: Karthikeyan Moreno MD    Subjective   Subjective   CC:  F/U dyspnea     HPI:  He is resting in bed, endorses dyspnea which is about the same from yesterday.  Denies any chest pain or chest discomfort    ROS:  Gen- No fevers, chills  CV- No chest pain, palpitations  Resp- No cough; +dyspnea  GI- No N/V/D, abd pain  All other systems have been reviewed and the pertinent positives and negatives are listed above in the HPI or ROS    Objective   Objective   Vital Signs:   Temp:  [96.3 °F (35.7 °C)-98.2 °F (36.8 °C)] 98.2 °F (36.8 °C)  Heart Rate:  [52-81] 57  Resp:  [16-22] 18  BP: (106-150)/() 129/54  Flow (L/min):  [2-4] 4  Physical Exam:  Constitutional: Alert, elderly ill-appearing male resting in bed  Eyes: EOMI, sclerae anicteric, no conjunctival injection  Head: NCAT  ENT: [place], moist mucous membranes   Respiratory: Nonlabored, symmetrical chest expansion, CTAB, 95% 4L; diminished throughout  Cardiovascular: IRR, HR 62, no R/G, +murmur, +DP pulses bilaterally  Gastrointestinal: Soft, NT, ND +BS  Musculoskeletal: CALL; no LE edema bilaterally  Neurologic: Oriented x4, strength symmetric in all extremities, follows all commands, speech clear  Skin: No rashes on exposed skin  Psychiatric: Pleasant and cooperative; normal affect    Results Reviewed:  LAB RESULTS:      Lab 22  0844 22  1020 22  0431 22  0628 22  1924   WBC 10.80 10.27 10.33 10.22 11.88*   HEMOGLOBIN 16.5 15.5 17.0 16.1 15.7   HEMATOCRIT 52.3* 47.8 53.8* 48.2 47.8   PLATELETS 216 209 195 227 230   NEUTROS ABS  --  7.54* 6.72 6.63 9.59*   IMMATURE GRANS (ABS)  --  0.05 0.05 0.05 0.07*   LYMPHS ABS  --  1.32 2.13 2.05 1.13   MONOS ABS  --  1.20* 1.23* 1.30* 0.99*   EOS ABS  --  0.12 0.15 0.14 0.06   .4* 95.8 100.2* 95.4 95.4    PROCALCITONIN  --   --   --   --  0.07         Lab 03/07/22  1019 03/06/22  1020 03/05/22  0431 03/04/22  1015 03/02/22  1924   SODIUM 141 139 141 140 135*   POTASSIUM 4.0 3.8 4.0 3.9 4.4   CHLORIDE 102 103 102 99 100   CO2 31.0* 27.0 27.0 28.0 25.0   ANION GAP 8.0 9.0 12.0 13.0 10.0   BUN 36* 32* 26* 23 21   CREATININE 1.19 1.13 1.23 1.28* 1.33*   EGFR 57.0* 60.6 54.7* 52.2* 49.8*   GLUCOSE 123* 134* 97 77 139*   CALCIUM 9.1 8.9 9.3 9.6 9.6   MAGNESIUM  --  2.4* 2.4* 2.3 2.4*         Lab 03/02/22 1924   TOTAL PROTEIN 7.9   ALBUMIN 4.00   GLOBULIN 3.9   ALT (SGPT) 12   AST (SGOT) 17   BILIRUBIN 0.7   ALK PHOS 79         Lab 03/02/22 1924   PROBNP 9,350.0*   TROPONIN T <0.010                 Brief Urine Lab Results     None          Microbiology Results Abnormal     Procedure Component Value - Date/Time    COVID PRE-OP / PRE-PROCEDURE SCREENING ORDER (NO ISOLATION) - Swab, Nasopharynx [[id number]]  (Normal) Collected: 03/02/22 2241    Lab Status: Final result Specimen: Swab from Nasopharynx Updated: 03/02/22 2343    Narrative:      The following orders were created for panel order COVID PRE-OP / PRE-PROCEDURE SCREENING ORDER (NO ISOLATION) - Swab, Nasopharynx.  Procedure                               Abnormality         Status                     ---------                               -----------         ------                     COVID-19, FLU A/B, RSV P...[[id number]]  Normal              Final result                 Please view results for these tests on the individual orders.    COVID-19, FLU A/B, RSV PCR - Swab, Nasopharynx [[id number]]  (Normal) Collected: 03/02/22 2241    Lab Status: Final result Specimen: Swab from Nasopharynx Updated: 03/02/22 2343     COVID19 Not Detected     Influenza A PCR Not Detected     Influenza B PCR Not Detected     RSV, PCR Not Detected    Narrative:      Fact sheet for providers: https://www.fda.gov/media/817607/download    Fact sheet for patients:  https://www.fda.gov/media/303640/download    Test performed by PCR.          Adult Transesophageal Echo (KIARA) W/ Cont if Necessary Per Protocol    Result Date: 3/8/2022  · Estimated left ventricular EF = 35-40% Left ventricular systolic function is moderately decreased. · Left ventricular wall thickness is consistent with mild concentric hypertrophy. · Severe aortic valve stenosis is present. · Severe dilation of the ascending aorta is present. · Moderate aortic valve regurgitation is present. · Mild mitral valve regurgitation is present · Trace tricuspid valve regurgitation is present  Anesthesia: Cath lab/Echo lab moderate sedation I was present with the patient for the duration of moderate sedation and supervised staff who had no other duties and monitored the patient for the entire procedure. Name of independent trained observer: Deja Jha RN Intra-service start time: 1403 Intra-service end time: 1430    Cardiac Catheterization/Vascular Study    Result Date: 3/8/2022  FINAL     Impression: · Mild to moderate coronary artery disease with an estimated 50% proximal LAD stenosis, 50% mid LAD stenosis, 70% mid circumflex stenosis in the vessel too small for intervention and mild disease in the right coronary. RECOMMENDATIONS: · The patient may proceed with TAVR evaluation. · The patient's EF of 30 to 35% does not appear to be related to his coronary disease. Indications: Coronary artery evaluation for the patient's reduced ejection fraction is well as possible future TAVR for significant aortic stenosis. Access: Right femoral artery as the patient has a dilated ascending aortic aneurysm Estimated blood loss: Less than 5 mL Procedures: · Left heart catheterization. · Left ventriculogram. · Selective coronary angiography. Procedure narrative: The patient was brought to the catheterization lab in a fasting condition.  Access site was prepped and draped in standard sterile fashion.  Lidocaine was injected and  arterial access was obtained by percutaneous anterior wall puncture technique.  A 6 Kisw[place]ili arterial sheath was placed in the right femoral artery using a modified Seldinger technique.  Selective coronary arteriography was performed using the Pema technique with a 6 Kisw[place]ili 4 curved Pema right catheter and a 6 K[place]ili 6 curved Pema left catheter.  Nonionic contrast was used and was injected manually.  No left ventriculography was performed.  No LV pressures were obtained.  Contrast: 45 ml Hemodynamic Findings: Not obtained Left ventriculography: Not performed Angiographic Findings: RCA: The right coronary artery is dominant for the posterior circulation and gives rise to the PDA as well as a posterolateral branch.  The right coronary artery is a very large vessel which contains an estimated 30-40% proximal stenosis, mid vessel 40% stenosis, and minor irregularities in the PDA and posterolateral branches. LMCA: Left main coronary artery gives rise to LAD and circumflex vessels and is a short vessel free of significant disease. Circumflex: The circumflex coronary artery gives rise to tiny first obtuse marginal branch small second obtuse marginal branch large bifurcating third obtuse marginal branch a small AV groove vessel and a small fourth obtuse marginal branch.  The circumflex within the AV groove vessel going into the fourth obtuse marginal branch contains an estimated 70% stenosis but the vessel is less than 2 mm in diameter. LAD: The LAD gives rise to a small first diagonal branch small second diagonal branch moderate third diagonal branch and terminates as a small apical recurrent branch.  The LAD proximally contains an estimated 50% stenosis just after the second diagonal and just after the third diagonal a 50% stenosis in the mid vessel.       Results for orders placed during the hospital encounter of 03/02/22    Adult Transesophageal Echo (KIARA) W/ Cont if Necessary Per Protocol    Interpretation  Summary  · Estimated left ventricular EF = 35-40% Left ventricular systolic function is moderately decreased.  · Left ventricular wall thickness is consistent with mild concentric hypertrophy.  · Severe aortic valve stenosis is present.  · Severe dilation of the ascending aorta is present.  · Moderate aortic valve regurgitation is present.  · Mild mitral valve regurgitation is present  · Trace tricuspid valve regurgitation is present    Anesthesia: Cath lab/Echo lab moderate sedation    I was present with the patient for the duration of moderate sedation and supervised staff who had no other duties and monitored the patient for the entire procedure.    Name of independent trained observer: Deja Jha RN  Intra-service start time: 1403  Intra-service end time: 1430      I have reviewed the medications:  Scheduled Meds:carvedilol, 3.125 mg, Oral, BID With Meals  fentaNYL citrate (PF), , ,   furosemide, 20 mg, Oral, Daily  midazolam, , ,   pharmacy consult - MTM, , Does not apply, Daily  sodium chloride, 10 mL, Intravenous, Q12H  spironolactone, 25 mg, Oral, Daily      Continuous Infusions:   PRN Meds:.•  acetaminophen **OR** acetaminophen **OR** acetaminophen  •  ALPRAZolam  •  atropine  •  HYDROcodone-acetaminophen  •  Morphine **AND** naloxone  •  ondansetron  •  sodium chloride  •  sodium chloride  •  sodium chloride    Assessment/Plan   Assessment & Plan     Active Hospital Problems    Diagnosis  POA   • **Acute on chronic systolic CHF (congestive heart failure) (HCC) [I50.23]  Yes   • Severe aortic stenosis [I35.0]  Yes   • 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     Brief Hospital Course to date:  Ash Diez is a 89 y.o. male with hypertension, AAA, hyperlipidemia, diastolic heart failure, valvular heart disease admitted for shortness of breath secondary to acute on chronic heart failure.  Improved with diuresis.    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    --cardiology following  --3/6/22 EKG shows bradycardia w/1st degree block   --Amlodipine discontinued, started carvedilol, aldactone.    --currently on 20 daily of lasix  --Per cardiology, patient is now wanting to have a TAVR;   --underwent KIARA and LHC on 3/8; he had minimal coronary disease and no stents were placed. KIARA showed ascending aorta measuring 5.8; Dr Pena discussed her recommendations for TAVR; Dr Mitchell to see..  --Hospice consult but patient has elected to possibly have a TAVR, so hospice is not appropriate at this time; patient and family spoke with them and took the information.  -Preoperative TAVR work-up pending, CT angio TAVR chest abdomen pelvis pending    DVT prophylaxis:  No DVT prophylaxis order currently exists.     AM-PAC 6 Clicks Score (PT): 17 (03/08/22 5686)    Disposition: He lives in assisted living at [facility] and PT/OT have recommended SNF rehab.  Case management has made referrals; family wants to [facility].      CODE STATUS:   Code Status and Medical Interventions:   Ordered at: 03/08/22 8693     Level Of Support Discussed With:    Patient     Code Status (Patient has no pulse and is not breathing):    CPR (Attempt to Resuscitate)     Medical Interventions (Patient has pulse or is breathing):    Full       Annie Sandoval MD  03/09/22

## 2022-03-10 NOTE — PLAN OF CARE
Goal Outcome Evaluation:  Plan of Care Reviewed With: patient, daughter        Progress: improving  Outcome Evaluation: OT promoted oob activity with pt demonstrating sba for bed mob, cga for sts and t/f and s/u for grooming.  Pt completed toileting 2x during tx while using urinal and required min assist while supported standing.  Pt making improvement with mob and adl performance and he completed multiple ub TE.

## 2022-03-10 NOTE — PROGRESS NOTES
[facility] Medicine Services  PROGRESS NOTE    Patient Name: Ash Diez  : 10/4/1928  MRN: [id number]    Date of Admission: 3/2/2022  Primary Care Physician: Karthikeyan Moreno MD    Subjective   Subjective   CC:  F/U dyspnea     HPI:  Some dyspnea but stable from yesterday, overall improved. No sputum. No fever. No chest pain  ROS:  Gen- No fevers, chills  CV- No chest pain, palpitations  Resp- No cough; +dyspnea  GI- No N/V/D, abd pain  All other systems have been reviewed and the pertinent positives and negatives are listed above in the HPI or ROS    Objective   Objective   Vital Signs:   Temp:  [97.4 °F (36.3 °C)-97.8 °F (36.6 °C)] 97.7 °F (36.5 °C)  Heart Rate:  [49-70] 59  Resp:  [16-18] 18  BP: (108-147)/(46-77) 121/66  Flow (L/min):  [4] 4  Physical Exam:  Constitutional: Alert, elderly ill-appearing male resting in bed, nasal canula in place 4Lnc  Eyes: EOMI, sclerae anicteric, no conjunctival injection  Head: NCAT  ENT: [employer], moist mucous membranes   Respiratory: decreased air movement bilateral bases  Cardiovascular:  Gastrointestinal: Soft, NT, ND +BS  Musculoskeletal: CALL; no LE edema bilaterally  Neurologic: Oriented x4, strength symmetric in all extremities, follows all commands, speech clear  Skin: No rashes on exposed skin  Psychiatric: Pleasant and cooperative; normal affect    Results Reviewed:  LAB RESULTS:      Lab 22  0844 22  1020 22  0431 22  0628   WBC 10.80 10.27 10.33 10.22   HEMOGLOBIN 16.5 15.5 17.0 16.1   HEMATOCRIT 52.3* 47.8 53.8* 48.2   PLATELETS 216 209 195 227   NEUTROS ABS  --  7.54* 6.72 6.63   IMMATURE GRANS (ABS)  --  0.05 0.05 0.05   LYMPHS ABS  --  1.32 2.13 2.05   MONOS ABS  --  1.20* 1.23* 1.30*   EOS ABS  --  0.12 0.15 0.14   .4* 95.8 100.2* 95.4         Lab 22  1019 22  1020 22  0431 22  1015   SODIUM 141 139 141 140   POTASSIUM 4.0 3.8 4.0 3.9   CHLORIDE 102 103 102 99   CO2 31.0* 27.0  27.0 28.0   ANION GAP 8.0 9.0 12.0 13.0   BUN 36* 32* 26* 23   CREATININE 1.19 1.13 1.23 1.28*   EGFR 57.0* 60.6 54.7* 52.2*   GLUCOSE 123* 134* 97 77   CALCIUM 9.1 8.9 9.3 9.6   MAGNESIUM  --  2.4* 2.4* 2.3                         Brief Urine Lab Results     None          Microbiology Results Abnormal     Procedure Component Value - Date/Time    COVID PRE-OP / PRE-PROCEDURE SCREENING ORDER (NO ISOLATION) - Swab, Nasopharynx [[id number]]  (Normal) Collected: 03/02/22 2241    Lab Status: Final result Specimen: Swab from Nasopharynx Updated: 03/02/22 2343    Narrative:      The following orders were created for panel order COVID PRE-OP / PRE-PROCEDURE SCREENING ORDER (NO ISOLATION) - Swab, Nasopharynx.  Procedure                               Abnormality         Status                     ---------                               -----------         ------                     COVID-19, FLU A/B, RSV P...[[id number]]  Normal              Final result                 Please view results for these tests on the individual orders.    COVID-19, FLU A/B, RSV PCR - Swab, Nasopharynx [[id number]]  (Normal) Collected: 03/02/22 2241    Lab Status: Final result Specimen: Swab from Nasopharynx Updated: 03/02/22 2343     COVID19 Not Detected     Influenza A PCR Not Detected     Influenza B PCR Not Detected     RSV, PCR Not Detected    Narrative:      Fact sheet for providers: https://www.fda.gov/media/705687/download    Fact sheet for patients: https://www.fda.gov/media/404679/download    Test performed by PCR.          Adult Transesophageal Echo (KIARA) W/ Cont if Necessary Per Protocol    Result Date: 3/8/2022  · Estimated left ventricular EF = 35-40% Left ventricular systolic function is moderately decreased. · Left ventricular wall thickness is consistent with mild concentric hypertrophy. · Severe aortic valve stenosis is present. · Severe dilation of the ascending aorta is present. · Moderate aortic valve regurgitation is present. ·  Mild mitral valve regurgitation is present · Trace tricuspid valve regurgitation is present  Anesthesia: Cath lab/Echo lab moderate sedation I was present with the patient for the duration of moderate sedation and supervised staff who had no other duties and monitored the patient for the entire procedure. Name of independent trained observer: Deja Jha RN Intra-service start time: 1403 Intra-service end time: 1430    Cardiac Catheterization/Vascular Study    Result Date: 3/8/2022  FINAL     Impression: · Mild to moderate coronary artery disease with an estimated 50% proximal LAD stenosis, 50% mid LAD stenosis, 70% mid circumflex stenosis in the vessel too small for intervention and mild disease in the right coronary. RECOMMENDATIONS: · The patient may proceed with TAVR evaluation. · The patient's EF of 30 to 35% does not appear to be related to his coronary disease. Indications: Coronary artery evaluation for the patient's reduced ejection fraction is well as possible future TAVR for significant aortic stenosis. Access: Right femoral artery as the patient has a dilated ascending aortic aneurysm Estimated blood loss: Less than 5 mL Procedures: · Left heart catheterization. · Left ventriculogram. · Selective coronary angiography. Procedure narrative: The patient was brought to the catheterization lab in a fasting condition.  Access site was prepped and draped in standard sterile fashion.  Lidocaine was injected and arterial access was obtained by percutaneous anterior wall puncture technique.  A 6 Pakistani arterial sheath was placed in the right femoral artery using a modified Seldinger technique.  Selective coronary arteriography was performed using the Pema technique with a 6 Pakistani 4 curved Pema right catheter and a 6 Pakistani 6 curved Pema left catheter.  Nonionic contrast was used and was injected manually.  No left ventriculography was performed.  No LV pressures were obtained.  Contrast: 45 ml  Hemodynamic Findings: Not obtained Left ventriculography: Not performed Angiographic Findings: RCA: The right coronary artery is dominant for the posterior circulation and gives rise to the PDA as well as a posterolateral branch.  The right coronary artery is a very large vessel which contains an estimated 30-40% proximal stenosis, mid vessel 40% stenosis, and minor irregularities in the PDA and posterolateral branches. LMCA: Left main coronary artery gives rise to LAD and circumflex vessels and is a short vessel free of significant disease. Circumflex: The circumflex coronary artery gives rise to tiny first obtuse marginal branch small second obtuse marginal branch large bifurcating third obtuse marginal branch a small AV groove vessel and a small fourth obtuse marginal branch.  The circumflex within the AV groove vessel going into the fourth obtuse marginal branch contains an estimated 70% stenosis but the vessel is less than 2 mm in diameter. LAD: The LAD gives rise to a small first diagonal branch small second diagonal branch moderate third diagonal branch and terminates as a small apical recurrent branch.  The LAD proximally contains an estimated 50% stenosis just after the second diagonal and just after the third diagonal a 50% stenosis in the mid vessel.     CT Angio TAVR Chest Abdomen Pelvis    Result Date: 3/10/2022  DATE OF EXAM: 03/09/2022 1:04 PM  PROCEDURE: CT ANGIO TAVR CHEST ABDOMEN PELVIS-  INDICATIONS: severe AS/tavr planning; J96.01-Acute respiratory failure with hypoxia; I50.9-Heart failure, unspecified; Z20.822-Contact with and (suspected) exposure to covid-19  COMPARISON: No comparisons available.  TECHNIQUE: Unenhanced 3 mm axial images through the chest, abdomen, pelvis, subsequent 1.5 mm arterial phase post IV contrast images following administration of 100 mL IV Isovue-370. 2-D reconstructions and 3-D VRT and MIP angiographic reconstructions.  FINDINGS: Patient history indicates severe  aortic stenosis, TAVR planning. Unenhanced images show extensive aortic valve calcification, and dense calcification of the LAD. There is moderately extensive calcification of the right coronary artery and left circumflex artery. There is ectasia of the ascending thoracic aorta to approximately 6 cm transverse luminal diameter as measured on both coronal and sagittal reconstructions. At the level of the proximal descending aorta, the aorta is still ectatic, approximately 4.4 cm. At the level of the diaphragmatic hiatus, maximal transverse luminal diameter is 3.9 cm. No additional aneurysmal dilatation is seen in the abdomen.  Angiographic images show no evidence of aortic dissection. There is relatively mild smooth margined thrombus of the arch and descending aorta. The angiographic images show limited contrast on the initial scan, but immediate repeat scan shows very good contrast of the distal aorta and lower extremity arteries. Common and external iliac arteries and common femoral arteries appear normal in caliber. There is rather extensive atherosclerotic calcification of the proximal superficial femoral arteries, but no significant stenosis at the included levels. No significant celiac axis or SMA stenosis is seen. No significant renal artery stenosis is identified.  Elsewhere, there appears to be extensive pulmonary edema, and fairly large free-flowing pleural effusions, generally symmetric in size on the right and left. A small nodule or nodular scar in the anterior right apex 2 cm in diameter on axial image 58 is not present on the 2019 chest CT scan and should be followed for stability. No other focal disease is identified. There is moderate bilateral lower lobe atelectasis associated with effusions. There is no significant pericardial effusion, but there are fairly numerous mediastinal lymph nodes, borderline to mildly enlarged, the largest node up to 15 mm in diameter in the preaortic mediastinal fat.  Pulmonary artery contrast opacification sufficient to exclude any large emboli and no embolic disease is appreciated.  There is diffuse fatty liver change and multiple granulomatous calcifications are seen in the liver and spleen. Gallbladder is nondistended. Pancreas appears relatively atrophic. Adrenal glands appear normal. There is bilateral renal cortical thinning expected for the patient's age and small renal cysts. No obstructive uropathy. No upper abdominal free air, ascites, adenopathy or acute inflammatory change is identified. Rectosigmoid anastomosis is noted clear of mass or inflammatory change. There are sigmoid diverticula and descending colon diverticuli without evidence of diverticulitis. Bladder is normally distended. Incidental note is made of bilateral hydroceles, incompletely included on this study. No acute bony abnormality is seen. There is a T6 superior endplate compression deformity, apparently old.        Impression:  1. CT of the chest, abdomen, and pelvis with image data saved per TAVR protocol. 2. Extensive aortic valve calcification, and diffuse and extensive coronary artery calcification noted. 3. Generalized ectasia of the thoracic aorta, to a maximum diameter of 6 cm at the mid ascending aorta. No evidence of dissection, no extensive thrombus. 4. Findings consistent with congestive heart failure, pulmonary edema and relatively pleural effusions. 5. Incidentally noted bilateral hydroceles.        Results for orders placed during the hospital encounter of 03/02/22    Adult Transesophageal Echo (KIARA) W/ Cont if Necessary Per Protocol    Interpretation Summary  · Estimated left ventricular EF = 35-40% Left ventricular systolic function is moderately decreased.  · Left ventricular wall thickness is consistent with mild concentric hypertrophy.  · Severe aortic valve stenosis is present.  · Severe dilation of the ascending aorta is present.  · Moderate aortic valve regurgitation is  present.  · Mild mitral valve regurgitation is present  · Trace tricuspid valve regurgitation is present    Anesthesia: Cath lab/Echo lab moderate sedation    I was present with the patient for the duration of moderate sedation and supervised staff who had no other duties and monitored the patient for the entire procedure.    Name of independent trained observer: Deja Jha RN  Intra-service start time: 1403  Intra-service end time: 1430      I have reviewed the medications:  Scheduled Meds:carvedilol, 3.125 mg, Oral, BID With Meals  fentaNYL citrate (PF), , ,   furosemide, 20 mg, Oral, Daily  midazolam, , ,   pharmacy consult - MTM, , Does not apply, Daily  sodium chloride, 10 mL, Intravenous, Q12H  spironolactone, 25 mg, Oral, Daily      Continuous Infusions:   PRN Meds:.•  acetaminophen **OR** acetaminophen **OR** acetaminophen  •  atropine  •  HYDROcodone-acetaminophen  •  [] Morphine **AND** naloxone  •  ondansetron  •  sodium chloride  •  sodium chloride  •  sodium chloride    Assessment/Plan   Assessment & Plan     Active Hospital Problems    Diagnosis  POA   • **Acute on chronic systolic CHF (congestive heart failure) (HCC) [I50.23]  Yes   • Severe aortic stenosis [I35.0]  Yes   • 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     Brief Hospital Course to date:  Ash Diez is a 89 y.o. male with hypertension, AAA, hyperlipidemia, diastolic heart failure, valvular heart disease admitted for shortness of breath secondary to acute on chronic heart failure.  Improved with diuresis.    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    --currently on 20 daily of lasix  -patient now wishing to pursue TAVR, undergoing workup (ct angio chest/abd/pelvis, carotid dopplers)  -[facility] 3/8/22: minimal cad, medical maangemetn  -3/10/22: still volume overloaded (bilateral pleural effusions on ct scan), no labs today. Ordering renal function tomorrow, likely increase diuresis (on 4Lnc currently). Cards & CT surgery following. Per patient, was told by specialist possibly TAVR Monday 3/14/22 (cards & ct surgery following); continue lasix 20mg, aldactone 25, coreg 3.125mg bid  --Hospice saw patient but now wishing to pursue hospice at this time, available prn     Am labs: cbc,bmp,mag    DVT prophylaxis:  No DVT prophylaxis order currently exists.     AM-PAC 6 Clicks Score (PT): 18 (03/10/22 0818)    Disposition: He lives in assisted living at [facility] and PT/OT have recommended SNF rehab.  Case management has made referrals; family wants to [facility] after discharge    CODE STATUS:   Code Status and Medical Interventions:   Ordered at: 03/08/22 1544     Level Of Support Discussed With:    Patient     Code Status (Patient has no pulse and is not breathing):    CPR (Attempt to Resuscitate)     Medical Interventions (Patient has pulse or is breathing):    Full       Mark Shaver MD  03/10/22

## 2022-03-10 NOTE — PROGRESS NOTES
Continued Stay Note  SAVANAH Inman     Patient Name: Ash Diez  MRN: [id number]  Today's Date: 3/10/2022    Admit Date: 3/2/2022     Discharge Plan     Row Name 03/10/22 1738       Plan    Plan SNF    Plan Comments Per chart review, POC will be to proceed with TAVR @ this time w/ plans to transition to a SNF @ d/c. Hospice will close this referral as the current POC is outside of the hospice benefit. If is needed, hospice, please feel free to re-consult.    Row Name 03/10/22 1203                                          Discharge Codes    No documentation.                     Shikha Branch

## 2022-03-10 NOTE — PROGRESS NOTES
[facility] Cardiothoracic Surgery In-Patient Progress Note     LOS: 8 days     Chief Complaint: Shortness of breath    Subjective  No complaints. Denies chest pain states shortness of breath is improving. VSS on 4L NC.       Objective    Vital Signs  Temp:  [97.4 °F (36.3 °C)-98.2 °F (36.8 °C)] 97.4 °F (36.3 °C)  Heart Rate:  [49-70] 64  Resp:  [16-18] 18  BP: (108-147)/(46-77) 117/56    Physical Exam   General Appearance: alert, appears stated age and cooperative   Lungs: Decreased lung sounds bilaterally. No adventitious breath sounds  noted.   Heart: Grade II-III systolic murmur     Results   Results from last 7 days   Lab Units 03/07/22  0844   WBC 10*3/mm3 10.80   HEMOGLOBIN g/dL 16.5   HEMATOCRIT % 52.3*   PLATELETS 10*3/mm3 216     Results from last 7 days   Lab Units 03/07/22  1019   SODIUM mmol/L 141   POTASSIUM mmol/L 4.0   CHLORIDE mmol/L 102   CO2 mmol/L 31.0*   BUN mg/dL 36*   CREATININE mg/dL 1.19   GLUCOSE mg/dL 123*   CALCIUM mg/dL 9.1       Imaging Results (Last 24 Hours)     Procedure Component Value Units Date/Time    CT Angio TAVR Chest Abdomen Pelvis [[id number]] Resulted: 03/10/22 0813     Updated: 03/09/22 1308          Assessment    Acute on chronic systolic CHF (congestive heart failure) (HCC)    Abdominal aortic aneurysm (AAA) without rupture (HCC)    Essential hypertension    Hyperlipidemia    Prediabetes    Diastolic congestive heart failure (HCC)    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

## 2022-03-10 NOTE — CASE MANAGEMENT/SOCIAL WORK
Continued Stay Note   [facility]     Patient Name: Ash Diez  MRN: [id number]  Today's Date: 3/10/2022    Admit Date: 3/2/2022     Discharge Plan     Row Name 03/10/22 1203       Plan    Plan ongoing    Patient/Family in Agreement with Plan yes    Plan Comments Mr. Diez is going to have his TAVR during this admission, per note review. I have informed [facility], [facility], and [facility] of his continued stay due to them all offering Mr. Diez a bed. Case management will continue to follow.    Final Discharge Disposition Code 30 - still a patient               Discharge Codes    No documentation.                     Juan Manuel Davis RN

## 2022-03-10 NOTE — THERAPY TREATMENT NOTE
Patient Name: Ash Diez  : 10/4/1928    MRN: [id number]                              Today's Date: 3/10/2022       Admit Date: 3/2/2022    Visit Dx:     ICD-10-CM ICD-9-CM   1. Acute respiratory failure with hypoxia (HCC)  J96.01 518.81   2. Acute on chronic congestive heart failure, unspecified heart failure type (HCC)  I50.9 428.0   3. COVID-19 ruled out by laboratory testing  Z20.822 V01.79   4. 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 has been evaluted at . pt decided agaisnt surgery    • Cancer (HCC)     colon   • Cellulitis     RLE   • CHF (congestive heart failure) (HCC)    • Hyperlipidemia    • Hypertension    • Pneumonia      Past Surgical History:   Procedure Laterality Date   • CARDIAC CATHETERIZATION N/A 3/8/2022    Procedure: LEFT HEART CATH;  Surgeon: Khloe Pena MD;  Location: [facility] CATH INVASIVE LOCATION;  Service: Cardiology;  Laterality: N/A;   • COLON SURGERY     • CORONARY STENT PLACEMENT     • VASCULAR SURGERY      Vein removal      General Information     Row Name 03/10/22 1425          OT Time and Intention    Document Type therapy note (daily note)  -SW     Mode of Treatment occupational therapy  -SW     Row Name 03/10/22 1425          General Information    Patient Profile Reviewed yes  -SW     Existing Precautions/Restrictions fall;oxygen therapy device and L/min  -SW     Row Name 03/10/22 1424          Cognition    Orientation Status (Cognition) oriented x 4  -SW     Row Name 03/10/22  1425          Safety Issues, Functional Mobility    Impairments Affecting Function (Mobility) balance;coordination;endurance/activity tolerance;postural/trunk control;shortness of breath;strength  -           User Key  (r) = Recorded By, (t) = Taken By, (c) = Cosigned By    Initials Name Provider Type    SW Katina Romeo OT Occupational Therapist                 Mobility/ADL's     Row Name 03/10/22 1425          Bed Mobility    Bed Mobility supine-sit  -SW     Supine-Sit [place] (Bed Mobility) standby assist;verbal cues  -     Row Name 03/10/22 1425          Transfers    Transfers bed-chair transfer;sit-stand transfer  -SW     Bed-Chair [place] (Transfers) verbal cues;contact guard  -     Assistive Device (Bed-Chair Transfers) walker, front-wheeled  -SW     Sit-Stand [place] (Transfers) standby assist;verbal cues  -     Row Name 03/10/22 1425          Sit-Stand Transfer    Assistive Device (Sit-Stand Transfers) walker, front-wheeled  -SW     Row Name 03/10/22 1425          Functional Mobility    Functional Mobility- Ind. Level contact guard assist;1 person;verbal cues required  -     Functional Mobility- Device rolling walker  -     Functional Mobility-Distance (Feet) 20  -SW     Functional Mobility- Safety Issues supplemental O2  -     Row Name 03/10/22 1425          Activities of Daily Living    BADL Assessment/Intervention toileting;grooming  -     Row Name 03/10/22 1425          Grooming Assessment/Training    [place] Level (Grooming) grooming skills;wash face, hands;set up  -SW     Position (Grooming) supported sitting  -     Row Name 03/10/22 1425          Toileting Assessment/Training    [place] Level (Toileting) toileting skills;adjust/manage clothing;perform perineal hygiene;minimum assist (75% patient effort)  -     Assistive Devices (Toileting) urinal  -SW     Position (Toileting) supported standing  -           User Key  (r) = Recorded By, (t) = Taken By, (c)  = Cosigned By    Initials Name Provider Type    Katina Hartmann OT Occupational Therapist               Obj/Interventions     [facility] Name 03/10/22 1425          Shoulder (Therapeutic Exercise)    Shoulder (Therapeutic Exercise) AROM (active range of motion)  -     Shoulder AROM (Therapeutic Exercise) right;flexion;extension;aBduction;aDduction;15 repititions  -[facility] Name 03/10/22 1425          Elbow/Forearm (Therapeutic Exercise)    Elbow/Forearm (Therapeutic Exercise) AROM (active range of motion)  -     Elbow/Forearm AROM (Therapeutic Exercise) bilateral;flexion;extension;supination;pronation;sitting;15 repititions  -     Row Name 03/10/22 1425          Motor Skills    Therapeutic Exercise shoulder;elbow/forearm  -[facility] Name 03/10/22 1425          Balance    Balance Assessment sitting static balance;sitting dynamic balance;sit to stand dynamic balance;standing static balance;standing dynamic balance  -     Static Sitting Balance independent  -     Dynamic Sitting Balance independent  -     Position, Sitting Balance unsupported;sitting edge of bed  -     Sit to Stand Dynamic Balance contact guard  -     Static Standing Balance contact guard  -     Dynamic Standing Balance contact guard  -     Position/Device Used, Standing Balance supported;walker, front-wheeled  -     Balance Interventions sitting;standing;sit to stand;supported;static;dynamic;minimal challenge;occupation based/functional task  -           User Key  (r) = Recorded By, (t) = Taken By, (c) = Cosigned By    Initials Name Provider Type    Katina Hartmann OT Occupational Therapist               Goals/Plan    No documentation.                Clinical Impression     [facility] Name 03/10/22 1425          Pain Assessment    Pretreatment Pain Rating 0/10 - no pain  -     Posttreatment Pain Rating 0/10 - no pain  -[facility] Name 03/10/22 1425          Plan of Care Review    Plan of Care Reviewed With patient;daughter  -      Progress improving  -SW     Outcome Evaluation OT promoted oob activity with pt demonstrating sba for bed mob, cga for sts and t/f and s/u for grooming.  Pt completed toileting 2x during tx while using urinal and required min assist while supported standing.  Pt making improvement with mob and adl performance and he completed multiple ub TE.  -SW     Row Name 03/10/22 1425          Vital Signs    Pre Systolic BP Rehab 121  -SW     Pre Treatment Diastolic BP 66  -SW     Pretreatment Heart Rate (beats/min) 63  -SW     Posttreatment Heart Rate (beats/min) 65  -SW     Pre SpO2 (%) 94  -SW     O2 Delivery Pre Treatment supplemental O2  -SW     O2 Delivery Intra Treatment supplemental O2  -SW     O2 Delivery Post Treatment supplemental O2  -SW     Pre Patient Position Supine  -SW     Intra Patient Position Standing  -SW     Post Patient Position Sitting  -SW     Row Name 03/10/22 1425          Positioning and Restraints    Pre-Treatment Position in bed  -SW     Post Treatment Position chair  -SW     In Chair notified nsg;reclined;call light within reach;encouraged to call for assist;with family/caregiver;waffle cushion;legs elevated  -SW           User Key  (r) = Recorded By, (t) = Taken By, (c) = Cosigned By    Initials Name Provider Type    SW Katina Romeo, OT Occupational Therapist               Outcome Measures     Row Name 03/10/22 1516          How much help from another is currently needed...    Putting on and taking off regular lower body clothing? 2  -SW     Bathing (including washing, rinsing, and drying) 2  -SW     Toileting (which includes using toilet bed pan or urinal) 3  -SW     Putting on and taking off regular upper body clothing 3  -SW     Taking care of personal grooming (such as brushing teeth) 4  -SW     Eating meals 4  -SW     AM-PAC 6 Clicks Score (OT) 18  -SW     Row Name 03/10/22 0818          How much help from another person do you currently need...    Turning from your back to your side while in  flat bed without using bedrails? 3  -CJ     Moving from lying on back to sitting on the side of a flat bed without bedrails? 3  -CJ     Moving to and from a bed to a chair (including a wheelchair)? 3  -CJ     Standing up from a chair using your arms (e.g., wheelchair, bedside chair)? 3  -CJ     Climbing 3-5 steps with a railing? 3  -CJ     To walk in hospital room? 3  -CJ     AM-PAC 6 Clicks Score (PT) 18  -CJ     Row Name 03/10/22 1516          Functional Assessment    Outcome Measure Options AM-PAC 6 Clicks Daily Activity (OT)  -           User Key  (r) = Recorded By, (t) = Taken By, (c) = Cosigned By    Initials Name Provider Type    Dhara Vidal RN Registered Nurse    Katina Hartmann OT Occupational Therapist                Occupational Therapy Education                 Title: PT OT SLP Therapies (Done)     Topic: Occupational Therapy (Done)     Point: ADL training (Done)     Description:   Instruct learner(s) on proper safety adaptation and remediation techniques during self care or transfers.   Instruct in proper use of assistive devices.              Learning Progress Summary           Patient Acceptance, E, VU by  at 3/10/2022 1517    Acceptance, E,TB,D, DU,VU,NR by  at 3/7/2022 1023    Comment: incorporated PLB and seq    Acceptance, E, NR by  at 3/3/2022 0820   Family Acceptance, E, VU by  at 3/10/2022 1517    Acceptance, E,TB,D, DU,VU,NR by KF at 3/7/2022 1023    Comment: incorporated PLB and seq                   Point: Home exercise program (Done)     Description:   Instruct learner(s) on appropriate technique for monitoring, assisting and/or progressing therapeutic exercises/activities.              Learning Progress Summary           Patient Acceptance, E, VU by  at 3/10/2022 1517    Acceptance, E,TB,D, DU,VU,NR by KF at 3/7/2022 1023    Comment: incorporated PLB and seq   Family Acceptance, E, VU by  at 3/10/2022 1517    Acceptance, E,TB,D, DU,VU,NR by KF at 3/7/2022 1023     Comment: incorporated PLB and seq                   Point: Precautions (Done)     Description:   Instruct learner(s) on prescribed precautions during self-care and functional transfers.              Learning Progress Summary           Patient Acceptance, E,TB,D, DU,VU,NR by  at 3/7/2022 1023    Comment: incorporated PLB and seq    Acceptance, E, NR by  at 3/3/2022 0820   Family Acceptance, E,TB,D, DU,VU,NR by  at 3/7/2022 1023    Comment: incorporated PLB and seq                   Point: Body mechanics (Done)     Description:   Instruct learner(s) on proper positioning and spine alignment during self-care, functional mobility activities and/or exercises.              Learning Progress Summary           Patient Acceptance, E,TB,D, DU,VU,NR by  at 3/7/2022 1023    Comment: incorporated PLB and seq    Acceptance, E, NR by  at 3/3/2022 0820   Family Acceptance, E,TB,D, DU,VU,NR by  at 3/7/2022 1023    Comment: incorporated PLB and seq                               User Key     Initials Effective Dates Name Provider Type Discipline     06/16/21 -  Charo Trinidad OT Occupational Therapist OT     06/16/21 -  Jenn Guevara, OT Occupational Therapist OT     06/16/21 -  Katina Romeo, OT Occupational Therapist OT              OT Recommendation and Plan     Plan of Care Review  Plan of Care Reviewed With: patient, daughter  Progress: improving  Outcome Evaluation: OT promoted oob activity with pt demonstrating sba for bed mob, cga for sts and t/f and s/u for grooming.  Pt completed toileting 2x during tx while using urinal and required min assist while supported standing.  Pt making improvement with mob and adl performance and he completed multiple ub TE.     Time Calculation:    Time Calculation- OT     Row Name 03/10/22 1424             Time Calculation- OT    OT Start Time 1425  -SW      OT Received On 03/10/22  -SW              Timed Charges    34803 - OT Therapeutic Exercise Minutes 15  -SW       62097 - OT Self Care/Mgmt Minutes 23  -SW              Total Minutes    Timed Charges Total Minutes 38  -SW       Total Minutes 38  -SW            User Key  (r) = Recorded By, (t) = Taken By, (c) = Cosigned By    Initials Name Provider Type    Katina Hartmann OT Occupational Therapist              Therapy Charges for Today     Code Description Service Date Service Provider Modifiers Qty    66831065180  OT THER PROC EA 15 MIN 3/10/2022 Katina Romeo OT GO 1    [id number]  OT SELF CARE/MGMT/TRAIN EA 15 MIN 3/10/2022 Katina Romeo OT GO 2               Katina Romeo OT  3/10/2022

## 2022-03-10 NOTE — NURSING NOTE
Five Meter Walk Test    Ash Diez  10/4/1928  [id number]  03/10/22      Utilized Walking Aid? Yes (walker)     Walk 1: 16.91 s/5m     Walk 2: 16.91 s/5m     Walk 3: 16.34 s/5m    Five Meter Walk Average: 16.7 s/5m    Gait Speed: Slow (Average > 6 s/5m)    Notes: Patient ambulating w/ Physical Therapy + O2      VALENTINO Brewer, 03/10/22

## 2022-03-11 NOTE — PROGRESS NOTES
"[facility] Progress Note     LOS: 9 days   Patient Care Team:  Karthikeyan Moreno MD as PCP - General (Family Medicine)  PCP:  Karthikeyan Moreno MD    Chief Complaint: Aortic stenosis    SUBJECTIVE: Resting comfortably, satting 93% on 4 L nasal cannula.      Review of Systems:   All systems have been reviewed and are negative with the exception of those mentioned above.      OBJECTIVE:    Vital Sign Min/Max for last 24 hours  Temp  Min: 96.8 °F (36 °C)  Max: 97.9 °F (36.6 °C)   BP  Min: 112/50  Max: 140/58   Pulse  Min: 50  Max: 70   Resp  Min: 18  Max: 20   SpO2  Min: 90 %  Max: 96 %   No data recorded   No data recorded     Flowsheet Rows    Flowsheet Row First Filed Value   Admission Height 181.6 cm (71.5\") Documented at 03/02/2022 1833   Admission Weight 90.7 kg (200 lb) Documented at 03/02/2022 1833          Telemetry: Sinus bradycardia with PACs and PVCs      Intake/Output Summary (Last 24 hours) at 3/11/2022 1250  Last data filed at 3/11/2022 1107  Gross per 24 hour   Intake 480 ml   Output 500 ml   Net -20 ml     Intake & Output (last 3 days)       03/08 0701  03/09 0700 03/09 0701  03/10 0700 03/10 0701  03/11 0700 03/11 0701  03/12 0700    P.O.  890 600 240    Total Intake(mL/kg)  890 (9.2) 600 (6.2) 240 (2.5)    Urine (mL/kg/hr) 1250 (0.5) 925 (0.4) 550 (0.2) 100 (0.2)    Stool 0       Total Output 1250 925 550 100    Net -1250 -35 +50 +140            Stool Unmeasured Occurrence 1 x              Physical Exam:    General Appearance:    Alert, cooperative, no distress, appears stated age   Neck:   Supple, symmetrical, trachea midline.   Lungs:     Clear to auscultation bilaterally, respirations unlabored   Chest Wall:    No tenderness or deformity    Heart:    Regular rate and rhythm, S1 and S2 normal, 3/6 mid peaking systolic murmur right upper sternal border, rub   or gallop, normal carotid impulse bilaterally without bruit.   Extremities:   Extremities " normal, atraumatic, no cyanosis or edema   Pulses:   2+ and symmetric all extremities   Skin:   Skin color, texture, turgor normal, no rashes or lesions      LABS/DIAGNOSTIC DATA:  Results from last 7 days   Lab Units 03/11/22  0644 03/07/22  0844 03/06/22  1020   WBC 10*3/mm3 11.01* 10.80 10.27   HEMOGLOBIN g/dL 15.2 16.5 15.5   HEMATOCRIT % 48.3 52.3* 47.8   PLATELETS 10*3/mm3 204 216 209     Lab Results   Lab Value Date/Time    TROPONINT <0.010 03/02/2022 1924    TROPONINT 0.030 09/05/2019 0751         Results from last 7 days   Lab Units 03/11/22  0644 03/07/22  1019 03/06/22  1020   SODIUM mmol/L 139 141 139   POTASSIUM mmol/L 4.6 4.0 3.8   CHLORIDE mmol/L 103 102 103   CO2 mmol/L 28.0 31.0* 27.0   BUN mg/dL 40* 36* 32*   CREATININE mg/dL 1.40* 1.19 1.13   CALCIUM mg/dL 9.3 9.1 8.9   GLUCOSE mg/dL 113* 123* 134*                       Medication Review:   carvedilol, 3.125 mg, Oral, BID With Meals  fentaNYL citrate (PF), , ,   furosemide, 20 mg, Oral, Daily  midazolam, , ,   pharmacy consult - MTM, , Does not apply, Daily  sodium chloride, 10 mL, Intravenous, Q12H  spironolactone, 25 mg, Oral, Daily             ASSESSMENT/PLAN:    Acute on chronic systolic CHF (congestive heart failure) (HCC)    Abdominal aortic aneurysm (AAA) without rupture (HCC)    Essential hypertension    Hyperlipidemia    Prediabetes    Diastolic congestive heart failure (HCC)    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    Heart failure with reduced ejection fraction, acute on chronic, EF 31 to 35%, associated diastolic dysfunction: Continue current medical therapy with gradual diuresis with oral loop diuretics while trending renal function.  Percutaneous valve intervention scheduled for Monday.    Thoracic ascending aneurysm: Asymptomatic, continue afterload reduction.  Not a candidate for intervention.      Carlito RAGSDALE  Mike FLOWERS MD   03/11/22  12:50 EST

## 2022-03-11 NOTE — PLAN OF CARE
Goal Outcome Evaluation:  Plan of Care Reviewed With: patient, daughter        Progress: improving  Outcome Evaluation: Pt ambulates increased distance on monitor with Afib noted and RR of 40. Pt declined rest breaks when prompted.

## 2022-03-11 NOTE — CASE MANAGEMENT/SOCIAL WORK
Continued Stay Note  [facility]     Patient Name: Ash Diez  MRN: [id number]  Today's Date: 3/11/2022    Admit Date: 3/2/2022     Discharge Plan     Row Name 03/11/22 1221       Plan    Plan ongoing    Patient/Family in Agreement with Plan yes    Plan Comments Mr. Diez is scheduled to have a TAVR on Monday 3/14/2020. Discharge planning is ongoing at this time. Case management will continue to follow.    Final Discharge Disposition Code 30 - still a patient               Discharge Codes    No documentation.                     Juan Manuel Davis RN

## 2022-03-11 NOTE — PROGRESS NOTES
[facility] Cardiothoracic Surgery In-Patient Progress Note     LOS: 9 days     Chief Complaint: Shortness of breath    Subjective  No complaints this morning. VSS on 4L NC.      Objective    Vital Signs  Temp:  [96.8 °F (36 °C)-97.9 °F (36.6 °C)] 97.4 °F (36.3 °C)  Heart Rate:  [50-67] 58  Resp:  [18-20] 20  BP: (112-138)/(47-66) 135/47    Physical Exam   General Appearance: alert, appears stated age and cooperative   Lungs: Decreased lung sounds bilaterally. No adventitious breath sounds  noted.   Heart: Grade II-III systolic murmur     Results     Results from last 7 days   Lab Units 03/11/22  0644   WBC 10*3/mm3 11.01*   HEMOGLOBIN g/dL 15.2   HEMATOCRIT % 48.3   PLATELETS 10*3/mm3 204     Results from last 7 days   Lab Units 03/11/22  0644   SODIUM mmol/L 139   POTASSIUM mmol/L 4.6   CHLORIDE mmol/L 103   CO2 mmol/L 28.0   BUN mg/dL 40*   CREATININE mg/dL 1.40*   GLUCOSE mg/dL 113*   CALCIUM mg/dL 9.3       Imaging Results (Last 24 Hours)     Procedure Component Value Units Date/Time    CT Angio TAVR Chest Abdomen Pelvis [[id number]] Collected: 03/10/22 0814     Updated: 03/10/22 1731    Narrative:      DATE OF EXAM: 03/09/2022 1:04 PM     PROCEDURE: CT ANGIO TAVR CHEST ABDOMEN PELVIS-     INDICATIONS: severe AS/tavr planning; J96.01-Acute respiratory failure  with hypoxia; I50.9-Heart failure, unspecified; Z20.822-Contact with and  (suspected) exposure to covid-19     COMPARISON: No comparisons available.     TECHNIQUE: Unenhanced 3 mm axial images through the chest, abdomen,  pelvis, subsequent 1.5 mm arterial phase post IV contrast images  following administration of 100 mL IV Isovue-370. 2-D reconstructions  and 3-D VRT and MIP angiographic reconstructions.     FINDINGS:   Patient history indicates severe aortic stenosis, TAVR planning.  Unenhanced images show extensive aortic valve calcification, and dense  calcification of the LAD. There is moderately extensive calcification of  the right coronary  artery and left circumflex artery. There is ectasia  of the ascending thoracic aorta to approximately 6 cm transverse luminal  diameter as measured on both coronal and sagittal reconstructions. At  the level of the proximal descending aorta, the aorta is still ectatic,  approximately 4.4 cm. At the level of the diaphragmatic hiatus, maximal  transverse luminal diameter is 3.9 cm. No additional aneurysmal  dilatation is seen in the abdomen.     Angiographic images show no evidence of aortic dissection. There is  relatively mild smooth margined thrombus of the arch and descending  aorta. The angiographic images show limited contrast on the initial  scan, but immediate repeat scan shows very good contrast of the distal  aorta and lower extremity arteries. Common and external iliac arteries  and common femoral arteries appear normal in caliber. There is rather  extensive atherosclerotic calcification of the proximal superficial  femoral arteries, but no significant stenosis at the included levels. No  significant celiac axis or SMA stenosis is seen. No significant renal  artery stenosis is identified.     Elsewhere, there appears to be extensive pulmonary edema, and fairly  large free-flowing pleural effusions, generally symmetric in size on the  right and left. A small nodule or nodular scar in the anterior right  apex 2 cm in diameter on axial image 58 is not present on the 2019 chest  CT scan and should be followed for stability. No other focal disease is  identified. There is moderate bilateral lower lobe atelectasis  associated with effusions. There is no significant pericardial effusion,  but there are fairly numerous mediastinal lymph nodes, borderline to  mildly enlarged, the largest node up to 15 mm in diameter in the  preaortic mediastinal fat. Pulmonary artery contrast opacification  sufficient to exclude any large emboli and no embolic disease is  appreciated.     There is diffuse fatty liver change and  multiple granulomatous  calcifications are seen in the liver and spleen. Gallbladder is  nondistended. Pancreas appears relatively atrophic. Adrenal glands  appear normal. There is bilateral renal cortical thinning expected for  the patient's age and small renal cysts. No obstructive uropathy. No  upper abdominal free air, ascites, adenopathy or acute inflammatory  change is identified. Rectosigmoid anastomosis is noted, clear of mass  or inflammatory change. There are sigmoid diverticula and descending  colon diverticuli without evidence of diverticulitis. Bladder is  normally distended. Incidental note is made of bilateral hydroceles,  incompletely included on this study. No acute bony abnormality is seen.  There is a T6 superior endplate compression deformity, apparently old.             Impression:         1. CT of the chest, abdomen, and pelvis with image data saved per TAVR  protocol.  2. Extensive aortic valve calcification, and diffuse and extensive  coronary artery calcification noted.  3. Generalized ectasia of the thoracic aorta, to a maximum diameter of 6  cm at the mid ascending aorta. No evidence of dissection, no extensive  thrombus.  4. Findings consistent with congestive heart failure, pulmonary edema  and relatively pleural effusions.  5. Incidentally noted bilateral hydroceles.     This report was finalized on 3/10/2022 5:28 PM by Dr. Tam Montoya MD.             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 Mitchell on Monday  Continue preoperative work-up  Ambulate TID  Pulmonary Toilet: IS q1 hr while awake        VALENTINO Duran  [facility]  Cardiothoracic Surgery  3/11/2022   08:24 EST

## 2022-03-11 NOTE — PROGRESS NOTES
[facility]  PROGRESS NOTE    Patient Name: Ash Diez  : 10/4/1928  MRN: [id number]    Date of Admission: 3/2/2022  Primary Care Physician: Karthikeyan Moreno MD    Subjective   Subjective   CC:  F/U dyspnea     HPI:  Stable dyspnea. No fever. No sputum.    ROS:  Gen- No fevers, chills  CV- No chest pain, palpitations  Resp- No cough; +dyspnea  GI- No N/V/D, abd pain  All other systems have been reviewed and the pertinent positives and negatives are listed above in the HPI or ROS    Objective   Objective   Vital Signs:   Temp:  [97.4 °F (36.3 °C)-97.9 °F (36.6 °C)] 97.4 °F (36.3 °C)  Heart Rate:  [50-70] 62  Resp:  [18-20] 20  BP: (125-140)/(47-64) 137/60  Flow (L/min):  [4] 4  Physical Exam:  Constitutional: Alert, elderly ill-appearing male resting in bed, nasal canula in place 4Lnc  Eyes: EOMI, sclerae anicteric, no conjunctival injection  Head: NCAT  ENT: Lignite, moist mucous membranes   Respiratory: decreased air movement bilateral bases w/ crackles  Cardiovascular: rrr  Gastrointestinal: Soft, NT, ND +BS  Musculoskeletal: CALL; no LE edema bilaterally  Neurologic: Oriented x4, strength symmetric in all extremities, follows all commands, speech clear  Skin: No rashes on exposed skin  Psychiatric: Pleasant and cooperative; normal affect    Results Reviewed:  LAB RESULTS:      Lab 22  0644 22  0844 22  1020 22  0431   WBC 11.01* 10.80 10.27 10.33   HEMOGLOBIN 15.2 16.5 15.5 17.0   HEMATOCRIT 48.3 52.3* 47.8 53.8*   PLATELETS 204 216 209 195   NEUTROS ABS  --   --  7.54* 6.72   IMMATURE GRANS (ABS)  --   --  0.05 0.05   LYMPHS ABS  --   --  1.32 2.13   MONOS ABS  --   --  1.20* 1.23*   EOS ABS  --   --  0.12 0.15   .4* 100.4* 95.8 100.2*         Lab 22  0644 22  1019 22  1020 22  0431   SODIUM 139 141 139 141   POTASSIUM 4.6 4.0 3.8 4.0   CHLORIDE 103 102 103 102   CO2 28.0 31.0* 27.0 27.0   ANION GAP 8.0 8.0 9.0  12.0   BUN 40* 36* 32* 26*   CREATININE 1.40* 1.19 1.13 1.23   EGFR 46.9* 57.0* 60.6 54.7*   GLUCOSE 113* 123* 134* 97   CALCIUM 9.3 9.1 8.9 9.3   MAGNESIUM 2.6*  --  2.4* 2.4*                         Brief Urine Lab Results     None          Microbiology Results Abnormal     Procedure Component Value - Date/Time    COVID PRE-OP / PRE-PROCEDURE SCREENING ORDER (NO ISOLATION) - Swab, Nasopharynx [[id number]]  (Normal) Collected: 03/02/22 2241    Lab Status: Final result Specimen: Swab from Nasopharynx Updated: 03/02/22 2343    Narrative:      The following orders were created for panel order COVID PRE-OP / PRE-PROCEDURE SCREENING ORDER (NO ISOLATION) - Swab, Nasopharynx.  Procedure                               Abnormality         Status                     ---------                               -----------         ------                     COVID-19, FLU A/B, RSV P...[[id number]]  Normal              Final result                 Please view results for these tests on the individual orders.    COVID-19, FLU A/B, RSV PCR - Swab, Nasopharynx [[id number]]  (Normal) Collected: 03/02/22 2241    Lab Status: Final result Specimen: Swab from Nasopharynx Updated: 03/02/22 2343     COVID19 Not Detected     Influenza A PCR Not Detected     Influenza B PCR Not Detected     RSV, PCR Not Detected    Narrative:      Fact sheet for providers: https://www.fda.gov/media/415859/download    Fact sheet for patients: https://www.fda.gov/media/841848/download    Test performed by PCR.          No radiology results from the last 24 hrs    Results for orders placed during the hospital encounter of 03/02/22    Adult Transesophageal Echo (KIARA) W/ Cont if Necessary Per Protocol    Interpretation Summary  · Estimated left ventricular EF = 35-40% Left ventricular systolic function is moderately decreased.  · Left ventricular wall thickness is consistent with mild concentric hypertrophy.  · Severe aortic valve stenosis is present.  · Severe dilation  of the ascending aorta is present.  · Moderate aortic valve regurgitation is present.  · Mild mitral valve regurgitation is present  · Trace tricuspid valve regurgitation is present    Anesthesia: Cath lab/Echo lab moderate sedation    I was present with the patient for the duration of moderate sedation and supervised staff who had no other duties and monitored the patient for the entire procedure.    Name of independent trained observer: Deja Jha RN  Intra-service start time: 1403  Intra-service end time: 1430      I have reviewed the medications:  Scheduled Meds:carvedilol, 3.125 mg, Oral, BID With Meals  fentaNYL citrate (PF), , ,   furosemide, 20 mg, Oral, Daily  midazolam, , ,   pharmacy consult - MTM, , Does not apply, Daily  sodium chloride, 10 mL, Intravenous, Q12H  spironolactone, 25 mg, Oral, Daily      Continuous Infusions:[START ON 3/14/2022] phenylephrine, 0.5-3 mcg/kg/min      PRN Meds:.•  acetaminophen **OR** acetaminophen **OR** acetaminophen  •  atropine  •  HYDROcodone-acetaminophen  •  magnesium sulfate **OR** magnesium sulfate in D5W 1g/100mL (PREMIX) **OR** magnesium sulfate  •  [] Morphine **AND** naloxone  •  ondansetron  •  sodium chloride  •  sodium chloride  •  sodium chloride    Assessment/Plan   Assessment & Plan     Active Hospital Problems    Diagnosis  POA   • **Acute on chronic systolic CHF (congestive heart failure) (HCC) [I50.23]  Yes   • Severe aortic stenosis [I35.0]  Yes   • 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     Brief Hospital Course to date:  Ash Diez is a 89 y.o. male with hypertension, AAA,  hyperlipidemia, diastolic heart failure, valvular heart disease admitted for shortness of breath secondary to acute on chronic heart failure.  Improved with diuresis.    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    -patient now wishing to pursue TAVR, undergoing workup (ct angio chest/abd/pelvis, carotid dopplers)  -[facility] 3/8/22: minimal cad, medical maangemetn  -3/11/22: still volume overloaded (bilateral pleural effusions on ct scan from 3/10) on 4Lnc; extra lasix 20mg iv x 1, trend renal function; plan TAVR Monday 3/14; cards & ct surgery following (continue lasix 20mg, aldactone 25, coreg 3.125mg bid)  --Hospice saw patient but now wishing to pursue hospice at this time, available prn     Am labs: cbc,bmp,mag    DVT prophylaxis:  No DVT prophylaxis order currently exists.     AM-PAC 6 Clicks Score (PT): 19 (03/11/22 1446)    Disposition: He lives in assisted living at [facility] and PT/OT have recommended SNF rehab.  Case management has made referrals; family wants to [facility] after discharge    CODE STATUS:   Code Status and Medical Interventions:   Ordered at: 03/08/22 1828     Level Of Support Discussed With:    Patient     Code Status (Patient has no pulse and is not breathing):    CPR (Attempt to Resuscitate)     Medical Interventions (Patient has pulse or is breathing):    Full       Mark Shaver MD  03/11/22

## 2022-03-11 NOTE — THERAPY TREATMENT NOTE
Patient Name: Ash Diez  : 10/4/1928    MRN: [id number]                              Today's Date: 3/11/2022       Admit Date: 3/2/2022    Visit Dx:     ICD-10-CM ICD-9-CM   1. Acute respiratory failure with hypoxia (HCC)  J96.01 518.81   2. Acute on chronic congestive heart failure, unspecified heart failure type (HCC)  I50.9 428.0   3. COVID-19 ruled out by laboratory testing  Z20.822 V01.79   4. 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 has been evaluted at . pt decided agaisnt surgery    • Cancer (HCC)     colon   • Cellulitis     RLE   • CHF (congestive heart failure) (HCC)    • Hyperlipidemia    • Hypertension    • Pneumonia      Past Surgical History:   Procedure Laterality Date   • CARDIAC CATHETERIZATION N/A 3/8/2022    Procedure: LEFT HEART CATH;  Surgeon: Khloe Pena MD;  Location: [facility] CATH INVASIVE LOCATION;  Service: Cardiology;  Laterality: N/A;   • COLON SURGERY     • CORONARY STENT PLACEMENT     • VASCULAR SURGERY      Vein removal      General Information     Row Name 22 1437          Physical Therapy Time and Intention    Document Type therapy note (daily note)  -EJ     Mode of Treatment physical therapy  -EJ     Row Name 22 1437          General Information    Patient Profile Reviewed yes  -EJ     Existing Precautions/Restrictions fall;oxygen therapy device and L/min  L humerus fx - limited AROM ~ 1 year ago; no BP in LUE; HR monitored during ambulation  -EJ     Barriers to Rehab  previous functional deficit  -     Row Name 03/11/22 1437          Cognition    Orientation Status (Cognition) oriented x 4  -     Row Name 03/11/22 1437          Safety Issues, Functional Mobility    Safety Issues Affecting Function (Mobility) insight into deficits/self-awareness;awareness of need for assistance;safety precautions follow-through/compliance;safety precaution awareness;sequencing abilities  -EJ     Impairments Affecting Function (Mobility) balance;endurance/activity tolerance;postural/trunk control;shortness of breath;strength  -EJ     Cognitive Impairments, Mobility Safety/Performance awareness, need for assistance;insight into deficits/self-awareness;safety precaution follow-through;safety precaution awareness  -EJ           User Key  (r) = Recorded By, (t) = Taken By, (c) = Cosigned By    Initials Name Provider Type     Bonnie Faustin PT Physical Therapist               Mobility     Row Name 03/11/22 1439          Bed Mobility    Supine-Sit [place] (Bed Mobility) verbal cues;minimum assist (75% patient effort)  -     Assistive Device (Bed Mobility) bed rails;head of bed elevated  -     Comment, (Bed Mobility) Initially pt reached for therapist. Therapist encouraged pt to perform with as much independence as possible  -     Row Name 03/11/22 1439          Sit-Stand Transfer    Sit-Stand [place] (Transfers) standby assist;verbal cues  -     Assistive Device (Sit-Stand Transfers) walker, front-wheeled  -     Comment, (Sit-Stand Transfer) cues for hand placement  -     Row Name 03/11/22 1439          Gait/Stairs (Locomotion)    [place] Level (Gait) contact guard  -     Assistive Device (Gait) walker, front-wheeled  -EJ     Distance in Feet (Gait) 300  -EJ     Deviations/Abnormal Patterns (Gait) base of support, narrow;noel decreased;stride length decreased;bilateral deviations  -     Bilateral Gait Deviations forward flexed posture;heel strike decreased   -EJ     Comment, (Gait/Stairs) forward flexed posture with cues for upright posture. Pt noted to be in A.fib, with rate high at 86 with ambulation, RR of 40. 4L O2 per NC  -EJ           User Key  (r) = Recorded By, (t) = Taken By, (c) = Cosigned By    Initials Name Provider Type    Bonnie Woodard PT Physical Therapist               Obj/Interventions     Row Name 03/11/22 1441          Hip (Therapeutic Exercise)    Hip Strengthening (Therapeutic Exercise) bilateral;marching while seated  16 reps. SpO2 drop to 83%. Quick rebound; per family member pt drops and rebounds quickly  -EJ     Row Name 03/11/22 1441          Knee (Therapeutic Exercise)    Knee (Therapeutic Exercise) strengthening exercise  -EJ     Knee Strengthening (Therapeutic Exercise) bilateral;LAQ (long arc quad);20 repititions  -EJ     Row Name 03/11/22 1441          Ankle (Therapeutic Exercise)    Ankle Strengthening (Therapeutic Exercise) bilateral;dorsiflexion;plantarflexion;20 repititions  -EJ           User Key  (r) = Recorded By, (t) = Taken By, (c) = Cosigned By    Initials Name Provider Type    Bonnie Woodard PT Physical Therapist               Goals/Plan    No documentation.                Clinical Impression     Row Name 03/11/22 1442          Pain    Pretreatment Pain Rating 0/10 - no pain  -EJ     Posttreatment Pain Rating 0/10 - no pain  -EJ     Pain Intervention(s) Repositioned;Ambulation/increased activity  -EJ     Additional Documentation Pain Scale: Numbers Pre/Post-Treatment (Group)  -     Row Name 03/11/22 1442          Plan of Care Review    Plan of Care Reviewed With patient;daughter  -EJ     Progress improving  -EJ     Outcome Evaluation Pt ambulates increased distance on monitor with Afib noted and RR of 40. Pt declined rest breaks when prompted.  -     Row Name 03/11/22 1442          Vital Signs    Pre Systolic BP Rehab 129  -EJ     Pre Treatment Diastolic BP 59  -EJ     Pretreatment Heart Rate (beats/min) 68   -EJ     Intratreatment Heart Rate (beats/min) 86  -EJ     Posttreatment Heart Rate (beats/min) 62  -EJ     Pre SpO2 (%) 95  -EJ     O2 Delivery Pre Treatment supplemental O2  -EJ     Intra SpO2 (%) 83  with ther ex. first SpO2 to show with good plith 92%  -EJ     O2 Delivery Intra Treatment supplemental O2  -EJ     Post SpO2 (%) 94  -EJ     O2 Delivery Post Treatment supplemental O2  -EJ     Pre Patient Position Supine  -EJ     Intra Patient Position Standing  -EJ     Post Patient Position Sitting  -EJ     Row Name 03/11/22 1442          Positioning and Restraints    Pre-Treatment Position in bed  -EJ     Post Treatment Position chair  -EJ     In Chair notified nsg;reclined;call light within reach;encouraged to call for assist;waffle cushion;with family/caregiver  -EJ           User Key  (r) = Recorded By, (t) = Taken By, (c) = Cosigned By    Initials Name Provider Type    Bonnie Woodard, PT Physical Therapist               Outcome Measures     Row Name 03/11/22 1446 03/11/22 0805       How much help from another person do you currently need...    Turning from your back to your side while in flat bed without using bedrails? 4  -EJ 4  -CJ    Moving from lying on back to sitting on the side of a flat bed without bedrails? 3  -EJ 4  -CJ    Moving to and from a bed to a chair (including a wheelchair)? 3  -EJ 3  -CJ    Standing up from a chair using your arms (e.g., wheelchair, bedside chair)? 3  -EJ 3  -CJ    Climbing 3-5 steps with a railing? 3  -EJ 2  -CJ    To walk in hospital room? 3  -EJ 3  -CJ    AM-PAC 6 Clicks Score (PT) 19  -EJ 19  -CJ    Row Name 03/11/22 1446          Functional Assessment    Outcome Measure Options AM-PAC 6 Clicks Basic Mobility (PT)  -EJ           User Key  (r) = Recorded By, (t) = Taken By, (c) = Cosigned By    Initials Name Provider Type    Bonnie Woodard, PT Physical Therapist    Dhara Vidal RN Registered Nurse                             Physical Therapy  Education                 Title: PT OT SLP Therapies (In Progress)     Topic: Physical Therapy (In Progress)     Point: Mobility training (In Progress)     Learning Progress Summary           Patient Acceptance, E, NR by EJ at 3/11/2022 1446    Eager, E, VU,NR by SS at 3/9/2022 1322    Comment: Reviewed safety/technique with bed mobility, transfers, ambulation, HEP, PT POC, pursed lip breathing    Acceptance, E, NR,VU by KG at 3/7/2022 1202    Acceptance, E, NR by KG1 at 3/5/2022 1532    Acceptance, E, NR by KG1 at 3/3/2022 0936   Family Eager, E, VU,NR by SS at 3/9/2022 1322    Comment: Reviewed safety/technique with bed mobility, transfers, ambulation, HEP, PT POC, pursed lip breathing                   Point: Home exercise program (In Progress)     Learning Progress Summary           Patient Acceptance, E, NR by EJ at 3/11/2022 1446    Eager, E, VU,NR by SS at 3/9/2022 1322    Comment: Reviewed safety/technique with bed mobility, transfers, ambulation, HEP, PT POC, pursed lip breathing    Acceptance, E, NR,VU by KG at 3/7/2022 1202    Acceptance, E, NR by KG1 at 3/5/2022 1532   Family Eager, E, VU,NR by SS at 3/9/2022 1322    Comment: Reviewed safety/technique with bed mobility, transfers, ambulation, HEP, PT POC, pursed lip breathing                   Point: Body mechanics (In Progress)     Learning Progress Summary           Patient Acceptance, E, NR by EJ at 3/11/2022 1446    Eager, E, VU,NR by SS at 3/9/2022 1322    Comment: Reviewed safety/technique with bed mobility, transfers, ambulation, HEP, PT POC, pursed lip breathing    Acceptance, E, NR,VU by KG at 3/7/2022 1202    Acceptance, E, NR by KG1 at 3/5/2022 1532    Acceptance, E, NR by KG1 at 3/3/2022 0936   Family Eager, E, VU,NR by SS at 3/9/2022 1322    Comment: Reviewed safety/technique with bed mobility, transfers, ambulation, HEP, PT POC, pursed lip breathing                   Point: Precautions (In Progress)     Learning Progress Summary            Patient Acceptance, E, NR by EJ at 3/11/2022 1446    Eager, E, VU,NR by  at 3/9/2022 1322    Comment: Reviewed safety/technique with bed mobility, transfers, ambulation, HEP, PT POC, pursed lip breathing    Acceptance, E, NR,VU by KG at 3/7/2022 1202    Acceptance, E, NR by KG1 at 3/5/2022 1532    Acceptance, E, NR by KG1 at 3/3/2022 0936   Family Eager, E, VU,NR by  at 3/9/2022 1322    Comment: Reviewed safety/technique with bed mobility, transfers, ambulation, HEP, PT POC, pursed lip breathing                               User Key     Initials Effective Dates Name Provider Type Discipline     06/16/21 -  Bonnie Faustin, PT Physical Therapist PT    [facility] 05/22/20 -  Tatyana Shay, PT Physical Therapist PT     06/16/21 -  Callie Huizar Physical Therapist PT     06/01/21 -  Ayaka Mcknight, PT Physical Therapist PT              PT Recommendation and Plan     Plan of Care Reviewed With: patient, daughter  Progress: improving  Outcome Evaluation: Pt ambulates increased distance on monitor with Afib noted and RR of 40. Pt declined rest breaks when prompted.     Time Calculation:    PT Charges     Row Name 03/11/22 1446             Time Calculation    Start Time 1404  -EJ      PT Received On 03/11/22  -EJ      PT Goal Re-Cert Due Date 03/13/22  -EJ              Time Calculation- PT    Total Timed Code Minutes- PT 32 minute(s)  -EJ              Timed Charges    09332 - PT Therapeutic Exercise Minutes 16  -EJ      46107 - Gait Training Minutes  16  -EJ              Total Minutes    Timed Charges Total Minutes 32  -EJ       Total Minutes 32  -EJ            User Key  (r) = Recorded By, (t) = Taken By, (c) = Cosigned By    Initials Name Provider Type     Bonnie Faustin, PT Physical Therapist              Therapy Charges for Today     Code Description Service Date Service Provider Modifiers Qty    65836145331 HC PT THER PROC EA 15 MIN 3/11/2022 Bonnie Faustin, PT GP 1    51300812575 HC GAIT  TRAINING EA 15 MIN 3/11/2022 Bonnie Faustin, PT GP 1          PT G-Codes  Outcome Measure Options: AM-PAC 6 Clicks Basic Mobility (PT)  AM-PAC 6 Clicks Score (PT): 19  AM-PAC 6 Clicks Score (OT): 18    Bonnie Ruffin, PT  3/11/2022

## 2022-03-12 NOTE — PROGRESS NOTES
[facility] Medicine Services  PROGRESS NOTE    Patient Name: Ash Diez  : 10/4/1928  MRN: [id number]    Date of Admission: 3/2/2022  Primary Care Physician: Karthikeyan Moreno MD    Subjective   Subjective   CC:  F/U dyspnea     HPI:  In chair, aphasic. uto    ROS:  uto    Objective   Objective   Vital Signs:   Temp:  [97 °F (36.1 °C)-97.8 °F (36.6 °C)] 97.8 °F (36.6 °C)  Heart Rate:  [51-69] 56  Resp:  [20] 20  BP: (129-152)/(57-68) 129/57  Flow (L/min):  [3-4] 3  Physical Exam:  Constitutional: eyes open, elderly, chronically ill appearing, no distress  Eyes: EOMI, sclerae anicteric, no conjunctival injection  Head: NCAT  ENT: [place], moist mucous membranes   Respiratory: decreased air movement bilateral bases w/ crackles  Cardiovascular: rrr  Gastrointestinal: Soft, NT, ND +BS  Musculoskeletal: CALL; no LE edema bilaterally  Neurologic: aphasic, decreased right  and right leg raise compared to left but poorly cooperative w/ exam  Skin: No rashes on exposed skin  Psychiatric:uto    Results Reviewed:  LAB RESULTS:      Lab 22  0659 2244 22  0844 22  1020   WBC 12.62* 11.01* 10.80 10.27   HEMOGLOBIN 16.0 15.2 16.5 15.5   HEMATOCRIT 51.1* 48.3 52.3* 47.8   PLATELETS 220 204 216 209   NEUTROS ABS  --   --   --  7.54*   IMMATURE GRANS (ABS)  --   --   --  0.05   LYMPHS ABS  --   --   --  1.32   MONOS ABS  --   --   --  1.20*   EOS ABS  --   --   --  0.12   .0* 100.4* 100.4* 95.8         Lab 22  0659 22  0644 22  1019 22  1020   SODIUM 140 139 141 139   POTASSIUM 4.6 4.6 4.0 3.8   CHLORIDE 103 103 102 103   CO2 28.0 28.0 31.0* 27.0   ANION GAP 9.0 8.0 8.0 9.0   BUN 39* 40* 36* 32*   CREATININE 1.12 1.40* 1.19 1.13   EGFR 61.3 46.9* 57.0* 60.6   GLUCOSE 118* 113* 123* 134*   CALCIUM 9.3 9.3 9.1 8.9   MAGNESIUM 2.5* 2.6*  --  2.4*                         Brief Urine Lab Results     None          Microbiology Results Abnormal      Procedure Component Value - Date/Time    COVID PRE-OP / PRE-PROCEDURE SCREENING ORDER (NO ISOLATION) - Swab, Nasopharynx [[id number]]  (Normal) Collected: 03/02/22 2241    Lab Status: Final result Specimen: Swab from Nasopharynx Updated: 03/02/22 2343    Narrative:      The following orders were created for panel order COVID PRE-OP / PRE-PROCEDURE SCREENING ORDER (NO ISOLATION) - Swab, Nasopharynx.  Procedure                               Abnormality         Status                     ---------                               -----------         ------                     COVID-19, FLU A/B, RSV P...[[id number]]  Normal              Final result                 Please view results for these tests on the individual orders.    COVID-19, FLU A/B, RSV PCR - Swab, Nasopharynx [[id number]]  (Normal) Collected: 03/02/22 2241    Lab Status: Final result Specimen: Swab from Nasopharynx Updated: 03/02/22 2343     COVID19 Not Detected     Influenza A PCR Not Detected     Influenza B PCR Not Detected     RSV, PCR Not Detected    Narrative:      Fact sheet for providers: https://www.fda.gov/media/877971/download    Fact sheet for patients: https://www.fda.gov/media/201353/download    Test performed by PCR.          No radiology results from the last 24 hrs    Results for orders placed during the hospital encounter of 03/02/22    Adult Transesophageal Echo (KIARA) W/ Cont if Necessary Per Protocol    Interpretation Summary  · Estimated left ventricular EF = 35-40% Left ventricular systolic function is moderately decreased.  · Left ventricular wall thickness is consistent with mild concentric hypertrophy.  · Severe aortic valve stenosis is present.  · Severe dilation of the ascending aorta is present.  · Moderate aortic valve regurgitation is present.  · Mild mitral valve regurgitation is present  · Trace tricuspid valve regurgitation is present    Anesthesia: Cath lab/Echo lab moderate sedation    I was present with the patient for the  duration of moderate sedation and supervised staff who had no other duties and monitored the patient for the entire procedure.    Name of independent trained observer: Deja Jha RN  Intra-service start time: 1403  Intra-service end time: 1430      I have reviewed the medications:  Scheduled Meds:carvedilol, 3.125 mg, Oral, BID With Meals  fentaNYL citrate (PF), , ,   furosemide, 20 mg, Oral, Daily  midazolam, , ,   pharmacy consult - MTM, , Does not apply, Daily  senna-docusate sodium, 2 tablet, Oral, BID  sodium chloride, 10 mL, Intravenous, Q12H  spironolactone, 25 mg, Oral, Daily      Continuous Infusions:[START ON 3/14/2022] phenylephrine, 0.5-3 mcg/kg/min      PRN Meds:.•  acetaminophen **OR** acetaminophen **OR** acetaminophen  •  atropine  •  bisacodyl  •  bisacodyl  •  HYDROcodone-acetaminophen  •  magnesium hydroxide  •  magnesium sulfate **OR** magnesium sulfate in D5W 1g/100mL (PREMIX) **OR** magnesium sulfate  •  [] Morphine **AND** naloxone  •  ondansetron  •  sodium chloride  •  sodium chloride  •  sodium chloride    Assessment/Plan   Assessment & Plan     Active Hospital Problems    Diagnosis  POA   • **Acute on chronic systolic CHF (congestive heart failure) (HCC) [I50.23]  Yes   • Severe aortic stenosis [I35.0]  Yes   • 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     Brief Hospital Course to date:  Ash Diez is a 89 y.o. male with hypertension, AAA, hyperlipidemia, diastolic heart failure, valvular heart disease admitted for shortness of breath secondary to acute on chronic heart failure.  Improved with diuresis.    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    -patient now wishing to pursue TAVR, undergoing workup (ct angio chest/abd/pelvis, carotid dopplers)  -[facility] 3/8/22: mild cad (50% lad, 70% mid circ dz, medical maangement  -planning TAVR Monday 3/14/22  -3/11/22: received extra dose lasix 20mg iv x 1 (in addition to the scheduled lasix 20mg daily) as still seemed overloaded  -3/12/22: code stroke called sitting in chair, poorly responsive, expressive aphasia w/ some apparently right sided weakness (poor ); stroke team ordered CTperfusion, CTA h&n, abg. Granddaughter tyson was bedside. Confirmed no cpr/dni. Ok with medical treatment for now, further information/workup w/ imaging. Follow up abg, ct perfusion, imaging, etc. Start asa & statin if no bleeding. Guarded prognosis. Plan to update tyson as information comes in. I also updated Dr. Montelongo on for ct surgery this weekend, since tentatively plans in place for tavr this weekend      Addendum @18:03:  -had fluctuating symptoms during the day; ct a h&n without lvo, did show decreased filling distal branches of left mca artery (compared to right) raising questions of small branch vessel occlusion, MRI ordered & pending. abg ok. Started on asa & high dose statin. Regarding the questions or possible afib, strips were reviewed by Cardiology (discussed w/ dr. Briceno) and no afib seen. No anticoagulation for now. eeg ordered, initiated on low dose keppra 250mg bid in case spells were due to nonconvulsive seizures.    *Fluctuating  neuro symptoms (aphasia, right sided weakness)  -TIA vs CVA vs seizure vs other   -abg ok  -cta h&n no lvo, suggested decreased filling distal branches left mca a  -asa, high dose statin; empiric keppra 250mg bid  -mr brain pending; eeg pending; neuro following; will check u/a  *A/C CHF/Aortic stneosis  *Irregular rhythm on telemetry, felt to represent pac's  -currently plans are for TAVR Monday 3/14/22, will see how does clinically in coming days  -extra lasix 20mg iv x 1 (continue lasix po and aldactone)  -d/w cards, no afib seen on telemetry, no clear cardiac indication for anticoagulation currently  *goals/limits  -d/w granddaughter extensively, in event of cardio-respiratory arrest patient is no cpr/dni; focus is on quality of life, which is why patient decided to pursue TAVR, to attempt to regain as much quality of life as possible. Per granddaughter, if trajectory of illnesses dictate that he is not likely to regain a function/mobile life then would switch to comfort focussed plan. Continue current medical treatment at this point. I have requested permission for granddaughter to stay w/ patient    Am labs: cbc,bmp,mag    DVT prophylaxis:  No DVT prophylaxis order currently exists.     AM-PAC 6 Clicks Score (PT): 19 (03/12/22 2325)    Disposition: He lives in assisted living at [facility] and PT/OT have recommended SNF rehab.  Case management has made referrals; family wants to [facility] after discharge    CODE STATUS:   Code Status and Medical Interventions:   Ordered at: 03/08/22 1546     Level Of Support Discussed With:    Patient     Code Status (Patient has no pulse and is not breathing):    CPR (Attempt to Resuscitate)     Medical Interventions (Patient has pulse or is breathing):    Full       Mark Shaver MD  03/12/22

## 2022-03-12 NOTE — PROGRESS NOTES
"[facility] Progress Note     LOS: 10 days   Patient Care Team:  Karthikeyan Moreno MD as PCP - General (Family Medicine)  PCP:  Karthikeyan Moreno MD    Chief Complaint: Aortic stenosis         SUBJECTIVE:   Acute confusion today.  Lethargy.  Speech difficulty.  Right-sided weakness.  Increase shortness of air.  Increased oxygen requirements.      Regular rhythm on heart monitor, appears to be intermittent first-degree AV block and second-degree AV block type I, with frequent ectopy    Review of Systems:   Positive for confusion, fatigue, neurologic changes, shortness of breath  Negative for chest pain, palpitations       OBJECTIVE:    Vital Sign Min/Max for last 24 hours  Temp  Min: 97 °F (36.1 °C)  Max: 97.8 °F (36.6 °C)   BP  Min: 127/60  Max: 152/60   Pulse  Min: 51  Max: 69   Resp  Min: 20  Max: 22   SpO2  Min: 85 %  Max: 97 %   No data recorded   No data recorded     Flowsheet Rows    Flowsheet Row First Filed Value   Admission Height 181.6 cm (71.5\") Documented at 03/02/2022 1833   Admission Weight 90.7 kg (200 lb) Documented at 03/02/2022 1833          Telemetry: Difficult to discern rhythm but appears to be intermittent first-degree AV block and second-degree AV block type I with frequent PACs and PVCs      Intake/Output Summary (Last 24 hours) at 3/12/2022 1520  Last data filed at 3/12/2022 1506  Gross per 24 hour   Intake 240 ml   Output 995 ml   Net -755 ml     Intake & Output (last 3 days)       03/09 0701  03/10 0700 03/10 0701  03/11 0700 03/11 0701  03/12 0700 03/12 0701  03/13 0700    P.O. 890 600 480     Total Intake(mL/kg) 890 (9.2) 600 (6.2) 480 (5)     Urine (mL/kg/hr) 925 (0.4) 550 (0.2) 1095 (0.5) 200 (0.2)    Stool    0    Total Output  200    Net -35 +50 -615 -200            Urine Unmeasured Occurrence    2 x    Stool Unmeasured Occurrence    1 x           Physical Exam:  CONSTITUTIONAL: No acute distress  RESPIRATORY: Increased " effort, bilateral rales, on 5 L nasal cannula  CARDIOVASCULAR: Regular rhythm with frequent ectopy.  No crisp S2.  Systolic murmur at the right upper sternal border   PERIPHERAL VASCULAR: There is no significant lower extremity edema bilaterally.    LABS/DIAGNOSTIC DATA:  Results from last 7 days   Lab Units 03/12/22  0659 03/11/22  0644 03/07/22  0844   WBC 10*3/mm3 12.62* 11.01* 10.80   HEMOGLOBIN g/dL 16.0 15.2 16.5   HEMATOCRIT % 51.1* 48.3 52.3*   PLATELETS 10*3/mm3 220 204 216     Lab Results   Lab Value Date/Time    TROPONINT <0.010 03/02/2022 1924    TROPONINT 0.030 09/05/2019 0751         Results from last 7 days   Lab Units 03/12/22  0659 03/11/22  0644 03/07/22  1019   SODIUM mmol/L 140 139 141   POTASSIUM mmol/L 4.6 4.6 4.0   CHLORIDE mmol/L 103 103 102   CO2 mmol/L 28.0 28.0 31.0*   BUN mg/dL 39* 40* 36*   CREATININE mg/dL 1.12 1.40* 1.19   CALCIUM mg/dL 9.3 9.3 9.1   GLUCOSE mg/dL 118* 113* 123*                       Medication Review:   aspirin, 81 mg, Oral, Daily   Or  aspirin, 300 mg, Rectal, Daily  atorvastatin, 80 mg, Oral, Nightly  carvedilol, 3.125 mg, Oral, BID With Meals  fentaNYL citrate (PF), , ,   furosemide, 20 mg, Oral, Daily  levETIRAcetam, 250 mg, Intravenous, Q12H  midazolam, , ,   pharmacy consult - MTM, , Does not apply, Daily  senna-docusate sodium, 2 tablet, Oral, BID  sodium chloride, 10 mL, Intravenous, Q12H  sodium chloride, 10 mL, Intravenous, Q12H  spironolactone, 25 mg, Oral, Daily       [START ON 3/14/2022] phenylephrine, 0.5-3 mcg/kg/min            ASSESSMENT:    Acute on chronic systolic CHF (congestive heart failure) (HCC)    Abdominal aortic aneurysm (AAA) without rupture (HCC)    Essential hypertension    Hyperlipidemia    Prediabetes    Diastolic congestive heart failure (HCC)    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   Ongoing neurologic work-up by stroke team. No clear atrial fibrillation on telemetry.  Continue telemetry monitoring  -HF: Careful diuresis with additional Lasix 20 mg IV push for pulmonary vascular congestion. Continue beta blocker.  -Aortic stenosis: remains scheduled for TAVR on Monday. Continue ASA, statin  -Thoracic ascending aneurysm: Asymptomatic, continue afterload reduction.  Not a candidate for intervention. Continue beta blocker.    -Discussed with Dr. Shaver  -We will revisit again tomorrow to reassess    Watson Briceno MD   03/12/22  15:20 EST

## 2022-03-12 NOTE — PLAN OF CARE
Goal Outcome Evaluation:      SLP evaluation completed. Will continue to address dysphagia. Please see note for further details and recommendations.

## 2022-03-12 NOTE — THERAPY EVALUATION
Acute Care - Speech Language Pathology   Swallow Initial Evaluation [facility]   Clinical Swallow Evaluation       Patient Name: Ash Diez  : 10/4/1928  MRN: [id number]  Today's Date: 3/12/2022               Admit Date: 3/2/2022    Visit Dx:     ICD-10-CM ICD-9-CM   1. Dysphagia, unspecified type  R13.10 787.20   2. Acute respiratory failure with hypoxia (HCC)  J96.01 518.81   3. Acute on chronic congestive heart failure, unspecified heart failure type (HCC)  I50.9 428.0   4. COVID-19 ruled out by laboratory testing  Z20.822 V01.79   5. 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 has been evaluted at . pt decided agaisnt surgery    • Cancer (HCC)     colon   • Cellulitis     RLE   • CHF (congestive heart failure) (HCC)    • Hyperlipidemia    • Hypertension    • Pneumonia      Past Surgical History:   Procedure Laterality Date   • CARDIAC CATHETERIZATION N/A 3/8/2022    Procedure: LEFT HEART CATH;  Surgeon: Khloe Pena MD;  Location:  [facility];  Service: Cardiology;  Laterality: N/A;   • COLON SURGERY     • CORONARY STENT PLACEMENT     • VASCULAR SURGERY      Vein removal       SLP Recommendation and Plan  SLP Swallowing Diagnosis: suspected pharyngeal dysphagia (22)  SLP Diet Recommendation: NPO (22)     SLP Rec. for Method of Medication Administration: meds crushed, with pudding or applesauce (22)     Monitor for Signs of Aspiration: yes, notify SLP if any  concerns (03/12/22 1315)  Recommended Diagnostics: reassess via clinical swallow evaluation (03/12/22 1315)  Swallow Criteria for Skilled Therapeutic Interventions Met: demonstrates skilled criteria (03/12/22 1315)  Anticipated Discharge Disposition (SLP): unknown, anticipate therapy at next level of care (03/12/22 1315)  Rehab Potential/Prognosis, Swallowing: good, to achieve stated therapy goals (03/12/22 1315)  Therapy Frequency (Swallow): PRN (03/12/22 1315)                                            SWALLOW EVALUATION (last 72 hours)     SLP Adult Swallow Evaluation     Row Name 03/12/22 1315                   Rehab Evaluation    Document Type evaluation  -SG        Subjective Information fatigue  -SG        Patient Observations alert;cooperative  -SG        Patient Effort good  -SG        Symptoms Noted During/After Treatment none  -SG                  General Information    Patient Profile Reviewed yes  -SG        Pertinent History Of Current Problem adm w/ r/o CVA vs, seizure, neuro work-up pending  -SG        Current Method of Nutrition NPO  -SG        Precautions/Limitations, Vision other (see comments)  CNA, pt's eyes closed during evaluation  -SG        Precautions/Limitations, Hearing WFL;for purposes of eval  -SG        Prior Level of Function-Communication unknown  -SG        Prior Level of Function-Swallowing no diet consistency restrictions;safe, efficient swallowing in all situations  per family  -SG        Plans/Goals Discussed with patient and family;agreed upon  -SG        Barriers to Rehab none identified  -SG        Patient's Goals for Discharge patient could not state  -SG        Family Goals for Discharge patient able to return to PO diet  -SG                  Pain    Additional Documentation Pain Scale: FACES Pre/Post-Treatment (Group)  -SG                  Pain Scale: FACES Pre/Post-Treatment    Pain: FACES Scale, Pretreatment 0-->no hurt  -SG        Posttreatment Pain Rating 0-->no hurt   -SG                  Oral Motor Structure and Function    Dentition Assessment natural, present and adequate  -SG        Secretion Management --  w/ water trialsa  -SG        Mucosal Quality dry  -SG        Gag Response other (see comments)  CNA  -SG        Volitional Swallow weak  -SG        Volitional Cough weak  -SG                  Oral Musculature and Cranial Nerve Assessment    Oral Motor General Assessment other (see comments);unable to assess  pt unable to follow OM commands  -SG                  General Eating/Swallowing Observations    Respiratory Support Currently in Use room air  -SG        Eating/Swallowing Skills fed by SLP  -SG        Positioning During Eating upright 90 degree;upright in bed  -SG        Utensils Used spoon;straw  -SG        Consistencies Trialed ice chips;thin liquids;pureed  -SG                  Respiratory    Respiratory Status increase in respiratory rate;wheezing, stridor;during swallowing/eating  -SG        Respiratory Pattern decrease control/incoordination of breathing swallow  -SG                  Clinical Swallow Eval    Pharyngeal Phase suspected pharyngeal impairment  -SG        Clinical Swallow Evaluation Summary CSE completed this date. Pt lethargic with eyes closed for duration of evaluation, able to provide some verbal responses. Unable to follow OME commands. No s/s w/ tsp of thins and ice chip, but inc'd RR, wet VQ and coughing w/ thins via straw. Rec: NPO, ok for important meds in applesauce this pm, re-eval tomorrow morning, SLC eval pending when pt is more alert.  -SG                  SLP Evaluation Clinical Impression    SLP Swallowing Diagnosis suspected pharyngeal dysphagia  -SG        Functional Impact risk of aspiration/pneumonia  -SG        Rehab Potential/Prognosis, Swallowing good, to achieve stated therapy goals  -SG        Swallow Criteria for Skilled Therapeutic Interventions Met demonstrates skilled criteria  -SG                  Recommendations     Therapy Frequency (Swallow) PRN  -SG        SLP Diet Recommendation NPO  -SG        Recommended Diagnostics reassess via clinical swallow evaluation  -        Oral Care Recommendations Oral Care BID/PRN  -SG        SLP Rec. for Method of Medication Administration meds crushed;with pudding or applesauce  -SG        Monitor for Signs of Aspiration yes;notify SLP if any concerns  -SG        Anticipated Discharge Disposition (SLP) unknown;anticipate therapy at next level of care  -              User Key  (r) = Recorded By, (t) = Taken By, (c) = Cosigned By    Initials Name Effective Dates    Ayaka Ley MS CCC-SLP 06/16/21 -                 EDUCATION  The patient has been educated in the following areas:   Dysphagia (Swallowing Impairment) Oral Care/Hydration NPO rationale.              Time Calculation:    Time Calculation- SLP     Row Name 03/12/22 1603             Time Calculation- SLP    SLP Start Time 1530  -      SLP Received On 03/12/22  -            User Key  (r) = Recorded By, (t) = Taken By, (c) = Cosigned By    Initials Name Provider Type    Ayaka Ley MS CCC-SLP Speech and Language Pathologist                Therapy Charges for Today     Code Description Service Date Service Provider Modifiers Qty    [id number] HC ST EVAL ORAL PHARYNG SWALLOW 4 3/12/2022 Ayaka Smyth MS CCC-SLP GN 1        Patient was not wearing a face mask and did exhibit coughing during this therapy encounter.  Procedure performed was aerosolizing, involved close contact (within 6 feet for at least 15 minutes or longer), and involved contact with infectious secretions or specimens.  Therapist used appropriate personal protective equipment including gloves, standard procedure mask and eye protection.  Appropriate PPE was worn during the entire therapy session.  Hand hygiene was completed before and after therapy session.          Ayaka Smyth MS  CCC-SLP  3/12/2022

## 2022-03-12 NOTE — SIGNIFICANT NOTE
SLP orders received per CVA pathway. SLP spoke w/ RN who will complete dysphagia screen after neuro testing priorities this afternoon. SLP to f/u tomorrow for CVA pathway evaluations as needed. Thank you, SLP [phone]

## 2022-03-12 NOTE — CONSULTS
Stroke Consult Note    Patient Name: Ash Diez   MRN: [id number]  Age: 89 y.o.  Sex: male  : 10/4/1928    Primary Care Physician: Karthikeyan Moreno MD  Referring Physician:  Saeed Galicia PA    TIME STROKE TEAM CALLED: 1130 EST     TIME PATIENT SEEN: 1135 EST    Handedness: Right  Race:     Chief Complaint/Reason for Consultation: Speech difficulty    Subjective .  HPI: Ash Diez is a 89 yr old male with a PMH significant for HTN, HLD, prediabetes, Afib (Not on OAC), mild to moderate aortic regurgitation, aortic stenosis, and AAA who was admitted to [facility] on 3/2/22 for increasing shortness of air.     Echo revealed severe aortic valve disease, EF 30-35%, mod AI, and mild MR. Pt has been undergoing a TAVR work up and has been scheduled to undergo surgery on Monday. Today @ approximately 1100, pt became aphasic with right sided weakness and a code stroke was called. Initial NIH was an 11. He was taken emergently to CT. CTH revealed an old RMCA territory stroke as well as diffuse atrophy. CTA H/N revealed no LVO. Unable to obtain CTP d/t limited IV access. While in CT, the patient began talking. NIH on reassessment 1 for drift in his LUE that is likely baseline d/t chronic fracture of the left humeral head and neck.    Last Known Normal Date/Time: 3/12/22 1100EST     Review of Systems   Reason unable to perform ROS: aphasic       Past Medical History:   Diagnosis Date   • Aneurysm (HCC)     on aortic valve. has been evaluted at . pt decided agaisnt surgery    • Cancer (HCC)     colon   • Cellulitis     RLE   • CHF (congestive heart failure) (HCC)    • Hyperlipidemia    • Hypertension    • Pneumonia      Past Surgical History:   Procedure Laterality Date   • CARDIAC CATHETERIZATION N/A 3/8/2022    Procedure: LEFT HEART CATH;  Surgeon: Khloe Pena MD;  Location: [facility] CATH INVASIVE LOCATION;  Service: Cardiology;  Laterality: N/A;   • COLON SURGERY     • CORONARY STENT PLACEMENT     •  VASCULAR SURGERY      Vein removal     Family History   Problem Relation Age of Onset   • Arthritis Mother    • Heart attack Mother    • Stroke Father    • Stroke Sister    • Stroke Brother    • Hypertension Daughter      Social History     Socioeconomic History   • Marital status:    Tobacco Use   • Smoking status: Never Smoker   • Smokeless tobacco: Never Used   Vaping Use   • Vaping Use: Never used   Substance and Sexual Activity   • Alcohol use: Not Currently     Comment: social   • Drug use: Never   • Sexual activity: Defer     No Known Allergies  Prior to Admission medications    Medication Sig Start Date End Date Taking? Authorizing Provider   amLODIPine (NORVASC) 5 MG tablet Take 1.5 tablets by mouth Daily. Needs to keep appointment 12/23/2020 and complete lab work that will be ordered for any ongoing refills.  Patient taking differently: Take 5 mg by mouth Daily. Needs to keep appointment 12/23/2020 and complete lab work that will be ordered for any ongoing refills. 12/23/20  Yes Shirin To MD             Objective     Temp:  [97 °F (36.1 °C)-97.8 °F (36.6 °C)] 97.8 °F (36.6 °C)  Heart Rate:  [51-69] 56  Resp:  [20] 20  BP: (129-152)/(57-68) 129/57  Neurological Exam  Mental Status  Awake and alert. Oriented only to person. Speech is normal. Expressive aphasia present.    Cranial Nerves  CN II: Blinks to visual threat in all quadrants. .  CN III, IV, VI: Extraocular movements intact bilaterally. Pupils equal round and reactive to light bilaterally.  CN VII: Full and symmetric facial movement.  CN IX, X: Palate elevates symmetrically  CN XI: Shoulder shrug strength is normal.  CN XII: Tongue midline without atrophy or fasciculations.    Motor    Some antigravity strength in BUE, drift in BLE.    Sensory  Light touch is normal in upper and lower extremities.     Reflexes                                            Right                      Left  Plantar                           Downgoing                 Upgoing    Coordination    CHARITY.    Gait    Walked from the bed to chair with assist x2. No ataxia noted.      Physical Exam  Constitutional:       General: He is awake.      Appearance: He is ill-appearing.   HENT:      Head: Normocephalic and atraumatic.   Eyes:      Extraocular Movements: Extraocular movements intact.      Pupils: Pupils are equal, round, and reactive to light.   Cardiovascular:      Rate and Rhythm: Bradycardia present. Rhythm irregular.   Pulmonary:      Comments: Mild tachypnea noted  Abdominal:      General: There is no distension.      Tenderness: There is no abdominal tenderness.   Musculoskeletal:         General: Signs of injury present.      Cervical back: Normal range of motion and neck supple.      Comments: Chronic LUE fx. Decreased ROM   Skin:     General: Skin is warm and dry.   Neurological:      Mental Status: He is alert.      Motor: Weakness present.      Comments: Oriented to person. Speech difficulty. Mildly weaker  R<L   Psychiatric:         Speech: Speech normal.         Acute Stroke Data    IV Thrombolytic (TPA/Tenecteplase) Inclusion / Exclusion Criteria    Time: 1200 EST  Person Administering Scale: VALENTINO Carcamo    Inclusion Criteria  [x]   18 years of age or greater   []   Onset of symptoms < 4.5 hours before beginning treatment (stroke onset = time patient was last seen well or without symptoms).   []   Diagnosis of acute ischemic stroke causing measurable disabling deficit (Complete Hemianopia, Any Aphasia, Visual or Sensory Extinction, Any weakness limiting sustained effort against gravity)   []   Any remaining deficit considered potentially disabling in view of patient and practitioner   Exclusion criteria (Do not proceed with Alteplase if any are checked under exclusion criteria)  []   Onset unknown or GREATER than 4.5 hours   []   ICH on CT/MRI   []   CT demonstrates hypodensity representing acute or subacute infarct   []   Significant  head trauma or prior stroke in the previous 3 months   []   Symptoms suggestive of subarachnoid hemorrhage   []   History of un-ruptured intracranial aneurysm GREATER than 10 mm   []   Recent intracranial or intraspinal surgery within the last 3 months   []   Arterial puncture at a non-compressible site in the previous 7 days   []   Active internal bleeding   []   Acute bleeding tendency   []   Platelet count LESS than 100,000 for known hematological diseases such as leukemia, thrombocytopenia or chronic cirrhosis   []   Current use of anticoagulant with INR GREATER than 1.7 or PT GREATER than 15 seconds, aPTT GREATER than 40 seconds   []   Heparin received within 48 hours, resulting in abnormally elevated aPTT GREATER than upper limit of normal   []   Current use of direct thrombin inhibitors or direct factor Xa inhibitors in the past 48 hours   []   Elevated blood pressure refractory to treatment (systolic GREATER than 185 mm/Hg or diastolic  GREATER than 110 mm/Hg   []   Suspected infective endocarditis and aortic arch dissection   []   Current use of therapeutic treatment dose of low-molecular-weight heparin (LMWH) within the previous 24 hours   []   Structural GI malignancy or bleed   Relative exclusion for all patients  [x]   Only minor nondisabling symptoms   []   Pregnancy   []   Seizure at onset with postictal residual neurological impairments   []   Major surgery or previous trauma within past 14 days   []   History of previous spontaneous ICH, intracranial neoplasm, or AV malformation   []   Postpartum (within previous 14 days)   []   Recent GI or urinary tract hemorrhage (within previous 21 days)   []   Recent acute MI (within previous 3 months)   []   History of unruptured intracranial aneurysm LESS than 10 mm   []   History of ruptured intracranial aneurysm   []   Blood glucose LESS than 50 mg/dL (2.7 mmol/L)   []   Dural puncture within the last 7 days   []   Known GREATER than 10 cerebral microbleeds    Additional exclusions for patients with symptoms onset between 3 and 4.5 hours.  []   Age > 80.   []   On any anticoagulants regardless of INR  >>> Warfarin (Coumadin), Heparin, Enoxaparin (Lovenox), fondaparinux (Arixtra), bivalirudin (Angiomax), Argatroban, dabigatran (Pradaxa), rivaroxaban (Xarelto), or apixaban (Eliquis)   []   Severe stroke (NIHSS > 25).   []   History of BOTH diabetes and previous ischemic stroke.   []   The risks and benefits have been discussed with the patient or family related to the administration of IV alteplase for stroke symptoms.   []   I have discussed and reviewed the patient's case and imaging with the attending prior to IV Thrombolytic (TPA/Tenecteplase).    Time Thrombolytic administered       Hospital Meds:  Scheduled- aspirin, 81 mg, Oral, Daily   Or  aspirin, 300 mg, Rectal, Daily  atorvastatin, 80 mg, Oral, Nightly  carvedilol, 3.125 mg, Oral, BID With Meals  fentaNYL citrate (PF), , ,   furosemide, 20 mg, Oral, Daily  midazolam, , ,   pharmacy consult - MTM, , Does not apply, Daily  senna-docusate sodium, 2 tablet, Oral, BID  sodium chloride, 10 mL, Intravenous, Q12H  sodium chloride, 10 mL, Intravenous, Q12H  spironolactone, 25 mg, Oral, Daily      Infusions- [START ON 3/14/2022] phenylephrine, 0.5-3 mcg/kg/min       PRNs- •  acetaminophen **OR** acetaminophen **OR** acetaminophen  •  atropine  •  bisacodyl  •  bisacodyl  •  HYDROcodone-acetaminophen  •  magnesium hydroxide  •  magnesium sulfate **OR** magnesium sulfate in D5W 1g/100mL (PREMIX) **OR** magnesium sulfate  •  [] Morphine **AND** naloxone  •  ondansetron  •  sodium chloride  •  sodium chloride  •  sodium chloride  •  sodium chloride    Functional Status Prior to Current Stroke/Navarro Score: 2    NIH Stroke Scale  Time:1135 EST  Person Administering Scale: VALENTINO Carcamo    1a  Level of consciousness: 1=not alert but arousable by minor stimulation to obey, answer or respond   1b. LOC  questions:  2=Performs neither task correctly   1c. LOC commands: 1=Performs one task correctly   2.  Best Gaze: 0=normal   3.  Visual: 0=No visual loss   4. Facial Palsy: 0=Normal symmetric movement   5a.  Motor left arm: 2=Some effort against gravity, limb cannot get to or maintain (if cured) 90 (or 45) degrees, drifts down to bed, but has some effort against gravity   5b.  Motor right arm: 2=Some effort against gravity, limb cannot get to or maintain (if cured) 90 (or 45) degrees, drifts down to bed, but has some effort against gravity   6a. motor left le=Drift, limb holds 90 (or 45) degrees but drifts down before full 10 seconds: does not hit bed   6b  Motor right le=Drift, limb holds 90 (or 45) degrees but drifts down before full 10 seconds: does not hit bed   7. Limb Ataxia: 0=Absent   8.  Sensory: 0=Normal; no sensory loss   9. Best Language:  1=Mild to moderate aphasia; some obvious loss of fluency or facility of comprehension without significant limitation on ideas expressed or form of expression.   10. Dysarthria: 0=Normal   11. Extinction and Inattention: 0=No abnormality    Total:   11       Results Reviewed:  CT Angiogram Neck    Result Date: 3/12/2022   1. No significant extracranial vascular stenosis or occlusion. 2. Decreased filling of the distal branches of the left middle cerebral artery compared with the right raising the question of a small branch vessel occlusion. 3. Hypoplastic left A1 segment.  This report was finalized on 3/12/2022 12:46 PM by Carlito Orozco MD.      CT Chest Without Contrast Diagnostic    Result Date: 3/12/2022   1. Approximately 6.1 cm ascending thoracic aortic aneurysm, with minimal if any change from 2019 unenhanced chest CT scan. No evidence of leak or dissection. 2. Evidence of congestive heart failure/volume overload, with pulmonary interstitial edema and moderate to large bilateral pleural effusions. 3. Shotty, reactive appearing mediastinal lymphadenopathy  incidentally noted.  This report was finalized on 3/12/2022 12:49 PM by Dr. Tam Montoya MD.      CT Head Without Contrast Stroke Protocol    Result Date: 3/12/2022   1. Generalized atrophy with chronic white matter changes throughout the periventricular region. 2. Interval appearance of a chronic infarct involving the right frontal operculum and right insular cortex. 3. No acute intracranial findings. 4. Bilateral maxillary and right ethmoid sinus disease.  This report was finalized on 3/12/2022 12:17 PM by Carlito Orozco MD.      CT Angiogram Head w AI Analysis of LVO    Result Date: 3/12/2022   1. No significant extracranial vascular stenosis or occlusion. 2. Decreased filling of the distal branches of the left middle cerebral artery compared with the right raising the question of a small branch vessel occlusion. 3. Hypoplastic left A1 segment.  This report was finalized on 3/12/2022 12:46 PM by Carlito Orozco MD.          Results for orders placed during the hospital encounter of 03/02/22    Adult Transesophageal Echo (KIARA) W/ Cont if Necessary Per Protocol    Interpretation Summary  · Estimated left ventricular EF = 35-40% Left ventricular systolic function is moderately decreased.  · Left ventricular wall thickness is consistent with mild concentric hypertrophy.  · Severe aortic valve stenosis is present.  · Severe dilation of the ascending aorta is present.  · Moderate aortic valve regurgitation is present.  · Mild mitral valve regurgitation is present  · Trace tricuspid valve regurgitation is present    Anesthesia: Cath lab/Echo lab moderate sedation    I was present with the patient for the duration of moderate sedation and supervised staff who had no other duties and monitored the patient for the entire procedure.    Name of independent trained observer: Deja Jha RN  Intra-service start time: 1403  Intra-service end time: 1430            Assessment/Plan:      89 yr old right handed male with a PMH  significant for HTN, HLD, prediabetes, Afib (Not on OAC), mild to moderate aortic regurgitation, aortic stenosis, and AAA who was admitted to [facility] on 3/2/22 for increasing shortness of air. Pt has been undergoing a TAVR work up and has been scheduled to undergo surgery on Monday. Today @ approximately 1100, pt became aphasic with right sided weakness and a code stroke was called. Initial NIH was an 11. He was taken emergently to CT. While in CT, the patient began talking. NIH on reassessment 1 for drift in his LUE that is likely baseline d/t chronic fracture of the left humeral head and neck.    Suspected Left hemispheric TIA or stroke  -possible cardioembolic (Afib) or atheroembolic  in etiology  -Initial NIH 11, currently 1 (at baseline)  -Not a candidate for TNK d/t improving symptoms  -CTH revealed an old RMCA territory stroke as well as diffuse atrophy.   -CTA H/N revealed no LVO.Decreased filling of the distal branches of the left middle cerebralartery compared with the right raising the question of a small branch vessel occlusion.    -Unable to obtain CTP d/t limited IV access.   -MRI pending  -Start ASA 81mg; pt not previous on antiplatelet therapy  -HLD; FLP ordered. Last  on 7/8/2021. Goal <70 for secondary stroke prevention. Start high dose statin  -Afib; Not currently on OAC. Dr. Shaver is discussing the possibility of a heparin gtt with CTS.  -Prediabetes; HgbA1c in the am  -Aortic stenosis; Plan for TAVR Monday per CTS. CTC revealed 6.1 cm ascending thoracic aortic aneurysm, with minimal change from 9/5/2019 CTC. No evidence of eak or dissection.CHF/volume overload as well as pulmonary interstitial edema and moderate to large bilateral pleural effusions. Management  per primary team.   -NPO until bedside dysphagia is passed  -PT/OT/SLP    Plan discussed with the patient's granddaughter, Dr. Shaver, Dr. Parra, and nursing staff. Stroke Neurology will continue to follow. Please call with any  questions or concerns.       VALENTINO Carcamo, [facility]-BC, Stroke Navigator  March 12, 2022  12:39 EST

## 2022-03-12 NOTE — PROGRESS NOTES
[facility] Cardiothoracic Surgery In-Patient Progress Note     LOS: 10 days     Chief Complaint: Shortness of breath    Subjective  No complaints this morning. VSS on 4L NC.      Objective    Vital Signs  Temp:  [97 °F (36.1 °C)-97.5 °F (36.4 °C)] 97.5 °F (36.4 °C)  Heart Rate:  [51-70] 66  Resp:  [20] 20  BP: (135-152)/(47-64) 152/60    Physical Exam   General Appearance: alert, appears stated age and cooperative   Lungs: Decreased lung sounds bilaterally. No adventitious breath sounds  noted.   Heart: Grade II-III systolic murmur   Them is unchanged  Results     Results from last 7 days   Lab Units 03/11/22  0644   WBC 10*3/mm3 11.01*   HEMOGLOBIN g/dL 15.2   HEMATOCRIT % 48.3   PLATELETS 10*3/mm3 204     Results from last 7 days   Lab Units 03/11/22  0644   SODIUM mmol/L 139   POTASSIUM mmol/L 4.6   CHLORIDE mmol/L 103   CO2 mmol/L 28.0   BUN mg/dL 40*   CREATININE mg/dL 1.40*   GLUCOSE mg/dL 113*   CALCIUM mg/dL 9.3       Imaging Results (Last 24 Hours)     ** No results found for the last 24 hours. **          Assessment    Acute on chronic systolic CHF (congestive heart failure) (HCC)    Abdominal aortic aneurysm (AAA) without rupture (HCC)    Essential hypertension    Hyperlipidemia    Prediabetes    Diastolic congestive heart failure (HCC)    CHF due to valvular disease (HCC)    Nonrheumatic aortic valve insufficiency    Severe aortic stenosis    No new labs  Plan   Tentatively on for TAVR with Dr. Mitchell on Monday  Continue preoperative work-up  Ambulate TID  Pulmonary Toilet: IS q1 hr while awake  Follow creatinine preop        Quintin Landon PA-C  [facility] Cardiothoracic Surgery  3/11/2022   08:24 EST

## 2022-03-13 NOTE — THERAPY RE-EVALUATION
Patient Name: Ash Diez  : 10/4/1928    MRN: [id number]                              Today's Date: 3/13/2022       Admit Date: 3/2/2022    Visit Dx:     ICD-10-CM ICD-9-CM   1. Dysphagia, unspecified type  R13.10 787.20   2. Acute respiratory failure with hypoxia (HCC)  J96.01 518.81   3. Acute on chronic congestive heart failure, unspecified heart failure type (HCC)  I50.9 428.0   4. COVID-19 ruled out by laboratory testing  Z20.822 V01.79   5. 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 has been evaluted at . pt decided agaisnt surgery    • Cancer (HCC)     colon   • Cellulitis     RLE   • CHF (congestive heart failure) (HCC)    • Hyperlipidemia    • Hypertension    • Pneumonia      Past Surgical History:   Procedure Laterality Date   • CARDIAC CATHETERIZATION N/A 3/8/2022    Procedure: LEFT HEART CATH;  Surgeon: Khloe Pena MD;  [facility];  Service: Cardiology;  Laterality: N/A;   • COLON SURGERY     • CORONARY STENT PLACEMENT     • VASCULAR SURGERY      Vein removal      General Information     Row Name 22 6651          Physical Therapy Time and Intention    Document Type re-evaluation  -KG     Mode of Treatment physical therapy  -KG     Row Name 22 8537          General Information    Patient Profile Reviewed yes  -KG     Existing Precautions/Restrictions fall;oxygen therapy device and L/min  -KG     Row Name 22 2716          Cognition    Orientation Status  (Cognition) oriented to;person;disoriented to;place;situation;time  -KG     Row Name 03/13/22 1550          Safety Issues, Functional Mobility    Impairments Affecting Function (Mobility) balance;endurance/activity tolerance;postural/trunk control;shortness of breath;strength;cognition  -KG           User Key  (r) = Recorded By, (t) = Taken By, (c) = Cosigned By    Initials Name Provider Type    DOE Callie Huizar Physical Therapist               Mobility     Row Name 03/13/22 1551          Bed Mobility    Bed Mobility rolling left;rolling right  -KG     Rolling Left [place] (Bed Mobility) contact guard;verbal cues;nonverbal cues (demo/gesture)  -KG     Rolling Right [place] (Bed Mobility) minimum assist (75% patient effort);nonverbal cues (demo/gesture);verbal cues  -KG     Scooting/Bridging [place] (Bed Mobility) dependent (less than 25% patient effort);2 person assist  -KG     Comment, (Bed Mobility) Due to change in medical status, RN requesting defer EOB today, now responds better to more simple one or two word commands  -KG     Row Name 03/13/22 1551          Transfers    Comment, (Transfers) deferred  -KG           User Key  (r) = Recorded By, (t) = Taken By, (c) = Cosigned By    Initials Name Provider Type    DOE Callie Huizar Physical Therapist               Obj/Interventions     Row Name 03/13/22 1553          Strength Comprehensive (MMT)    General Manual Muscle Testing (MMT) Assessment lower extremity strength deficits identified  -KG     Comment, General Manual Muscle Testing (MMT) Assessment unable to formerly assess, but resisted SLR slightly weaker on right  -KG     Row Name 03/13/22 2683          Motor Skills    Therapeutic Exercise knee;ankle  -KG     Row Name 03/13/22 1553          Hip (Therapeutic Exercise)    Hip (Therapeutic Exercise) strengthening exercise  -KG     Hip Strengthening (Therapeutic Exercise) bilateral;external rotation;internal rotation;10 repetitions  -KG      Row Name 03/13/22 1553          Knee (Therapeutic Exercise)    Knee Strengthening (Therapeutic Exercise) bilateral;heel slides;SLR (straight leg raise);15 repititions  -KG     Row Name 03/13/22 1553          Ankle (Therapeutic Exercise)    Ankle AROM (Therapeutic Exercise) bilateral;dorsiflexion;plantarflexion;15 repititions  -KG           User Key  (r) = Recorded By, (t) = Taken By, (c) = Cosigned By    Initials Name Provider Type    Callie Galdamez Physical Therapist               Goals/Plan     Row Name 03/13/22 1601          Bed Mobility Goal 1 (PT)    Activity/Assistive Device (Bed Mobility Goal 1, PT) sit to supine;supine to sit  -KG     [place] Level/Cues Needed (Bed Mobility Goal 1, PT) contact guard required  -KG     Time Frame (Bed Mobility Goal 1, PT) long term goal (LTG);10 days  -KG     Progress/Outcomes (Bed Mobility Goal 1, PT) goal ongoing;goal revised this date  -KG     Row Name 03/13/22 1601          Transfer Goal 1 (PT)    Activity/Assistive Device (Transfer Goal 1, PT) sit-to-stand/stand-to-sit;bed-to-chair/chair-to-bed;walker, rolling  -KG     [place] Level/Cues Needed (Transfer Goal 1, PT) independent;minimum assist (75% or more patient effort)  -KG     Time Frame (Transfer Goal 1, PT) long term goal (LTG);10 days  -KG     Progress/Outcome (Transfer Goal 1, PT) goal ongoing;goal revised this date  -KG     Row Name 03/13/22 1601          Gait Training Goal 1 (PT)    Progress/Outcome (Gait Training Goal 1, PT) goal no longer appropriate  -KG           User Key  (r) = Recorded By, (t) = Taken By, (c) = Cosigned By    Initials Name Provider Type    DOE Callie Huizar Physical Therapist               Clinical Impression     Row Name 03/13/22 7815          Pain Scale: FACES Pre/Post-Treatment    Pain: FACES Scale, Pretreatment 0-->no hurt  -KG     Posttreatment Pain Rating 0-->no hurt  -KG     Row Name 03/13/22 3195          Plan of Care Review    Plan of Care Reviewed With  patient;daughter  -KG     Progress declining  -KG     Outcome Evaluation Re-eval performed.  pt presenting with decline function.  Oriented x1.  Difficulty to formerly assess strength, but resisted SLR on R weaker than LLE.  Rolling R min-A and can maintain sidelying with CGA.  Rolling Left performed with CGA and can maintain sidelying.  nursing requested deferring OOB actvity today.  Able to actively participate in LE bed level exercises, does best with simple commands.  Continued recommendation SNF pending medical status  -KG     Row Name 03/13/22 1558          Therapy Assessment/Plan (PT)    Rehab Potential (PT) fair, will monitor progress closely  -KG     Criteria for Skilled Interventions Met (PT) yes;skilled treatment is necessary;meets criteria  -KG     Row Name 03/13/22 0159          Vital Signs    Post Systolic BP Rehab 117  -KG     Post Treatment Diastolic BP 81  -KG     Pre SpO2 (%) 93  -KG     O2 Delivery Pre Treatment supplemental O2  -KG     Intra SpO2 (%) 95  -KG     O2 Delivery Intra Treatment supplemental O2  -KG     Post SpO2 (%) 95  -KG     O2 Delivery Post Treatment supplemental O2  -KG     Row Name 03/13/22 1556          Positioning and Restraints    Pre-Treatment Position in bed  -KG     Post Treatment Position bed  -KG     In Bed notified nsg;call light within reach;encouraged to call for assist;exit alarm on;with family/caregiver  -KG           User Key  (r) = Recorded By, (t) = Taken By, (c) = Cosigned By    Initials Name Provider Type    DOE Callie Huizar Physical Therapist               Outcome Measures     Row Name 03/13/22 1602 03/13/22 0826       How much help from another person do you currently need...    Turning from your back to your side while in flat bed without using bedrails? 3  -KG 1  -LC    Moving from lying on back to sitting on the side of a flat bed without bedrails? 3  -KG 1  -LC    Moving to and from a bed to a chair (including a wheelchair)? 2  -KG 1  -LC    Standing  up from a chair using your arms (e.g., wheelchair, bedside chair)? 2  -KG 1  -LC    Climbing 3-5 steps with a railing? 1  -KG 1  -LC    To walk in hospital room? 1  -KG 1  -LC    AM-PAC 6 Clicks Score (PT) 12  -KG 6  -LC    Row Name 03/13/22 1605 03/13/22 1604       Modified [place] Scale    Modified [place] Scale 4 - Moderately severe disability.  Unable to walk without assistance, and unable to attend to own bodily needs without assistance.  -KG 4 - Moderately severe disability.  Unable to walk without assistance, and unable to attend to own bodily needs without assistance.  -KF    Row Name 03/13/22 1604 03/13/22 1602       Functional Assessment    Outcome Measure Options Modified [place]  -KF AM-PAC 6 Clicks Basic Mobility (PT)  -KG    Row Name 03/13/22 1601          Functional Assessment    Outcome Measure Options AM-PAC 6 Clicks Daily Activity (OT)  -KF           User Key  (r) = Recorded By, (t) = Taken By, (c) = Cosigned By    Initials Name Provider Type    Charo Dalton OT Occupational Therapist    Callie Galdamez Physical Therapist     Stacy Su, RN Registered Nurse                             Physical Therapy Education                 Title: PT OT SLP Therapies (In Progress)     Topic: Physical Therapy (In Progress)     Point: Mobility training (In Progress)     Learning Progress Summary           Patient Acceptance, E, NR by KG at 3/13/2022 1602    Acceptance, E, NR by  at 3/11/2022 1446    Eager, E, VU,NR by SS at 3/9/2022 1322    Comment: Reviewed safety/technique with bed mobility, transfers, ambulation, HEP, PT POC, pursed lip breathing    Acceptance, E, NR,VU by KG at 3/7/2022 1202    Acceptance, E, NR by KG1 at 3/5/2022 1532    Acceptance, E, NR by KG1 at 3/3/2022 0936   Family Eager, E, VU,NR by SS at 3/9/2022 1322    Comment: Reviewed safety/technique with bed mobility, transfers, ambulation, HEP, PT POC, pursed lip breathing                   Point: Home exercise program (In  Progress)     Learning Progress Summary           Patient Acceptance, E, NR by KG at 3/13/2022 1602    Acceptance, E, NR by EJ at 3/11/2022 1446    Eager, E, VU,NR by SS at 3/9/2022 1322    Comment: Reviewed safety/technique with bed mobility, transfers, ambulation, HEP, PT POC, pursed lip breathing    Acceptance, E, NR,VU by KG at 3/7/2022 1202    Acceptance, E, NR by KG1 at 3/5/2022 1532   Family Eager, E, VU,NR by SS at 3/9/2022 1322    Comment: Reviewed safety/technique with bed mobility, transfers, ambulation, HEP, PT POC, pursed lip breathing                   Point: Body mechanics (In Progress)     Learning Progress Summary           Patient Acceptance, E, NR by KG at 3/13/2022 1602    Acceptance, E, NR by EJ at 3/11/2022 1446    Eager, E, VU,NR by SS at 3/9/2022 1322    Comment: Reviewed safety/technique with bed mobility, transfers, ambulation, HEP, PT POC, pursed lip breathing    Acceptance, E, NR,VU by KG at 3/7/2022 1202    Acceptance, E, NR by KG1 at 3/5/2022 1532    Acceptance, E, NR by KG1 at 3/3/2022 0936   Family Eager, E, VU,NR by SS at 3/9/2022 1322    Comment: Reviewed safety/technique with bed mobility, transfers, ambulation, HEP, PT POC, pursed lip breathing                   Point: Precautions (In Progress)     Learning Progress Summary           Patient Acceptance, E, NR by KG at 3/13/2022 1602    Acceptance, E, NR by EJ at 3/11/2022 1446    Eager, E, VU,NR by SS at 3/9/2022 1322    Comment: Reviewed safety/technique with bed mobility, transfers, ambulation, HEP, PT POC, pursed lip breathing    Acceptance, E, NR,VU by KG at 3/7/2022 1202    Acceptance, E, NR by KG1 at 3/5/2022 1532    Acceptance, E, NR by KG1 at 3/3/2022 0936   Family Eager, E, VU,NR by SS at 3/9/2022 1322    Comment: Reviewed safety/technique with bed mobility, transfers, ambulation, HEP, PT POC, pursed lip breathing                               User Key     Initials Effective Dates Name Provider Type Discipline    EJ  06/16/21 -  Bonnie Faustin, PT Physical Therapist PT    KG1 05/22/20 -  Tatyana Shay, PT Physical Therapist PT    KG 06/16/21 -  Callie Huizar Physical Therapist PT    SS 06/01/21 -  Ayaka Mcknight, PT Physical Therapist PT              PT Recommendation and Plan     Plan of Care Reviewed With: patient, daughter  Progress: declining  Outcome Evaluation: Re-eval performed.  pt presenting with decline function.  Oriented x1.  Difficulty to formerly assess strength, but resisted SLR on R weaker than LLE.  Rolling R min-A and can maintain sidelying with CGA.  Rolling Left performed with CGA and can maintain sidelying.  nursing requested deferring OOB actvity today.  Able to actively participate in LE bed level exercises, does best with simple commands.  Continued recommendation SNF pending medical status     Time Calculation:    PT Charges     Row Name 03/13/22 1606             Time Calculation    Start Time 1512  -KG      PT Received On 03/13/22  -KG      PT Goal Re-Cert Due Date 03/23/22  -KG              Time Calculation- PT    Total Timed Code Minutes- PT 35 minute(s)  -KG              Timed Charges    69561 - PT Therapeutic Exercise Minutes 15  -KG      69393 - PT Therapeutic Activity Minutes 20  -KG              Total Minutes    Timed Charges Total Minutes 35  -KG       Total Minutes 35  -KG            User Key  (r) = Recorded By, (t) = Taken By, (c) = Cosigned By    Initials Name Provider Type    KG Callie Huizar Physical Therapist              Therapy Charges for Today     Code Description Service Date Service Provider Modifiers Qty    41390566864 HC PT THER PROC EA 15 MIN 3/13/2022 Callie Huizar GP 1    37324248443 HC PT THERAPEUTIC ACT EA 15 MIN 3/13/2022 Callie Huizar GP 1    01191578252 HC PT RE-EVAL ESTABLISHED PLAN 2 3/13/2022 Callie Huizar GP 1          PT G-Codes  Outcome Measure Options: Modified [place]  AM-PAC 6 Clicks Score (PT): 12  AM-PAC 6 Clicks Score (OT):  9  Modified [place] Scale: 4 - Moderately severe disability.  Unable to walk without assistance, and unable to attend to own bodily needs without assistance.    Callie Huizar  3/13/2022

## 2022-03-13 NOTE — PLAN OF CARE
Goal Outcome Evaluation:  Plan of Care Reviewed With: patient, family        Progress: declining  Outcome Evaluation: OT re-eval completed Pt able to follow 25% single step commands and responds best to single word commands with tactile cues especially for rolling R/L with Ranjeet required, depA washing face, maxA washing hands, 5 reps RUE shoulder flexion supine tolerated with AAROM/AROM, depA toileting, at this time recom 3xwk pending progress and GOC

## 2022-03-13 NOTE — THERAPY EVALUATION
Acute Care - Speech Language Pathology   Swallow Re-Evaluation  [place]   Clinical Swallow Evaluation     Patient Name: Ash Diez  : 10/4/1928  MRN: [id number]  Today's Date: 3/13/2022               Admit Date: 3/2/2022    Visit Dx:     ICD-10-CM ICD-9-CM   1. Dysphagia, unspecified type  R13.10 787.20   2. Acute respiratory failure with hypoxia (HCC)  J96.01 518.81   3. Acute on chronic congestive heart failure, unspecified heart failure type (HCC)  I50.9 428.0   4. COVID-19 ruled out by laboratory testing  Z20.822 V01.79   5. 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 has been evaluted at . pt decided agaisnt surgery    • Cancer (HCC)     colon   • Cellulitis     RLE   • CHF (congestive heart failure) (HCC)    • Hyperlipidemia    • Hypertension    • Pneumonia      Past Surgical History:   Procedure Laterality Date   • CARDIAC CATHETERIZATION N/A 3/8/2022    Procedure: LEFT HEART CATH;  Surgeon: Khloe Pena MD;  Location:  [facility] CATH INVASIVE LOCATION;  Service: Cardiology;  Laterality: N/A;   • COLON SURGERY     • CORONARY STENT PLACEMENT     • VASCULAR SURGERY      Vein removal       SLP Recommendation and Plan  SLP Swallowing Diagnosis: R/O pharyngeal dysphagia (22)  SLP Diet Recommendation: puree, thin liquids, other (see comments) (advance as tolerated when more consistently alert) (22)  Recommended Precautions and Strategies: general aspiration precautions (22)  SLP Rec. for  Method of Medication Administration: meds whole, meds crushed, with pudding or applesauce (or even ice cream) (03/13/22 0900)     Monitor for Signs of Aspiration: notify SLP if any concerns (03/13/22 0900)  Recommended Diagnostics: reassess via clinical swallow evaluation, SLE/Cog/Motor Speech Evaluation, other (see comments) (to ensure no changes needed to POC) (03/13/22 0900)     Anticipated Discharge Disposition (SLP): unknown (03/13/22 0900)                   Plan of Care Reviewed With: patient, grandchild(arthur)      SWALLOW EVALUATION (last 72 hours)     SLP Adult Swallow Evaluation     Row Name 03/13/22 0900                Rehab Evaluation    Document Type evaluation  -SM       Subjective Information --       Patient Observations alert;lethargic;cooperative  -       Patient Effort --       Symptoms Noted During/After Treatment --               General Information    Patient Profile Reviewed --       Pertinent History Of Current Problem L CVA. Plan was for TAVR tomorrow though may be on hold after CVA. Pt had previously stated his focus was QOL  -SM       Current Method of Nutrition NPO;other (see comments)  x meds  -SM       Precautions/Limitations, Vision --       Precautions/Limitations, Hearing --       Prior Level of Function-Communication --       Prior Level of Function-Swallowing safe, efficient swallowing in all situations  -SM       Plans/Goals Discussed with patient and family;agreed upon  -       Barriers to Rehab medically complex  -SM       Patient's Goals for Discharge patient did not state  -       Family Goals for Discharge patient able to return to PO diet;patient able to eat/drink without coughing/choking;other (see comments)  with focus more on QOL  -SM               Pain    Additional Documentation --               Pain Scale: FACES Pre/Post-Treatment    Pain: FACES Scale, Pretreatment 0-->no hurt  -SM       Posttreatment Pain Rating 0-->no hurt  -SM               Oral Motor Structure  and Function    Dentition Assessment --       Secretion Management --       Mucosal Quality --       Gag Response --       Volitional Swallow --       Volitional Cough --               Oral Musculature and Cranial Nerve Assessment    Oral Motor General Assessment --               General Eating/Swallowing Observations    Respiratory Support Currently in Use --       Eating/Swallowing Skills --       Positioning During Eating --       Utensils Used --       Consistencies Trialed --               Respiratory    Respiratory Status --       Respiratory Pattern --               Clinical Swallow Eval    Oral Prep Phase impaired  -       Pharyngeal Phase --       Clinical Swallow Evaluation Summary Not opening mouth for pills with earlier attempt by RN. Pt accepted puree and thin liquids from SLP with min cueing to alert and take off utensil. No overt s/s aspiration though clear risk factors present. Discussed options with pt's yohannes, who confirmed pt with more focus on QOL and did not think MBS needed at this time.  -               SLP Evaluation Clinical Impression    SLP Swallowing Diagnosis R/O pharyngeal dysphagia  -       Functional Impact risk of aspiration/pneumonia  -       Rehab Potential/Prognosis, Swallowing --       Swallow Criteria for Skilled Therapeutic Interventions Met --               Recommendations    Therapy Frequency (Swallow) --       SLP Diet Recommendation puree;thin liquids;other (see comments)  advance as tolerated when more consistently alert  -       Recommended Diagnostics reassess via clinical swallow evaluation;SLE/Cog/Motor Speech Evaluation;other (see comments)  to ensure no changes needed to POC  -       Recommended Precautions and Strategies general aspiration precautions  -       Oral Care Recommendations Oral Care BID/PRN  -       SLP Rec. for Method of Medication Administration meds whole;meds crushed;with pudding or applesauce  or even ice cream  -        Monitor for Signs of Aspiration notify SLP if any concerns  -SM       Anticipated Discharge Disposition (SLP) unknown  -SM             User Key  (r) = Recorded By, (t) = Taken By, (c) = Cosigned By    Initials Name Effective Dates    SG Redd-Reinier Ayaka Sigala, MS CCC-SLP 06/16/21 -     SM Marilou Szymanski MS CCC-SLP 06/16/21 -                 EDUCATION  The patient has been educated in the following areas:   Dysphagia (Swallowing Impairment) Modified Diet Instruction.              Time Calculation:    Time Calculation- SLP     Row Name 03/13/22 1224             Time Calculation- SLP    SLP Start Time 0900  -SM      SLP Received On 03/13/22  -              Untimed Charges    01958-LK Eval Oral Pharyng Swallow Minutes 56  -SM              Total Minutes    Untimed Charges Total Minutes 56  -SM       Total Minutes 56  -SM            User Key  (r) = Recorded By, (t) = Taken By, (c) = Cosigned By    Initials Name Provider Type    Marilou Odell MS CCC-SLP Speech and Language Pathologist                Therapy Charges for Today     Code Description Service Date Service Provider Modifiers Qty    43022569474 HC ST EVAL ORAL PHARYNG SWALLOW 4 3/13/2022 Marilou Szymanski MS CCC-SLP GN 1            Patient was not wearing a face mask and did not exhibit coughing during this therapy encounter.  Procedure performed was aerosolizing, involved close contact (within 6 feet for at least 15 minutes or longer), and did not involve contact with infectious secretions or specimens.  Therapist used appropriate personal protective equipment including gloves, standard procedure mask and eye protection.  Appropriate PPE was worn during the entire therapy session.  Hand hygiene was completed before and after therapy session.       Marilou Szymanski MS CCC-SLP  3/13/2022

## 2022-03-13 NOTE — PROGRESS NOTES
"[facility] Progress Note     LOS: 11 days   Patient Care Team:  Karthikeyan Moreno MD as PCP - General (Family Medicine)  PCP:  Karthikeyan Moreno MD    Chief Complaint: Aortic stenosis         SUBJECTIVE: Patient resting in bed this morning.  Able to answer some questions.  Granddaughter at bedside and reports he is much more alert than he was through the night.  Breathing comfortably on 5 L nasal cannula with saturations >93%.     Regular rhythm on heart monitor, appears to be intermittent first-degree AV block and second-degree AV block type I, with frequent ectopy     Review of Systems:   Positive for confusion, fatigue, neurologic changes, shortness of breath  Negative for chest pain, palpitations       OBJECTIVE:    Vital Sign Min/Max for last 24 hours  Temp  Min: 97.4 °F (36.3 °C)  Max: 97.8 °F (36.6 °C)   BP  Min: 127/60  Max: 155/60   Pulse  Min: 51  Max: 67   Resp  Min: 18  Max: 24   SpO2  Min: 85 %  Max: 98 %   No data recorded   No data recorded     Flowsheet Rows    Flowsheet Row First Filed Value   Admission Height 181.6 cm (71.5\") Documented at 03/02/2022 1833   Admission Weight 90.7 kg (200 lb) Documented at 03/02/2022 1833          Telemetry: Difficult to discern rhythm but appears to be intermittent first-degree AV block and second-degree AV block type I with frequent PACs and PVCs      Intake/Output Summary (Last 24 hours) at 3/13/2022 0850  Last data filed at 3/13/2022 0326  Gross per 24 hour   Intake --   Output 700 ml   Net -700 ml     Intake & Output (last 3 days)       03/10 0701  03/11 0700 03/11 0701  03/12 0700 03/12 0701  03/13 0700 03/13 0701  03/14 0700    P.O. 600 480      Total Intake(mL/kg) 600 (6.2) 480 (5)      Urine (mL/kg/hr) 550 (0.2) 1095 (0.5) 700 (0.3)     Stool   0     Total Output 550 1095 700     Net +50 -615 -700             Urine Unmeasured Occurrence   3 x     Stool Unmeasured Occurrence   1 x            Physical " Exam:  CONSTITUTIONAL: No acute distress  RESPIRATORY: On 5 L nasal cannula  CARDIOVASCULAR: Regular rhythm with frequent ectopy.      LABS/DIAGNOSTIC DATA:  Results from last 7 days   Lab Units 03/13/22  0755 03/12/22  0659 03/11/22  0644   WBC 10*3/mm3 10.77 12.62* 11.01*   HEMOGLOBIN g/dL 15.2 16.0 15.2   HEMATOCRIT % 47.9 51.1* 48.3   PLATELETS 10*3/mm3 213 220 204     Lab Results   Lab Value Date/Time    TROPONINT <0.010 03/02/2022 1924    TROPONINT 0.030 09/05/2019 0751         Results from last 7 days   Lab Units 03/13/22  0753 03/12/22  0659 03/11/22  0644   SODIUM mmol/L 143 140 139   POTASSIUM mmol/L 4.4 4.6 4.6   CHLORIDE mmol/L 106 103 103   CO2 mmol/L 27.0 28.0 28.0   BUN mg/dL 35* 39* 40*   CREATININE mg/dL 1.16 1.12 1.40*   CALCIUM mg/dL 9.1 9.3 9.3   GLUCOSE mg/dL 91 118* 113*         Results from last 7 days   Lab Units 03/13/22  0753   CHOLESTEROL mg/dL 160   TRIGLYCERIDES mg/dL 100   HDL CHOL mg/dL 37*   LDL CHOL mg/dL 104*               Medication Review:   aspirin, 81 mg, Oral, Daily   Or  aspirin, 300 mg, Rectal, Daily  atorvastatin, 80 mg, Oral, Nightly  carvedilol, 3.125 mg, Oral, BID With Meals  fentaNYL citrate (PF), , ,   furosemide, 40 mg, Intravenous, Once  furosemide, 20 mg, Oral, Daily  levETIRAcetam, 250 mg, Intravenous, Q12H  midazolam, , ,   pharmacy consult - MTM, , Does not apply, Daily  senna-docusate sodium, 2 tablet, Oral, BID  sodium chloride, 10 mL, Intravenous, Q12H  sodium chloride, 10 mL, Intravenous, Q12H  spironolactone, 25 mg, Oral, Daily       [START ON 3/14/2022] phenylephrine, 0.5-3 mcg/kg/min            ASSESSMENT:    Acute on chronic systolic CHF (congestive heart failure) (HCC)    Abdominal aortic aneurysm (AAA) without rupture (HCC)    Essential hypertension    Hyperlipidemia    Prediabetes    Diastolic congestive heart failure (HCC)    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  Appears to be somewhat improved this morning.  Ongoing neurologic work-up by stroke team. No clear atrial fibrillation on telemetry (1st deg AVB and 2nd deg AVB type I).  Continue telemetry monitoring  -HF: Additional diuresis given this morning for increased pulmonary vascular congestion.  Continue beta blocker.  -Aortic stenosis: Remains scheduled for TAVR on Monday.  We will have TAVR team reevaluate Monday morning due to the events over the weekend.  Continue ASA, statin  -Thoracic ascending aneurysm: Asymptomatic, continue afterload reduction.  Not a candidate for intervention. Continue beta blocker.    Scribed for Dr. Briceno by Shanita Patel, APRN. 3/13/2022  08:50 EDT    I, Watson Briceno MD, personally performed the services as scribed by the above named individual. I have made any necessary edits and it is both accurate and complete.     Watson Briceno MD, MSc, FACC, [facility]  Interventional Cardiology  Psychiatric    ADDENDUM:  -Stroke team assessment reviewed  -After further goals of care discussions with palliative care, the patient, and family, the patient has opted to move toward comfort care.  -Tomorrow's TAVR is tentatively canceled.  -We will forward an epic message to the TAVR team   -Patient had an adequate urine output response to Lasix, but continues to warrant increased oxygen support    Watson Briceno MD, MSc, FACC, [facility]  Interventional Cardiology  Psychiatric

## 2022-03-13 NOTE — CONSULTS
Palliative Care Initial Consultation Note    Name: Ash Diez ,Age: 89 y.o. years old, Sex: male  Admit Date:  3/2/2022    Karthikeyan Moreno MD  Consulting physician: Mark Shaver MD  Reason for referral: [facility]  Chief Complaint   Patient presents with   • Shortness of Breath       HPI: 89yo male with severe aortic stenosis, CHF, AAA came to ER for worsening SOB, improved with diuresis. Pt was scheduled to have TAVR procedure tomorrow morning however code stroke called yesterday. Pt noted with significant change in functional and cognitive status. Granddaughter reports he had a rough night last night with increase in dyspnea, agitation with bipap. Breathing improved after additional lasix dose this morning. Pt sleeping but wakens easily, repeats 'yes' to questions only.     Symptoms:   Dyspnea  agitation    Advance care planning discussed: yes    Code Status:   Current Code Status     Date Active Code Status Order ID Comments User Context       3/12/2022 1159 No CPR (Do Not Attempt to Resuscitate) [id number]  Mark Shaver MD Inpatient     Advance Care Planning Activity      Questions for Current Code Status     Question Answer    Code Status (Patient has no pulse and is not breathing) No CPR (Do Not Attempt to Resuscitate)    Medical Interventions (Patient has pulse or is breathing) Limited Support    Medical Intervention Limits: NO intubation (DNI)        Advance Directive: none on file  Surrogate decision maker: devin Nelson/jaclyn Kinney    Past Medical History:   Diagnosis Date   • Aneurysm (HCC)     on aortic valve. has been evaluted at . pt decided agaisnt surgery    • Cancer (HCC)     colon   • Cellulitis     RLE   • CHF (congestive heart failure) (HCC)    • Hyperlipidemia    • Hypertension    • Pneumonia      Past Surgical History:   Procedure Laterality Date   • CARDIAC CATHETERIZATION N/A 3/8/2022    Procedure: LEFT HEART CATH;  Surgeon: Khloe Pena MD;  Location: [facility]  INVASIVE LOCATION;  Service: Cardiology;  Laterality: N/A;   • COLON SURGERY     • CORONARY STENT PLACEMENT     • VASCULAR SURGERY      Vein removal       Reviewed current scheduled and prn medications for route, type, dose and frequency.    Current Facility-Administered Medications   Medication Dose Route Frequency Provider Last Rate Last Admin   • acetaminophen (TYLENOL) tablet 650 mg  650 mg Oral Q4H PRN Khloe Pena MD        Or   • acetaminophen (TYLENOL) 160 MG/5ML solution 650 mg  650 mg Oral Q4H PRN Khloe Pena MD        Or   • acetaminophen (TYLENOL) suppository 650 mg  650 mg Rectal Q4H PRN Khloe Pena MD       • aspirin chewable tablet 81 mg  81 mg Oral Daily Bonnie Tate APRN        Or   • aspirin suppository 300 mg  300 mg Rectal Daily Bonnie Tate APRN   300 mg at 03/12/22 1543   • atorvastatin (LIPITOR) tablet 80 mg  80 mg Oral Nightly Bonnie Tate APRN   80 mg at 03/12/22 2006   • atropine sulfate injection 0.5 mg  0.5 mg Intravenous Q5 Min PRN Khloe Pena MD       • bisacodyl (DULCOLAX) EC tablet 10 mg  10 mg Oral Daily PRN Tio, Yadira, APRN       • bisacodyl (DULCOLAX) suppository 10 mg  10 mg Rectal Daily PRN Tio, Yadira, APRN       • carvedilol (COREG) tablet 3.125 mg  3.125 mg Oral BID With Meals Khloe Pena MD   3.125 mg at 03/12/22 1804   • fentaNYL citrate (PF) (SUBLIMAZE) 50 mcg/mL injection  - ADS Override Pull            • furosemide (LASIX) tablet 20 mg  20 mg Oral Daily Khloe Pena MD   20 mg at 03/13/22 0826   • HYDROcodone-acetaminophen (NORCO) 7.5-325 MG per tablet 1 tablet  1 tablet Oral Q4H PRN Khloe Pena MD       • levETIRAcetam (KEPPRA) 250 mg in sodium chloride 0.9 % 100 mL IVPB  250 mg Intravenous Q12H Bonnie Tate APRN   250 mg at 03/13/22 0825   • magnesium hydroxide (MILK OF MAGNESIA) suspension 10 mL  10 mL Oral Daily PRN Yadira Kinsey, APRN        • magnesium sulfate 4 gram infusion - Mg less than or equal to 1mg/dL  4 g Intravenous PRN Mark Shaver MD        Or   • magnesium sulfate 3 gram infusion (1gm x 3) - Mg 1.1 - 1.5 mg/dL  1 g Intravenous PRN Mark Shaver MD        Or   • Magnesium Sulfate 2 gram infusion- Mg 1.6 - 1.9 mg/dL  2 g Intravenous PRN Mark Shaver MD       • midazolam (VERSED) 2 MG/2ML injection  - ADS Override Pull            • naloxone (NARCAN) injection 0.4 mg  0.4 mg Intravenous Q5 Min PRN Khloe Pena MD       • ondansetron (ZOFRAN) injection 4 mg  4 mg Intravenous Q6H PRN Khloe Pena MD       • Pharmacy Consult - MTM   Does not apply Daily Khloe Pena MD       • [START ON 3/14/2022] phenylephrine (ERIC-SYNEPHRINE) 50 mg in sodium chloride 0.9 % 250 mL infusion **for procedure**  0.5-3 mcg/kg/min Intravenous Titrated Lobito Ralph CRNA       • sennosides-docusate (PERICOLACE) 8.6-50 MG per tablet 2 tablet  2 tablet Oral BID Yadira Kinsey APRN   2 tablet at 03/12/22 2006   • sodium chloride 0.9 % flush 10 mL  10 mL Intravenous PRN Khloe Pena MD       • sodium chloride 0.9 % flush 10 mL  10 mL Intravenous Q12H Khloe Pena MD   10 mL at 03/12/22 0908   • sodium chloride 0.9 % flush 10 mL  10 mL Intravenous PRN Khloe Pena MD   10 mL at 03/09/22 0943   • sodium chloride 0.9 % flush 10 mL  10 mL Intravenous Q12H Bonnie Tate APRN   10 mL at 03/12/22 2008   • sodium chloride 0.9 % flush 10 mL  10 mL Intravenous PRN Bonnie Tate APRN       • sodium chloride 0.9 % infusion 250 mL  250 mL Intravenous Once PRN Khloe Pena MD       • spironolactone (ALDACTONE) tablet 25 mg  25 mg Oral Daily Khloe Pena MD   25 mg at 03/12/22 0907     [START ON 3/14/2022] phenylephrine, 0.5-3 mcg/kg/min      •  acetaminophen **OR** acetaminophen **OR** acetaminophen  •  atropine  •  bisacodyl  •  bisacodyl  •   "HYDROcodone-acetaminophen  •  magnesium hydroxide  •  magnesium sulfate **OR** magnesium sulfate in D5W 1g/100mL (PREMIX) **OR** magnesium sulfate  •  [] Morphine **AND** naloxone  •  ondansetron  •  sodium chloride  •  sodium chloride  •  sodium chloride  •  sodium chloride  No Known Allergies  Family History   Problem Relation Age of Onset   • Arthritis Mother    • Heart attack Mother    • Stroke Father    • Stroke Sister    • Stroke Brother    • Hypertension Daughter      Social History     Socioeconomic History   • Marital status:    Tobacco Use   • Smoking status: Never Smoker   • Smokeless tobacco: Never Used   Vaping Use   • Vaping Use: Never used   Substance and Sexual Activity   • Alcohol use: Not Currently     Comment: social   • Drug use: Never   • Sexual activity: Defer       ROS:  Unable to obtain    PPS: 30%  /65 (BP Location: Right arm, Patient Position: Lying)   Pulse 61   Temp 97.6 °F (36.4 °C) (Axillary)   Resp 20   Ht 181.6 cm (71.5\")   Wt 96.8 kg (213 lb 6.5 oz)   SpO2 96%   BMI 29.35 kg/m²   96.8 kg (213 lb 6.5 oz) Body mass index is 29.35 kg/m².  Intake & Output (last day)        0701   0700  0701   0700    P.O.      Total Intake(mL/kg)      Urine (mL/kg/hr) 700 (0.3) 1250 (2)    Stool 0     Total Output 700 1250    Net -700 -1250          Urine Unmeasured Occurrence 3 x     Stool Unmeasured Occurrence 1 x           Physical Exam:    Gen: frail elderly male lying in bed, NAD   HEENT: NCAT, MMM   CV: HR RRR, +murmur   Pulm: resp slightly heavy at rest, O2 via NC   GI/: abd NTND, +BS   MSK: no cyanosis, no edema   Neuro: confused   Psych: calm    Reviewed labs and diagnostic results.  Results from last 7 days   Lab Units 22  0755   WBC 10*3/mm3 10.77   HEMOGLOBIN g/dL 15.2   HEMATOCRIT % 47.9   PLATELETS 10*3/mm3 213     Results from last 7 days   Lab Units 22  0753   SODIUM mmol/L 143   POTASSIUM mmol/L 4.4   CHLORIDE mmol/L 106   CO2 " mmol/L 27.0   BUN mg/dL 35*   CREATININE mg/dL 1.16   CALCIUM mg/dL 9.1   GLUCOSE mg/dL 91       Impression: 89 y.o. year old male with aortic stenosis     Plan:   Discussed case with granddaughter Gregoira, her and dtr Leslie are in agreement that pt's focus is on quality of life and says he would not want to live in this confused, debilitated state. Wants him to be comfortable above everything. Was hopeful to have TAVR procedure to improve quality of life but given his change in condition may no longer be an option. She agrees to comfort measures and to continue monitoring his condition. His appetite is poor, remains confused, not getting OOB. Add low dose morphine and ativan for dyspnea and agitation. No bipap due to causing increase agitation and distress. Continue scheduled medications as long as pt able to take. Dtr coming up to visit later today, approved for 2 visitors related to comfort measures.     Jami Israel NP  [phone]  03/13/22  13:20 EDT    Time:30 minutes spent reviewing medical and medication records, assessing and examining patient, discussing with family and nursing staff, answering questions, formulating a plan and documentation of care. > 50% time spent face to face

## 2022-03-13 NOTE — NURSING NOTE
"Stroke Neurology:    Called to the bedside by RN for examination and assessment of the patient.  Patient with current confusion and repetitive speech.  Patient will only repeat \"OK\" and is currently not following any commands.  Pupils are equal round reactive.  He is withdrawing to pain in all 4 extremities and does respond to calling his name. EEG recently completed is negative for any epileptic activity. Keppra administered this evening per orders.  Seizure precautions. PT it tachypneic and currently requiring 5 L nasal cannula. Called hospitalist APRN discussed patient case.  Patient will be placed on BiPAP trial with repeat ABG.  Labs to be repeated in a.m.    Stroke neurology will continue to follow.  Please call with any further questions or concerns.  VALENTINO Prince  03/12/2022 4329  "

## 2022-03-13 NOTE — PROGRESS NOTES
[facility] Medicine Services  PROGRESS NOTE    Patient Name: Ash Diez  : 10/4/1928  MRN: [id number]    Date of Admission: 3/2/2022  Primary Care Physician: Karthikeyan Moreno MD    Subjective   Subjective   CC:  F/U dyspnea     HPI:  Uto, answers his name to all questions    ROS:  uto    Objective   Objective   Vital Signs:   Temp:  [97.4 °F (36.3 °C)-97.8 °F (36.6 °C)] 97.7 °F (36.5 °C)  Heart Rate:  [51-67] 60  Resp:  [18-24] 18  BP: (127-155)/(56-68) 155/60  Flow (L/min):  [3-5] 5  Physical Exam:  Constitutional: eyes open, elderly, chronically ill appearing, no distress, sitting in bed, nasal canula in place  Eyes: EOMI, sclerae anicteric, no conjunctival injection  Head: NCAT  ENT: [place], moist mucous membranes , nasal canula 5lnc  Respiratory: decreased air movement bilateral bases w/ crackles  Cardiovascular: rrr currently  Gastrointestinal: Soft, NT, ND +BS  Musculoskeletal: CALL; no LE edema bilaterally  Neurologic: answers his name to all questions, does move right arm today, didn't cooperate with leg exam so unable to determine his right leg strength during my visit.   Skin: No rashes on exposed skin  Psychiatric:calm    Results Reviewed:  LAB RESULTS:      Lab 22  0755 22  0659 22  0644 22  0844 22  1020   WBC 10.77 12.62* 11.01* 10.80 10.27   HEMOGLOBIN 15.2 16.0 15.2 16.5 15.5   HEMATOCRIT 47.9 51.1* 48.3 52.3* 47.8   PLATELETS 213 220 204 216 209   NEUTROS ABS  --   --   --   --  7.54*   IMMATURE GRANS (ABS)  --   --   --   --  0.05   LYMPHS ABS  --   --   --   --  1.32   MONOS ABS  --   --   --   --  1.20*   EOS ABS  --   --   --   --  0.12   MCV 99.2* 102.0* 100.4* 100.4* 95.8         Lab 22  0753 22  0659 22  0644 22  1019 22  1020   SODIUM 143 140 139 141 139   POTASSIUM 4.4 4.6 4.6 4.0 3.8   CHLORIDE 106 103 103 102 103   CO2 27.0 28.0 28.0 31.0* 27.0   ANION GAP 10.0 9.0 8.0 8.0 9.0   BUN 35* 39* 40* 36*  32*   CREATININE 1.16 1.12 1.40* 1.19 1.13   EGFR 58.7* 61.3 46.9* 57.0* 60.6   GLUCOSE 91 118* 113* 123* 134*   CALCIUM 9.1 9.3 9.3 9.1 8.9   MAGNESIUM  --  2.5* 2.6*  --  2.4*             Lab 03/13/22  0753   PROBNP 6,908.0*         Lab 03/13/22  0753   CHOLESTEROL 160   LDL CHOL 104*   HDL CHOL 37*   TRIGLYCERIDES 100             Lab 03/13/22  0315 03/12/22  1240   PH, ARTERIAL 7.451* 7.448   PCO2, ARTERIAL 43.6 40.4   PO2 ART 80.5* 72.1*   FIO2 40 40   HCO3 ART 30.4* 27.9*   BASE EXCESS ART 5.6* 3.6*   CARBOXYHEMOGLOBIN 1.0 1.0     Brief Urine Lab Results  (Last result in the past 365 days)      Color   Clarity   Blood   Leuk Est   Nitrite   Protein   CREAT   Urine HCG        03/12/22 1831 Yellow   Clear   Trace   Negative   Negative   Negative                 Microbiology Results Abnormal     Procedure Component Value - Date/Time    COVID PRE-OP / PRE-PROCEDURE SCREENING ORDER (NO ISOLATION) - Swab, Nasopharynx [[id number]]  (Normal) Collected: 03/02/22 2241    Lab Status: Final result Specimen: Swab from Nasopharynx Updated: 03/02/22 2343    Narrative:      The following orders were created for panel order COVID PRE-OP / PRE-PROCEDURE SCREENING ORDER (NO ISOLATION) - Swab, Nasopharynx.  Procedure                               Abnormality         Status                     ---------                               -----------         ------                     COVID-19, FLU A/B, RSV P...[[id number]]  Normal              Final result                 Please view results for these tests on the individual orders.    COVID-19, FLU A/B, RSV PCR - Swab, Nasopharynx [[id number]]  (Normal) Collected: 03/02/22 2241    Lab Status: Final result Specimen: Swab from Nasopharynx Updated: 03/02/22 2343     COVID19 Not Detected     Influenza A PCR Not Detected     Influenza B PCR Not Detected     RSV, PCR Not Detected    Narrative:      Fact sheet for providers: https://www.fda.gov/media/361122/download    Fact sheet for patients:  https://www.fda.gov/media/895512/download    Test performed by PCR.          EEG    Result Date: 3/12/2022  Reason for referral: 89 y.o.male with episodes of altered mental status Technical Summary:  A 19 channel digital EEG was performed using the international 10-20 placement system, including eye leads and EKG leads. Duration: 23 minutes Findings: The patient is awake and confused with repetitive speech.  The background shows diffuse medium amplitude 3-6 Hz intermixed delta and theta activity present symmetrically over both hemispheres.  Photic stimulation does not change the tracing.  Light sleep is seen with mild slowing of the background.  Hyperventilation is not performed.  No focal features or epileptiform activity are seen. Video: On Technical quality: Good SUMMARY: Moderate generalized slow Confusional periods during the study have no EEG correlate No focal features or epileptiform activity are seen     Impression: Diffuse cerebral dysfunction of moderate degree, nonspecific No evidence for epilepsy is seen This report is transcribed using the Dragon dictation system.      CT Angiogram Neck    Result Date: 3/12/2022  EXAMINATION: CT ANGIOGRAM HEAD W AI ANALYSIS OF LVO-, CT ANGIOGRAM NECK-   INDICATION: Stroke, follow up; J96.01-Acute respiratory failure with hypoxia; I50.9-Heart failure, unspecified; Z20.822-Contact with and (suspected) exposure to covid-19; I35.0-Nonrheumatic aortic (valve) stenosis new onset aphasia  TECHNIQUE: CTA of the head was performed after the intravenous administration of 75 mL of Isovue 370. Reconstructed coronal and sagittal images were also obtained. In addition, a 3-D volume rendered image was obtained after post processing. Automated exposure control and iterative reconstruction methods were used.  The radiation dose reduction device was turned on for each scan per the ALARA (As Low as Reasonably Achievable) protocol.  AI analysis of LVO was utilized.  Stenosis measurement  was performed by the NASCET or similar method.  COMPARISON: No comparisons available.  FINDINGS: Nonvascular: There are large bilateral dependent pleural effusions. There is hazy airspace disease present throughout both upper lobes which may represent pulmonary edema. There is no significant spinal lymphadenopathy. Thyroid is normal. There is no supraclavicular or cervical lymphadenopathy. There is cervical degenerative disc disease most pronounced at the C3-4 level. Mild mucosal disease is present in the right maxillary sinus and there is right ethmoid mucosal disease. The mastoid air cells are clear. Intracranially there is generalized atrophy with chronic areas of infarction involving the right insular cortex and right frontal lobe inferiorly.  Vascular: The ascending aorta is dilated measuring 5.2 cm per the proximal great vessels appear widely patent. Both the right and left vertebral arteries are widely patent. There is no significant basilar stenosis. There is atherosclerotic calcified plaque in both carotid bulbs without hemodynamically significant stenosis. Mild calcified plaque is present in the right and left carotid siphon. Intracranially, there is normal filling of the anterior cerebral arteries. The left A1 segment is hypoplastic. The right middle cerebral artery appears to fill normally. On the left, the middle cerebral artery is widely patent. There is decreased perfusion of the distal branches of the left middle cerebral artery compared with the right. Both posterior cerebral arteries fill normally.      Impression:  1. No significant extracranial vascular stenosis or occlusion. 2. Decreased filling of the distal branches of the left middle cerebral artery compared with the right raising the question of a small branch vessel occlusion. 3. Hypoplastic left A1 segment.  This report was finalized on 3/12/2022 12:46 PM by Carlito Orozco MD.      CT Chest Without Contrast Diagnostic    Result Date:  3/12/2022  DATE OF EXAM: 3/12/2022 12:18 PM  PROCEDURE: CT CHEST WO CONTRAST DIAGNOSTIC-  INDICATIONS: AMS; assess known AAA; J96.01-Acute respiratory failure with hypoxia; I50.9-Heart failure, unspecified; Z20.822-Contact with and (suspected) exposure to covid-19; I35.0-Nonrheumatic aortic (valve) stenosis  COMPARISON: 9/5/2019 unenhanced chest CT scan  TECHNIQUE: Routine transaxial slices were obtained through the chest without the administration of intravenous contrast. Reconstructed coronal and sagittal images were also obtained. Automated exposure control and iterative construction methods were used.  The radiation dose reduction device was turned on for each scan per the ALARA (As Low as Reasonably Achievable) protocol.  FINDINGS: The current study has residual intravascular contrast from earlier angiographic studies of head and neck. There is ectasia of the ascending aorta to a maximum of 6.1 cm transverse luminal diameter on axial images, 6.2 cm as measured on sagittal reconstruction images and 6.0 cm as measured on coronal imaging series. There is no evidence of dissection. There is heavy calcification of the aortic valve, and moderate diffuse coronary artery calcification. No pericardial effusion is seen. There are moderate to large free-flowing bilateral pleural effusions. There is mild shotty mediastinal lymphadenopathy, a little increased from the prior study, but not extensive.  Lung window images show diffuse reticular interstitial disease pattern and prominent vasculature typical of pulmonary edema/volume overload. There is moderate atelectasis of the lower lungs associated with the effusions.  Included images of the upper abdomen and left upper pole renal cyst, and contrast in the renal collecting systems which are nondilated. Granulomatous calcifications are seen in the otherwise normal-appearing included portions of the liver and spleen. Included gallbladder pancreatic tail and adrenal glands  appear unremarkable. Bony structures appear intact.      Impression:  1. Approximately 6.1 cm ascending thoracic aortic aneurysm, with minimal if any change from 9/5/2019 unenhanced chest CT scan. No evidence of leak or dissection. 2. Evidence of congestive heart failure/volume overload, with pulmonary interstitial edema and moderate to large bilateral pleural effusions. 3. Shotty, reactive appearing mediastinal lymphadenopathy incidentally noted.  This report was finalized on 3/12/2022 12:49 PM by Dr. Tam Montoya MD.      MRI Brain Without Contrast    Result Date: 3/12/2022  DATE OF EXAM: 3/12/2022 5:01 PM  PROCEDURE: MRI BRAIN WO CONTRAST-  INDICATIONS: Stroke, follow up; R13.10-Dysphagia, unspecified; J96.01-Acute respiratory failure with hypoxia; I50.9-Heart failure, unspecified; Z20.822-Contact with and (suspected) exposure to covid-19; I35.0-Nonrheumatic aortic (valve) stenosis  COMPARISON: Head CT scan of same day  TECHNIQUE: Multiplanar multisequence images of the brain were performed without contrast according to routine brain MRI protocol.  FINDINGS: There is a subtle punctate area of restricted diffusion that appears to localize to the left post central gyrus, barely above the level of artifact. Slight signal changes are seen of the adjacent cortex, but not definite for infarct. More inferiorly, however, there appear to be small areas of restricted diffusion in the left corona radiata, with weak ADC map correlation. No potential acute infarction is seen elsewhere.  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 There are no signal changes suggestive of hemorrhage, no evidence of hydrocephalus, mass or mass effect or abnormal extra-axial collection. There is expected flow signal in the major intracranial arteries and median sagittal sinus. No gross abnormalities to  the orbits or mastoids. There is mild right maxillary sinus mucosal thickening. Sagittal images show the midline structures to appear grossly normal.        Impression: Subtle, almost punctate areas of restricted diffusion that appear to localize to the left corona radiata and left postcentral gyrus. 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  This report was finalized on 3/12/2022 6:01 PM by Dr. Tam Montoya MD.      XR Chest 1 View    Result Date: 3/13/2022  Chest one view. DATE: 3/13/2022. COMPARISON: 3/6/2022. CLINICAL HISTORY: Tachypnea.     Impression: There is extensive interstitial and patchy airspace changes throughout the lungs bilaterally which could be inflammatory or infectious with a component of edema a consideration. Cardiac silhouette appears moderately enlarged. Evaluation is limited due to poor positioning and leftward rotation. Electronically signed by:  Farshad Ervin D.O.  3/13/2022 3:09 AM Mountain Time    CT Head Without Contrast Stroke Protocol    Result Date: 3/12/2022  DATE OF EXAM: 3/12/2022 11:53 AM  PROCEDURE: CT HEAD WO CONTRAST STROKE PROTOCOL-  INDICATIONS: Neuro deficit, acute, stroke suspected; J96.01-Acute respiratory failure with hypoxia; I50.9-Heart failure, unspecified; Z20.822-Contact with and (suspected) exposure to covid-19; I35.0-Nonrheumatic aortic (valve) stenosis  COMPARISON: 09/05/2019  TECHNIQUE: Routine transaxial and coronal reconstruction images were obtained through the head without the administration of contrast. Automated exposure control and iterative reconstruction methods were used.  The radiation dose reduction device was turned on for each scan per the ALARA (As Low as Reasonably Achievable) protocol.  FINDINGS: There is generalized enlargement of the ventricles and CSF containing spaces. There is an old area of infarction involving the right insular cortex and the right frontal operculum. There is decreased  attenuation throughout the periventricular white matter in both hemispheres. No mass lesions, mass effect, acute hemorrhage or edema. No intra or extra-axial fluid collections are present. There is mucosal disease identified in both maxillary sinuses and the right ethmoid sinuses,      Impression:  1. Generalized atrophy with chronic white matter changes throughout the periventricular region. 2. Interval appearance of a chronic infarct involving the right frontal operculum and right insular cortex. 3. No acute intracranial findings. 4. Bilateral maxillary and right ethmoid sinus disease.  This report was finalized on 3/12/2022 12:17 PM by Carlito Orozco MD.      CT Angiogram Head w AI Analysis of LVO    Result Date: 3/12/2022  EXAMINATION: CT ANGIOGRAM HEAD W AI ANALYSIS OF LVO-, CT ANGIOGRAM NECK-   INDICATION: Stroke, follow up; J96.01-Acute respiratory failure with hypoxia; I50.9-Heart failure, unspecified; Z20.822-Contact with and (suspected) exposure to covid-19; I35.0-Nonrheumatic aortic (valve) stenosis new onset aphasia  TECHNIQUE: CTA of the head was performed after the intravenous administration of 75 mL of Isovue 370. Reconstructed coronal and sagittal images were also obtained. In addition, a 3-D volume rendered image was obtained after post processing. Automated exposure control and iterative reconstruction methods were used.  The radiation dose reduction device was turned on for each scan per the ALARA (As Low as Reasonably Achievable) protocol.  AI analysis of LVO was utilized.  Stenosis measurement was performed by the NASCET or similar method.  COMPARISON: No comparisons available.  FINDINGS: Nonvascular: There are large bilateral dependent pleural effusions. There is hazy airspace disease present throughout both upper lobes which may represent pulmonary edema. There is no significant spinal lymphadenopathy. Thyroid is normal. There is no supraclavicular or cervical lymphadenopathy. There is cervical  degenerative disc disease most pronounced at the C3-4 level. Mild mucosal disease is present in the right maxillary sinus and there is right ethmoid mucosal disease. The mastoid air cells are clear. Intracranially there is generalized atrophy with chronic areas of infarction involving the right insular cortex and right frontal lobe inferiorly.  Vascular: The ascending aorta is dilated measuring 5.2 cm per the proximal great vessels appear widely patent. Both the right and left vertebral arteries are widely patent. There is no significant basilar stenosis. There is atherosclerotic calcified plaque in both carotid bulbs without hemodynamically significant stenosis. Mild calcified plaque is present in the right and left carotid siphon. Intracranially, there is normal filling of the anterior cerebral arteries. The left A1 segment is hypoplastic. The right middle cerebral artery appears to fill normally. On the left, the middle cerebral artery is widely patent. There is decreased perfusion of the distal branches of the left middle cerebral artery compared with the right. Both posterior cerebral arteries fill normally.      Impression:  1. No significant extracranial vascular stenosis or occlusion. 2. Decreased filling of the distal branches of the left middle cerebral artery compared with the right raising the question of a small branch vessel occlusion. 3. Hypoplastic left A1 segment.  This report was finalized on 3/12/2022 12:46 PM by Carlito Orozco MD.        Results for orders placed during the hospital encounter of 03/02/22    Adult Transesophageal Echo ([facility]ARA) W/ Cont if Necessary Per Protocol    Interpretation Summary  · Estimated left ventricular EF = 35-40% Left ventricular systolic function is moderately decreased.  · Left ventricular wall thickness is consistent with mild concentric hypertrophy.  · Severe aortic valve stenosis is present.  · Severe dilation of the ascending aorta is present.  · Moderate aortic  valve regurgitation is present.  · Mild mitral valve regurgitation is present  · Trace tricuspid valve regurgitation is present    Anesthesia: Cath lab/Echo lab moderate sedation    I was present with the patient for the duration of moderate sedation and supervised staff who had no other duties and monitored the patient for the entire procedure.    Name of independent trained observer: Deja Jha RN  Intra-service start time: 1403  Intra-service end time: 1430      I have reviewed the medications:  Scheduled Meds:aspirin, 81 mg, Oral, Daily   Or  aspirin, 300 mg, Rectal, Daily  atorvastatin, 80 mg, Oral, Nightly  carvedilol, 3.125 mg, Oral, BID With Meals  fentaNYL citrate (PF), , ,   furosemide, 40 mg, Intravenous, Once  furosemide, 20 mg, Oral, Daily  levETIRAcetam, 250 mg, Intravenous, Q12H  midazolam, , ,   pharmacy consult - MTM, , Does not apply, Daily  senna-docusate sodium, 2 tablet, Oral, BID  sodium chloride, 10 mL, Intravenous, Q12H  sodium chloride, 10 mL, Intravenous, Q12H  spironolactone, 25 mg, Oral, Daily      Continuous Infusions:[START ON 3/14/2022] phenylephrine, 0.5-3 mcg/kg/min      PRN Meds:.•  acetaminophen **OR** acetaminophen **OR** acetaminophen  •  atropine  •  bisacodyl  •  bisacodyl  •  HYDROcodone-acetaminophen  •  magnesium hydroxide  •  magnesium sulfate **OR** magnesium sulfate in D5W 1g/100mL (PREMIX) **OR** magnesium sulfate  •  [] Morphine **AND** naloxone  •  ondansetron  •  sodium chloride  •  sodium chloride  •  sodium chloride  •  sodium chloride    Assessment/Plan   Assessment & Plan     Active Hospital Problems    Diagnosis  POA   • **Acute on chronic systolic CHF (congestive heart failure) (HCC) [I50.23]  Yes   • Severe aortic stenosis [I35.0]  Yes   • CHF due to valvular disease (HCC) [I50.9, I38]  Yes   • Nonrheumatic aortic valve insufficiency [I35.1]  Yes   • Diastolic congestive heart failure (HCC) [I50.30]  Yes   • Prediabetes [R73.03]  Yes   •  Hyperlipidemia [E78.5]  Yes   • Essential hypertension [I10]  Yes   • Abdominal aortic aneurysm (AAA) without rupture (HCC) [I71.4]  Yes      Resolved Hospital Problems   No resolved problems to display.     Brief Hospital Course to date:  Ash Diez is a 89 y.o. male with hypertension, AAA, hyperlipidemia, diastolic heart failure, valvular heart disease admitted for shortness of breath secondary to acute on chronic heart failure.  Improved with diuresis.    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    -patient now wishing to pursue TAVR, undergoing workup (ct angio chest/abd/pelvis, carotid dopplers)  -[facility] 3/8/22: mild cad (50% lad, 70% mid circ dz, medical mangement  -3/13/22: Tentative plans for TAVR Monday 3/14/22, however appears may need to be at least postponed, in light of this weekend's events (aphasia, encephalopathy, suspected left corona/left postcentral gyrus cva) as well as increased oxygen requirement now 5Lnc. I will give extra lasix 40mg iv now (in addition to scheduled 20mg po). I have called Francis Landon w/ ct surgery to relay my concerns that should likely at least postpone tavr (wouldn't likely tolerate anesthesia/sedation given current resp/neuro status) and I plan to discuss w/ cards today.    Fluctuating neurologic symptoms (aphasia, transient right hemiparesis, encephalopathy)  Rule out CVA  -3/12/22: code stroke called for aphasia, transient right sided hemparesis. abg normal pco2, Cta h&n no lvo, distal branches left middle cereral artery w/ decreased filling; mri brain: subtle punctate restricted difusion  ldeft corona radiata and left postcentral gyrus; started on asa, statin, empiric low dose keppra. eeg was negative  3/13/22:continue asa/statin; empiric keppra, neuro following     goals/limits  -3/13/22: I have had extensive discussion multiple times over past 24 hours w/ granddaughter at bedside (who, along w/ patient's daughter are surrogates). Granddaughter has expressed patient's wishes to focus on quality of life, which was reason he wished to pursue TAVR in the first place. Patient is no cpr/dni in event of cardiorespiratory arrest. At this point, I have expressed my concerns about clinical trajectory w/ increasing oxygen requirements, encephalopathy/aphasia and I don't think is good candidate for anesthesia/sedation at this point. I have asked palliative care to assist in goals/symptom discussions. If patient significantly deteriorates, then granddaughter has said would be open to transitioning to comfort focussed plan    Am labs: cbc,bmp,mag        **Addendum @ 14:43**  -palliative has seen, patient's daughter & granddaughter have now opted to pursue comfort measures only, so canceling tavr, pursing symptom management. Will cancel labs. If no significant improvement then likely inpatient hospice appropriate      DVT prophylaxis:  Mechanical DVT prophylaxis orders are present.     AM-PAC 6 Clicks Score (PT): 19 (03/12/22 0907)    Disposition: tbd    CODE STATUS:   Code Status and Medical Interventions:   Ordered at: 03/12/22 1159     Medical Intervention Limits:    NO intubation (DNI)     Code Status (Patient has no pulse and is not breathing):    No CPR (Do Not Attempt to Resuscitate)     Medical Interventions (Patient has pulse or is breathing):    Limited Support       Mark Shaver MD  03/13/22

## 2022-03-13 NOTE — PROGRESS NOTES
Stroke Progress Note       Chief Complaint: altered mental status.     Subjective    Subjective     Subjective:  Patient had an event of unresponsiveness and speech arrest yesterday  For which code stroke was initiated.     Review of Systems   UTO    Objective      Temp:  [97.5 °F (36.4 °C)-97.8 °F (36.6 °C)] 97.6 °F (36.4 °C)  Heart Rate:  [51-61] 61  Resp:  [18-24] 20  BP: (130-155)/(56-65) 136/65    NEURO  MENTAL STATUS: not alert, drowsy and opens eyes on painful stimuli   Only mumbles, no spontaneous speech    CRANIAL NERVES:    Pupils equal and reactive, EOMI intact, no gaze deviation, no nystagmus  No facial droop, cough and gag intact  MOTOR:  Withdraws to pain on all extremities     SENSORY: withdraws to pain on all extremities      COORDINATION:  Not assessed due to patient condition    STATION: Not assessed due to patient condition    GAIT: Not assessed due to patient condition    Results Review:    I reviewed the patient's new clinical results.    Lab Results (last 24 hours)     Procedure Component Value Units Date/Time    POC Glucose Once [[id number]]  (Normal) Collected: 03/13/22 1134    Specimen: Blood Updated: 03/13/22 1136     Glucose 99 mg/dL      Comment: Meter: [id number] : [id number] Ashia Dougherty       Hemoglobin A1c [[id number]]  (Abnormal) Collected: 03/13/22 0753    Specimen: Blood Updated: 03/13/22 0910     Hemoglobin A1C 5.70 %     Narrative:      Hemoglobin A1C Ranges:    Increased Risk for Diabetes  5.7% to 6.4%  Diabetes                     >= 6.5%  Diabetic Goal                < 7.0%    Basic Metabolic Panel [[id number]]  (Abnormal) Collected: 03/13/22 0753    Specimen: Blood Updated: 03/13/22 0826     Glucose 91 mg/dL      BUN 35 mg/dL      Creatinine 1.16 mg/dL      Sodium 143 mmol/L      Potassium 4.4 mmol/L      Comment: Slight hemolysis detected by analyzer. Results may be affected.        Chloride 106 mmol/L      CO2 27.0 mmol/L      Calcium 9.1 mg/dL      BUN/Creatinine Ratio  30.2     Anion Gap 10.0 mmol/L      eGFR 58.7 mL/min/1.73      Comment: National Kidney Foundation and American Society of Nephrology (ASN) Task Force recommended calculation based on the Chronic Kidney Disease Epidemiology Collaboration (CKD-EPI) equation refit without adjustment for race.       Narrative:      GFR Normal >60  Chronic Kidney Disease <60  Kidney Failure <15      Lipid Panel [[id number]]  (Abnormal) Collected: 03/13/22 0753    Specimen: Blood Updated: 03/13/22 0826     Total Cholesterol 160 mg/dL      Triglycerides 100 mg/dL      HDL Cholesterol 37 mg/dL      LDL Cholesterol  104 mg/dL      VLDL Cholesterol 19 mg/dL      LDL/HDL Ratio 2.78    Narrative:      Cholesterol Reference Ranges  (U.S. Department of Health and Human Services ATP III Classifications)    Desirable          <200 mg/dL  Borderline High    200-239 mg/dL  High Risk          >240 mg/dL      Triglyceride Reference Ranges  (U.S. Department of Health and Human Services ATP III Classifications)    Normal           <150 mg/dL  Borderline High  150-199 mg/dL  High             200-499 mg/dL  Very High        >500 mg/dL    HDL Reference Ranges  (U.S. Department of Health and Human Services ATP III Classifications)    Low     <40 mg/dl (major risk factor for CHD)  High    >60 mg/dl ('negative' risk factor for CHD)        LDL Reference Ranges  (U.S. Department of Health and Human Services ATP III Classifications)    Optimal          <100 mg/dL  Near Optimal     100-129 mg/dL  Borderline High  130-159 mg/dL  High             160-189 mg/dL  Very High        >189 mg/dL    proBNP [[id number]]  (Abnormal) Collected: 03/13/22 0753    Specimen: Blood Updated: 03/13/22 0824     proBNP 6,908.0 pg/mL     Narrative:      Among patients with dyspnea, NT-proBNP is highly sensitive for the detection of acute congestive heart failure. In addition NT-proBNP of <300 pg/ml effectively rules out acute congestive heart failure with 99% negative predictive  value.    Results may be falsely decreased if patient taking Biotin.      CBC (No Diff) [[id number]]  (Abnormal) Collected: 03/13/22 0755    Specimen: Blood Updated: 03/13/22 0802     WBC 10.77 10*3/mm3      RBC 4.83 10*6/mm3      Hemoglobin 15.2 g/dL      Hematocrit 47.9 %      MCV 99.2 fL      MCH 31.5 pg      MCHC 31.7 g/dL      RDW 15.0 %      RDW-SD 55.3 fl      MPV 10.7 fL      Platelets 213 10*3/mm3     POC Glucose Once [[id number]]  (Normal) Collected: 03/13/22 0725    Specimen: Blood Updated: 03/13/22 0727     Glucose 91 mg/dL      Comment: Meter: [id number] : [id number] Ashia Dougherty       Blood Gas, Arterial With Co-Ox [[id number]]  (Abnormal) Collected: 03/13/22 0315    Specimen: Arterial Blood Updated: 03/13/22 0315     Site Left Radial     Augie's Test N/A     pH, Arterial 7.451 pH units      Comment: 83 Value above reference range        pCO2, Arterial 43.6 mm Hg      pO2, Arterial 80.5 mm Hg      Comment: 84 Value below reference range        HCO3, Arterial 30.4 mmol/L      Base Excess, Arterial 5.6 mmol/L      Hemoglobin, Blood Gas 15.6 g/dL      Hematocrit, Blood Gas 47.9 %      Oxyhemoglobin 95.3 %      Methemoglobin 0.30 %      Carboxyhemoglobin 1.0 %      CO2 Content 31.7 mmol/L      Temperature 37.0 C      Barometric Pressure for Blood Gas --     Comment: N/A        Modality BiPap     FIO2 40 %      Rate 0 Breaths/minute      PIP 0 cmH2O      Comment: Meter: [id number]     :  [id number]        IPAP 0     EPAP 0     Note --     pH, Temp Corrected 7.451 pH Units      pCO2, Temperature Corrected 43.6 mm Hg      pO2, Temperature Corrected 80.5 mm Hg     POC Glucose Once [[id number]]  (Normal) Collected: 03/12/22 2113    Specimen: Blood Updated: 03/12/22 2115     Glucose 97 mg/dL      Comment: Meter: [id number] : [id number] Cortes Banda       Urinalysis, Microscopic Only - Urine, Clean Catch [[id number]] Collected: 03/12/22 1831    Specimen: Urine, Clean Catch Updated: 03/12/22 1848      RBC, UA 0-2 /HPF      WBC, UA 0-2 /HPF      Bacteria, UA None Seen /HPF      Squamous Epithelial Cells, UA None Seen /HPF      Hyaline Casts, UA 0-6 /LPF      Methodology Automated Microscopy    Urinalysis With Culture If Indicated - Urine, Clean Catch [[id number]]  (Abnormal) Collected: 03/12/22 1831    Specimen: Urine, Clean Catch Updated: 03/12/22 1844     Color, UA Yellow     Appearance, UA Clear     pH, UA <=5.0     Specific Gravity, UA 1.040     Glucose, UA Negative     Ketones, UA Negative     Bilirubin, UA Negative     Blood, UA Trace     Protein, UA Negative     Leuk Esterase, UA Negative     Nitrite, UA Negative     Urobilinogen, UA 1.0 E.U./dL    Blood Gas, Arterial With Co-Ox [[id number]]  (Abnormal) Collected: 03/12/22 1240    Specimen: Arterial Blood Updated: 03/12/22 1240     Site Left Radial     Augie's Test N/A     pH, Arterial 7.448 pH units      pCO2, Arterial 40.4 mm Hg      pO2, Arterial 72.1 mm Hg      Comment: 84 Value below reference range        HCO3, Arterial 27.9 mmol/L      Base Excess, Arterial 3.6 mmol/L      Hemoglobin, Blood Gas 15.7 g/dL      Hematocrit, Blood Gas 48.1 %      Oxyhemoglobin 94.2 %      Methemoglobin 0.30 %      Carboxyhemoglobin 1.0 %      CO2 Content 29.2 mmol/L      Temperature 37.0 C      Barometric Pressure for Blood Gas --     Comment: N/A        Modality Nasal Cannula     FIO2 40 %      Rate 0 Breaths/minute      PIP 0 cmH2O      Comment: Meter: [id number]     :  [id number]        IPAP 0     EPAP 0     Note --     pH, Temp Corrected 7.448 pH Units      pCO2, Temperature Corrected 40.4 mm Hg      pO2, Temperature Corrected 72.1 mm Hg         EEG    Result Date: 3/12/2022  Diffuse cerebral dysfunction of moderate degree, nonspecific No evidence for epilepsy is seen This report is transcribed using the Dragon dictation system.      CT Angiogram Neck    Result Date: 3/12/2022   1. No significant extracranial vascular stenosis or occlusion. 2. Decreased  filling of the distal branches of the left middle cerebral artery compared with the right raising the question of a small branch vessel occlusion. 3. Hypoplastic left A1 segment.  This report was finalized on 3/12/2022 12:46 PM by Carlito Orozco MD.      CT Chest Without Contrast Diagnostic    Result Date: 3/12/2022   1. Approximately 6.1 cm ascending thoracic aortic aneurysm, with minimal if any change from 9/5/2019 unenhanced chest CT scan. No evidence of leak or dissection. 2. Evidence of congestive heart failure/volume overload, with pulmonary interstitial edema and moderate to large bilateral pleural effusions. 3. Shotty, reactive appearing mediastinal lymphadenopathy incidentally noted.  This report was finalized on 3/12/2022 12:49 PM by Dr. Tam Montoya MD.      MRI Brain Without Contrast    Result Date: 3/12/2022  Subtle, almost punctate areas of restricted diffusion that appear to localize to the left corona radiata and left postcentral gyrus. 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  This report was finalized on 3/12/2022 6:01 PM by Dr. Tam Montoya MD.      XR Chest 1 View    Result Date: 3/13/2022  There is extensive interstitial and patchy airspace changes throughout the lungs bilaterally which could be inflammatory or infectious with a component of edema a consideration. Cardiac silhouette appears moderately enlarged. Evaluation is limited due to poor positioning and leftward rotation. Electronically signed by:  Farshad Ervin D.O.  3/13/2022 3:09 AM Mountain Time    CT Head Without Contrast Stroke Protocol    Result Date: 3/12/2022   1. Generalized atrophy with chronic white matter changes throughout the periventricular region. 2. Interval appearance of a chronic infarct involving the right frontal operculum and right insular cortex. 3. No acute intracranial findings. 4. Bilateral maxillary and right ethmoid sinus disease.  This report was finalized on  3/12/2022 12:17 PM by Carlito Orozco MD.      CT Angiogram Head w AI Analysis of LVO    Result Date: 3/12/2022   1. No significant extracranial vascular stenosis or occlusion. 2. Decreased filling of the distal branches of the left middle cerebral artery compared with the right raising the question of a small branch vessel occlusion. 3. Hypoplastic left A1 segment.  This report was finalized on 3/12/2022 12:46 PM by Carlito Orozco MD.      Results for orders placed during the hospital encounter of 03/02/22    Adult Transesophageal Echo ([facility]) W/ Cont if Necessary Per Protocol    Interpretation Summary  · Estimated left ventricular EF = 35-40% Left ventricular systolic function is moderately decreased.  · Left ventricular wall thickness is consistent with mild concentric hypertrophy.  · Severe aortic valve stenosis is present.  · Severe dilation of the ascending aorta is present.  · Moderate aortic valve regurgitation is present.  · Mild mitral valve regurgitation is present  · Trace tricuspid valve regurgitation is present    Anesthesia: Cath lab/Echo lab moderate sedation    I was present with the patient for the duration of moderate sedation and supervised staff who had no other duties and monitored the patient for the entire procedure.    Name of independent trained observer: Deja Jha RN  Intra-service start time: 1403  Intra-service end time: 1430            Assessment/Plan     Assessment/Plan:    89 with vascular risk factors including atrial fibrillation not on anticoagulation who has been scheduled for TAVR, had an acute onset of mental changes with patient unresponsive, becoming pale, confused, questionable right-sided weakness.    Acute stroke imaging included CT head which evidenced old right MCA stroke, CTA head and neck revealed no target vessel occlusion for any intervention therapy.  MRI brain evidence subtle punctate restricted diffusion in the left corona radiata.    Acute ischemic stroke, left MCA  territory  -Subtle punctate infarct does not explain patient clinical status.  -Episode was concerning for seizure, although EEG is negative, patient was started on Keppra 250 twice daily  -Continue aspirin statin.,  From stroke standpoint okay for heparin GTT.    Neurology will continue to follow the patient.    Ryan Parra MD  03/13/22  12:14 EDT

## 2022-03-13 NOTE — PLAN OF CARE
Goal Outcome Evaluation   Patient's TAVR cancelled due to ongoing neurological change with no significant improvement.  Patient remains on 5L NC. GOC: Comfort measures only.

## 2022-03-13 NOTE — THERAPY RE-EVALUATION
Patient Name: Ash Diez  : 10/4/1928    MRN: [id number]                              Today's Date: 3/13/2022       Admit Date: 3/2/2022    Visit Dx:     ICD-10-CM ICD-9-CM   1. Dysphagia, unspecified type  R13.10 787.20   2. Acute respiratory failure with hypoxia (HCC)  J96.01 518.81   3. Acute on chronic congestive heart failure, unspecified heart failure type (HCC)  I50.9 428.0   4. COVID-19 ruled out by laboratory testing  Z20.822 V01.79   5. 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 has been evaluted at . pt decided agaisnt surgery    • Cancer (HCC)     colon   • Cellulitis     RLE   • CHF (congestive heart failure) (HCC)    • Hyperlipidemia    • Hypertension    • Pneumonia      Past Surgical History:   Procedure Laterality Date   • CARDIAC CATHETERIZATION N/A 3/8/2022    Procedure: LEFT HEART CATH;  Surgeon: Khloe Pena MD;  Location: [facility] CATH INVASIVE LOCATION;  Service: Cardiology;  Laterality: N/A;   • COLON SURGERY     • CORONARY STENT PLACEMENT     • VASCULAR SURGERY      Vein removal      General Information     Row Name 22 1546          OT Time and Intention    Document Type re-evaluation  -KF     Mode of Treatment occupational therapy  -KF     Row Name 22 1546          General Information    Patient Profile Reviewed yes  -KF     Existing Precautions/Restrictions fall;oxygen therapy device and L/min  LUE h/o humerus fx; perseverates on phrases, responds to single word commands  -KF      "Barriers to Rehab medically complex;previous functional deficit;cognitive status;hearing deficit  -     Row Name 03/13/22 1546          Living Environment    People in Home facility resident  see IE for further information [facility] resident  -     Row Name 03/13/22 1546          Cognition    Orientation Status (Cognition) oriented to;person;disoriented to;place;situation;time  -     Row Name 03/13/22 1546          Safety Issues, Functional Mobility    Safety Issues Affecting Function (Mobility) awareness of need for assistance;insight into deficits/self-awareness;ability to follow commands;problem-solving;safety precaution awareness;sequencing abilities  -KF     Impairments Affecting Function (Mobility) balance;endurance/activity tolerance;postural/trunk control;shortness of breath;strength;cognition  -KF     Cognitive Impairments, Mobility Safety/Performance attention;awareness, need for assistance;insight into deficits/self-awareness;problem-solving/reasoning;safety precaution awareness;sequencing abilities  -     Comment, Safety Issues/Impairments (Mobility) perseverates on phrases  -           User Key  (r) = Recorded By, (t) = Taken By, (c) = Cosigned By    Initials Name Provider Type    KF Charo Trinidad, OT Occupational Therapist                 Mobility/ADL's     Row Name 03/13/22 1548          Bed Mobility    Bed Mobility rolling left;rolling right  -     Rolling Left [place] (Bed Mobility) contact guard;verbal cues;nonverbal cues (demo/gesture)  -     Rolling Right [place] (Bed Mobility) minimum assist (75% patient effort);nonverbal cues (demo/gesture);verbal cues  -     Assistive Device (Bed Mobility) bed rails;draw sheet;head of bed elevated  -     Comment, (Bed Mobility) due to medical status, RN deferred sitting EOB at this time; responds to single word response of \"roll\" well with tactile cues  -     Row Name 03/13/22 1548          Transfers    Comment, (Transfers) " deferred at this time  -     Row Name 03/13/22 1548          Functional Mobility    Functional Mobility- Ind. Level unable to perform  -KF     Row Name 03/13/22 1548          Activities of Daily Living    BADL Assessment/Intervention toileting;grooming  -     Row Name 03/13/22 1548          Grooming Assessment/Training    [place] Level (Grooming) wash face, hands;maximum assist (25% patient effort)  -KF     Assistive Devices (Grooming) hand over hand  -KF     Position (Grooming) sitting up in bed  -KF     Comment, (Grooming) initially unable to participate then post washing hands x1 depA then Pt provided wash cloth and demonstrates ability with Ranjeet to wash hands, depA however to wash face with hand over hand  -KF     Row Name 03/13/22 1548          Toileting Assessment/Training    [place] Level (Toileting) dependent (less than 25% patient effort);toileting skills;adjust/manage clothing;change pad/brief  -KF     Position (Toileting) supine  -           User Key  (r) = Recorded By, (t) = Taken By, (c) = Cosigned By    Initials Name Provider Type    KF Charo Trinidad, OT Occupational Therapist               Obj/Interventions     Row Name 03/13/22 1552          Sensory Assessment (Somatosensory)    Sensory Assessment (Somatosensory) UE sensation intact;unable/difficult to assess  -[facility] Name 03/13/22 1552          Range of Motion Comprehensive    Comment, General Range of Motion RUE WFL; L UE chronic fx shoulder with limited shoulder flexion; L elbow/wrist/hand WFL  -[facility] Name 03/13/22 1552          Strength Comprehensive (MMT)    Comment, General Manual Muscle Testing (MMT) Assessment unable to follow for formal assessment due to COG  -KF     [facility] Name 03/13/22 1552          Shoulder (Therapeutic Exercise)    Shoulder (Therapeutic Exercise) AAROM (active assistive range of motion)  AAROM with AROM to guide and seq  -KF     Shoulder AROM (Therapeutic Exercise)  flexion;extension;right;supine;5 repetitions  -     Row Name 03/13/22 1552          Motor Skills    Therapeutic Exercise shoulder  -KF           User Key  (r) = Recorded By, (t) = Taken By, (c) = Cosigned By    Initials Name Provider Type    Charo Dalton, OT Occupational Therapist               Goals/Plan     Row Name 03/13/22 1600          Transfer Goal 1 (OT)    Progress/Outcome (Transfer Goal 1, OT) medical status inhibited participation;goal no longer appropriate;goal revised this date  -     Row Name 03/13/22 1600          Dressing Goal 1 (OT)    Activity/Device (Dressing Goal 1, OT) reacher;long-handled shoe horn;sock-aid  -KF     [place]/Cues Needed (Dressing Goal 1, OT) minimum assist (75% or more patient effort);verbal cues required  -KF     Time Frame (Dressing Goal 1, OT) 10 days;long term goal (LTG)  -KF     Progress/Outcome (Dressing Goal 1, OT) goal no longer appropriate;medical status inhibited participation  -     Row Name 03/13/22 1600          Toileting Goal 1 (OT)    Activity/Device (Toileting Goal 1, OT) adjust/manage clothing;perform perineal hygiene;commode;grab bar/safety frame  -KF     [place] Level/Cues Needed (Toileting Goal 1, OT) verbal cues required;minimum assist (75% or more patient effort)  -KF     Time Frame (Toileting Goal 1, OT) 10 days;long term goal (LTG)  -KF     Progress/Outcome (Toileting Goal 1, OT) goal no longer appropriate;medical status inhibited participation  -     Row Name 03/13/22 1600          Grooming Goal 1 (OT)    Activity/Device (Grooming Goal 1, OT) hair care;oral care;wash face, hands  in sitting  -KF     [place] (Grooming Goal 1, OT) moderate assist (50-74% patient effort)  -KF     Time Frame (Grooming Goal 1, OT) long term goal (LTG);10 days  -KF     Strategies/Barriers (Grooming Goal 1, OT) hand over hand assist PRN  -KF     Progress/Outcome (Grooming Goal 1, OT) goal ongoing  -     Row Name 03/13/22 1600           Self-Feeding Goal 1 (OT)    Activity/Device (Self-Feeding Goal 1, OT) liquids to mouth;scoop food and bring to mouth  AE PRN  -KF     [place] Level/Cues Needed (Self-Feeding Goal 1, OT) supervision required;set-up required;verbal cues required;tactile cues required  -KF     Time Frame (Self-Feeding Goal 1, OT) long term goal (LTG);10 days  -KF     Progress/Outcomes (Self-Feeding Goal 1, OT) goal ongoing  -KF           User Key  (r) = Recorded By, (t) = Taken By, (c) = Cosigned By    Initials Name Provider Type    KF Charo Trinidad, OT Occupational Therapist               Clinical Impression     Row Name 03/13/22 1553          Pain Assessment    Additional Documentation Pain Scale: FACES Pre/Post-Treatment (Group)  -KF     Row Name 03/13/22 1553          Pain Scale: FACES Pre/Post-Treatment    Pain: FACES Scale, Pretreatment 0-->no hurt  -KF     Posttreatment Pain Rating 0-->no hurt  -KF     Pain Location - Side/Orientation Left  -KF     Pain Location generalized  -KF     Pain Location - shoulder  -KF     Pre/Posttreatment Pain Comment only with movement LUE shoulder  -KF     Row Name 03/13/22 1553          Plan of Care Review    Plan of Care Reviewed With patient;family  -KF     Progress declining  -KF     Outcome Evaluation OT re-eval completed Pt able to follow 25% single step commands and responds best to single word commands with tactile cues especially for rolling R/L with Ranjeet required, depA washing face, maxA washing hands, 5 reps RUE shoulder flexion supine tolerated with AAROM/AROM, depA toileting, at this time recom 3xwk pending progress and GOC  -KF     Row Name 03/13/22 1553          Therapy Assessment/Plan (OT)    Rehab Potential (OT) fair, will monitor progress closely  -KF     Criteria for Skilled Therapeutic Interventions Met (OT) skilled treatment is necessary;meets criteria  -KF     Therapy Frequency (OT) 3 times/wk  -KF     Row Name 03/13/22 1553          Therapy Plan Review/Discharge  Plan (OT)    Anticipated Discharge Disposition (OT) skilled nursing facility  pending medical status  -KF     Row Name 03/13/22 1553          Vital Signs    Pre Systolic BP Rehab 136  -KF     Pre Treatment Diastolic BP 65  -KF     Post Systolic BP Rehab 117  -KF     Post Treatment Diastolic BP 81  -KF     Pre SpO2 (%) 95  -KF     O2 Delivery Pre Treatment supplemental O2  -KF     Post SpO2 (%) 95  -KF     O2 Delivery Post Treatment supplemental O2  -KF     Pre Patient Position Supine  -KF     Intra Patient Position Side Lying  -KF     Post Patient Position Supine  -KF     Row Name 03/13/22 1553          Positioning and Restraints    Pre-Treatment Position in bed  -KF     Post Treatment Position bed  -KF     In Bed notified nsg;supine;fowlers;side rails up x3;call light within reach;encouraged to call for assist;exit alarm on;with family/caregiver;RUE elevated;LUE elevated;legs elevated  seizure pads  -KF           User Key  (r) = Recorded By, (t) = Taken By, (c) = Cosigned By    Initials Name Provider Type    KF Charo Trinidad, OT Occupational Therapist               Outcome Measures     Row Name 03/13/22 1601          How much help from another is currently needed...    Putting on and taking off regular lower body clothing? 1  -KF     Bathing (including washing, rinsing, and drying) 1  -KF     Toileting (which includes using toilet bed pan or urinal) 1  -KF     Putting on and taking off regular upper body clothing 2  -KF     Taking care of personal grooming (such as brushing teeth) 2  -KF     Eating meals 2  -KF     AM-PAC 6 Clicks Score (OT) 9  -KF     Row Name 03/13/22 1602 03/13/22 0826       How much help from another person do you currently need...    Turning from your back to your side while in flat bed without using bedrails? 3  -KG 1  -LC    Moving from lying on back to sitting on the side of a flat bed without bedrails? 3  -KG 1  -LC    Moving to and from a bed to a chair (including a wheelchair)? 2   -KG 1  -LC    Standing up from a chair using your arms (e.g., wheelchair, bedside chair)? 2  -KG 1  -LC    Climbing 3-5 steps with a railing? 1  -KG 1  -LC    To walk in hospital room? 1  -KG 1  -LC    AM-PAC 6 Clicks Score (PT) 12  -KG 6  -LC    Row Name 03/13/22 1604          Modified Jose Scale    Modified Jose Scale 4 - Moderately severe disability.  Unable to walk without assistance, and unable to attend to own bodily needs without assistance.  -KF     Row Name 03/13/22 1604 03/13/22 1602       Functional Assessment    Outcome Measure Options Modified Jose  -KF AM-PAC 6 Clicks Basic Mobility (PT)  -KG    Row Name 03/13/22 1601          Functional Assessment    Outcome Measure Options AM-PAC 6 Clicks Daily Activity (OT)  -KF           User Key  (r) = Recorded By, (t) = Taken By, (c) = Cosigned By    Initials Name Provider Type    KF Charo Trinidad OT Occupational Therapist    Callie Galdamez Physical Therapist    Stacy Cruz, SOTO Registered Nurse                Occupational Therapy Education                 Title: PT OT SLP Therapies (In Progress)     Topic: Occupational Therapy (Done)     Point: ADL training (Done)     Description:   Instruct learner(s) on proper safety adaptation and remediation techniques during self care or transfers.   Instruct in proper use of assistive devices.              Learning Progress Summary           Patient Acceptance, E,TB,D, VU,NL by  at 3/13/2022 1601    Acceptance, E, VU by  at 3/10/2022 1517    Acceptance, E,TB,D, DU,VU,NR by  at 3/7/2022 1023    Comment: incorporated PLB and seq    Acceptance, E, NR by  at 3/3/2022 0820   Family Acceptance, E,TB,D, VU,NL by  at 3/13/2022 1601    Acceptance, E, VU by  at 3/10/2022 1517    Acceptance, E,TB,D, DU,VU,NR by  at 3/7/2022 1023    Comment: incorporated PLB and seq                   Point: Home exercise program (Done)     Description:   Instruct learner(s) on appropriate technique for monitoring,  assisting and/or progressing therapeutic exercises/activities.              Learning Progress Summary           Patient Acceptance, E, VU by  at 3/10/2022 1517    Acceptance, E,TB,D, DU,VU,NR by  at 3/7/2022 1023    Comment: incorporated PLB and seq   Family Acceptance, E, VU by  at 3/10/2022 1517    Acceptance, E,TB,D, DU,VU,NR by  at 3/7/2022 1023    Comment: incorporated PLB and seq                   Point: Precautions (Done)     Description:   Instruct learner(s) on prescribed precautions during self-care and functional transfers.              Learning Progress Summary           Patient Acceptance, E,TB,D, VU,NL by  at 3/13/2022 1601    Acceptance, E,TB,D, DU,VU,NR by  at 3/7/2022 1023    Comment: incorporated PLB and seq    Acceptance, E, NR by  at 3/3/2022 0820   Family Acceptance, E,TB,D, VU,NL by  at 3/13/2022 1601    Acceptance, E,TB,D, DU,VU,NR by  at 3/7/2022 1023    Comment: incorporated PLB and seq                   Point: Body mechanics (Done)     Description:   Instruct learner(s) on proper positioning and spine alignment during self-care, functional mobility activities and/or exercises.              Learning Progress Summary           Patient Acceptance, E,TB,D, VU,NL by  at 3/13/2022 1601    Acceptance, E,TB,D, DU,VU,NR by  at 3/7/2022 1023    Comment: incorporated PLB and seq    Acceptance, E, NR by  at 3/3/2022 0820   Family Acceptance, E,TB,D, VU,NL by  at 3/13/2022 1601    Acceptance, E,TB,D, DU,VU,NR by  at 3/7/2022 1023    Comment: incorporated PLB and seq                               User Key     Initials Effective Dates Name Provider Type Discipline     06/16/21 -  Charo Trinidad OT Occupational Therapist OT     06/16/21 -  Jenn Guevara OT Occupational Therapist OT     06/16/21 -  Katina Romeo OT Occupational Therapist OT              OT Recommendation and Plan  Therapy Frequency (OT): 3 times/wk  Plan of Care Review  Plan of Care Reviewed With:  patient, family  Progress: declining  Outcome Evaluation: OT re-eval completed Pt able to follow 25% single step commands and responds best to single word commands with tactile cues especially for rolling R/L with [place] required, depA washing face, maxA washing hands, 5 reps RUE shoulder flexion supine tolerated with AAROM/AROM, depA toileting, at this time recom 3xwk pending progress and GOC     Time Calculation:    Time Calculation- OT     Row Name 03/13/22 1456             Time Calculation- OT    OT Start Time 1456  -KF      OT Received On 03/13/22  -KF      OT Goal Re-Cert Due Date 03/23/22  -KF              Timed Charges    38802 - OT Therapeutic Exercise Minutes 5  -KF      33792 - OT Therapeutic Activity Minutes 5  -KF              Untimed Charges    OT Eval/Re-eval Minutes 23  -KF              Total Minutes    Timed Charges Total Minutes 10  -KF      Untimed Charges Total Minutes 23  -KF       Total Minutes 33  -KF            User Key  (r) = Recorded By, (t) = Taken By, (c) = Cosigned By    Initials Name Provider Type    KF Charo Trinidad OT Occupational Therapist              Therapy Charges for Today     Code Description Service Date Service Provider Modifiers Qty    [id number]  OT THER PROC EA 15 MIN 3/13/2022 Charo Trinidad OT GO 1    [id number]  OT RE-EVAL 2 3/13/2022 Charo Trinidad OT GO 1               Charo Trinidad OT  3/13/2022

## 2022-03-13 NOTE — PLAN OF CARE
"Goal Outcome Evaluation:  Plan of Care Reviewed With: grandchild(arthur)        Progress: declining  Outcome Evaluation: pt minimally responsive to pain throughout night, unable to complete NIH, pt not cooperating or following commands, very somnolent, APRN notified, bipap ordered, abgs ordered, EEG completed with no seizure activity.  Pt responds \"okay\" to questions and commands, after wearing bipap for approx 4 hours, pt took it off his face and yelled for us to \"leave it alone\", put pt back on 5L nc, cards notified of tachypnea and refusal of bipap, cxr ordered, one time dose of lasix ordered.  Tachypneic and bradycardic, otherwise vss, will continue to monitor.  "

## 2022-03-13 NOTE — PLAN OF CARE
Goal Outcome Evaluation:  Plan of Care Reviewed With: patient, daughter        Progress: declining  Outcome Evaluation: Re-eval performed.  pt presenting with decline function.  Oriented x1.  Difficulty to formerly assess strength, but resisted SLR on R weaker than LLE.  Rolling R min-A and can maintain sidelying with CGA.  Rolling Left performed with CGA and can maintain sidelying.  nursing requested deferring OOB actvity today.  Able to actively participate in LE bed level exercises, does best with simple commands.  Continued recommendation SNF pending medical status

## 2022-03-13 NOTE — PROGRESS NOTES
[facility] Cardiothoracic Surgery In-Patient Progress Note     LOS: 11 days     Chief Complaint: Shortness of breath     Subjective  No complaints this morning. VSS on 5 L NC.    Not following commands  Objective    Vital Signs  Temp:  [97.4 °F (36.3 °C)-97.8 °F (36.6 °C)] 97.7 °F (36.5 °C)  Heart Rate:  [51-67] 55  Resp:  [20-24] 22  BP: (127-142)/(56-68) 138/56    Physical Exam   General Appearance: alert, appears stated age and cooperative   Lungs: Decreased lung sounds bilaterally. No adventitious breath sounds  noted.   Heart: Grade II-III systolic murmur   Exam unchanged  Results     Results from last 7 days   Lab Units 03/12/22  0659   WBC 10*3/mm3 12.62*   HEMOGLOBIN g/dL 16.0   HEMATOCRIT % 51.1*   PLATELETS 10*3/mm3 220     Results from last 7 days   Lab Units 03/12/22  0659   SODIUM mmol/L 140   POTASSIUM mmol/L 4.6   CHLORIDE mmol/L 103   CO2 mmol/L 28.0   BUN mg/dL 39*   CREATININE mg/dL 1.12   GLUCOSE mg/dL 118*   CALCIUM mg/dL 9.3       Imaging Results (Last 24 Hours)     Procedure Component Value Units Date/Time    XR Chest 1 View [[id number]] Collected: 03/13/22 0509     Updated: 03/13/22 0510    Narrative:      Chest one view.    DATE: 3/13/2022.    COMPARISON: 3/6/2022.    CLINICAL HISTORY: Tachypnea.      Impression:        There is extensive interstitial and patchy airspace changes throughout the lungs bilaterally which could be inflammatory or infectious with a component of edema a consideration.    Cardiac silhouette appears moderately enlarged. Evaluation is limited due to poor positioning and leftward rotation.    Electronically signed by:  Farshad Ervin D.O.    3/13/2022 3:09 AM Mountain Time    MRI Brain Without Contrast [[id number]] Collected: 03/12/22 1749     Updated: 03/12/22 1804    Narrative:      DATE OF EXAM: 3/12/2022 5:01 PM     PROCEDURE: MRI BRAIN WO CONTRAST-     INDICATIONS: Stroke, follow up; R13.10-Dysphagia, unspecified;  J96.01-Acute respiratory failure with hypoxia;  I50.9-Heart failure,  unspecified; Z20.822-Contact with and (suspected) exposure to covid-19;  I35.0-Nonrheumatic aortic (valve) stenosis     COMPARISON: Head CT scan of same day     TECHNIQUE: Multiplanar multisequence images of the brain were performed  without contrast according to routine brain MRI protocol.      FINDINGS:   There is a subtle punctate area of restricted diffusion that appears to  localize to the left post central gyrus, barely above the level of  artifact. Slight signal changes are seen of the adjacent cortex, but not  definite for infarct. More inferiorly, however, there appear to be small  areas of restricted diffusion in the left corona radiata, with weak ADC  map correlation. No potential acute infarction is seen elsewhere.     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 There are no  signal changes suggestive of hemorrhage, no evidence of hydrocephalus,  mass or mass effect or abnormal extra-axial collection. There is  expected flow signal in the major intracranial arteries and median  sagittal sinus. No gross abnormalities to the orbits or mastoids. There  is mild right maxillary sinus mucosal thickening. Sagittal images show  the midline structures to appear grossly normal.             Impression:      Subtle, almost punctate areas of restricted diffusion that appear to  localize to the left corona radiata and left postcentral gyrus. 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     This report was finalized on 3/12/2022 6:01 PM by Dr. Tam Montoya MD.       CT Chest Without Contrast Diagnostic [[id number]] Collected: 03/12/22 1241     Updated: 03/12/22 1252    Narrative:      DATE OF EXAM: 3/12/2022 12:18 PM     PROCEDURE: CT CHEST WO CONTRAST DIAGNOSTIC-     INDICATIONS: AMS; assess known AAA;  J96.01-Acute respiratory failure  with hypoxia; I50.9-Heart failure, unspecified; Z20.822-Contact with and  (suspected) exposure to covid-19; I35.0-Nonrheumatic aortic (valve)  stenosis     COMPARISON: 9/5/2019 unenhanced chest CT scan     TECHNIQUE: Routine transaxial slices were obtained through the chest  without the administration of intravenous contrast. Reconstructed  coronal and sagittal images were also obtained. Automated exposure  control and iterative construction methods were used.     The radiation dose reduction device was turned on for each scan per the  ALARA (As Low as Reasonably Achievable) protocol.     FINDINGS:  The current study has residual intravascular contrast from earlier  angiographic studies of head and neck. There is ectasia of the ascending  aorta to a maximum of 6.1 cm transverse luminal diameter on axial  images, 6.2 cm as measured on sagittal reconstruction images and 6.0 cm  as measured on coronal imaging series. There is no evidence of  dissection. There is heavy calcification of the aortic valve, and  moderate diffuse coronary artery calcification. No pericardial effusion  is seen. There are moderate to large free-flowing bilateral pleural  effusions. There is mild shotty mediastinal lymphadenopathy, a little  increased from the prior study, but not extensive.     Lung window images show diffuse reticular interstitial disease pattern  and prominent vasculature typical of pulmonary edema/volume overload.  There is moderate atelectasis of the lower lungs associated with the  effusions.     Included images of the upper abdomen and left upper pole renal cyst, and  contrast in the renal collecting systems which are nondilated.  Granulomatous calcifications are seen in the otherwise normal-appearing  included portions of the liver and spleen. Included gallbladder  pancreatic tail and adrenal glands appear unremarkable. Bony structures  appear intact.        Impression:         1.  Approximately 6.1 cm ascending thoracic aortic aneurysm, with minimal  if any change from 9/5/2019 unenhanced chest CT scan. No evidence of  leak or dissection.  2. Evidence of congestive heart failure/volume overload, with pulmonary  interstitial edema and moderate to large bilateral pleural effusions.  3. Shotty, reactive appearing mediastinal lymphadenopathy incidentally  noted.     This report was finalized on 3/12/2022 12:49 PM by Dr. Tam Montoya MD.       CT Angiogram Head w AI Analysis of LVO [[id number]] Collected: 03/12/22 1239     Updated: 03/12/22 1249    Narrative:      EXAMINATION: CT ANGIOGRAM HEAD W AI ANALYSIS OF LVO-, CT ANGIOGRAM NECK-        INDICATION: Stroke, follow up; J96.01-Acute respiratory failure with  hypoxia; I50.9-Heart failure, unspecified; Z20.822-Contact with and  (suspected) exposure to covid-19; I35.0-Nonrheumatic aortic (valve)  stenosis new onset aphasia     TECHNIQUE: CTA of the head was performed after the intravenous  administration of 75 mL of Isovue 370. Reconstructed coronal and  sagittal images were also obtained. In addition, a 3-D volume rendered  image was obtained after post processing. Automated exposure control and  iterative reconstruction methods were used.     The radiation dose reduction device was turned on for each scan per the  ALARA (As Low as Reasonably Achievable) protocol.     AI analysis of LVO was utilized.     Stenosis measurement was performed by the NASCET or similar method.     COMPARISON: No comparisons available.     FINDINGS:   Nonvascular: There are large bilateral dependent pleural effusions.  There is hazy airspace disease present throughout both upper lobes which  may represent pulmonary edema. There is no significant spinal  lymphadenopathy. Thyroid is normal. There is no supraclavicular or  cervical lymphadenopathy. There is cervical degenerative disc disease  most pronounced at the C3-4 level. Mild mucosal disease is present in  the right  maxillary sinus and there is right ethmoid mucosal disease.  The mastoid air cells are clear. Intracranially there is generalized  atrophy with chronic areas of infarction involving the right insular  cortex and right frontal lobe inferiorly.     Vascular: The ascending aorta is dilated measuring 5.2 cm per the  proximal great vessels appear widely patent. Both the right and left  vertebral arteries are widely patent. There is no significant basilar  stenosis. There is atherosclerotic calcified plaque in both carotid  bulbs without hemodynamically significant stenosis. Mild calcified  plaque is present in the right and left carotid siphon. Intracranially,  there is normal filling of the anterior cerebral arteries. The left A1  segment is hypoplastic. The right middle cerebral artery appears to fill  normally. On the left, the middle cerebral artery is widely patent.  There is decreased perfusion of the distal branches of the left middle  cerebral artery compared with the right. Both posterior cerebral  arteries fill normally.       Impression:         1. No significant extracranial vascular stenosis or occlusion.  2. Decreased filling of the distal branches of the left middle cerebral  artery compared with the right raising the question of a small branch  vessel occlusion.  3. Hypoplastic left A1 segment.     This report was finalized on 3/12/2022 12:46 PM by Carlito Orozco MD.       CT Angiogram Neck [[id number]] Collected: 03/12/22 1239     Updated: 03/12/22 1249    Narrative:      EXAMINATION: CT ANGIOGRAM HEAD W AI ANALYSIS OF LVO-, CT ANGIOGRAM NECK-        INDICATION: Stroke, follow up; J96.01-Acute respiratory failure with  hypoxia; I50.9-Heart failure, unspecified; Z20.822-Contact with and  (suspected) exposure to covid-19; I35.0-Nonrheumatic aortic (valve)  stenosis new onset aphasia     TECHNIQUE: CTA of the head was performed after the intravenous  administration of 75 mL of Isovue 370. Reconstructed coronal  and  sagittal images were also obtained. In addition, a 3-D volume rendered  image was obtained after post processing. Automated exposure control and  iterative reconstruction methods were used.     The radiation dose reduction device was turned on for each scan per the  ALARA (As Low as Reasonably Achievable) protocol.     AI analysis of LVO was utilized.     Stenosis measurement was performed by the NASCET or similar method.     COMPARISON: No comparisons available.     FINDINGS:   Nonvascular: There are large bilateral dependent pleural effusions.  There is hazy airspace disease present throughout both upper lobes which  may represent pulmonary edema. There is no significant spinal  lymphadenopathy. Thyroid is normal. There is no supraclavicular or  cervical lymphadenopathy. There is cervical degenerative disc disease  most pronounced at the C3-4 level. Mild mucosal disease is present in  the right maxillary sinus and there is right ethmoid mucosal disease.  The mastoid air cells are clear. Intracranially there is generalized  atrophy with chronic areas of infarction involving the right insular  cortex and right frontal lobe inferiorly.     Vascular: The ascending aorta is dilated measuring 5.2 cm per the  proximal great vessels appear widely patent. Both the right and left  vertebral arteries are widely patent. There is no significant basilar  stenosis. There is atherosclerotic calcified plaque in both carotid  bulbs without hemodynamically significant stenosis. Mild calcified  plaque is present in the right and left carotid siphon. Intracranially,  there is normal filling of the anterior cerebral arteries. The left A1  segment is hypoplastic. The right middle cerebral artery appears to fill  normally. On the left, the middle cerebral artery is widely patent.  There is decreased perfusion of the distal branches of the left middle  cerebral artery compared with the right. Both posterior cerebral  arteries fill  normally.       Impression:         1. No significant extracranial vascular stenosis or occlusion.  2. Decreased filling of the distal branches of the left middle cerebral  artery compared with the right raising the question of a small branch  vessel occlusion.  3. Hypoplastic left A1 segment.     This report was finalized on 3/12/2022 12:46 PM by Carlito Orozco MD.       CT Head Without Contrast Stroke Protocol [[id number]] Collected: 03/12/22 1215     Updated: 03/12/22 1221    Narrative:      DATE OF EXAM: 3/12/2022 11:53 AM     PROCEDURE: CT HEAD WO CONTRAST STROKE PROTOCOL-     INDICATIONS: Neuro deficit, acute, stroke suspected; J96.01-Acute  respiratory failure with hypoxia; I50.9-Heart failure, unspecified;  Z20.822-Contact with and (suspected) exposure to covid-19;  I35.0-Nonrheumatic aortic (valve) stenosis     COMPARISON: 09/05/2019     TECHNIQUE: Routine transaxial and coronal reconstruction images were  obtained through the head without the administration of contrast.  Automated exposure control and iterative reconstruction methods were  used.     The radiation dose reduction device was turned on for each scan per the  ALARA (As Low as Reasonably Achievable) protocol.     FINDINGS:  There is generalized enlargement of the ventricles and CSF containing  spaces. There is an old area of infarction involving the right insular  cortex and the right frontal operculum. There is decreased attenuation  throughout the periventricular white matter in both hemispheres. No mass  lesions, mass effect, acute hemorrhage or edema. No intra or extra-axial  fluid collections are present. There is mucosal disease identified in  both maxillary sinuses and the right ethmoid sinuses,        Impression:         1. Generalized atrophy with chronic white matter changes throughout the  periventricular region.  2. Interval appearance of a chronic infarct involving the right frontal  operculum and right insular cortex.  3. No acute  intracranial findings.  4. Bilateral maxillary and right ethmoid sinus disease.     This report was finalized on 3/12/2022 12:17 PM by Carlito Orozco MD.             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

## 2022-03-13 NOTE — PLAN OF CARE
Goal Outcome Evaluation:  Plan of Care Reviewed With: patient, grandchild(arthur)         SLP dysphagia evaluation completed. No overt signs aspiration with puree and thin liquid trials (not consistently alert to attempt solids) though definite risk factors for silent aspiration present. As focus more QOL, granddaughter confirms, no MBS/FEES attempt at this time. Puree diet and thin liquids, can advance as tolerated when more alert. SLP will check back for tolerance and adjust POC accordingly as needed. Please see note for further details and recommendations.

## 2022-03-14 PROBLEM — I63.512 ACUTE ISCHEMIC LEFT MCA STROKE (HCC): Status: ACTIVE | Noted: 2022-01-01

## 2022-03-14 NOTE — DISCHARGE PLACEMENT REQUEST
"Ash Diez (89 y.o. Male)             Date of Birth   10/04/1928    Social Security Number       Address   [address] [place] [address]    Home Phone   [phone]    MRN   [id number]       Religious   [facility]    Marital Status                               Admission Date   3/2/22    Admission Type   Emergency    Admitting Provider   Flori Multani MD    Attending Provider   Flori Multani MD    Department, Room/Bed   [address], S468/1       Discharge Date       Discharge Disposition       Discharge Destination                               Attending Provider: Flori Multani MD    Allergies: No Known Allergies    Isolation: None   Infection: None   Code Status: No CPR   Advance Care Planning Activity    Ht: 181.6 cm (71.5\")   Wt: 96.8 kg (213 lb 6.5 oz)    Admission Cmt: None   Principal Problem: Acute on chronic systolic CHF (congestive heart failure) (HCC) [I50.23]                 Active Insurance as of 3/2/2022     Primary Coverage     Payor Plan Insurance Group Employer/Plan Group    MEDICARE MEDICARE A & B      Payor Plan Address Payor Plan Phone Number Payor Plan Fax Number Effective Dates    [address] [phone]  10/1/1993 - None Entered    [facility] [id number]       Subscriber Name Subscriber Birth Date Member ID       ASH DIEZ 10/4/1928 [id number]           Secondary Coverage     Payor Plan Insurance Group Employer/Plan Group    AARP [employer] SUP AARP HEALTH CARE OPTIONS      Payor Plan Address Payor Plan Phone Number Payor Plan Fax Number Effective Dates    [facility] [phone]  1/1/2019 - None Entered    [address]       [facility] [address]       Subscriber Name Subscriber Birth Date Member ID       ASH DIEZ 10/4/1928 [id number]                 Emergency Contacts      (Rel.) Home Phone Work Phone Mobile Phone    PEPE HERNÁNDEZ (Daughter) [phone] -- [phone]    MateusGregoria dorsey (Grandchild) [phone] -- --    "         Emergency Contact Information     Name Relation Home Work Mobile    PEPE HERNÁNDEZ Daughter [phone]    Gregoria Hughes Grandchild [phone]            Insurance Information                MEDICARE/MEDICARE A & B Phone: [phone]    Subscriber: Ash Diez Subscriber#: [id number]    Group#: -- Precert#: --        [facility] OPT[facility] Phone: [phone]    Subscriber: Ash Diez Subscriber#: [id number]    Group#: -- Precert#: --             History & Physical      MerlinJesús hill DO at 22 0032              [facility]  HISTORY AND PHYSICAL    Patient Name: Ash Diez  : 10/4/1928  MRN: [id number]  Primary Care Physician: Karthikeyan Moreno MD  Date of admission: 3/2/2022      Subjective   Subjective     Chief Complaint:  Shortness of air    HPI:  Ash Diez is a 89 y.o. male with past medical history of diastolic CHF, mild to moderate aortic regurgitation, moderate aortic stenosis as of 2019, prediabetes, essential hypertension, hyperlipidemia, AAA who presents to the ER with complaints of dyspnea mainly with exertion over the past 24 hours and orthopnea.    Patient has prior history of diastolic CHF and valvular dysfunction.  At some point he was taken off of diuretics.  He does not remember the reasoning why or when this actually happened.  Patient states that over the past 24 hours he has had increasing shortness of air.  Mainly with exertion.  Some shortness of air with rest.  He reports chronic lower extremity edema which has not changed in the last several days.  He denies any chest pain or pressure.  Denies any nausea or vomiting or abdominal pain.  He currently is living at [facility] living [facility] and normally is able to perform ADLs without any issue.  Denies any cough, fever, or chills.  Does report some increased orthopnea starting last night.  SOA constant, rated 4/10 in severity,  worse with exertion.    Work-up in the ER revealing pulmonary edema.  Patient given 80 mg Lasix in the ER and has already diuresed greater than 500 cc.  He is feeling some improved and his shortness of breath      COVID Details:    Symptoms:    [] NONE [] Fever []  Cough [x] Shortness of breath [] Change in taste/smell      Review of Systems   Gen- No fevers, chills  CV- No chest pain, palpitations  Resp- No cough, reports dyspnea  GI- No N/V/D, abd pain      All other systems reviewed and are negative.     Personal History     Past Medical History:   Diagnosis Date   • Aneurysm (HCC)     on aortic valve. has been evaluted at . pt decided agaisnt surgery    • Cancer (HCC)     colon   • Cellulitis     RLE   • CHF (congestive heart failure) (HCC)    • Hyperlipidemia    • Hypertension    • Pneumonia        Past Surgical History:   Procedure Laterality Date   • COLON SURGERY     • CORONARY STENT PLACEMENT     • VASCULAR SURGERY      Vein removal       Family History:  family history includes Arthritis in his mother; Heart attack in his mother; Hypertension in his daughter; Stroke in his brother, father, and sister. Otherwise pertinent FHx was reviewed and unremarkable.     Social History:  reports that he has never smoked. He has never used smokeless tobacco. He reports previous alcohol use. He reports that he does not use drugs.  Social History     Social History Narrative    2021: From [place], [place]. , has been living with his elderly daughter and son-in-law, but now living with his granddaughter in [place] to get access to care. Overall patient has been more or less bedbound since fall in January 2021 with left shoulder fracture.  Has barely been able to get around with cane, does not have a wheelchair.       Medications:  Available home medication information reviewed.  Medications Prior to Admission   Medication Sig Dispense Refill Last Dose   • amLODIPine (NORVASC) 5 MG tablet Take 1.5 tablets by mouth  Daily. Needs to keep appointment 12/23/2020 and complete lab work that will be ordered for any ongoing refills. 45 tablet 6        No Known Allergies    Objective   Objective     Vital Signs:   Temp:  [97.4 °F (36.3 °C)-97.7 °F (36.5 °C)] 97.7 °F (36.5 °C)  Heart Rate:  [83-95] 95  Resp:  [18-28] 22  BP: (129-166)/(68-98) 129/88       Physical Exam   Constitutional: Awake, alert, appears much younger than stated age  Eyes: PERRLA, sclerae anicteric, no conjunctival injection  HENT: NCAT, mucous membranes moist  Neck: Supple, no thyromegaly, no lymphadenopathy, trachea midline  Respiratory: Clear to auscultation bilaterally, nonlabored respirations   Cardiovascular: RRR, + murmur, palpable pedal pulses bilaterally  Gastrointestinal: Positive bowel sounds, soft, nontender, nondistended  Musculoskeletal: 2+ pitting bilateral ankle edema, no clubbing or cyanosis to extremities  Psychiatric: Appropriate affect, cooperative  Neurologic: Oriented x 3, strength symmetric in all extremities, Cranial Nerves grossly intact to confrontation, speech clear  Skin: No rashes      Result Review:  I have personally reviewed the results from the time of this admission to 3/3/2022 00:35 EST and agree with these findings:  [x]  Laboratory  []  Microbiology  [x]  Radiology  [x]  EKG/Telemetry   []  Cardiology/Vascular   []  Pathology  []  Old records  []  Other:  Most notable findings include: EKG without any acute ischemic changes, chest x-ray with evidence of pulmonary edema      LAB RESULTS:      Lab 03/02/22 1924   WBC 11.88*   HEMOGLOBIN 15.7   HEMATOCRIT 47.8   PLATELETS 230   NEUTROS ABS 9.59*   IMMATURE GRANS (ABS) 0.07*   LYMPHS ABS 1.13   MONOS ABS 0.99*   EOS ABS 0.06   MCV 95.4   PROCALCITONIN 0.07         Lab 03/02/22 1924   SODIUM 135*   POTASSIUM 4.4   CHLORIDE 100   CO2 25.0   ANION GAP 10.0   BUN 21   CREATININE 1.33*   EGFR 49.8*   GLUCOSE 139*   CALCIUM 9.6         Lab 03/02/22 1924   TOTAL PROTEIN 7.9   ALBUMIN  4.00   GLOBULIN 3.9   ALT (SGPT) 12   AST (SGOT) 17   BILIRUBIN 0.7   ALK PHOS 79         Lab 03/02/22  1924   PROBNP 9,350.0*   TROPONIN T <0.010                     Microbiology Results (last 10 days)     Procedure Component Value - Date/Time    COVID PRE-OP / PRE-PROCEDURE SCREENING ORDER (NO ISOLATION) - Swab, Nasopharynx [[id number]]  (Normal) Collected: 03/02/22 2241    Lab Status: Final result Specimen: Swab from Nasopharynx Updated: 03/02/22 2343    Narrative:      The following orders were created for panel order COVID PRE-OP / PRE-PROCEDURE SCREENING ORDER (NO ISOLATION) - Swab, Nasopharynx.  Procedure                               Abnormality         Status                     ---------                               -----------         ------                     COVID-19, FLU A/B, RSV P...[[id number]]  Normal              Final result                 Please view results for these tests on the individual orders.    COVID-19, FLU A/B, RSV PCR - Swab, Nasopharynx [[id number]]  (Normal) Collected: 03/02/22 2241    Lab Status: Final result Specimen: Swab from Nasopharynx Updated: 03/02/22 2343     COVID19 Not Detected     Influenza A PCR Not Detected     Influenza B PCR Not Detected     RSV, PCR Not Detected    Narrative:      Fact sheet for providers: https://www.fda.gov/media/521860/download    Fact sheet for patients: https://www.fda.gov/media/608946/download    Test performed by PCR.          XR Chest 1 View    Result Date: 3/2/2022  EXAMINATION: XR CHEST 1 VW-  INDICATION: SOA triage protocol  COMPARISON: 9/8/2019  FINDINGS: Lung volumes are low. Heart is enlarged and there is a diffuse pulmonary edema pattern present. No definite effusion or evidence of pneumothorax is seen. Prominent mediastinal shadow and ectatic appearing aortic knob shadow are stable. Chronic left proximal humerus fracture is again seen as on 7/8/2021 shoulder films.       Impression: Congestive heart failure.    This report was  finalized on 3/2/2022 9:03 PM by Dr. Tam Montoya MD.        Results for orders placed during the hospital encounter of 09/16/19    Adult Transthoracic Echo Complete W/ Cont if Necessary Per Protocol    Interpretation Summary  · Estimated EF appears to be in the range of 56 - 60%.  · Left ventricular diastolic dysfunction (grade I) consistent with impaired relaxation.  · Left ventricular wall thickness is consistent with mild concentric hypertrophy.  · Left atrial cavity size is mildly dilated.  · Moderate aortic valve stenosis is present.  · Mild to moderate aortic valve regurgitation is present.  · Mild tricuspid valve regurgitation is present.  · Calculated right ventricular systolic pressure from tricuspid regurgitation is 27 mmHg.  · Severe dilation of the aortic root is present. Severe dilation of the proximal aorta is present.      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) [I50.9, I38]  Unknown   • Nonrheumatic aortic valve insufficiency [I35.1]  Unknown   • Diastolic congestive heart failure (HCC) [I50.30]  Yes   • Prediabetes [R73.03]  Yes     A1c 5.7 on 5/29/19 with prior PCP     • Aortic stenosis, moderate [I35.0]  Yes   • Hyperlipidemia [E78.5]  Yes   • Essential hypertension [I10]  Yes   • Abdominal aortic aneurysm (AAA) without rupture (HCC) [I71.4]  Yes     Patient is a 89-year-old male with past medical history of diastolic CHF, mild to moderate aortic regurgitation, moderate aortic stenosis as of 2019, prediabetes, essential hypertension, hyperlipidemia, AAA who presents to the ER with complaints of dyspnea mainly with exertion over the past 24 hours and orthopnea.    1.  Acute on chronic diastolic CHF  2.  CHF exacerbated by valvular heart disease/AR/AS  3.  Preload dependency secondary to AS  4.  Essential hypertension  5.  Hyperlipidemia  6.  Severe dilation of aortic root  7.  AAA    --Lasix 80 mg given in ER  --Apply  external urinary cath  --PT/OT  --Echo  --Cardiology consult, reports seeing someone in our systemp before. ?previously seen Dr. Mckeon  --Continue 40 mg twice daily IV starting in the a.m.  --strict is and os        DVT prophylaxis: heparin      CODE STATUS: full code  There are no questions and answers to display.         Jesús Boyer DO  03/03/22      Electronically signed by Jesús Boyer DO at 03/03/22 0041

## 2022-03-14 NOTE — SIGNIFICANT NOTE
03/14/22 1131   SLP Deferred Reason   SLP Deferred Reason   (Transitioning to Hospice care. No further SLP needs. Signing off.)

## 2022-03-14 NOTE — PROGRESS NOTES
"Palliative Care Daily Progress Note     C/C: unable to verbalize    S: Medical record reviewed. Follow up visit for evaluation of needs and medication efficacy. Events noted. Patient briefly opens eyes to verbal stimuli and quickly falls back to sleep. PRN use noted since transition to comfort care (approx 16hrs)- ativan x2 +additional dose given this am, morphine x3. No family at bedside. Pt restless and tachypneic when roused      ROS: Unable to obtain r/t AMS/condition.    O: Code Status:   Code Status and Medical Interventions:   Ordered at: 03/13/22 1352     Level Of Support Discussed With:    Next of Kin (If No Surrogate)     Code Status (Patient has no pulse and is not breathing):    No CPR (Do Not Attempt to Resuscitate)     Medical Interventions (Patient has pulse or is breathing):    Comfort Measures      Advanced Directives: Advance Directive Status: Patient does not have advance directive   Goals of Care: Ongoing.   Palliative Performance Scale Score:   10%    /74 (BP Location: Right arm, Patient Position: Lying)   Pulse 60   Temp 97.5 °F (36.4 °C) (Axillary)   Resp 18   Ht 181.6 cm (71.5\")   Wt 96.8 kg (213 lb 6.5 oz)   SpO2 96%   BMI 29.35 kg/m²     Intake/Output Summary (Last 24 hours) at 3/14/2022 0939  Last data filed at 3/13/2022 1832  Gross per 24 hour   Intake 10 ml   Output 750 ml   Net -740 ml       PE:  General Appearance:    A/c ill appearing   HEENT:    NC/AT, EOMI, anicteric, MMM, face relaxed, face flushed   Neck:   supple, trachea midline, no JVD   Lungs:     CTA bilat, diminished in bases; respirations irregular, even and Cheyne-Olvera pattern RR28 with intermittent 20-30 sec periods of apnea    Heart:    Irregular rhythm- jluis 50s, +murmur   Abdomen:     Normal bowel sounds, soft, non-tender, non-distended   G/U:   Deferred   MSK/Extremities:   Wasting, no edema   Pulses:   Pulses palpable and equal bilaterally   Skin:   Warm, dry   Neurologic:   Briefly rouses to verbal " stimuli, nonverbal during visit, unable to follow commands   Psych:   Restless with frequent position changes and moaning when awake     Meds: Reviewed and changes noted.    Labs:   Results from last 7 days   Lab Units 03/13/22  0755   WBC 10*3/mm3 10.77   HEMOGLOBIN g/dL 15.2   HEMATOCRIT % 47.9   PLATELETS 10*3/mm3 213     Results from last 7 days   Lab Units 03/13/22  0753   SODIUM mmol/L 143   POTASSIUM mmol/L 4.4   CHLORIDE mmol/L 106   CO2 mmol/L 27.0   BUN mg/dL 35*   CREATININE mg/dL 1.16   GLUCOSE mg/dL 91   CALCIUM mg/dL 9.1     Results from last 7 days   Lab Units 03/13/22  0753   SODIUM mmol/L 143   POTASSIUM mmol/L 4.4   CHLORIDE mmol/L 106   CO2 mmol/L 27.0   BUN mg/dL 35*   CREATININE mg/dL 1.16   CALCIUM mg/dL 9.1   GLUCOSE mg/dL 91     Imaging Results (Last 72 Hours)     Procedure Component Value Units Date/Time    XR Chest 1 View [[id number]] Collected: 03/13/22 0509     Updated: 03/13/22 0510    Narrative:      Chest one view.    DATE: 3/13/2022.    COMPARISON: 3/6/2022.    CLINICAL HISTORY: Tachypnea.      Impression:        There is extensive interstitial and patchy airspace changes throughout the lungs bilaterally which could be inflammatory or infectious with a component of edema a consideration.    Cardiac silhouette appears moderately enlarged. Evaluation is limited due to poor positioning and leftward rotation.    Electronically signed by:  Farshad Ervin D.O.    3/13/2022 3:09 AM Mountain Time    MRI Brain Without Contrast [[id number]] Collected: 03/12/22 1749     Updated: 03/12/22 1804    Narrative:      DATE OF EXAM: 3/12/2022 5:01 PM     PROCEDURE: MRI BRAIN WO CONTRAST-     INDICATIONS: Stroke, follow up; R13.10-Dysphagia, unspecified;  J96.01-Acute respiratory failure with hypoxia; I50.9-Heart failure,  unspecified; Z20.822-Contact with and (suspected) exposure to covid-19;  I35.0-Nonrheumatic aortic (valve) stenosis     COMPARISON: Head CT scan of same day     TECHNIQUE: Multiplanar  multisequence images of the brain were performed  without contrast according to routine brain MRI protocol.      FINDINGS:   There is a subtle punctate area of restricted diffusion that appears to  localize to the left post central gyrus, barely above the level of  artifact. Slight signal changes are seen of the adjacent cortex, but not  definite for infarct. More inferiorly, however, there appear to be small  areas of restricted diffusion in the left corona radiata, with weak ADC  map correlation. No potential acute infarction is seen elsewhere.     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 There are no  signal changes suggestive of hemorrhage, no evidence of hydrocephalus,  mass or mass effect or abnormal extra-axial collection. There is  expected flow signal in the major intracranial arteries and median  sagittal sinus. No gross abnormalities to the orbits or mastoids. There  is mild right maxillary sinus mucosal thickening. Sagittal images show  the midline structures to appear grossly normal.             Impression:      Subtle, almost punctate areas of restricted diffusion that appear to  localize to the left corona radiata and left postcentral gyrus. 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     This report was finalized on 3/12/2022 6:01 PM by Dr. Tam Montoya MD.       CT Chest Without Contrast Diagnostic [[id number]] Collected: 03/12/22 1241     Updated: 03/12/22 1252    Narrative:      DATE OF EXAM: 3/12/2022 12:18 PM     PROCEDURE: CT CHEST WO CONTRAST DIAGNOSTIC-     INDICATIONS: AMS; assess known AAA; J96.01-Acute respiratory failure  with hypoxia; I50.9-Heart failure, unspecified; Z20.822-Contact with and  (suspected) exposure to covid-19; I35.0-Nonrheumatic aortic (valve)  stenosis     COMPARISON:  9/5/2019 unenhanced chest CT scan     TECHNIQUE: Routine transaxial slices were obtained through the chest  without the administration of intravenous contrast. Reconstructed  coronal and sagittal images were also obtained. Automated exposure  control and iterative construction methods were used.     The radiation dose reduction device was turned on for each scan per the  ALARA (As Low as Reasonably Achievable) protocol.     FINDINGS:  The current study has residual intravascular contrast from earlier  angiographic studies of head and neck. There is ectasia of the ascending  aorta to a maximum of 6.1 cm transverse luminal diameter on axial  images, 6.2 cm as measured on sagittal reconstruction images and 6.0 cm  as measured on coronal imaging series. There is no evidence of  dissection. There is heavy calcification of the aortic valve, and  moderate diffuse coronary artery calcification. No pericardial effusion  is seen. There are moderate to large free-flowing bilateral pleural  effusions. There is mild shotty mediastinal lymphadenopathy, a little  increased from the prior study, but not extensive.     Lung window images show diffuse reticular interstitial disease pattern  and prominent vasculature typical of pulmonary edema/volume overload.  There is moderate atelectasis of the lower lungs associated with the  effusions.     Included images of the upper abdomen and left upper pole renal cyst, and  contrast in the renal collecting systems which are nondilated.  Granulomatous calcifications are seen in the otherwise normal-appearing  included portions of the liver and spleen. Included gallbladder  pancreatic tail and adrenal glands appear unremarkable. Bony structures  appear intact.        Impression:         1. Approximately 6.1 cm ascending thoracic aortic aneurysm, with minimal  if any change from 9/5/2019 unenhanced chest CT scan. No evidence of  leak or dissection.  2. Evidence of congestive heart  failure/volume overload, with pulmonary  interstitial edema and moderate to large bilateral pleural effusions.  3. Shotty, reactive appearing mediastinal lymphadenopathy incidentally  noted.     This report was finalized on 3/12/2022 12:49 PM by Dr. Tam Montoya MD.       CT Angiogram Head w AI Analysis of LVO [[id number]] Collected: 03/12/22 1239     Updated: 03/12/22 1249    Narrative:      EXAMINATION: CT ANGIOGRAM HEAD W AI ANALYSIS OF LVO-, CT ANGIOGRAM NECK-        INDICATION: Stroke, follow up; J96.01-Acute respiratory failure with  hypoxia; I50.9-Heart failure, unspecified; Z20.822-Contact with and  (suspected) exposure to covid-19; I35.0-Nonrheumatic aortic (valve)  stenosis new onset aphasia     TECHNIQUE: CTA of the head was performed after the intravenous  administration of 75 mL of Isovue 370. Reconstructed coronal and  sagittal images were also obtained. In addition, a 3-D volume rendered  image was obtained after post processing. Automated exposure control and  iterative reconstruction methods were used.     The radiation dose reduction device was turned on for each scan per the  ALARA (As Low as Reasonably Achievable) protocol.     AI analysis of LVO was utilized.     Stenosis measurement was performed by the NASCET or similar method.     COMPARISON: No comparisons available.     FINDINGS:   Nonvascular: There are large bilateral dependent pleural effusions.  There is hazy airspace disease present throughout both upper lobes which  may represent pulmonary edema. There is no significant spinal  lymphadenopathy. Thyroid is normal. There is no supraclavicular or  cervical lymphadenopathy. There is cervical degenerative disc disease  most pronounced at the C3-4 level. Mild mucosal disease is present in  the right maxillary sinus and there is right ethmoid mucosal disease.  The mastoid air cells are clear. Intracranially there is generalized  atrophy with chronic areas of infarction involving the right  insular  cortex and right frontal lobe inferiorly.     Vascular: The ascending aorta is dilated measuring 5.2 cm per the  proximal great vessels appear widely patent. Both the right and left  vertebral arteries are widely patent. There is no significant basilar  stenosis. There is atherosclerotic calcified plaque in both carotid  bulbs without hemodynamically significant stenosis. Mild calcified  plaque is present in the right and left carotid siphon. Intracranially,  there is normal filling of the anterior cerebral arteries. The left A1  segment is hypoplastic. The right middle cerebral artery appears to fill  normally. On the left, the middle cerebral artery is widely patent.  There is decreased perfusion of the distal branches of the left middle  cerebral artery compared with the right. Both posterior cerebral  arteries fill normally.       Impression:         1. No significant extracranial vascular stenosis or occlusion.  2. Decreased filling of the distal branches of the left middle cerebral  artery compared with the right raising the question of a small branch  vessel occlusion.  3. Hypoplastic left A1 segment.     This report was finalized on 3/12/2022 12:46 PM by Carlito Orozco MD.       CT Angiogram Neck [[id number]] Collected: 03/12/22 1239     Updated: 03/12/22 1249    Narrative:      EXAMINATION: CT ANGIOGRAM HEAD W AI ANALYSIS OF LVO-, CT ANGIOGRAM NECK-        INDICATION: Stroke, follow up; J96.01-Acute respiratory failure with  hypoxia; I50.9-Heart failure, unspecified; Z20.822-Contact with and  (suspected) exposure to covid-19; I35.0-Nonrheumatic aortic (valve)  stenosis new onset aphasia     TECHNIQUE: CTA of the head was performed after the intravenous  administration of 75 mL of Isovue 370. Reconstructed coronal and  sagittal images were also obtained. In addition, a 3-D volume rendered  image was obtained after post processing. Automated exposure control and  iterative reconstruction methods were  used.     The radiation dose reduction device was turned on for each scan per the  ALARA (As Low as Reasonably Achievable) protocol.     AI analysis of LVO was utilized.     Stenosis measurement was performed by the NASCET or similar method.     COMPARISON: No comparisons available.     FINDINGS:   Nonvascular: There are large bilateral dependent pleural effusions.  There is hazy airspace disease present throughout both upper lobes which  may represent pulmonary edema. There is no significant spinal  lymphadenopathy. Thyroid is normal. There is no supraclavicular or  cervical lymphadenopathy. There is cervical degenerative disc disease  most pronounced at the C3-4 level. Mild mucosal disease is present in  the right maxillary sinus and there is right ethmoid mucosal disease.  The mastoid air cells are clear. Intracranially there is generalized  atrophy with chronic areas of infarction involving the right insular  cortex and right frontal lobe inferiorly.     Vascular: The ascending aorta is dilated measuring 5.2 cm per the  proximal great vessels appear widely patent. Both the right and left  vertebral arteries are widely patent. There is no significant basilar  stenosis. There is atherosclerotic calcified plaque in both carotid  bulbs without hemodynamically significant stenosis. Mild calcified  plaque is present in the right and left carotid siphon. Intracranially,  there is normal filling of the anterior cerebral arteries. The left A1  segment is hypoplastic. The right middle cerebral artery appears to fill  normally. On the left, the middle cerebral artery is widely patent.  There is decreased perfusion of the distal branches of the left middle  cerebral artery compared with the right. Both posterior cerebral  arteries fill normally.       Impression:         1. No significant extracranial vascular stenosis or occlusion.  2. Decreased filling of the distal branches of the left middle cerebral  artery compared  with the right raising the question of a small branch  vessel occlusion.  3. Hypoplastic left A1 segment.     This report was finalized on 3/12/2022 12:46 PM by Carlito Orozco MD.       CT Head Without Contrast Stroke Protocol [[id number]] Collected: 03/12/22 1215     Updated: 03/12/22 1221    Narrative:      DATE OF EXAM: 3/12/2022 11:53 AM     PROCEDURE: CT HEAD WO CONTRAST STROKE PROTOCOL-     INDICATIONS: Neuro deficit, acute, stroke suspected; J96.01-Acute  respiratory failure with hypoxia; I50.9-Heart failure, unspecified;  Z20.822-Contact with and (suspected) exposure to covid-19;  I35.0-Nonrheumatic aortic (valve) stenosis     COMPARISON: 09/05/2019     TECHNIQUE: Routine transaxial and coronal reconstruction images were  obtained through the head without the administration of contrast.  Automated exposure control and iterative reconstruction methods were  used.     The radiation dose reduction device was turned on for each scan per the  ALARA (As Low as Reasonably Achievable) protocol.     FINDINGS:  There is generalized enlargement of the ventricles and CSF containing  spaces. There is an old area of infarction involving the right insular  cortex and the right frontal operculum. There is decreased attenuation  throughout the periventricular white matter in both hemispheres. No mass  lesions, mass effect, acute hemorrhage or edema. No intra or extra-axial  fluid collections are present. There is mucosal disease identified in  both maxillary sinuses and the right ethmoid sinuses,        Impression:         1. Generalized atrophy with chronic white matter changes throughout the  periventricular region.  2. Interval appearance of a chronic infarct involving the right frontal  operculum and right insular cortex.  3. No acute intracranial findings.  4. Bilateral maxillary and right ethmoid sinus disease.     This report was finalized on 3/12/2022 12:17 PM by Carlito Orozco MD.               Lab 03/13/22  0753    HEMOGLOBIN A1C 5.70*         Diagnostics: Reviewed    A:   Patient Active Problem List   Diagnosis   • Abdominal aortic aneurysm (AAA) without rupture (HCC)   • Essential hypertension   • Hyperlipidemia   • Prediabetes   • Obesity (BMI 30.0-34.9)   • Diastolic congestive heart failure (HCC)   • CHF due to valvular disease (HCC)   • Nonrheumatic aortic valve insufficiency   • Severe aortic stenosis   • Acute on chronic systolic CHF (congestive heart failure) ([facility])       S/Sx:  1. Dyspnea  -sched morphine 1mg IV q6h ATC  -increased morphine 1-2mg IV q1h PRN    2. Agitation  -sched lorazepam 0.25mg IV q8h ATC  -increased lorazepam 0.25-0.5mg IV/subcut q4h PRN    3. GOC  -DNR/DNI  -Comfort Only  -Hospice consult for impatient hospice    P: Called and spoke with patients daughter this am over the phone. She confirms GOC are comfort focused and is agreeable to inpatient consult for symptom management. Discussed prognosis appears hours to days. Notified inpatient hospice team. Palliative Care Team will continue to follow patient.     Manjula Moran, APRN  3/14/2022  Time spent:20min

## 2022-03-14 NOTE — NURSING NOTE
TAVR APRN    TAVR team updated on patient clinical status and cancellation of plans for TAVR.  Will sign off to Palliative Team.    Rossy AVELAR

## 2022-03-14 NOTE — DISCHARGE SUMMARY
[facility] Medicine Services  DISCHARGE SUMMARY    Patient Name: Ash Diez  : 10/4/1928  MRN: [id number]    Date of Admission: 3/2/2022  Date of Discharge:  3/14/2022  Primary Care Physician: Karthikeyan Moreno MD    Consults     Date and Time Order Name Status Description    3/13/2022  8:57 AM Inpatient Palliative Care MD Consult Completed     3/12/2022 12:39 PM Inpatient Neurology Consult Stroke Completed     3/3/2022 12:38 AM Inpatient Cardiology Consult Completed           Hospital Course     Presenting Problem:   Acute ischemic left MCA stroke (HCC) [I63.512]    Active Hospital Problems    Diagnosis  POA   • **Acute ischemic left MCA stroke (HCC) [I63.512]  Yes      Resolved Hospital Problems   No resolved problems to display.      Hospital Course:  Ash Diez is a 89 y.o. male PMH HTN, AAA, HLD, diastolic heart failure, failure heart disease admitted for shortness of breath secondary to acute on chronic heart failure.  Patient improved with diuresis.  Initially consider TAVR for severe aortic stenosis, but patient condition declined on 3/12/2022 when patient had acute ischemic CVA, left MCA territory.  Due to declining condition, family has decided for comfort care with hospice services, DNR/DNI.     Discharge Follow Up Recommendations for labs/diagnostics:  -Patient discharged to inpatient hospice    Day of Discharge     HPI:   Resting peacefully.    Review of Systems  Unable to obtain due to AMS      Vital Signs:   Temp:  [97.5 °F (36.4 °C)] 97.5 °F (36.4 °C)  Heart Rate:  [56-66] 60  Resp:  [18-32] 18  BP: (127-152)/(58-74) 143/59     Physical Exam:  Constitutional: sleeping, ill appearing  HENT: NCAT, mucous membranes moist  Respiratory: Diminished in the bases, irregular respirations  Cardiovascular: Irregular, bradycardic, murmur present  Gastrointestinal: Positive bowel sounds, soft, nontender, nondistended  Musculoskeletal: No bilateral ankle edema  Neurologic: Unable  to assess  Skin: No rashes    Assessment:  Acute on chronic mixed systolic and diastolic CHF  Severe aortic valve disease (severe AoS, mod AI)  Non-obstructive CAD  Non-ischemic cardiomyopathy (ef 30-35%)  Acute hypoxic resp failure  HTN  Acute ischemic CVA  --ECHO 3/8/2022 shows LVEF 30 to 35% with severe aortic stenosis with mild MR.  3/6/2022 EKG showed bradycardia first-degree AV block.  Initially wanted to pursue TAVR; however, patient had acute ischemic CVA of the left MCA territory. Family wish to make the patient comfortable with hospice services.  Patient is a DNR/DNI.     Pertinent  and/or Most Recent Results     Results from last 7 days   Lab Units 03/13/22  0755 03/13/22  0753 03/12/22  0659 03/11/22  0644   WBC 10*3/mm3 10.77  --  12.62* 11.01*   HEMOGLOBIN g/dL 15.2  --  16.0 15.2   HEMATOCRIT % 47.9  --  51.1* 48.3   PLATELETS 10*3/mm3 213  --  220 204   SODIUM mmol/L  --  143 140 139   POTASSIUM mmol/L  --  4.4 4.6 4.6   CHLORIDE mmol/L  --  106 103 103   CO2 mmol/L  --  27.0 28.0 28.0   BUN mg/dL  --  35* 39* 40*   CREATININE mg/dL  --  1.16 1.12 1.40*   GLUCOSE mg/dL  --  91 118* 113*   CALCIUM mg/dL  --  9.1 9.3 9.3           Invalid input(s): PROT, LABALBU  Results from last 7 days   Lab Units 03/13/22  0753   CHOLESTEROL mg/dL 160   TRIGLYCERIDES mg/dL 100   HDL CHOL mg/dL 37*     Results from last 7 days   Lab Units 03/13/22  0753   HEMOGLOBIN A1C % 5.70*   PROBNP pg/mL 6,908.0*       Brief Urine Lab Results  (Last result in the past 365 days)      Color   Clarity   Blood   Leuk Est   Nitrite   Protein   CREAT   Urine HCG        03/12/22 1831 Yellow   Clear   Trace   Negative   Negative   Negative                 Microbiology Results Abnormal     None          Imaging Results (All)     None          Results for orders placed during the hospital encounter of 03/02/22    Duplex Carotid Ultrasound CAR    Interpretation Summary  · Proximal right internal carotid artery plaque without significant  stenosis, <50%.  · Proximal left internal carotid artery plaque without significant stenosis, <20%.      Results for orders placed during the hospital encounter of 03/02/22    Duplex Carotid Ultrasound CAR    Interpretation Summary  · Proximal right internal carotid artery plaque without significant stenosis, <50%.  · Proximal left internal carotid artery plaque without significant stenosis, <20%.      Results for orders placed during the hospital encounter of 03/02/22    Adult Transesophageal Echo (KIARA) W/ Cont if Necessary Per Protocol    Interpretation Summary  · Estimated left ventricular EF = 35-40% Left ventricular systolic function is moderately decreased.  · Left ventricular wall thickness is consistent with mild concentric hypertrophy.  · Severe aortic valve stenosis is present.  · Severe dilation of the ascending aorta is present.  · Moderate aortic valve regurgitation is present.  · Mild mitral valve regurgitation is present  · Trace tricuspid valve regurgitation is present    Anesthesia: Cath lab/Echo lab moderate sedation    I was present with the patient for the duration of moderate sedation and supervised staff who had no other duties and monitored the patient for the entire procedure.    Name of independent trained observer: Deja Jha RN  Intra-service start time: 1403  Intra-service end time: 1430        Discharge Details        Discharge Medications      ASK your doctor about these medications      Instructions Start Date   amLODIPine 5 MG tablet  Commonly known as: NORVASC   7.5 mg, Oral, Daily, Needs to keep appointment 12/23/2020 and complete lab work that will be ordered for any ongoing refills.             No Known Allergies      Discharge Disposition:      Discharge Diet:  Diet Order   Procedures   • NPO Diet         Discharge Activity:         CODE STATUS:    Code Status and Medical Interventions:   Ordered at: 03/14/22 1242     Code Status (Patient has no pulse and is not breathing):     No CPR (Do Not Attempt to Resuscitate)     Medical Interventions (Patient has pulse or is breathing):    Comfort Measures         Future Appointments   Date Time Provider Department Center   3/14/2022  3:00 PM [facility]  [facility]           Time Spent on Discharge:  35 minutes    Electronically signed by VALENTINO Siu, 03/14/22, 1:37 PM EDT.

## 2022-03-14 NOTE — INTERVAL H&P NOTE
Mr. Diez admitted to [facility] on 3/14/2022. Please see Hospice documentation for further information.

## 2022-03-14 NOTE — SIGNIFICANT NOTE
03/14/22 1235   Spiritual Care   Spiritual Care Visit Type initial   Spiritual Care Source nurse referral   Receptivity to Spiritual Care visit welcomed   Spiritual Care Request family support;end-of-life issue(s) support   Spiritual Care Interventions supportive conversation provided   Response to Spiritual Care engaged in conversation;receptive of support;thanks expressed   Spiritual Care Follow-Up follow-up planned regularly for general support     Visited with family at bedside while pt was asleep. Daughter stated that pt was [employer] and attended [employer] regularly. Family appreciative of  support. Will continue to follow for family support.

## 2022-03-14 NOTE — H&P
Hospice History and Physical     Patient Name:  Ash Diez   : 10/4/1928   Sex: male    Patient Care Team:  Karthikeyan Moreno MD as PCP - General (Family Medicine)    Code Status: Comfort Measures    Subjective     Per Hospitalist Discharge Summary:    Ash Diez is a 89 y.o. male PMH HTN, AAA, HLD, diastolic heart failure, failure heart disease admitted for shortness of breath secondary to acute on chronic heart failure.  Patient improved with diuresis.  Initially consider TAVR for severe aortic stenosis, but patient condition declined on 3/12/2022 when patient had acute ischemic CVA, left MCA territory.  Due to declining condition, family has decided for comfort care with hospice services, DNR/DNI.     [end of copied text]    Mr. Diez was admitted to in Hospice on 3/14/2022 for mgmt of acute symptoms 2/2 acute ischemic Left MCA CVA.     Pt was restless and tense upon my visit. Granddaughter, Gregoria, laying in bed next to him. Dtr at bedside. Asked Nursing for prns. Medication changes made a few times for pt comfort.       Review of Systems  Review of Systems   Unable to perform ROS: Acuity of condition       History  Past Medical History:   Diagnosis Date   • Aneurysm (HCC)     on aortic valve. has been evaluted at . pt decided agaisnt surgery    • Cancer (HCC)     colon   • Cellulitis     RLE   • CHF (congestive heart failure) (HCC)    • Hyperlipidemia    • Hypertension    • Pneumonia      Past Surgical History:   Procedure Laterality Date   • CARDIAC CATHETERIZATION N/A 3/8/2022    Procedure: LEFT HEART CATH;  Surgeon: Khloe Pena MD;  Location: [facility] CATH INVASIVE LOCATION;  Service: Cardiology;  Laterality: N/A;   • COLON SURGERY     • CORONARY STENT PLACEMENT     • VASCULAR SURGERY      Vein removal     Current Facility-Administered Medications   Medication Dose Route Frequency Provider Last Rate Last Admin   • [START ON 3/15/2022] acetaminophen (TYLENOL) suppository 650 mg  650 mg  Rectal Q4H PRN Gill Hwang H, APRN       • bisacodyl (DULCOLAX) suppository 10 mg  10 mg Rectal Daily PRN Gill Hwang H, APRN       • furosemide (LASIX) injection 20 mg  20 mg Intravenous Q6H Gill Hwang H, APRN       • furosemide (LASIX) injection 20 mg  20 mg Intravenous Q6H PRN Gill Hwang H, APRN       • glycopyrrolate (ROBINUL) injection 0.2 mg  0.2 mg Intravenous Q6H PRN Gill Hwang, APRN       • haloperidol lactate (HALDOL) injection 1 mg  1 mg Intravenous Q4H PRN Gill Hwang, APRN       • ketorolac (TORADOL) injection 15 mg  15 mg Intravenous Q6H PRN Gill Hwang, APRN       • levETIRAcetam (KEPPRA) tablet 250 mg  250 mg Oral BID Gill Hwang, APRN       • LORazepam (ATIVAN) injection 0.5 mg  0.5 mg Intravenous Q6H Gill Hwang, APRN       • LORazepam (ATIVAN) injection 0.5 mg  0.5 mg Intravenous Q4H PRN Gill Hwang, APRN       • morphine injection 2 mg  2 mg Intravenous Q6H Gill Hwang, APRN       • morphine injection 2 mg  2 mg Intravenous Q1H PRN Gill Hwang, APRN        Or   • Morphine sulfate (PF) injection 4 mg  4 mg Intravenous Q1H PRN Gill Hwang, APRN       • ondansetron (ZOFRAN) injection 4 mg  4 mg Intravenous Q6H PRN Gill Hwang, APRN       • scopolamine patch 1 mg/72 hr  1 patch Transdermal Q72H PRN Gill Hwang, APRN            •  [START ON 3/15/2022] acetaminophen  •  bisacodyl  •  furosemide  •  glycopyrrolate  •  haloperidol lactate  •  ketorolac  •  LORazepam  •  Morphine **OR** Morphine  •  ondansetron  •  Scopolamine  No Known Allergies  Family History   Problem Relation Age of Onset   • Arthritis Mother    • Heart attack Mother    • Stroke Father    • Stroke Sister    • Stroke Brother    • Hypertension Daughter      Social History     Socioeconomic History   • Marital status:    Tobacco Use   • Smoking status: Never Smoker   • Smokeless tobacco: Never Used   Vaping Use   • Vaping Use:  Never used   Substance and Sexual Activity   • Alcohol use: Not Currently     Comment: social   • Drug use: Never   • Sexual activity: Defer       Objective     Vital Signs  Temp:  [97.5 °F (36.4 °C)] 97.5 °F (36.4 °C)  Heart Rate:  [56-67] 60  Resp:  [18-32] 18  BP: (127-152)/(58-74) 143/59    PPS: 10%    Physical Exam:  Physical Exam  Constitutional:       Appearance: He is ill-appearing.      Comments: Pt not comfortable - laying on his Right side. Tense. Mild facial grimacing.   HENT:      Head: Normocephalic.      Mouth/Throat:      Pharynx: Oropharynx is clear.   Eyes:      Comments: Eyes closed   Cardiovascular:      Rate and Rhythm: Normal rate and regular rhythm.   Pulmonary:      Comments: CTA; no audible congestion; respirations are tachypneic (RR28).   Abdominal:      Palpations: Abdomen is soft.      Comments: Hypoactive BSx4   Musculoskeletal:      Cervical back: Neck supple.      Right lower leg: No edema.      Left lower leg: No edema.   Skin:     General: Skin is dry.      Comments: Olive skin tone with darker areas around eyes   Neurological:      Comments: Does not follow commands   Psychiatric:      Comments: + facial grimacing; no moaning         Results Review:   Lab Results   Component Value Date    HGBA1C 5.70 (H) 03/13/2022       Lab Results   Component Value Date    GLUCOSE 91 03/13/2022    BUN 35 (H) 03/13/2022    CREATININE 1.16 03/13/2022    EGFRIFNONA 41 (L) 07/08/2021    BCR 30.2 (H) 03/13/2022    K 4.4 03/13/2022    CO2 27.0 03/13/2022    CALCIUM 9.1 03/13/2022    ALBUMIN 4.00 03/02/2022    AST 17 03/02/2022    ALT 12 03/02/2022       WBC   Date Value Ref Range Status   03/13/2022 10.77 3.40 - 10.80 10*3/mm3 Final     RBC   Date Value Ref Range Status   03/13/2022 4.83 4.14 - 5.80 10*6/mm3 Final     Hemoglobin   Date Value Ref Range Status   03/13/2022 15.2 13.0 - 17.7 g/dL Final     Hematocrit   Date Value Ref Range Status   03/13/2022 47.9 37.5 - 51.0 % Final     MCV   Date Value  Ref Range Status   03/13/2022 99.2 (H) 79.0 - 97.0 fL Final     MCH   Date Value Ref Range Status   03/13/2022 31.5 26.6 - 33.0 pg Final     MCHC   Date Value Ref Range Status   03/13/2022 31.7 31.5 - 35.7 g/dL Final     RDW   Date Value Ref Range Status   03/13/2022 15.0 12.3 - 15.4 % Final     RDW-SD   Date Value Ref Range Status   03/13/2022 55.3 (H) 37.0 - 54.0 fl Final     MPV   Date Value Ref Range Status   03/13/2022 10.7 6.0 - 12.0 fL Final     Platelets   Date Value Ref Range Status   03/13/2022 213 140 - 450 10*3/mm3 Final         Acute ischemic left MCA stroke (HCC)      Assessment/Plan   Assessment/Plan:     89[facility]    Agitation/restlessness  Anxiety  Dyspnea  Pain, nos  Constipation  Congestion  EOLC    Lasix 20mg q6h --- changed to q8h    Continue Keppra 250mg BID for now    Ativan 0.5mg q6h ---- now q4h    Morphine 2mg q6h --- now q4h    Palliative oral rinse    Eye gtts    Scop patch    Prns for comfort    Transfer to [facility] please    Coordinated care with s4 Nursing, N Nursing, and Hospice IDT    Discussion with loving dtr and granddtr regarding end of life s/s and symptom mgmt. Support provided. Hospice contact information shared.     Discharge disposition: EOLC    Total Visit Time: > 65min  Face to Face Time: 30min    Justification for care:  Patient meets criteria for acute in-patient care with required nursing assessment and interventions for symptoms with IV medications.      Gill Hwang, JULIO, MHA, APRN  [facility] Navigators  Hospice and Palliative Care Nurse Practitioner  03/14/22  12:44 EDT

## 2022-03-14 NOTE — PROGRESS NOTES
[facility] Cardiothoracic Surgery In-Patient Progress Note     LOS: 12 days       Subjective  Code stroke was called over the weekend    Objective    Vital Signs  Temp:  [97.5 °F (36.4 °C)-97.6 °F (36.4 °C)] 97.5 °F (36.4 °C)  Heart Rate:  [56-67] 60  Resp:  [18-24] 18  BP: (127-152)/(58-74) 127/74    Physical Exam   General Appearance: Sleeping; unable to answer questions; opens eyes  to voice; not following commands   Lungs: Decreased lung sounds bilaterally. No adventitious breath sounds  noted.   Heart: Grade II-III systolic murmur     Results     Results from last 7 days   Lab Units 03/13/22  0755   WBC 10*3/mm3 10.77   HEMOGLOBIN g/dL 15.2   HEMATOCRIT % 47.9   PLATELETS 10*3/mm3 213     Results from last 7 days   Lab Units 03/13/22  0753   SODIUM mmol/L 143   POTASSIUM mmol/L 4.4   CHLORIDE mmol/L 106   CO2 mmol/L 27.0   BUN mg/dL 35*   CREATININE mg/dL 1.16   GLUCOSE mg/dL 91   CALCIUM mg/dL 9.1       Imaging Results (Last 24 Hours)     ** No results found for the last 24 hours. 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

## 2022-03-14 NOTE — CONSULTS
Order noted for diabetes education per stroke protocol. A1c 5.7%. No history of diabetes noted per chart review. Please re consult if needed. Thank you.

## 2022-03-14 NOTE — PROGRESS NOTES
Continued Stay Note  [facility]     Patient Name: Ash Diez  MRN: [id number]  Today's Date: 3/14/2022    Admit Date: 3/14/2022     Discharge Plan     Row Name 03/14/22 1351       Plan    Plan IPU admission    N/A Provider List Comment Patient admitted into inpatient hospice today. POC formulated with daughter and new orders received. Dr. MARICEL Multani aware of admission    Final Discharge Disposition Code 51 - hospice medical facility               Discharge Codes    No documentation.                     Radha Byrnes RN

## 2022-03-15 NOTE — PLAN OF CARE
Goal Outcome Evaluation:Comfort & safety maintained this shift.  One PRN dose of medication given today.  Family up to date on plan of care and goals of care.  Continue to monitor.

## 2022-03-15 NOTE — PLAN OF CARE
Problem: Adult Inpatient Plan of Care  Goal: Optimal Comfort and Wellbeing  Intervention: Provide Person-Centered Care  Recent Flowsheet Documentation  Taken 3/14/2022 2145 by Selene Arshad, RN  Trust Relationship/Rapport:   care explained   reassurance provided   thoughts/feelings acknowledged  Taken 3/14/2022 1933 by Selene Arshad, RN  Trust Relationship/Rapport:   care explained   reassurance provided   thoughts/feelings acknowledged   Goal Outcome Evaluation:   Pt resting with bouts of intermittent apnea and tachypnea (PRN meds given). Turned Q6hr after pain medication administration. Will CTM and provide care.

## 2022-03-15 NOTE — PROGRESS NOTES
Continued Stay Note   [facility]     Patient Name: Ash Diez  MRN: [id number]  Today's Date: 3/15/2022    Admit Date: 3/14/2022     Discharge Plan     Row Name 03/15/22 1237       Plan    Plan IPU assessment    N/A Provider List Comment  3/15 PPS 10 % Pt is unresponsive but comfortable. Nephsridevi Al at bedside. Pt is shallow breathing with long apnea spells 10-20 sec. Skin remains warm, no mottling noted. Crespo drained 700 cc, BM 3/14. PRN Haldol 1 mg x 1, Toradol 15 mg x 1, Ativan 0.25 mg x 1, Ativan 0.5 mg x 1, Morp 2 mg x 1, Morp 4 mg x 1 / 24 hr given for pain, anxiety and fever  .               Discharge Codes    No documentation.                     Radha Byrnes RN

## 2022-03-15 NOTE — PROGRESS NOTES
Hospice Progress Note    Patient Name: Ash Diez   : 10/4/1928  Gender: male    Code Status: comfort measures    Date of Admission: 3/14/2022    Subjective:    89yoM admitted in Hospice 3/14 for acute ischemic Left MCA CVA    Overnight notes reviewed: Pt resting with bouts of intermittent apnea and tachypnea (PRN meds given). Turned Q6hr after pain medication administration. Will CTM and provide care.    This morning pt uncomfortable initially but much more comfortable with medication changes (prior to medication changes was requiring medications q2h).  Family at bedside.     - PRNs:  Morphine 2mg x1 ()  Ativan 0.5mg x1 ()  Haldol 1mg x1 ()  Morphine 4mg x1 ()    - Held: none    - Intake/Output  *PO: NPO  *Urine: 700ml  *LBM: 3/12      ROS:  Review of Systems   Unable to perform ROS: Acuity of condition       Reviewed current scheduled and prn medications for route, type, dose and frequency.     •  acetaminophen  •  bisacodyl  •  furosemide  •  glycopyrrolate  •  haloperidol lactate  •  ketorolac  •  LORazepam  •  LORazepam  •  [DISCONTINUED] Morphine **OR** Morphine  •  ondansetron  •  palliative care oral rinse  •  polyvinyl alcohol  •  Scopolamine    Objective:   Resp 14      PPS: 10%    Physical Exam:  Constitutional:       Comments: More comfortable with medication changes; not as tense today; relaxed  HENT:      Head: Normocephalic.      Mouth/Throat:      Pharynx: Oropharynx is clear.   Eyes:      Comments: Eyes closed   Cardiovascular:      Rate and Rhythm: Normal rate and regular rhythm.   Pulmonary:      Comments: CTA; no audible congestion; reg respiration rate after medication changes.   Abdominal:      Palpations: Abdomen is soft.      Comments: Hypoactive BSx4   Musculoskeletal:      Cervical back: Neck supple.      Right lower leg: No edema.      Left lower leg: No edema.   Skin:     General: Skin is dry.      Comments: Olive skin tone with darker areas around eyes    Neurological:      Comments: Does not follow commands   Psychiatric:      Comments:no facial grimacing; no moaning       Acute ischemic left MCA stroke (HCC)      Assessment/Plan:     89yoM admitted inpt Hospice 3/14 for acute ischemic Left MCA CVA     Agitation/restlessness  Anxiety  Dyspnea  Pain, nos  Constipation  Congestion  EOLC    Dul supp    Haldol 2mg q8h    Increase morphine 4mg q4h    Continue plan of care    Monitor for changing needs    Prns reviewed/adjusted for comfort    Coordinated care with Nursing and Hospice IDT    Discussion at bedside with family    Discharge Disposition: EOLC    Total Visit Time: 45min  Face to Face Time: 25min    Justification for care:  Patient meets criteria for acute in-patient care with required nursing assessment and interventions for symptoms with IV medications.      Gill Hwang, JULIO, MHA, APRN  [facility] Navigators  Hospice and Palliative Care Nurse Practitioner  03/15/22  10:32 EDT

## 2022-03-15 NOTE — PROGRESS NOTES
Clinical Nutrition       Patient Name: Ash Diez  YOB: 1928  MRN: [id number]  Date of Encounter: 03/15/22 08:30 EDT  Admission date: 3/14/2022      Reason for Visit   Identified at risk by screening criteria, transfer      EMR  Reviewed   Yes         Reported/Observed/Food/Nutrition Related - Comments     Pt admitted with Acute ischemic left MCA CVA. Pt condition declining, family has opted for Hospice/Comfort measures. Pt is NPO     Current Nutrition Prescription     NPO Diet  No active supplement orders      Average Intake from Charting: N/A      Actions     Care plan reviewed, Hospice, comfort measures    RD will sign off, please consult as needed.      Ruthann Jara RD,   Time Spent: 15 min

## 2022-03-16 NOTE — PROGRESS NOTES
Continued Stay Note  [facility]     Patient Name: Ash Diez  MRN: [id number]  Today's Date: 3/16/2022    Admit Date: 3/14/2022     Discharge Plan     Row Name 03/16/22 1336       Plan    Plan Inpatient hospice    Plan Comments Visit to bedside. Present are patient and daughter. Patient with relaxed face, jaw, body posture. Noted daughter reports patient with labored respirations earlier, RN requests prn medication from Danuta VALERA.  Noted.  Patient required no prn medications last shift.  Rn provides for support, open communication.  This patient continues to meet inpatient hospice criteria due to continued requirements of injectable medications for palliation of symptoms, he is declining with current level of care unable to be provided in alternate setting.                                    Oneyda Robles RN

## 2022-03-16 NOTE — PLAN OF CARE
Goal Outcome Evaluation:  Plan of Care Reviewed With: patient        Progress: declining  Outcome Evaluation: Family at bedside, supportive of POC. Pt resting comfortably. No PRN's have been given at this time. Will continue hospice care.

## 2022-03-16 NOTE — PROGRESS NOTES
Hospice Progress Note    Patient Name: Ash Diez   : 10/4/1928  Gender: male    Code Status: comfort measures    Date of Admission: 3/14/2022    Subjective:    89yoM admitted in Hospice 3/14 for acute ischemic Left MCA CVA    Overnight notes reviewed: Family at bedside, supportive of POC. Pt resting comfortably. No PRN's have been given at this time. Will continue hospice care.    Comfortable at visit; undisturbed by exam. Family at bedside.     - PRNs: none    - Held: Keppra    - Intake/Output  *PO: NPO  *Urine: 700ml. 1700ml  *LBM: 3/12, receiving dul supp with tonight being dose #2 out of 3      ROS:  Review of Systems   Unable to perform ROS: Acuity of condition       Reviewed current scheduled and prn medications for route, type, dose and frequency.     •  acetaminophen  •  bisacodyl  •  furosemide  •  glycopyrrolate  •  haloperidol lactate  •  ketorolac  •  LORazepam  •  LORazepam  •  [DISCONTINUED] Morphine **OR** Morphine  •  ondansetron  •  palliative care oral rinse  •  polyvinyl alcohol  •  Scopolamine    Objective:   Resp 14      PPS: 10%    Physical Exam:  Constitutional:       Comments: Comfortable during visit; undisturbed by exam  HENT:      Head: Normocephalic.      Mouth/Throat:      Pharynx: Oropharynx is clear.   Eyes:      Comments: Eyes closed   Cardiovascular:      Rate and Rhythm: Normal rate and regular rhythm.   Pulmonary:      Comments: CTA; no audible congestion; respirations nonlabored   Abdominal:      Palpations: Abdomen is soft.      Comments: Hypoactive BSx4   Musculoskeletal:      Cervical back: Neck supple.      Right lower leg: No edema.      Left lower leg: No edema.   Skin:     General: Skin is dry.      Comments: Olive skin tone with darker areas around eyes (more noticeable today)  Neurological:      Comments: Does not follow commands   Psychiatric:      Comments:no facial grimacing; no moaning         Acute ischemic left MCA stroke (HCC)      Assessment/Plan:     89yoM  admitted in Hospice 3/14 for acute ischemic Left MCA CVA     Agitation/restlessness  Anxiety  Dyspnea  Pain, nos  Constipation  Congestion  EOLC    Discontinue Keppra -- been held since admission    Continue:    dul supp    Lasix 20mg q8h    Haldol 2mg q8h    Ativan 0.5mg q4h    Morphine 4mg q4h    Scheduled and prn palliative oral rinse    Scheduled and prn eye gtts    Scop patch    Coordinated care with Nursing and Hospice IDT     Discussion at bedside with family    Discharge Disposition: EOLC    Total Visit Time: 40min  Face to Face Time: 25min    Justification for care:  Patient meets criteria for acute in-patient care with required nursing assessment and interventions for symptoms with IV medications.      Gill Hwang, JULIO, MHA, APRN  [facility] Care Navigators  Hospice and Palliative Care Nurse Practitioner  03/16/22  11:38 EDT

## 2022-03-17 NOTE — PROGRESS NOTES
Continued Stay Note   [facility]     Patient Name: Ash Diez  MRN: [id number]  Today's Date: 3/17/2022    Admit Date: 3/14/2022     Discharge Plan     Row Name 03/17/22 0943       Plan    Plan Inpatient hospice    Plan Comments Patient resting peacefully with eyes closed.  Face, jaw, and body relaxed and breathing even and unlabored.  Nephew at the bedside.    Final Discharge Disposition Code 51 - hospice medical facility               Discharge Codes    No documentation.                     Brittany Quiros RN

## 2022-03-17 NOTE — PLAN OF CARE
Goal Outcome Evaluation:  Plan of Care Reviewed With: patient, family        Progress: declining  Outcome Evaluation: Pt remaned somnolent through out the shift.He showed episodes of apnea/cheyne torres.Comfort measures maintaned.

## 2022-03-17 NOTE — PROGRESS NOTES
Hospice Progress Note    Patient Name: Ash Diez   : 10/4/1928  Gender: male    Code Status: comfort measures    Date of Admission: 3/14/2022    Subjective:    89yoM admitted in Hospice 3/14 for acute ischemic Left MCA CVA    Overnight notes reviewed: Pt remaned somnolent through out the shift.He showed episodes of apnea/cheyne torres.Comfort measures maintaned.    Pt remained comfortable upon visit; undisturbed by exam. Dtr at bedside.     - PRNs:  Toradol 15mg x2 (1800, 1052)  Ativan 1mg x1 (1800)  Morphine 4mg x2 (1800, 1429)    - Held: none      ROS:  Review of Systems   Unable to perform ROS: Acuity of condition       Reviewed current scheduled and prn medications for route, type, dose and frequency.     •  acetaminophen  •  bisacodyl  •  furosemide  •  glycopyrrolate  •  haloperidol lactate  •  ketorolac  •  LORazepam  •  LORazepam  •  [DISCONTINUED] Morphine **OR** Morphine  •  ondansetron  •  palliative care oral rinse  •  polyvinyl alcohol  •  Scopolamine    Objective:   Resp 14      PPS: 10%    Physical Exam:  Constitutional:       Comments: Comfortable during visit; undisturbed by exam  HENT:      Head: Normocephalic.      Mouth/Throat:      Pharynx: Oropharynx is clear.   Eyes:      Comments: Eyes closed   Cardiovascular:      Rate and Rhythm: Normal rate and regular rhythm.   Pulmonary:      Comments: CTA; no audible congestion; respirations tachypneic at times; apnea noted  Abdominal:      Palpations: Abdomen is soft.      Comments: Hypoactive BSx4   Musculoskeletal:      Cervical back: Neck supple.      Right lower leg: No edema.      Left lower leg: No edema.   Skin:     General: Skin is dry.      Comments: Olive skin tone with darker areas around eyes (contrast continues to be more noticeable daily)  Neurological:      Comments: Does not follow commands   Psychiatric:      Comments: no facial grimacing; no moaning         Acute ischemic left MCA stroke (HCC)      Assessment/Plan:     89yoM  admitted in Hospice 3/14 for acute ischemic Left MCA CVA     Agitation/restlessness  Anxiety  Dyspnea  Pain, nos  Constipation  Congestion  EOLC    Increase scheduled ativan to 1mg     Continue plan of care     Monitor for changing needs     Prns reviewed/adjusted for comfort    Coordinated care with Nursing and Hospice IDT     Discussion at bedside with dtr; support provided. She has Hospice contact information.    Discharge Disposition: EOLC    Total Visit Time: 40min  Face to Face Time: 25min    Justification for care:  Patient meets criteria for acute in-patient care with required nursing assessment and interventions for symptoms with IV medications.      Gill Hwang, DNP, MHA, APRN  [facility] Care Navigators  Hospice and Palliative Care Nurse Practitioner  03/17/22  14:36 EDT

## 2022-03-18 NOTE — SIGNIFICANT NOTE
Exam confirms with auscultation zero audible heart tones and zero audible respirations. Mr.Sam Diez was pronounced dead at 0234.  MD notified by Patient's RN.    Gregoria Boyd RN  Clinical House Supervisor  3/18/2022 03:24 EDT

## 2022-03-21 NOTE — DISCHARGE SUMMARY
Date of Death: 3/18/2022  Time of Death:  0234    Presenting Problem/History of Present Illness    Acute ischemic left MCA stroke (HCC)      Hospital Course    Per Hospitalist Discharge Summary:     Ash Diez is a 89 y.o. male PMH HTN, AAA, HLD, diastolic heart failure, failure heart disease admitted for shortness of breath secondary to acute on chronic heart failure.  Patient improved with diuresis.  Initially consider TAVR for severe aortic stenosis, but patient condition declined on 3/12/2022 when patient had acute ischemic CVA, left MCA territory.  Due to declining condition, family has decided for comfort care with hospice services, DNR/DNI.      [end of copied text]     Mr. Diez was admitted to in Hospice on 3/14/2022 for mgmt of acute symptoms 2/2 acute ischemic Left MCA CVA.        Social History:  Social History     Tobacco Use   • Smoking status: Never Smoker   • Smokeless tobacco: Never Used   Substance Use Topics   • Alcohol use: Not Currently     Comment: social         Consults:   Consults     Date and Time Order Name Status Description    3/13/2022  8:57 AM Inpatient Palliative Care MD Consult Completed     3/12/2022 12:39 PM Inpatient Neurology Consult Stroke Completed     3/3/2022 12:38 AM Inpatient Cardiology Consult Completed         Exam confirms with auscultation zero audible heart tones and zero audible respirations. Mr.Sam Diez was pronounced dead at 0234.  MD notified by Patient's RN.     Gregoria Boyd, RN  Clinical House Supervisor  3/18/2022 03:24 EDT      Gill Hwang, JULIO, MHA, APRN  [facility]  Hospice and Palliative Care Nurse Practitioner  03/21/22  12:47 EDT
